# Patient Record
Sex: MALE | Race: WHITE | Employment: PART TIME | URBAN - METROPOLITAN AREA
[De-identification: names, ages, dates, MRNs, and addresses within clinical notes are randomized per-mention and may not be internally consistent; named-entity substitution may affect disease eponyms.]

---

## 2020-07-01 ENCOUNTER — HOSPITAL ENCOUNTER (OUTPATIENT)
Dept: GENERAL RADIOLOGY | Age: 65
Discharge: HOME | End: 2020-07-01
Payer: COMMERCIAL

## 2020-07-01 ENCOUNTER — HOSPITAL ENCOUNTER (OUTPATIENT)
Age: 65
Discharge: HOME | End: 2020-07-01
Payer: COMMERCIAL

## 2020-07-01 PROCEDURE — 72040 X-RAY EXAM NECK SPINE 2-3 VW: CPT

## 2020-11-11 ENCOUNTER — HOSPITAL ENCOUNTER (OUTPATIENT)
Dept: NON INVASIVE DIAGNOSTICS | Age: 65
Discharge: HOME | DRG: 191 | End: 2020-11-11
Payer: MEDICAID

## 2020-11-11 ENCOUNTER — OFFICE VISIT (OUTPATIENT)
Dept: CARDIOLOGY CLINIC | Age: 65
End: 2020-11-11
Payer: MEDICAID

## 2020-11-11 VITALS
WEIGHT: 252 LBS | HEIGHT: 68 IN | HEART RATE: 80 BPM | SYSTOLIC BLOOD PRESSURE: 138 MMHG | BODY MASS INDEX: 38.19 KG/M2 | DIASTOLIC BLOOD PRESSURE: 78 MMHG

## 2020-11-11 PROBLEM — I20.0 UNSTABLE ANGINA (HCC): Status: ACTIVE | Noted: 2020-11-11

## 2020-11-11 PROBLEM — R07.9 EXERTIONAL CHEST PAIN: Status: ACTIVE | Noted: 2020-11-11

## 2020-11-11 PROBLEM — R94.39 ABNORMAL STRESS TEST: Status: ACTIVE | Noted: 2020-11-11

## 2020-11-11 LAB
LV EF: 48 %
LVEF MODALITY: NORMAL

## 2020-11-11 PROCEDURE — 93017 CV STRESS TEST TRACING ONLY: CPT | Performed by: INTERNAL MEDICINE

## 2020-11-11 PROCEDURE — 99205 OFFICE O/P NEW HI 60 MIN: CPT | Performed by: INTERNAL MEDICINE

## 2020-11-11 PROCEDURE — 6360000002 HC RX W HCPCS: Performed by: INTERNAL MEDICINE

## 2020-11-11 PROCEDURE — 78452 HT MUSCLE IMAGE SPECT MULT: CPT | Performed by: INTERNAL MEDICINE

## 2020-11-11 PROCEDURE — A9502 TC99M TETROFOSMIN: HCPCS | Performed by: INTERNAL MEDICINE

## 2020-11-11 PROCEDURE — 3430000000 HC RX DIAGNOSTIC RADIOPHARMACEUTICAL: Performed by: INTERNAL MEDICINE

## 2020-11-11 RX ORDER — ASPIRIN 81 MG/1
81 TABLET, CHEWABLE ORAL DAILY
Status: ON HOLD | COMMUNITY
End: 2020-11-20 | Stop reason: HOSPADM

## 2020-11-11 RX ORDER — ACETAMINOPHEN AND CODEINE PHOSPHATE 300; 60 MG/1; MG/1
1 TABLET ORAL EVERY 8 HOURS PRN
Status: ON HOLD | COMMUNITY
End: 2020-11-22 | Stop reason: HOSPADM

## 2020-11-11 RX ORDER — TAMSULOSIN HYDROCHLORIDE 0.4 MG/1
0.4 CAPSULE ORAL DAILY
COMMUNITY

## 2020-11-11 RX ADMIN — REGADENOSON 0.4 MG: 0.08 INJECTION, SOLUTION INTRAVENOUS at 11:06

## 2020-11-11 RX ADMIN — TETROFOSMIN 10 MILLICURIE: 1.38 INJECTION, POWDER, LYOPHILIZED, FOR SOLUTION INTRAVENOUS at 10:14

## 2020-11-11 RX ADMIN — TETROFOSMIN 30 MILLICURIE: 1.38 INJECTION, POWDER, LYOPHILIZED, FOR SOLUTION INTRAVENOUS at 12:14

## 2020-11-11 NOTE — PROGRESS NOTES
daily  Patient taking differently: Take 50 mg by mouth daily  Yes Lorenzo Montoya MD       [x] Medications and dosages reviewed. ROS:  [x]Full ROS obtained and negative except as mentioned in HPI      Physical Examination:    Vitals:    11/11/20 1320   BP: 138/78   Site: Left Upper Arm   Position: Sitting   Cuff Size: Medium Adult   Pulse: 80   Weight: 252 lb (114.3 kg)   Height: 5' 8\" (1.727 m)        · GENERAL: Well developed, well nourished, No acute distress  · NEUROLOGICAL: Alert and oriented  · PSYCH: Calm affect  · SKIN: Warm and dry, No visible rash,   · EYES: Pupils equal and round, Sclera non-icteric,   · HENT:  External ears and nose unremarkable, mucus membranes moist  · MUSCULOSKELETAL: Normal cephalic, neck supple  · CAROTID: Normal upstroke, no bruits  · CARDIAC: JVP normal, Normal PMI, regular rate and rhythm, normal S1S2, no murmur, rub, or gallop  · RESPIRATORY: Normal respiratory effort, clear to auscultation bilaterally  · EXTREMITIES: No edema  · GASTROINTESTINAL: normal bowel sounds, soft, non-tender, No hepatomegaly     MYOVIEW  Summary     -There is a moderate sized, severe intensity, significantly reversible     inferior-lateral defect c/w ischemia and some scar.     -There is mild inferior-lateral hypokinesis with EF=48%.  -Moderate risk study           Assessment:     Abnormal Stress: Moderate area of ischemia. Needs cath possible PCI. Discussed risk/benefits. Pt agrees to proceed. Plan for tomorrow. Offered admission today but pt declines as he has to take care of his cat.     Chest pain/Unstable angina:  As above  Cath possible in am  On atenolol and ASA  Told to call 911 if any significant pain  Limit activity    HTN:  /78 (Site: Left Upper Arm, Position: Sitting, Cuff Size: Medium Adult)   Pulse 80   Ht 5' 8\" (1.727 m)   Wt 252 lb (114.3 kg)   BMI 38.32 kg/m²   Controlled      Plan:  Cath possible in am  Call 911 with any issues tonight  Discussed with pt and Dr. Mayela Damon via phone    Thank you for allowing me to participate in the care of this individual.      Esteban Payan M.D., [facility] [place]

## 2020-11-12 ENCOUNTER — APPOINTMENT (OUTPATIENT)
Dept: GENERAL RADIOLOGY | Age: 65
DRG: 191 | End: 2020-11-12
Attending: INTERNAL MEDICINE
Payer: MEDICAID

## 2020-11-12 ENCOUNTER — HOSPITAL ENCOUNTER (INPATIENT)
Dept: CARDIAC CATH/INVASIVE PROCEDURES | Age: 65
LOS: 1 days | Discharge: HOME | DRG: 191 | End: 2020-11-13
Attending: INTERNAL MEDICINE | Admitting: INTERNAL MEDICINE
Payer: MEDICAID

## 2020-11-12 PROBLEM — I25.83 CORONARY ARTERY DISEASE DUE TO LIPID RICH PLAQUE: Status: ACTIVE | Noted: 2020-11-12

## 2020-11-12 PROBLEM — I25.10 CORONARY ARTERY DISEASE DUE TO LIPID RICH PLAQUE: Status: ACTIVE | Noted: 2020-11-12

## 2020-11-12 LAB
ANION GAP SERPL CALCULATED.3IONS-SCNC: 11 MMOL/L (ref 3–16)
BASOPHILS ABSOLUTE: 0.1 K/UL (ref 0–0.2)
BASOPHILS RELATIVE PERCENT: 0.8 %
BILIRUBIN URINE: NEGATIVE
BLOOD, URINE: NEGATIVE
BUN BLDV-MCNC: 28 MG/DL (ref 7–20)
CALCIUM SERPL-MCNC: 9.7 MG/DL (ref 8.3–10.6)
CHLORIDE BLD-SCNC: 100 MMOL/L (ref 99–110)
CLARITY: CLEAR
CO2: 28 MMOL/L (ref 21–32)
COLOR: YELLOW
CREAT SERPL-MCNC: 1.1 MG/DL (ref 0.8–1.3)
EKG ATRIAL RATE: 77 BPM
EKG DIAGNOSIS: NORMAL
EKG P AXIS: 28 DEGREES
EKG P-R INTERVAL: 172 MS
EKG Q-T INTERVAL: 404 MS
EKG QRS DURATION: 106 MS
EKG QTC CALCULATION (BAZETT): 457 MS
EKG R AXIS: 28 DEGREES
EKG T AXIS: 4 DEGREES
EKG VENTRICULAR RATE: 77 BPM
EOSINOPHILS ABSOLUTE: 0.1 K/UL (ref 0–0.6)
EOSINOPHILS RELATIVE PERCENT: 1.9 %
GFR AFRICAN AMERICAN: >60
GFR NON-AFRICAN AMERICAN: >60
GLUCOSE BLD-MCNC: 154 MG/DL (ref 70–99)
GLUCOSE URINE: NEGATIVE MG/DL
HCT VFR BLD CALC: 43.7 % (ref 40.5–52.5)
HEMOGLOBIN: 15.1 G/DL (ref 13.5–17.5)
KETONES, URINE: NEGATIVE MG/DL
LEFT VENTRICULAR EJECTION FRACTION MODE: NORMAL
LEUKOCYTE ESTERASE, URINE: NEGATIVE
LV EF: 45 %
LV EF: 55 %
LVEF MODALITY: NORMAL
LYMPHOCYTES ABSOLUTE: 1.8 K/UL (ref 1–5.1)
LYMPHOCYTES RELATIVE PERCENT: 22.8 %
MCH RBC QN AUTO: 32.5 PG (ref 26–34)
MCHC RBC AUTO-ENTMCNC: 34.6 G/DL (ref 31–36)
MCV RBC AUTO: 93.8 FL (ref 80–100)
MICROSCOPIC EXAMINATION: NORMAL
MONOCYTES ABSOLUTE: 0.7 K/UL (ref 0–1.3)
MONOCYTES RELATIVE PERCENT: 9.5 %
NEUTROPHILS ABSOLUTE: 5 K/UL (ref 1.7–7.7)
NEUTROPHILS RELATIVE PERCENT: 65 %
NITRITE, URINE: NEGATIVE
PDW BLD-RTO: 12.5 % (ref 12.4–15.4)
PH UA: 5.5 (ref 5–8)
PLATELET # BLD: 232 K/UL (ref 135–450)
PMV BLD AUTO: 8.3 FL (ref 5–10.5)
POTASSIUM SERPL-SCNC: 3.2 MMOL/L (ref 3.5–5.1)
PROTEIN UA: NEGATIVE MG/DL
RBC # BLD: 4.66 M/UL (ref 4.2–5.9)
SODIUM BLD-SCNC: 139 MMOL/L (ref 136–145)
SPECIFIC GRAVITY UA: >1.03 (ref 1–1.03)
URINE TYPE: NORMAL
UROBILINOGEN, URINE: 0.2 E.U./DL
WBC # BLD: 7.7 K/UL (ref 4–11)

## 2020-11-12 PROCEDURE — 36415 COLL VENOUS BLD VENIPUNCTURE: CPT

## 2020-11-12 PROCEDURE — B2151ZZ FLUOROSCOPY OF LEFT HEART USING LOW OSMOLAR CONTRAST: ICD-10-PCS | Performed by: INTERNAL MEDICINE

## 2020-11-12 PROCEDURE — B2111ZZ FLUOROSCOPY OF MULTIPLE CORONARY ARTERIES USING LOW OSMOLAR CONTRAST: ICD-10-PCS | Performed by: INTERNAL MEDICINE

## 2020-11-12 PROCEDURE — 93306 TTE W/DOPPLER COMPLETE: CPT

## 2020-11-12 PROCEDURE — 6370000000 HC RX 637 (ALT 250 FOR IP): Performed by: INTERNAL MEDICINE

## 2020-11-12 PROCEDURE — U0003 INFECTIOUS AGENT DETECTION BY NUCLEIC ACID (DNA OR RNA); SEVERE ACUTE RESPIRATORY SYNDROME CORONAVIRUS 2 (SARS-COV-2) (CORONAVIRUS DISEASE [COVID-19]), AMPLIFIED PROBE TECHNIQUE, MAKING USE OF HIGH THROUGHPUT TECHNOLOGIES AS DESCRIBED BY CMS-2020-01-R: HCPCS

## 2020-11-12 PROCEDURE — 93458 L HRT ARTERY/VENTRICLE ANGIO: CPT | Performed by: INTERNAL MEDICINE

## 2020-11-12 PROCEDURE — 71045 X-RAY EXAM CHEST 1 VIEW: CPT

## 2020-11-12 PROCEDURE — 80048 BASIC METABOLIC PNL TOTAL CA: CPT

## 2020-11-12 PROCEDURE — 2140000000 HC CCU INTERMEDIATE R&B

## 2020-11-12 PROCEDURE — C1769 GUIDE WIRE: HCPCS

## 2020-11-12 PROCEDURE — 2500000003 HC RX 250 WO HCPCS

## 2020-11-12 PROCEDURE — 93880 EXTRACRANIAL BILAT STUDY: CPT

## 2020-11-12 PROCEDURE — 99152 MOD SED SAME PHYS/QHP 5/>YRS: CPT

## 2020-11-12 PROCEDURE — 81003 URINALYSIS AUTO W/O SCOPE: CPT

## 2020-11-12 PROCEDURE — 99153 MOD SED SAME PHYS/QHP EA: CPT

## 2020-11-12 PROCEDURE — 93005 ELECTROCARDIOGRAM TRACING: CPT | Performed by: INTERNAL MEDICINE

## 2020-11-12 PROCEDURE — 85025 COMPLETE CBC W/AUTO DIFF WBC: CPT

## 2020-11-12 PROCEDURE — 93458 L HRT ARTERY/VENTRICLE ANGIO: CPT

## 2020-11-12 PROCEDURE — 6360000004 HC RX CONTRAST MEDICATION: Performed by: INTERNAL MEDICINE

## 2020-11-12 PROCEDURE — 93010 ELECTROCARDIOGRAM REPORT: CPT | Performed by: INTERNAL MEDICINE

## 2020-11-12 PROCEDURE — APPSS60 APP SPLIT SHARED TIME 46-60 MINUTES: Performed by: NURSE PRACTITIONER

## 2020-11-12 PROCEDURE — 6360000002 HC RX W HCPCS

## 2020-11-12 PROCEDURE — 93971 EXTREMITY STUDY: CPT

## 2020-11-12 PROCEDURE — 2709999900 HC NON-CHARGEABLE SUPPLY

## 2020-11-12 PROCEDURE — 2580000003 HC RX 258: Performed by: INTERNAL MEDICINE

## 2020-11-12 PROCEDURE — U0002 COVID-19 LAB TEST NON-CDC: HCPCS

## 2020-11-12 PROCEDURE — 4A023N7 MEASUREMENT OF CARDIAC SAMPLING AND PRESSURE, LEFT HEART, PERCUTANEOUS APPROACH: ICD-10-PCS | Performed by: INTERNAL MEDICINE

## 2020-11-12 PROCEDURE — 94010 BREATHING CAPACITY TEST: CPT

## 2020-11-12 PROCEDURE — C1894 INTRO/SHEATH, NON-LASER: HCPCS

## 2020-11-12 RX ORDER — ONDANSETRON 2 MG/ML
4 INJECTION INTRAMUSCULAR; INTRAVENOUS EVERY 6 HOURS PRN
Status: DISCONTINUED | OUTPATIENT
Start: 2020-11-12 | End: 2020-11-13 | Stop reason: HOSPADM

## 2020-11-12 RX ORDER — SODIUM CHLORIDE 0.9 % (FLUSH) 0.9 %
10 SYRINGE (ML) INJECTION EVERY 12 HOURS SCHEDULED
Status: DISCONTINUED | OUTPATIENT
Start: 2020-11-12 | End: 2020-11-13 | Stop reason: HOSPADM

## 2020-11-12 RX ORDER — TAMSULOSIN HYDROCHLORIDE 0.4 MG/1
0.4 CAPSULE ORAL DAILY
Status: DISCONTINUED | OUTPATIENT
Start: 2020-11-12 | End: 2020-11-13 | Stop reason: HOSPADM

## 2020-11-12 RX ORDER — ACETAMINOPHEN 325 MG/1
650 TABLET ORAL EVERY 4 HOURS PRN
Status: DISCONTINUED | OUTPATIENT
Start: 2020-11-12 | End: 2020-11-13 | Stop reason: HOSPADM

## 2020-11-12 RX ORDER — ASPIRIN 81 MG/1
81 TABLET, CHEWABLE ORAL DAILY
Status: DISCONTINUED | OUTPATIENT
Start: 2020-11-13 | End: 2020-11-13 | Stop reason: HOSPADM

## 2020-11-12 RX ORDER — METOPROLOL TARTRATE 50 MG/1
50 TABLET, FILM COATED ORAL 2 TIMES DAILY
Status: DISCONTINUED | OUTPATIENT
Start: 2020-11-12 | End: 2020-11-13 | Stop reason: HOSPADM

## 2020-11-12 RX ORDER — SODIUM CHLORIDE 0.9 % (FLUSH) 0.9 %
10 SYRINGE (ML) INJECTION PRN
Status: DISCONTINUED | OUTPATIENT
Start: 2020-11-12 | End: 2020-11-13 | Stop reason: HOSPADM

## 2020-11-12 RX ORDER — RANOLAZINE 500 MG/1
500 TABLET, EXTENDED RELEASE ORAL 2 TIMES DAILY
Status: DISCONTINUED | OUTPATIENT
Start: 2020-11-12 | End: 2020-11-13 | Stop reason: HOSPADM

## 2020-11-12 RX ORDER — ATORVASTATIN CALCIUM 80 MG/1
80 TABLET, FILM COATED ORAL NIGHTLY
Status: DISCONTINUED | OUTPATIENT
Start: 2020-11-12 | End: 2020-11-13 | Stop reason: HOSPADM

## 2020-11-12 RX ADMIN — IOPAMIDOL 79 ML: 755 INJECTION, SOLUTION INTRAVENOUS at 09:36

## 2020-11-12 RX ADMIN — RANOLAZINE 500 MG: 500 TABLET, EXTENDED RELEASE ORAL at 20:02

## 2020-11-12 RX ADMIN — RANOLAZINE 500 MG: 500 TABLET, EXTENDED RELEASE ORAL at 14:00

## 2020-11-12 RX ADMIN — ATORVASTATIN CALCIUM 80 MG: 80 TABLET, FILM COATED ORAL at 20:02

## 2020-11-12 RX ADMIN — NITROGLYCERIN 0.5 INCH: 20 OINTMENT TOPICAL at 13:00

## 2020-11-12 RX ADMIN — NITROGLYCERIN 0.5 INCH: 20 OINTMENT TOPICAL at 17:35

## 2020-11-12 RX ADMIN — Medication 10 ML: at 20:03

## 2020-11-12 ASSESSMENT — PAIN SCALES - GENERAL
PAINLEVEL_OUTOF10: 0

## 2020-11-12 NOTE — BRIEF OP NOTE
Cardiac Cath 11/12/2020:  Access: RRA  Ultrasound: Ultrasound guidance used to determine after mentioned artery patency, size (> 2 mm), anatomic variations and ideal puncture location. Real-time ultrasound utilized concurrent with vascular needle entry into the artery. Images permanently recorded and reported in the patient chart. Hemostasis: TR band  Conscious sedation start time: 0905  Conscious sedation stop time: 0931  Versed: 4 mg  Fentanyl: 150 mcg  Bleeding risk: Low  LVEDP: 22 mmHg  AO: 101/59 mmHg  Estimated blood loss: Less than 25 mL  Contrast: 79 mL  Fluoroscopy time: 2.5 min. Anatomy:   LM-30% distal  LAD-70% mid, 100% distal with left to left collaterals  D1-80% proximal  Cx-100% proximal with left to left collaterals  OM1-100% proximal with left to left collaterals  RCA-100% proximal with right to right and left-to-right collaterals  LVEF-55%    Impression:  1. Severe three-vessel CAD with extensive collateralization. 2.  Preserved LV systolic function with LVEF of 55%. Plan:  1. Recommend CV surgical evaluation for CABG.

## 2020-11-12 NOTE — CONSULTS
Department of Cardiovascular & Thoracic Surgery   Consultation          PCP: Camilla Vaz MD    Referring Physician: Ofe Peña MD    DIAGNOSIS:  CAD    CHIEF COMPLAINT:  Chest pain     History Obtained From:  Patient and electronic medical record     HISTORY OF PRESENT ILLNESS:      The patient is a 59 y.o. male with significant past medical history of hypertension, former smoker, and family history of heart disease. Patient has been having chest pain for two years. He has been seen by dr. Padmaja Lambert. On 11/11 patient had a stress test with moderate inferior lateral ischemia. He underwent a cardiac angiogram today showed multi vessel CAD. We were consulted to evaluate for CABG. Patient is in stable condition. Past Medical History:        Diagnosis Date    Anxiety     Cervical disc herniation     HTN (hypertension) 5/9/2013    Neck sprain     Post traumatic stress disorder     Rotator cuff (capsule) sprain and strain     Sprain of shoulder, left     Sprain of shoulder, right        Past Surgical History:        Procedure Laterality Date    ANKLE SURGERY Right     around 2000     Home medications:  Prior to Admission medications    Medication Sig Start Date End Date Taking? Authorizing Provider   tamsulosin (FLOMAX) 0.4 MG capsule Take 0.4 mg by mouth daily   Yes Historical Provider, MD   acetaminophen-codeine (TYLENOL/CODEINE #4) 300-60 MG per tablet Take 1 tablet by mouth every 8 hours as needed for Pain. Yes Historical Provider, MD   aspirin 81 MG chewable tablet Take 81 mg by mouth daily   Yes Historical Provider, MD   atenolol (TENORMIN) 100 MG tablet Take 1 tablet by mouth daily  Patient taking differently: Take 50 mg by mouth daily  5/18/15  Yes Camilla Vaz MD       Medications:   No current facility-administered medications for this encounter. Allergies:  Patient has no known allergies.     Social History:    TOBACCO: former smoker   ETOH: reports no history of alcohol abuse   DRUGS: reports no history of drug abuse   LIFESTYLE: active   MARITAL STATUS:   OCCUPATION:  [address]      Family History:        Problem Relation Age of Onset    Heart Disease Father        REVIEW OF SYSTEMS:    CONSTITUTIONAL: alert and oriented x 3, pleasant 59years old male   DERMATOLOGICAL: negative  NEUROLOGICAL:  negative  EYES:  negative  HEENT:  negative  RESPIRATORY:  negative  CARDIOVASCULAR:  negative  GASTROINTESTINAL:  negative  GENITO-URINARY: negative  ENDOCRINE: negative  MUSCULOSKELETAL:  negative  HEMATOLOGICAL AND LYMPHATIC: negative  IMMUNOLOGICAL: negative  PSYCHOLOGICAL: negative    PHYSICAL EXAM:    VITALS:  BP (!) 152/90   Ht 5' 8\" (1.727 m)   Wt 252 lb (114.3 kg)   BMI 38.32 kg/m²     Eyes:  lids and lashes normal, pupils equal and round, extra ocular muscles intact, sclera clear, conjunctiva normal    Head/ENT:  Normocephalic, atraumatic, no residual dentition, normal gums, & palate, oropharynx without erythema or exudates    Neck:  supple, symmetrical, trachea midline, no lymphadenopathy, no jugular venous distension, no carotid bruits and MASSES:  no masses    Lungs:  no increased work of breathing, good air exchange, no retractions and clear to auscultation, no crackles or wheezing, no palpable / percussible abnormalities    Cardiovascular:  regular rate and rhythm, S1, S2 normal, no murmur, click, rub or gallop, normal apical impulse and prominent apical impulse    Pulses:  Right dorsalis pedis 2, Left dorsalis pedis 2, Right posterior tibial 1, Left Posterior tibial 1, Right Femoral 2, Left Femoral 2, Right radial 2, and Left radial 2    Abdomen:  Soft, normal bowel sounds, non-tender, no hepatosplenomegaly, aorta likely normal and bruits absent    Musculoskeletal:  Back is straight and non-tender,  No CVAT, full ROM of upper and lower extremities. Extremities:   No clubbing, or cyanosis, or edema .   He does have a right ankle surgical incision from orthopedic surgery and he mentions that he has hardware (rods) in place. Skin: warm and normal turgor, no ulcers, infections, or rashes, no rashes    Neurological: awake, alert and oriented x 3, motor 5/5 bilateral upper and lower extremities, sensation grossly intact    Psychiatric: Mood and affect appear appropriate    DATA:    EKG: sinus rhythm  CBC:   Lab Results   Component Value Date    WBC 7.7 11/12/2020    RBC 4.66 11/12/2020    HGB 15.1 11/12/2020    HCT 43.7 11/12/2020    MCV 93.8 11/12/2020    MCH 32.5 11/12/2020    MCHC 34.6 11/12/2020    RDW 12.5 11/12/2020     11/12/2020    MPV 8.3 11/12/2020     BMP:    Lab Results   Component Value Date     11/12/2020    K 3.2 11/12/2020     11/12/2020    CO2 28 11/12/2020    BUN 28 11/12/2020    LABALBU 4.4 05/09/2013    CREATININE 1.1 11/12/2020    CALCIUM 9.7 11/12/2020    GFRAA >60 11/12/2020    GFRAA >60 05/09/2013    LABGLOM >60 11/12/2020    GLUCOSE 154 11/12/2020         Cardiac Angiogram: 11/12/2020  Summary:  LM-30% distal  LAD-70% mid, 100% distal with left to left collaterals  D1-80% proximal  Cx-100% proximal with left to left collaterals  OM1-100% proximal with left to left collaterals  RCA-100% proximal with right to right and left-to-right collaterals  LVEF-55%     Impression:  1. Severe three-vessel CAD with extensive collateralization. 2.  Preserved LV systolic function with LVEF of 55%.     [id number] Score for Atrial Fibrillation Stroke Risk   Risk   Factors  Component Value   C CHF No 0   H HTN Yes 1   A2 Age >= 76 No,  (62 y.o.) 0   D DM No 0   S2 Prior Stroke/TIA No 0   V Vascular Disease No 0   A Age 74-69 No,  (62 y.o.) 0   Sc Sex male 0    [id number]  Score  1   Score last updated 11/12/20 7:91 AM EST    Click here for a link to the UpToDate guideline \"Atrial Fibrillation: Anticoagulation therapy to prevent embolization    Disclaimer: Risk Score calculation is dependent on accuracy of patient problem list and past encounter diagnosis. STS Risk score:  Risk of Mortality: 0.519%   Renal Failure: 0.930%   Permanent Stroke: 0.493%   Prolonged Ventilation: 3.165%   DSW Infection: 0.300%   Reoperation: 1.086%   Morbidity or Mortality: 5.044%   Short Length of Stay: 65.204%   Long Length of Stay: 1.597%      ASSESSMENT AND PLAN:  54-year-old male who presents with worsening exertional shortness of breath and chest pain for which he underwent an abnormal stress test recently. That led to a cardiac cath earlier today which showed severe three-vessel coronary artery disease as outlined above. We are asked to evaluate the patient for consideration for surgical revascularization with CABG. Treatment options including not doing anything and the consequences of that versus medical therapy versus PCI versus CABG were discussed in extent with the patient. Best option is CABG. We will need to complete standard preoperative work-up and risk stratification. The patient wishes to be discharged home to arrange some personal matters before returning next week for surgery, possibly Monday or Tuesday. He lives alone so he he will need appoint one of his family members as medical POA and I instructed him to discuss with other family members the possibility of someone spending a few nights with him at home after his discharge from the hospital in the early postoperative phase.   He understands that with urgent CABG surgery he has risks including but not limited to bleeding that might require transfusions and/or reexploration, infection, irregular heartbeat, heart block that might require a permanent pacemaker, wound dehiscence, sternal dehiscence with possible need for reconstructive surgery and multiple surgical scars, chronic chest wall pain syndrome and/or chest wall numbness/tingling, brachial plexus and/or peripheral neuropathy, stroke with possible permanent deficit, respiratory failure with possible need for prolonged mechanical ventilation and/or tracheostomy, acute kidney injury with possible need for dialysis, intestinal organ malperfusion and possible limb ischemia and limb loss, postoperative low cardiac output syndrome that might require mechanical circulatory support, heart attack, bypass graft failure that might require another percutaneous intervention and/or repeat bypass surgery, and even death with an operation. Pertinent questions were asked and answered. The patient was counseled at length about the risks of rosemary Covid-19 during their perioperative period and any recovery window from their procedure. The patient was made aware that rosemary Covid-19  may worsen their prognosis for recovering from their procedure  and lend to a higher morbidity and/or mortality risk. All material risks, benefits, and reasonable alternatives including postponing the procedure were discussed. The patient does wish to proceed with the procedure at this time. Dr. Laney Runner, thank you very much for allowing us to participate in the care of this patient.

## 2020-11-12 NOTE — H&P
[facility]  Cardiac Consult     Referring Provider:  Boris Ziegler MD     Chief complaint:  Chest pain. History of Present Illness:  59 y.o. male seen in consultation at the request of Dr. Abrahan Dao for chest pain and abnormal stress. He says he has been having chest pain for 2 years. Worsening over the past 2 months and much worse over the past 2 months. Pain in the center f his chest. Exacerbated by exertion and improved by rest. PCP recently added atenolol and that has also help. He rubs Vicks on his chest at night. He had a stress myvoiew today with moderate inferior lateral ischemia/scar. Past Medical History:   has a past medical history of Anxiety, Cervical disc herniation, HTN (hypertension), Neck sprain, Post traumatic stress disorder, Rotator cuff (capsule) sprain and strain, Sprain of shoulder, left, and Sprain of shoulder, right. Surgical History:   has a past surgical history that includes Ankle surgery (Right). Social History:  Social History     Tobacco Use    Smoking status: Former Smoker     Packs/day: 0.50     Types: Cigarettes    Smokeless tobacco: Never Used   Substance Use Topics    Alcohol use: Yes     Alcohol/week: 0.8 standard drinks     Types: 1 Standard drinks or equivalent per week        Family History:  family history includes Heart Disease in his father. Allergies:  Patient has no known allergies. Home Medications:  Prior to Visit Medications    Medication Sig Taking? Authorizing Provider   tamsulosin (FLOMAX) 0.4 MG capsule Take 0.4 mg by mouth daily Yes Historical Provider, MD   acetaminophen-codeine (TYLENOL/CODEINE #4) 300-60 MG per tablet Take 1 tablet by mouth every 8 hours as needed for Pain.  Yes Historical Provider, MD   aspirin 81 MG chewable tablet Take 81 mg by mouth daily Yes Historical Provider, MD   atenolol (TENORMIN) 100 MG tablet Take 1 tablet by mouth daily  Patient taking differently: Take 50 mg by mouth daily  Yes Boris Ziegler MD       [x] Medications and dosages reviewed. ROS:  [x]Full ROS obtained and negative except as mentioned in HPI      Physical Examination:    Vitals:    11/12/20 0826   BP: (!) 152/90   Weight: 252 lb (114.3 kg)   Height: 5' 8\" (1.727 m)        · GENERAL: Well developed, well nourished, No acute distress  · NEUROLOGICAL: Alert and oriented  · PSYCH: Calm affect  · SKIN: Warm and dry, No visible rash,   · EYES: Pupils equal and round, Sclera non-icteric,   · HENT:  External ears and nose unremarkable, mucus membranes moist  · MUSCULOSKELETAL: Normal cephalic, neck supple  · CAROTID: Normal upstroke, no bruits  · CARDIAC: JVP normal, Normal PMI, regular rate and rhythm, normal S1S2, no murmur, rub, or gallop  · RESPIRATORY: Normal respiratory effort, clear to auscultation bilaterally  · EXTREMITIES: No edema  · GASTROINTESTINAL: normal bowel sounds, soft, non-tender, No hepatomegaly     MYOVIEW  Summary     -There is a moderate sized, severe intensity, significantly reversible     inferior-lateral defect c/w ischemia and some scar.     -There is mild inferior-lateral hypokinesis with EF=48%.  -Moderate risk study           Assessment:     Abnormal Stress: Moderate area of ischemia. Needs cath possible PCI. Discussed risk/benefits. Pt agrees to proceed. Plan for tomorrow. Offered admission today but pt declines as he has to take care of his cat. Chest pain/Unstable angina:  As above  Cath possible in am  On atenolol and ASA  Told to call 911 if any significant pain  Limit activity    HTN:  /78 (Site: Left Upper Arm, Position: Sitting, Cuff Size: Medium Adult)   Pulse 80   Ht 5' 8\" (1.727 m)   Wt 252 lb (114.3 kg)   BMI 38.32 kg/m²   Controlled      Plan:  Cath possible in am  Call 911 with any issues tonight  Discussed with pt and Dr. Roberto Kaye via phone    Thank you for allowing me to participate in the care of this individual.      Trevon Sotomayor M.D., [facility] [place]    Patient seen and examined.   Chart reviewed. No changes to H&P. Cardiac catheterization today. Dianne Hugger Karla Cockayne, MD

## 2020-11-12 NOTE — PRE SEDATION
Brief Pre-Op Note/Sedation Assessment      Macie Faust  1955  Cath/NONE      [id number]  9:06 AM    Planned Procedure: Cardiac Catheterization Procedure    Post Procedure Plan: Return to same level of care    Consent: I have discussed with the patient and/or the patient representative the indication, alternatives, and the possible risks and/or complications of the planned procedure and the anesthesia methods. The patient and/or patient representative appear to understand and agree to proceed. Chief Complaint: Chest Pain/Pressure  Anginal Equivalent      Indications for Cath Procedure:  New Onset Angina <= 2 months  Anginal Classification within 2 weeks:  CCS III - Symptoms with everyday living activities, i.e., moderate limitation  NYHA Heart Failure Class within 2 weeks: No symptoms  Is Cath Lab Visit Valve-related?: No  Surgical Risk: Low  Functional Type: >= 4 METS with symptoms    Anti- Anginal Meds within 2 weeks:   Yes: Beta Blockers and Aspirin    Stress or Imaging Studies Performed:  Stress Test with SPECT Result: Positive:  inferolateral Risk/Extent of Ischemia:  Intermediate     Vital Signs:  BP (!) 152/90   Ht 5' 8\" (1.727 m)   Wt 252 lb (114.3 kg)   BMI 38.32 kg/m²     Allergies:  No Known Allergies    Past Medical History:  Past Medical History:   Diagnosis Date    Anxiety     Cervical disc herniation     HTN (hypertension) 5/9/2013    Neck sprain     Post traumatic stress disorder     Rotator cuff (capsule) sprain and strain     Sprain of shoulder, left     Sprain of shoulder, right          Surgical History:  Past Surgical History:   Procedure Laterality Date    ANKLE SURGERY Right     around 2000         Medications:  No current facility-administered medications for this encounter. Pre-Sedation:    Pre-Sedation Documentation and Exam:  I have assessed the patient and agree with the H&P present on the chart.     Prior History of Anesthesia Complications: none    Modified Mallampati:  III (soft palate, base of uvula visible)    ASA Classification:  Class 2 - A normal healthy patient with mild systemic disease      Shelby Scale: Activity:  2 - Able to move 4 extremities voluntarily on command  Respiration:  2 - Able to breathe deeply and cough freely  Circulation:  2 - BP+/- 20mmHg of normal  Consciousness:  2 - Fully awake  Oxygen Saturation (color):  2 - Able to maintain oxygen saturation >92% on room air    Sedation/Anesthesia Plan:  Guard the patient's safety and welfare. Minimize physical discomfort and pain. Minimize negative psychological responses to treatment by providing sedation and analgesia and maximize the potential amnesia. Patient to meet pre-procedure discharge plan.     Medication Planned:  midazolam intravenously and fentanyl intravenously    Patient is an appropriate candidate for plan of sedation: yes      Electronically signed by Edith Mo MD on 11/12/2020 at 9:06 AM

## 2020-11-13 ENCOUNTER — APPOINTMENT (OUTPATIENT)
Dept: CT IMAGING | Age: 65
DRG: 191 | End: 2020-11-13
Attending: INTERNAL MEDICINE
Payer: MEDICAID

## 2020-11-13 ENCOUNTER — ANESTHESIA EVENT (OUTPATIENT)
Dept: OPERATING ROOM | Age: 65
DRG: 166 | End: 2020-11-13
Payer: MEDICAID

## 2020-11-13 VITALS
DIASTOLIC BLOOD PRESSURE: 91 MMHG | BODY MASS INDEX: 37.96 KG/M2 | RESPIRATION RATE: 16 BRPM | HEART RATE: 86 BPM | OXYGEN SATURATION: 94 % | TEMPERATURE: 97.1 F | HEIGHT: 68 IN | SYSTOLIC BLOOD PRESSURE: 142 MMHG | WEIGHT: 250.44 LBS

## 2020-11-13 LAB
A/G RATIO: 1.3 (ref 1.1–2.2)
ALBUMIN SERPL-MCNC: 3.6 G/DL (ref 3.4–5)
ALP BLD-CCNC: 71 U/L (ref 40–129)
ALT SERPL-CCNC: 17 U/L (ref 10–40)
ANION GAP SERPL CALCULATED.3IONS-SCNC: 10 MMOL/L (ref 3–16)
ANION GAP SERPL CALCULATED.3IONS-SCNC: 13 MMOL/L (ref 3–16)
AST SERPL-CCNC: 26 U/L (ref 15–37)
BILIRUB SERPL-MCNC: 0.9 MG/DL (ref 0–1)
BUN BLDV-MCNC: 24 MG/DL (ref 7–20)
BUN BLDV-MCNC: 25 MG/DL (ref 7–20)
CALCIUM SERPL-MCNC: 9 MG/DL (ref 8.3–10.6)
CALCIUM SERPL-MCNC: 9.4 MG/DL (ref 8.3–10.6)
CHLORIDE BLD-SCNC: 104 MMOL/L (ref 99–110)
CHLORIDE BLD-SCNC: 105 MMOL/L (ref 99–110)
CO2: 23 MMOL/L (ref 21–32)
CO2: 27 MMOL/L (ref 21–32)
CREAT SERPL-MCNC: 1.1 MG/DL (ref 0.8–1.3)
CREAT SERPL-MCNC: 1.2 MG/DL (ref 0.8–1.3)
GFR AFRICAN AMERICAN: >60
GFR AFRICAN AMERICAN: >60
GFR NON-AFRICAN AMERICAN: >60
GFR NON-AFRICAN AMERICAN: >60
GLOBULIN: 2.8 G/DL
GLUCOSE BLD-MCNC: 130 MG/DL (ref 70–99)
GLUCOSE BLD-MCNC: 134 MG/DL (ref 70–99)
HCT VFR BLD CALC: 42 % (ref 40.5–52.5)
HEMOGLOBIN: 14.3 G/DL (ref 13.5–17.5)
MCH RBC QN AUTO: 32.1 PG (ref 26–34)
MCHC RBC AUTO-ENTMCNC: 34.2 G/DL (ref 31–36)
MCV RBC AUTO: 93.9 FL (ref 80–100)
PDW BLD-RTO: 12.4 % (ref 12.4–15.4)
PLATELET # BLD: 225 K/UL (ref 135–450)
PMV BLD AUTO: 8.3 FL (ref 5–10.5)
POTASSIUM SERPL-SCNC: 3.3 MMOL/L (ref 3.5–5.1)
POTASSIUM SERPL-SCNC: 3.4 MMOL/L (ref 3.5–5.1)
RBC # BLD: 4.47 M/UL (ref 4.2–5.9)
SARS-COV-2, PCR: NOT DETECTED
SODIUM BLD-SCNC: 140 MMOL/L (ref 136–145)
SODIUM BLD-SCNC: 142 MMOL/L (ref 136–145)
TOTAL PROTEIN: 6.4 G/DL (ref 6.4–8.2)
WBC # BLD: 8.2 K/UL (ref 4–11)

## 2020-11-13 PROCEDURE — 80053 COMPREHEN METABOLIC PANEL: CPT

## 2020-11-13 PROCEDURE — 85027 COMPLETE CBC AUTOMATED: CPT

## 2020-11-13 PROCEDURE — 6370000000 HC RX 637 (ALT 250 FOR IP): Performed by: INTERNAL MEDICINE

## 2020-11-13 PROCEDURE — 71250 CT THORAX DX C-: CPT

## 2020-11-13 PROCEDURE — 83036 HEMOGLOBIN GLYCOSYLATED A1C: CPT

## 2020-11-13 PROCEDURE — APPSS30 APP SPLIT SHARED TIME 16-30 MINUTES: Performed by: NURSE PRACTITIONER

## 2020-11-13 PROCEDURE — 2580000003 HC RX 258: Performed by: INTERNAL MEDICINE

## 2020-11-13 PROCEDURE — 80061 LIPID PANEL: CPT

## 2020-11-13 PROCEDURE — 36415 COLL VENOUS BLD VENIPUNCTURE: CPT

## 2020-11-13 PROCEDURE — 99239 HOSP IP/OBS DSCHRG MGMT >30: CPT | Performed by: INTERNAL MEDICINE

## 2020-11-13 RX ORDER — ATORVASTATIN CALCIUM 80 MG/1
80 TABLET, FILM COATED ORAL NIGHTLY
Qty: 30 TABLET | Refills: 3 | Status: SHIPPED | OUTPATIENT
Start: 2020-11-13 | End: 2020-12-08 | Stop reason: SDUPTHER

## 2020-11-13 RX ORDER — ISOSORBIDE MONONITRATE 30 MG/1
30 TABLET, EXTENDED RELEASE ORAL DAILY
Qty: 30 TABLET | Refills: 3 | Status: ON HOLD
Start: 2020-11-13 | End: 2020-11-20 | Stop reason: HOSPADM

## 2020-11-13 RX ORDER — METOPROLOL TARTRATE 50 MG/1
50 TABLET, FILM COATED ORAL 2 TIMES DAILY
Qty: 60 TABLET | Refills: 3 | Status: ON HOLD
Start: 2020-11-13 | End: 2020-11-20 | Stop reason: HOSPADM

## 2020-11-13 RX ORDER — NITROGLYCERIN 0.4 MG/1
0.4 TABLET SUBLINGUAL EVERY 5 MIN PRN
Qty: 25 TABLET | Refills: 1 | Status: ON HOLD
Start: 2020-11-13 | End: 2020-11-20 | Stop reason: HOSPADM

## 2020-11-13 RX ADMIN — Medication 10 ML: at 08:44

## 2020-11-13 RX ADMIN — RANOLAZINE 500 MG: 500 TABLET, EXTENDED RELEASE ORAL at 08:44

## 2020-11-13 RX ADMIN — METOPROLOL TARTRATE 50 MG: 50 TABLET, FILM COATED ORAL at 08:44

## 2020-11-13 RX ADMIN — ASPIRIN 81 MG: 81 TABLET, CHEWABLE ORAL at 08:44

## 2020-11-13 RX ADMIN — TAMSULOSIN HYDROCHLORIDE 0.4 MG: 0.4 CAPSULE ORAL at 08:44

## 2020-11-13 ASSESSMENT — PAIN SCALES - GENERAL: PAINLEVEL_OUTOF10: 0

## 2020-11-13 NOTE — DISCHARGE SUMMARY
[address]    DISCHARGE SUMMARY      Patient ID:  Farhan Emerson  [id number] 59 y.o. 1955    Admit date: 11/12/2020    Discharge date: 11/13/2020     Admitting Physician: Alma Villaseñor MD     Discharge Physician: Alma Villaseñor     Admission Diagnoses: Abnormal stress test [R94.39]  Exertional chest pain [R07.9]  Unstable angina ([address] [I20.0]  Unstable angina ([address] [I20.0]    Discharge Diagnoses:   Patient Active Problem List   Diagnosis    BWC Rotator cuff (capsule) sprain and strain    BWC Neck sprain and strain    BWC Sprain and strain of unspecified site of shoulder and upper arm    Post traumatic stress disorder    Neck sprain    Sprain of shoulder, left    Sprain of shoulder, right    Rotator cuff (capsule) sprain and strain    HTN (hypertension)    Cervical disc herniation    Anxiety    Abnormal stress test    Exertional chest pain    Unstable angina ([address]    Coronary artery disease due to lipid rich plaque        Discharged Condition: fair    Hospital Course: Farhan Emerson was admitted 11/12/2020 for cardiac catheterization as part of evaluation of unstable angina. Cardiac catheterization 11/12/2020 reveals severe multivessel CAD with normal LV systolic function. He was admitted for further evaluation and treatment. He was seen by CV surgery with plans for CABG. Patient had 2D echo Doppler as well as carotid vein map and CT of the abdomen pelvis. He was discharged home today, 11/13/2020, due to the need to take care of several personal business items that he states that he had to do. He will return on Monday, 11/16/2020, for CABG.     Consults:  IP CONSULT TO CARDIOTHORACIC SURGERY  IP CONSULT TO CARDIOTHORACIC SURGERY    Significant Diagnostic Studies:   Echocardiography: abnormal and reviewed by myself  Stress test: Abnormal, suggestive of severe CAD not done    Labs:   Lab Results   Component Value Date    CREATININE 1.2 11/13/2020    CREATININE 1.1 11/13/2020    BUN 25 (H) 11/13/2020    BUN 24 (H) 11/13/2020     11/13/2020     11/13/2020    K 3.4 (L) 11/13/2020    K 3.3 (L) 11/13/2020     11/13/2020     11/13/2020    CO2 23 11/13/2020    CO2 27 11/13/2020      Lab Results   Component Value Date    WBC 8.2 11/13/2020    HGB 14.3 11/13/2020    HCT 42.0 11/13/2020    MCV 93.9 11/13/2020     11/13/2020    No results found for: INR, PROTIME No results found for: BNP    Disposition: home    Patient Instructions:   Discharge Medication List as of 11/13/2020  2:46 PM      START taking these medications    Details   atorvastatin (LIPITOR) 80 MG tablet Take 1 tablet by mouth nightly, Disp-30 tablet,R-3Normal      metoprolol tartrate (LOPRESSOR) 50 MG tablet Take 1 tablet by mouth 2 times daily, Disp-60 tablet,R-3Normal      nitroGLYCERIN (NITROSTAT) 0.4 MG SL tablet Place 1 tablet under the tongue every 5 minutes as needed for Chest pain, Disp-25 tablet,R-1Normal      isosorbide mononitrate (IMDUR) 30 MG extended release tablet Take 1 tablet by mouth daily, Disp-30 tablet,R-3Normal         CONTINUE these medications which have NOT CHANGED    Details   tamsulosin (FLOMAX) 0.4 MG capsule Take 0.4 mg by mouth dailyHistorical Med      acetaminophen-codeine (TYLENOL/CODEINE #4) 300-60 MG per tablet Take 1 tablet by mouth every 8 hours as needed for Pain. Historical Med      aspirin 81 MG chewable tablet Take 81 mg by mouth dailyHistorical Med         STOP taking these medications       atenolol (TENORMIN) 100 MG tablet Comments:   Reason for Stopping:               is on a beta-blocker  is not on an ace-i/ARB  is on a statin  is on antiplatelet therapy (aspirin)  is not on anticoagulant    Follow-up with CV surgery in 2 days.     Signed:  Aislinn Campbell, 11/13/2020, 5:04 PM

## 2020-11-13 NOTE — PROGRESS NOTES
CC: CAD, hypertension      Patient denies any acute events overnight. CV: NSR 60-70's, SBP: 100-120's- on Lopressor 50 BID PO  Pulm: Clear sat 97-99% on RA  Renal: K+ 3.3, Creat.  1.1  Heme: WBC: 8.2, H/H: 14.3, 42, PLT: 225      Plan:  CABG schedule next week per Dr. Yani Motley
CLINICAL PHARMACY NOTE: MEDS TO [address] Select Patient?: No  Total # of Prescriptions Filled: 4   The following medications were delivered to the patient:  · Nitroglycerin 0.4 subl  · Atorvastatin 40mg  · Metoprolol tartrate 50mg  · Isosorbide mononitrate ER 30mg  Total # of Interventions Completed: 0  Time Spent (min): 30    Additional Documentation:  Medications delivered- patient signed  Bandar Taylor
Discharge instructions reviewed with patient and family member. Patient and family verbalized understanding. All home medications have been reviewed, questions answered and patient voiced understanding. All medication side effects reviewed and patient and family verbalized understanding. pt is aware in case of chest pain to call 911. Pt aware to take beta blockers Monday morning prior to arriving for surgery. 2 bottles of Hibiclens given to pt. Patient given prescriptions, discharge instructions, and appointment times. Patient discharged to home with family via private car. Taken to lobby via wheelchair.
Patient brought over to CVU from cath lab and attached to CVU monitoring. Report reviewed with cath lab RN. R radial site  Occlusive dressing dry and intact. Pedal pulses easily palpated.   Primary RN sandip
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 He does live alone and would need to make arrangements. He will stay overnight for further evaluation from CV surgery as well as additional work-up including 2D echo with Doppler and hopefully carotid vein mapping. Advised him that the safest place, in regards to his cardiac status, is in the hospital.  He however has not had rest discomfort yet. I have discussed that he is at increased risk for coronary event going home. We will wait further decision-making and the patient tomorrow. We will add Ranexa and nitroglycerin paste. Change atenolol to metoprolol.
Pt discharged per wheelchair with mask on and pt [id number].
Resting in bed, assessment completed. Denies needs will monitor.
estimated   at 45%. -Inferoseptal, inferior, and inferolateral hypokinesis noted. -There is trivial tricuspid regurgitation with a RVSP estimation of 13 mmHg. -Grade I diastolic dysfunction with normal LV filling pressures. Avg.   E/e'=8.4    Cardiac Cath 11/12/2020:  Access: RRA  Contrast: 79 mL  Fluoroscopy time: 2.5 min.     Anatomy:   LM-30% distal  LAD-70% mid, 100% distal with left to left collaterals  D1-80% proximal  Cx-100% proximal with left to left collaterals  OM1-100% proximal with left to left collaterals  RCA-100% proximal with right to right and left-to-right collaterals  LVEF-55%     Impression:  1. Severe three-vessel CAD with extensive collateralization. 2.  Preserved LV systolic function with LVEF of 55%.     Plan:  1. Recommend CV surgical evaluation for CABG. Assessment/Plan:  Principal Problem:    Unstable angina (HCC)  Plan: Secondary to severe multivessel CAD. CABG planned for Monday. Active Problems:    Abnormal stress test  Plan: Secondary to severe multivessel CAD. Coronary artery disease due to lipid rich plaque  Plan: Severe multivessel CAD. Revascularization planned by CABG      HTN (hypertension)  Plan: Stable. Patient lives alone and has limited support. He states that he will need to be discharged to be able to take care of some important business. Current CABG is planned for Monday. CV surgery has ordered a CT of the abdomen and pelvis which will be done today and then hopeful discharge afterwards.         Anny Whittington MD 11/13/2020 1:43 PM

## 2020-11-14 LAB
CHOLESTEROL, TOTAL: 200 MG/DL (ref 0–199)
ESTIMATED AVERAGE GLUCOSE: 145.6 MG/DL
HBA1C MFR BLD: 6.7 %
HDLC SERPL-MCNC: 35 MG/DL (ref 40–60)
LDL CHOLESTEROL CALCULATED: 120 MG/DL
TRIGL SERPL-MCNC: 225 MG/DL (ref 0–150)
VLDLC SERPL CALC-MCNC: 45 MG/DL

## 2020-11-14 NOTE — ANESTHESIA PRE PROCEDURE
Department of Anesthesiology  Preprocedure Note       Name:  Gigi Garnica   Age:  59 y.o.  :  1955                                          MRN:  [id number]         Date:  2020      Surgeon: Donna Roldan):  Spike Viera MD    Procedure: Procedure(s):  CABG CORONARY ARTERY BYPASS GRAFTING WITH LEFT ATRIAL APPENDAGE CLIP    Medications prior to admission:   Prior to Admission medications    Medication Sig Start Date End Date Taking? Authorizing Provider   atorvastatin (LIPITOR) 80 MG tablet Take 1 tablet by mouth nightly 20   Kirk Tierney MD   metoprolol tartrate (LOPRESSOR) 50 MG tablet Take 1 tablet by mouth 2 times daily 20   Kirk Tierney MD   nitroGLYCERIN (NITROSTAT) 0.4 MG SL tablet Place 1 tablet under the tongue every 5 minutes as needed for Chest pain 20   Kirk Tierney MD   isosorbide mononitrate (IMDUR) 30 MG extended release tablet Take 1 tablet by mouth daily 20   Kirk Tierney MD   tamsulosin (FLOMAX) 0.4 MG capsule Take 0.4 mg by mouth daily    Historical Provider, MD   acetaminophen-codeine (TYLENOL/CODEINE #4) 300-60 MG per tablet Take 1 tablet by mouth every 8 hours as needed for Pain. Historical Provider, MD   aspirin 81 MG chewable tablet Take 81 mg by mouth daily    Historical Provider, MD       Current medications:    No current facility-administered medications for this encounter.       Current Outpatient Medications   Medication Sig Dispense Refill    atorvastatin (LIPITOR) 80 MG tablet Take 1 tablet by mouth nightly 30 tablet 3    metoprolol tartrate (LOPRESSOR) 50 MG tablet Take 1 tablet by mouth 2 times daily 60 tablet 3    nitroGLYCERIN (NITROSTAT) 0.4 MG SL tablet Place 1 tablet under the tongue every 5 minutes as needed for Chest pain 25 tablet 1    isosorbide mononitrate (IMDUR) 30 MG extended release tablet Take 1 tablet by mouth daily 30 tablet 3    tamsulosin (FLOMAX) 0.4 MG capsule Take 0.4 mg by mouth daily      acetaminophen-codeine (TYLENOL/CODEINE #4) 300-60 MG per tablet Take 1 tablet by mouth every 8 hours as needed for Pain.  aspirin 81 MG chewable tablet Take 81 mg by mouth daily         Allergies:  No Known Allergies    Problem List:    Patient Active Problem List   Diagnosis Code    [facility] Rotator cuff (capsule) sprain and strain S43.429A    [facility] Neck sprain and strain S13. 9XXA    [facility] Sprain and strain of unspecified site of shoulder and upper arm [id number]    Post traumatic stress disorder F43.10    Neck sprain S13. 9XXA    Sprain of shoulder, left S42.46A    Sprain of shoulder, right S43.401A    Rotator cuff (capsule) sprain and strain [id number]    HTN (hypertension) I10    Cervical disc herniation M50.20    Anxiety F41.9    Abnormal stress test R94.39    Exertional chest pain R07.9    Unstable angina (HCC) I20.0    Coronary artery disease due to lipid rich plaque I25.10, I25.83       Past Medical History:        Diagnosis Date    Anxiety     Cervical disc herniation     Coronary artery disease due to lipid rich plaque 11/12/2020    HTN (hypertension) 5/9/2013    Neck sprain     Post traumatic stress disorder     Rotator cuff (capsule) sprain and strain     Sprain of shoulder, left     Sprain of shoulder, right        Past Surgical History:        Procedure Laterality Date    ANKLE SURGERY Right     around 2000       Social History:    Social History     Tobacco Use    Smoking status: Former Smoker     Packs/day: 0.50     Types: Cigarettes    Smokeless tobacco: Never Used   Substance Use Topics    Alcohol use: Yes     Alcohol/week: 0.8 standard drinks     Types: 1 Standard drinks or equivalent per week                                Counseling given: Not Answered      Vital Signs (Current): There were no vitals filed for this visit.                                            BP Readings from Last 3 Encounters:   11/13/20 (!) 142/91   11/11/20 138/78   05/18/15 120/90       NPO Status: BMI:   Wt Readings from Last 3 Encounters:   11/13/20 250 lb 7.1 oz (113.6 kg)   11/11/20 252 lb (114.3 kg)   05/18/15 248 lb (112.5 kg)     There is no height or weight on file to calculate BMI.    CBC:   Lab Results   Component Value Date    WBC 8.2 11/13/2020    RBC 4.47 11/13/2020    HGB 14.3 11/13/2020    HCT 42.0 11/13/2020    MCV 93.9 11/13/2020    RDW 12.4 11/13/2020     11/13/2020       CMP:   Lab Results   Component Value Date     11/13/2020     11/13/2020    K 3.4 11/13/2020    K 3.3 11/13/2020     11/13/2020     11/13/2020    CO2 23 11/13/2020    CO2 27 11/13/2020    BUN 25 11/13/2020    BUN 24 11/13/2020    CREATININE 1.2 11/13/2020    CREATININE 1.1 11/13/2020    GFRAA >60 11/13/2020    GFRAA >60 11/13/2020    GFRAA >60 05/09/2013    AGRATIO 1.3 11/13/2020    LABGLOM >60 11/13/2020    LABGLOM >60 11/13/2020    GLUCOSE 130 11/13/2020    GLUCOSE 134 11/13/2020    PROT 6.4 11/13/2020    PROT 7.0 01/10/2013    CALCIUM 9.4 11/13/2020    CALCIUM 9.0 11/13/2020    BILITOT 0.9 11/13/2020    ALKPHOS 71 11/13/2020    AST 26 11/13/2020    ALT 17 11/13/2020       POC Tests: No results for input(s): POCGLU, POCNA, POCK, POCCL, POCBUN, POCHEMO, POCHCT in the last 72 hours.     Coags: No results found for: PROTIME, INR, APTT    HCG (If Applicable): No results found for: PREGTESTUR, PREGSERUM, HCG, HCGQUANT     ABGs: No results found for: PHART, PO2ART, IDO9XOO, HBD6HWE, BEART, D2KTKHEO     Type & Screen (If Applicable):  No results found for: LABABO, LABRH    Drug/Infectious Status (If Applicable):  No results found for: HIV, HEPCAB    COVID-19 Screening (If Applicable):   Lab Results   Component Value Date    COVID19 Not Detected 11/12/2020         Anesthesia Evaluation  Patient summary reviewed and Nursing notes reviewed  Airway: Mallampati: II  TM distance: >3 FB   Neck ROM: full  Mouth opening: > = 3 FB Dental:          Pulmonary:                              Cardiovascular:  Exercise tolerance: poor (<4 METS),   (+) hypertension: moderate, angina: with exertion, CAD: obstructive,                ROS comment: Echo 2020  Mildly reduced global systolic function with an ejection fraction estimated   at 45%. -Inferoseptal, inferior, and inferolateral hypokinesis noted. -There is trivial tricuspid regurgitation with a RVSP estimation of 13 mmHg. -Grade I diastolic dysfunction with normal LV filling pressures. Avg.   E/e'=8.4    Cath 2020  LM-30% distal  LAD-70% mid, 100% distal with left to left collaterals  D1-80% proximal  Cx-100% proximal with left to left collaterals  OM1-100% proximal with left to left collaterals  RCA-100% proximal with right to right and left-to-right collaterals  LVEF-55%     Neuro/Psych:   (+) psychiatric history: stable with treatment            GI/Hepatic/Renal:             Endo/Other:                     Abdominal:           Vascular:                                        Anesthesia Plan      general     ASA 4 - emergent       Induction: intravenous and rapid sequence. MALATHI, PA catheter, central line and arterial line  MIPS: Postoperative opioids intended, Prophylactic antiemetics administered and Postoperative ventilation.                       Kati Gao MD   11/13/2020

## 2020-11-14 NOTE — PROCEDURES
[facility]                     [address] [facility], [address]                               PULMONARY FUNCTION    PATIENT NAME: Glenna Mead                  :        1955  MED REC NO:   [id number]                          ROOM:       2910  ACCOUNT NO:   [de-identified]                           ADMIT DATE: 2020  PROVIDER:     Claire Hutchinson MD    DATE OF PROCEDURE:  2020    Spirometry reveals normal FVC and FEV1.  FEV1/FVC ratio is normal.   Expiratory flow rates are normal.    IMPRESSION:  Normal spirometry; however, there was significant  variability between efforts. Interpreted with caution.         Stuart Solis MD    D: 2020 16:54:25       T: 2020 23:10:27     [id number]  Job#: [id number]     Doc#: [id number]    CC:

## 2020-11-16 ENCOUNTER — APPOINTMENT (OUTPATIENT)
Dept: GENERAL RADIOLOGY | Age: 65
DRG: 166 | End: 2020-11-16
Attending: THORACIC SURGERY (CARDIOTHORACIC VASCULAR SURGERY)
Payer: MEDICAID

## 2020-11-16 ENCOUNTER — ANESTHESIA (OUTPATIENT)
Dept: OPERATING ROOM | Age: 65
DRG: 166 | End: 2020-11-16
Payer: MEDICAID

## 2020-11-16 ENCOUNTER — HOSPITAL ENCOUNTER (INPATIENT)
Age: 65
LOS: 6 days | Discharge: HOME HEALTH | DRG: 166 | End: 2020-11-22
Attending: THORACIC SURGERY (CARDIOTHORACIC VASCULAR SURGERY) | Admitting: THORACIC SURGERY (CARDIOTHORACIC VASCULAR SURGERY)
Payer: MEDICAID

## 2020-11-16 VITALS — TEMPERATURE: 98.4 F | RESPIRATION RATE: 16 BRPM | OXYGEN SATURATION: 99 %

## 2020-11-16 PROBLEM — I25.10 CAD IN NATIVE ARTERY: Status: ACTIVE | Noted: 2020-11-16

## 2020-11-16 LAB
ABO/RH: NORMAL
ACTIVATED CLOTTING TIME: 100 SEC (ref 99–130)
ACTIVATED CLOTTING TIME: 106 SEC (ref 99–130)
ACTIVATED CLOTTING TIME: 436 SEC (ref 99–130)
ACTIVATED CLOTTING TIME: 450 SEC (ref 99–130)
ACTIVATED CLOTTING TIME: 459 SEC (ref 99–130)
ACTIVATED CLOTTING TIME: 493 SEC (ref 99–130)
ACTIVATED CLOTTING TIME: 572 SEC (ref 99–130)
ANION GAP SERPL CALCULATED.3IONS-SCNC: 10 MMOL/L (ref 3–16)
ANTIBODY SCREEN: NORMAL
APTT: 31.2 SEC (ref 24.2–36.2)
BASE EXCESS ARTERIAL: -1 (ref -3–3)
BASE EXCESS ARTERIAL: -2 (ref -3–3)
BASE EXCESS ARTERIAL: 0 (ref -3–3)
BASE EXCESS ARTERIAL: 0.9 MMOL/L (ref -3–3)
BASE EXCESS ARTERIAL: 1 (ref -3–3)
BASE EXCESS ARTERIAL: 2 (ref -3–3)
BASE EXCESS MIXED: -1
BASE EXCESS VENOUS: -1 (ref -3–3)
BUN BLDV-MCNC: 15 MG/DL (ref 7–20)
CALCIUM IONIZED: 1.11 MMOL/L (ref 1.12–1.32)
CALCIUM IONIZED: 1.13 MMOL/L (ref 1.12–1.32)
CALCIUM IONIZED: 1.14 MMOL/L (ref 1.12–1.32)
CALCIUM IONIZED: 1.14 MMOL/L (ref 1.12–1.32)
CALCIUM IONIZED: 1.19 MMOL/L (ref 1.12–1.32)
CALCIUM IONIZED: 1.2 MMOL/L (ref 1.12–1.32)
CALCIUM IONIZED: 1.23 MMOL/L (ref 1.12–1.32)
CALCIUM SERPL-MCNC: 8.1 MG/DL (ref 8.3–10.6)
CARBOXYHEMOGLOBIN ARTERIAL: 1.1 % (ref 0–1.5)
CHLORIDE BLD-SCNC: 112 MMOL/L (ref 99–110)
CO2: 23 MMOL/L (ref 21–32)
CREAT SERPL-MCNC: 1 MG/DL (ref 0.8–1.3)
GFR AFRICAN AMERICAN: >60
GFR NON-AFRICAN AMERICAN: >60
GLUCOSE BLD-MCNC: 106 MG/DL (ref 70–99)
GLUCOSE BLD-MCNC: 111 MG/DL (ref 70–99)
GLUCOSE BLD-MCNC: 123 MG/DL (ref 70–99)
GLUCOSE BLD-MCNC: 124 MG/DL (ref 70–99)
GLUCOSE BLD-MCNC: 124 MG/DL (ref 70–99)
GLUCOSE BLD-MCNC: 127 MG/DL (ref 70–99)
GLUCOSE BLD-MCNC: 130 MG/DL (ref 70–99)
GLUCOSE BLD-MCNC: 132 MG/DL (ref 70–99)
GLUCOSE BLD-MCNC: 134 MG/DL (ref 70–99)
GLUCOSE BLD-MCNC: 135 MG/DL (ref 70–99)
GLUCOSE BLD-MCNC: 136 MG/DL (ref 70–99)
GLUCOSE BLD-MCNC: 137 MG/DL (ref 70–99)
GLUCOSE BLD-MCNC: 138 MG/DL (ref 70–99)
GLUCOSE BLD-MCNC: 143 MG/DL (ref 70–99)
GLUCOSE BLD-MCNC: 147 MG/DL (ref 70–99)
GLUCOSE BLD-MCNC: 148 MG/DL (ref 70–99)
GLUCOSE BLD-MCNC: 152 MG/DL (ref 70–99)
GLUCOSE BLD-MCNC: 208 MG/DL (ref 70–99)
HCO3 ARTERIAL: 23.5 MMOL/L (ref 21–29)
HCO3 ARTERIAL: 23.7 MMOL/L (ref 21–29)
HCO3 ARTERIAL: 24.5 MMOL/L (ref 21–29)
HCO3 ARTERIAL: 24.9 MMOL/L (ref 21–29)
HCO3 ARTERIAL: 26 MMOL/L (ref 21–29)
HCO3 ARTERIAL: 26.1 MMOL/L (ref 21–29)
HCO3 ARTERIAL: 26.4 MMOL/L (ref 21–29)
HCO3 ARTERIAL: 26.5 MMOL/L (ref 21–29)
HCO3 ARTERIAL: 26.6 MMOL/L (ref 21–29)
HCO3 ARTERIAL: 27 MMOL/L (ref 21–29)
HCO3 VENOUS: 25.5 MMOL/L (ref 23–29)
HCO3, MIXED: 24.3 MMOL/L
HCT VFR BLD CALC: 33.5 % (ref 40.5–52.5)
HEMOGLOBIN, ART, EXTENDED: 14.2 G/DL (ref 13.5–17.5)
HEMOGLOBIN: 10.5 GM/DL (ref 13.5–17.5)
HEMOGLOBIN: 10.5 GM/DL (ref 13.5–17.5)
HEMOGLOBIN: 10.6 GM/DL (ref 13.5–17.5)
HEMOGLOBIN: 11.3 GM/DL (ref 13.5–17.5)
HEMOGLOBIN: 11.6 G/DL (ref 13.5–17.5)
HEMOGLOBIN: 11.7 GM/DL (ref 13.5–17.5)
HEMOGLOBIN: 12.4 GM/DL (ref 13.5–17.5)
HEMOGLOBIN: 8.6 GM/DL (ref 13.5–17.5)
HEMOGLOBIN: 9.4 GM/DL (ref 13.5–17.5)
HEMOGLOBIN: 9.5 GM/DL (ref 13.5–17.5)
HEMOGLOBIN: 9.6 GM/DL (ref 13.5–17.5)
HEMOGLOBIN: 9.7 GM/DL (ref 13.5–17.5)
INR BLD: 0.94 (ref 0.86–1.14)
LACTATE: 0.77 MMOL/L (ref 0.4–2)
LACTATE: 1.22 MMOL/L (ref 0.4–2)
LACTATE: 2.01 MMOL/L (ref 0.4–2)
LACTATE: 2.01 MMOL/L (ref 0.4–2)
LACTATE: 2.2 MMOL/L (ref 0.4–2)
LACTATE: 2.33 MMOL/L (ref 0.4–2)
LACTATE: 2.39 MMOL/L (ref 0.4–2)
LACTATE: 2.41 MMOL/L (ref 0.4–2)
MAGNESIUM: 2.7 MG/DL (ref 1.8–2.4)
MCH RBC QN AUTO: 32.8 PG (ref 26–34)
MCHC RBC AUTO-ENTMCNC: 34.7 G/DL (ref 31–36)
MCV RBC AUTO: 94.3 FL (ref 80–100)
METHEMOGLOBIN ARTERIAL: 0.1 %
O2 CONTENT ARTERIAL: 19 ML/DL
O2 SAT, ARTERIAL: 100 % (ref 93–100)
O2 SAT, ARTERIAL: 93 % (ref 93–100)
O2 SAT, ARTERIAL: 93 % (ref 93–100)
O2 SAT, ARTERIAL: 94 % (ref 93–100)
O2 SAT, ARTERIAL: 95.9 %
O2 SAT, ARTERIAL: 96 % (ref 93–100)
O2 SAT, ARTERIAL: 97 % (ref 93–100)
O2 SAT, MIXED: 60 %
O2 SAT, VEN: 77 %
O2 THERAPY: ABNORMAL
PCO2 ARTERIAL: 37.8 MM HG (ref 35–45)
PCO2 ARTERIAL: 38.5 MM HG (ref 35–45)
PCO2 ARTERIAL: 42.4 MMHG (ref 35–45)
PCO2 ARTERIAL: 43.2 MM HG (ref 35–45)
PCO2 ARTERIAL: 44.8 MM HG (ref 35–45)
PCO2 ARTERIAL: 44.8 MM HG (ref 35–45)
PCO2 ARTERIAL: 44.9 MM HG (ref 35–45)
PCO2 ARTERIAL: 46.2 MM HG (ref 35–45)
PCO2 ARTERIAL: 47.6 MM HG (ref 35–45)
PCO2 ARTERIAL: 47.7 MM HG (ref 35–45)
PCO2 ARTERIAL: 48.3 MM HG (ref 35–45)
PCO2 ARTERIAL: 50.2 MM HG (ref 35–45)
PCO2 MIXED: 43.7 MM HG
PCO2, VEN: 48.9 MM HG (ref 40–50)
PDW BLD-RTO: 12.1 % (ref 12.4–15.4)
PERFORMED ON: ABNORMAL
PH ARTERIAL: 7.3 (ref 7.35–7.45)
PH ARTERIAL: 7.32 (ref 7.35–7.45)
PH ARTERIAL: 7.34 (ref 7.35–7.45)
PH ARTERIAL: 7.35 (ref 7.35–7.45)
PH ARTERIAL: 7.36 (ref 7.35–7.45)
PH ARTERIAL: 7.37 (ref 7.35–7.45)
PH ARTERIAL: 7.38 (ref 7.35–7.45)
PH ARTERIAL: 7.39 (ref 7.35–7.45)
PH ARTERIAL: 7.39 (ref 7.35–7.45)
PH ARTERIAL: 7.4 (ref 7.35–7.45)
PH VENOUS: 7.33 (ref 7.35–7.45)
PH, MIXED: 7.35 (ref 7.35–7.45)
PLATELET # BLD: 163 K/UL (ref 135–450)
PMV BLD AUTO: 8.1 FL (ref 5–10.5)
PO2 ARTERIAL: 186.7 MM HG (ref 75–108)
PO2 ARTERIAL: 200.4 MM HG (ref 75–108)
PO2 ARTERIAL: 354.2 MM HG (ref 75–108)
PO2 ARTERIAL: 396.9 MM HG (ref 75–108)
PO2 ARTERIAL: 397.6 MM HG (ref 75–108)
PO2 ARTERIAL: 427.4 MM HG (ref 75–108)
PO2 ARTERIAL: 65.4 MM HG (ref 75–108)
PO2 ARTERIAL: 70 MM HG (ref 75–108)
PO2 ARTERIAL: 73.2 MM HG (ref 75–108)
PO2 ARTERIAL: 73.9 MMHG (ref 75–108)
PO2 ARTERIAL: 87.8 MM HG (ref 75–108)
PO2 ARTERIAL: 90.2 MM HG (ref 75–108)
PO2 MIXED: 33 MM HG
PO2, VEN: 45 MM HG
POC HEMATOCRIT: 25 % (ref 40.5–52.5)
POC HEMATOCRIT: 28 % (ref 40.5–52.5)
POC HEMATOCRIT: 29 % (ref 40.5–52.5)
POC HEMATOCRIT: 31 % (ref 40.5–52.5)
POC HEMATOCRIT: 33 % (ref 40.5–52.5)
POC HEMATOCRIT: 34 % (ref 40.5–52.5)
POC HEMATOCRIT: 36 % (ref 40.5–52.5)
POC PATIENT TEMP: 97
POC PATIENT TEMP: 99
POC PATIENT TEMP: 99.4
POC POTASSIUM: 3 MMOL/L (ref 3.5–5.1)
POC POTASSIUM: 3.1 MMOL/L (ref 3.5–5.1)
POC POTASSIUM: 3.2 MMOL/L (ref 3.5–5.1)
POC POTASSIUM: 3.3 MMOL/L (ref 3.5–5.1)
POC POTASSIUM: 3.4 MMOL/L (ref 3.5–5.1)
POC POTASSIUM: 3.5 MMOL/L (ref 3.5–5.1)
POC POTASSIUM: 3.5 MMOL/L (ref 3.5–5.1)
POC POTASSIUM: 3.7 MMOL/L (ref 3.5–5.1)
POC POTASSIUM: 4 MMOL/L (ref 3.5–5.1)
POC SAMPLE TYPE: ABNORMAL
POC SODIUM: 140 MMOL/L (ref 136–145)
POC SODIUM: 140 MMOL/L (ref 136–145)
POC SODIUM: 141 MMOL/L (ref 136–145)
POC SODIUM: 144 MMOL/L (ref 136–145)
POC SODIUM: 144 MMOL/L (ref 136–145)
POC SODIUM: 145 MMOL/L (ref 136–145)
POC SODIUM: 149 MMOL/L (ref 136–145)
POTASSIUM SERPL-SCNC: 3.3 MMOL/L (ref 3.5–5.1)
PROTHROMBIN TIME: 10.9 SEC (ref 10–13.2)
RBC # BLD: 3.55 M/UL (ref 4.2–5.9)
SODIUM BLD-SCNC: 145 MMOL/L (ref 136–145)
TCO2 ARTERIAL: 25 MMOL/L
TCO2 ARTERIAL: 25 MMOL/L
TCO2 ARTERIAL: 26 MMOL/L
TCO2 ARTERIAL: 27 MMOL/L
TCO2 ARTERIAL: 28 MMOL/L
TCO2 ARTERIAL: 61.2 MMOL/L
TCO2 CALC MIXED: 26 MMOL/L
TCO2 CALC VENOUS: 27 MMOL/L
WBC # BLD: 17.7 K/UL (ref 4–11)

## 2020-11-16 PROCEDURE — 33533 CABG ARTERIAL SINGLE: CPT | Performed by: PHYSICIAN ASSISTANT

## 2020-11-16 PROCEDURE — 02L70CK OCCLUSION OF LEFT ATRIAL APPENDAGE WITH EXTRALUMINAL DEVICE, OPEN APPROACH: ICD-10-PCS | Performed by: THORACIC SURGERY (CARDIOTHORACIC VASCULAR SURGERY)

## 2020-11-16 PROCEDURE — 3700000001 HC ADD 15 MINUTES (ANESTHESIA): Performed by: THORACIC SURGERY (CARDIOTHORACIC VASCULAR SURGERY)

## 2020-11-16 PROCEDURE — 6360000002 HC RX W HCPCS: Performed by: THORACIC SURGERY (CARDIOTHORACIC VASCULAR SURGERY)

## 2020-11-16 PROCEDURE — 85610 PROTHROMBIN TIME: CPT

## 2020-11-16 PROCEDURE — 33518 CABG ARTERY-VEIN TWO: CPT | Performed by: PHYSICIAN ASSISTANT

## 2020-11-16 PROCEDURE — P9045 ALBUMIN (HUMAN), 5%, 250 ML: HCPCS | Performed by: THORACIC SURGERY (CARDIOTHORACIC VASCULAR SURGERY)

## 2020-11-16 PROCEDURE — 84295 ASSAY OF SERUM SODIUM: CPT

## 2020-11-16 PROCEDURE — 2500000003 HC RX 250 WO HCPCS: Performed by: ANESTHESIOLOGY

## 2020-11-16 PROCEDURE — 02HV33Z INSERTION OF INFUSION DEVICE INTO SUPERIOR VENA CAVA, PERCUTANEOUS APPROACH: ICD-10-PCS | Performed by: ANESTHESIOLOGY

## 2020-11-16 PROCEDURE — 2500000003 HC RX 250 WO HCPCS: Performed by: THORACIC SURGERY (CARDIOTHORACIC VASCULAR SURGERY)

## 2020-11-16 PROCEDURE — 85014 HEMATOCRIT: CPT

## 2020-11-16 PROCEDURE — 2709999900 HC NON-CHARGEABLE SUPPLY: Performed by: THORACIC SURGERY (CARDIOTHORACIC VASCULAR SURGERY)

## 2020-11-16 PROCEDURE — 2580000003 HC RX 258: Performed by: THORACIC SURGERY (CARDIOTHORACIC VASCULAR SURGERY)

## 2020-11-16 PROCEDURE — C9290 INJ, BUPIVACAINE LIPOSOME: HCPCS | Performed by: THORACIC SURGERY (CARDIOTHORACIC VASCULAR SURGERY)

## 2020-11-16 PROCEDURE — 85730 THROMBOPLASTIN TIME PARTIAL: CPT

## 2020-11-16 PROCEDURE — 94750 HC PULMONARY COMPLIANCE STUDY: CPT

## 2020-11-16 PROCEDURE — 06BQ4ZZ EXCISION OF LEFT SAPHENOUS VEIN, PERCUTANEOUS ENDOSCOPIC APPROACH: ICD-10-PCS | Performed by: THORACIC SURGERY (CARDIOTHORACIC VASCULAR SURGERY)

## 2020-11-16 PROCEDURE — B24BZZ4 ULTRASONOGRAPHY OF HEART WITH AORTA, TRANSESOPHAGEAL: ICD-10-PCS | Performed by: ANESTHESIOLOGY

## 2020-11-16 PROCEDURE — C9113 INJ PANTOPRAZOLE SODIUM, VIA: HCPCS | Performed by: THORACIC SURGERY (CARDIOTHORACIC VASCULAR SURGERY)

## 2020-11-16 PROCEDURE — 82803 BLOOD GASES ANY COMBINATION: CPT

## 2020-11-16 PROCEDURE — C1729 CATH, DRAINAGE: HCPCS | Performed by: THORACIC SURGERY (CARDIOTHORACIC VASCULAR SURGERY)

## 2020-11-16 PROCEDURE — 2720000010 HC SURG SUPPLY STERILE: Performed by: THORACIC SURGERY (CARDIOTHORACIC VASCULAR SURGERY)

## 2020-11-16 PROCEDURE — 6370000000 HC RX 637 (ALT 250 FOR IP): Performed by: THORACIC SURGERY (CARDIOTHORACIC VASCULAR SURGERY)

## 2020-11-16 PROCEDURE — 2780000010 HC IMPLANT OTHER: Performed by: THORACIC SURGERY (CARDIOTHORACIC VASCULAR SURGERY)

## 2020-11-16 PROCEDURE — 94640 AIRWAY INHALATION TREATMENT: CPT

## 2020-11-16 PROCEDURE — 85347 COAGULATION TIME ACTIVATED: CPT

## 2020-11-16 PROCEDURE — 36415 COLL VENOUS BLD VENIPUNCTURE: CPT

## 2020-11-16 PROCEDURE — 021109W BYPASS CORONARY ARTERY, TWO ARTERIES FROM AORTA WITH AUTOLOGOUS VENOUS TISSUE, OPEN APPROACH: ICD-10-PCS | Performed by: THORACIC SURGERY (CARDIOTHORACIC VASCULAR SURGERY)

## 2020-11-16 PROCEDURE — 7100000011 HC PHASE II RECOVERY - ADDTL 15 MIN

## 2020-11-16 PROCEDURE — 7100000010 HC PHASE II RECOVERY - FIRST 15 MIN

## 2020-11-16 PROCEDURE — 02100Z9 BYPASS CORONARY ARTERY, ONE ARTERY FROM LEFT INTERNAL MAMMARY, OPEN APPROACH: ICD-10-PCS | Performed by: THORACIC SURGERY (CARDIOTHORACIC VASCULAR SURGERY)

## 2020-11-16 PROCEDURE — 6360000002 HC RX W HCPCS: Performed by: ANESTHESIOLOGY

## 2020-11-16 PROCEDURE — 2700000000 HC OXYGEN THERAPY PER DAY

## 2020-11-16 PROCEDURE — 85027 COMPLETE CBC AUTOMATED: CPT

## 2020-11-16 PROCEDURE — 86900 BLOOD TYPING SEROLOGIC ABO: CPT

## 2020-11-16 PROCEDURE — 2100000000 HC CCU R&B

## 2020-11-16 PROCEDURE — 94002 VENT MGMT INPAT INIT DAY: CPT

## 2020-11-16 PROCEDURE — 82947 ASSAY GLUCOSE BLOOD QUANT: CPT

## 2020-11-16 PROCEDURE — 33508 ENDOSCOPIC VEIN HARVEST: CPT | Performed by: THORACIC SURGERY (CARDIOTHORACIC VASCULAR SURGERY)

## 2020-11-16 PROCEDURE — C1751 CATH, INF, PER/CENT/MIDLINE: HCPCS | Performed by: THORACIC SURGERY (CARDIOTHORACIC VASCULAR SURGERY)

## 2020-11-16 PROCEDURE — C9248 INJ, CLEVIDIPINE BUTYRATE: HCPCS | Performed by: ANESTHESIOLOGY

## 2020-11-16 PROCEDURE — 83605 ASSAY OF LACTIC ACID: CPT

## 2020-11-16 PROCEDURE — 3700000000 HC ANESTHESIA ATTENDED CARE: Performed by: THORACIC SURGERY (CARDIOTHORACIC VASCULAR SURGERY)

## 2020-11-16 PROCEDURE — 3600000018 HC SURGERY OHS ADDTL 15MIN: Performed by: THORACIC SURGERY (CARDIOTHORACIC VASCULAR SURGERY)

## 2020-11-16 PROCEDURE — 83735 ASSAY OF MAGNESIUM: CPT

## 2020-11-16 PROCEDURE — 5A1221Z PERFORMANCE OF CARDIAC OUTPUT, CONTINUOUS: ICD-10-PCS | Performed by: THORACIC SURGERY (CARDIOTHORACIC VASCULAR SURGERY)

## 2020-11-16 PROCEDURE — 33533 CABG ARTERIAL SINGLE: CPT | Performed by: THORACIC SURGERY (CARDIOTHORACIC VASCULAR SURGERY)

## 2020-11-16 PROCEDURE — 86850 RBC ANTIBODY SCREEN: CPT

## 2020-11-16 PROCEDURE — 86923 COMPATIBILITY TEST ELECTRIC: CPT

## 2020-11-16 PROCEDURE — 94761 N-INVAS EAR/PLS OXIMETRY MLT: CPT

## 2020-11-16 PROCEDURE — 71045 X-RAY EXAM CHEST 1 VIEW: CPT

## 2020-11-16 PROCEDURE — 82330 ASSAY OF CALCIUM: CPT

## 2020-11-16 PROCEDURE — 86901 BLOOD TYPING SEROLOGIC RH(D): CPT

## 2020-11-16 PROCEDURE — 3600000008 HC SURGERY OHS BASE: Performed by: THORACIC SURGERY (CARDIOTHORACIC VASCULAR SURGERY)

## 2020-11-16 PROCEDURE — 84132 ASSAY OF SERUM POTASSIUM: CPT

## 2020-11-16 PROCEDURE — 33518 CABG ARTERY-VEIN TWO: CPT | Performed by: THORACIC SURGERY (CARDIOTHORACIC VASCULAR SURGERY)

## 2020-11-16 PROCEDURE — 2580000003 HC RX 258: Performed by: ANESTHESIOLOGY

## 2020-11-16 PROCEDURE — 80048 BASIC METABOLIC PNL TOTAL CA: CPT

## 2020-11-16 PROCEDURE — 5A1223Z PERFORMANCE OF CARDIAC PACING, CONTINUOUS: ICD-10-PCS | Performed by: THORACIC SURGERY (CARDIOTHORACIC VASCULAR SURGERY)

## 2020-11-16 RX ORDER — ACETAMINOPHEN 650 MG/1
650 SUPPOSITORY RECTAL EVERY 4 HOURS PRN
Status: DISCONTINUED | OUTPATIENT
Start: 2020-11-16 | End: 2020-11-17 | Stop reason: DRUGHIGH

## 2020-11-16 RX ORDER — ALBUTEROL SULFATE 90 UG/1
2 AEROSOL, METERED RESPIRATORY (INHALATION) 4 TIMES DAILY
Status: DISCONTINUED | OUTPATIENT
Start: 2020-11-16 | End: 2020-11-16

## 2020-11-16 RX ORDER — MILRINONE LACTATE 0.2 MG/ML
INJECTION, SOLUTION INTRAVENOUS CONTINUOUS PRN
Status: DISCONTINUED | OUTPATIENT
Start: 2020-11-16 | End: 2020-11-16 | Stop reason: SDUPTHER

## 2020-11-16 RX ORDER — MAGNESIUM SULFATE IN WATER 40 MG/ML
2 INJECTION, SOLUTION INTRAVENOUS PRN
Status: DISCONTINUED | OUTPATIENT
Start: 2020-11-16 | End: 2020-11-19

## 2020-11-16 RX ORDER — DEXTROSE MONOHYDRATE 25 G/50ML
12.5 INJECTION, SOLUTION INTRAVENOUS PRN
Status: DISCONTINUED | OUTPATIENT
Start: 2020-11-16 | End: 2020-11-19

## 2020-11-16 RX ORDER — FENTANYL CITRATE 50 UG/ML
INJECTION, SOLUTION INTRAMUSCULAR; INTRAVENOUS PRN
Status: DISCONTINUED | OUTPATIENT
Start: 2020-11-16 | End: 2020-11-16 | Stop reason: SDUPTHER

## 2020-11-16 RX ORDER — ASPIRIN 300 MG/1
150 SUPPOSITORY RECTAL DAILY
Status: DISCONTINUED | OUTPATIENT
Start: 2020-11-16 | End: 2020-11-17

## 2020-11-16 RX ORDER — MEPERIDINE HYDROCHLORIDE 50 MG/ML
25 INJECTION INTRAMUSCULAR; INTRAVENOUS; SUBCUTANEOUS
Status: DISPENSED | OUTPATIENT
Start: 2020-11-16 | End: 2020-11-16

## 2020-11-16 RX ORDER — MAGNESIUM HYDROXIDE 1200 MG/15ML
LIQUID ORAL CONTINUOUS PRN
Status: COMPLETED | OUTPATIENT
Start: 2020-11-16 | End: 2020-11-16

## 2020-11-16 RX ORDER — SODIUM CHLORIDE 9 MG/ML
INJECTION, SOLUTION INTRAVENOUS CONTINUOUS
Status: DISCONTINUED | OUTPATIENT
Start: 2020-11-16 | End: 2020-11-19

## 2020-11-16 RX ORDER — OXYCODONE HYDROCHLORIDE 5 MG/1
5 TABLET ORAL EVERY 4 HOURS PRN
Status: DISCONTINUED | OUTPATIENT
Start: 2020-11-16 | End: 2020-11-19

## 2020-11-16 RX ORDER — OXYCODONE HYDROCHLORIDE 5 MG/1
10 TABLET ORAL EVERY 4 HOURS PRN
Status: DISCONTINUED | OUTPATIENT
Start: 2020-11-16 | End: 2020-11-19

## 2020-11-16 RX ORDER — VECURONIUM BROMIDE 1 MG/ML
INJECTION, POWDER, LYOPHILIZED, FOR SOLUTION INTRAVENOUS PRN
Status: DISCONTINUED | OUTPATIENT
Start: 2020-11-16 | End: 2020-11-16 | Stop reason: SDUPTHER

## 2020-11-16 RX ORDER — CALCIUM CHLORIDE 100 MG/ML
INJECTION INTRAVENOUS; INTRAVENTRICULAR PRN
Status: DISCONTINUED | OUTPATIENT
Start: 2020-11-16 | End: 2020-11-16 | Stop reason: SDUPTHER

## 2020-11-16 RX ORDER — FUROSEMIDE 10 MG/ML
20 INJECTION INTRAMUSCULAR; INTRAVENOUS 2 TIMES DAILY
Status: DISCONTINUED | OUTPATIENT
Start: 2020-11-17 | End: 2020-11-19

## 2020-11-16 RX ORDER — ATORVASTATIN CALCIUM 40 MG/1
40 TABLET, FILM COATED ORAL NIGHTLY
Status: DISCONTINUED | OUTPATIENT
Start: 2020-11-17 | End: 2020-11-22 | Stop reason: HOSPADM

## 2020-11-16 RX ORDER — ALBUTEROL SULFATE 2.5 MG/3ML
2.5 SOLUTION RESPIRATORY (INHALATION) 4 TIMES DAILY
Status: DISCONTINUED | OUTPATIENT
Start: 2020-11-17 | End: 2020-11-22 | Stop reason: HOSPADM

## 2020-11-16 RX ORDER — 0.9 % SODIUM CHLORIDE 0.9 %
500 INTRAVENOUS SOLUTION INTRAVENOUS CONTINUOUS PRN
Status: DISCONTINUED | OUTPATIENT
Start: 2020-11-16 | End: 2020-11-19

## 2020-11-16 RX ORDER — MILRINONE LACTATE 0.2 MG/ML
0.38 INJECTION, SOLUTION INTRAVENOUS CONTINUOUS PRN
Status: DISCONTINUED | OUTPATIENT
Start: 2020-11-16 | End: 2020-11-19

## 2020-11-16 RX ORDER — SODIUM CHLORIDE 0.9 % (FLUSH) 0.9 %
10 SYRINGE (ML) INJECTION PRN
Status: DISCONTINUED | OUTPATIENT
Start: 2020-11-16 | End: 2020-11-22 | Stop reason: HOSPADM

## 2020-11-16 RX ORDER — HYDRALAZINE HYDROCHLORIDE 20 MG/ML
5 INJECTION INTRAMUSCULAR; INTRAVENOUS EVERY 5 MIN PRN
Status: DISCONTINUED | OUTPATIENT
Start: 2020-11-16 | End: 2020-11-17 | Stop reason: RX

## 2020-11-16 RX ORDER — HEPARIN SODIUM 10000 [USP'U]/ML
INJECTION, SOLUTION INTRAVENOUS; SUBCUTANEOUS PRN
Status: DISCONTINUED | OUTPATIENT
Start: 2020-11-16 | End: 2020-11-16 | Stop reason: SDUPTHER

## 2020-11-16 RX ORDER — DIPHENHYDRAMINE HCL 25 MG
25 TABLET ORAL NIGHTLY PRN
Status: DISCONTINUED | OUTPATIENT
Start: 2020-11-16 | End: 2020-11-19

## 2020-11-16 RX ORDER — MIDAZOLAM HYDROCHLORIDE 5 MG/ML
INJECTION INTRAMUSCULAR; INTRAVENOUS PRN
Status: DISCONTINUED | OUTPATIENT
Start: 2020-11-16 | End: 2020-11-16 | Stop reason: SDUPTHER

## 2020-11-16 RX ORDER — FUROSEMIDE 10 MG/ML
INJECTION INTRAMUSCULAR; INTRAVENOUS PRN
Status: DISCONTINUED | OUTPATIENT
Start: 2020-11-16 | End: 2020-11-16 | Stop reason: SDUPTHER

## 2020-11-16 RX ORDER — MIDAZOLAM HYDROCHLORIDE 1 MG/ML
1 INJECTION INTRAMUSCULAR; INTRAVENOUS
Status: DISCONTINUED | OUTPATIENT
Start: 2020-11-16 | End: 2020-11-17

## 2020-11-16 RX ORDER — ONDANSETRON 2 MG/ML
4 INJECTION INTRAMUSCULAR; INTRAVENOUS EVERY 8 HOURS PRN
Status: DISCONTINUED | OUTPATIENT
Start: 2020-11-16 | End: 2020-11-22 | Stop reason: HOSPADM

## 2020-11-16 RX ORDER — SODIUM CHLORIDE 0.9 % (FLUSH) 0.9 %
10 SYRINGE (ML) INJECTION EVERY 12 HOURS SCHEDULED
Status: DISCONTINUED | OUTPATIENT
Start: 2020-11-16 | End: 2020-11-22 | Stop reason: HOSPADM

## 2020-11-16 RX ORDER — SENNA AND DOCUSATE SODIUM 50; 8.6 MG/1; MG/1
1 TABLET, FILM COATED ORAL 2 TIMES DAILY
Status: DISCONTINUED | OUTPATIENT
Start: 2020-11-16 | End: 2020-11-22

## 2020-11-16 RX ORDER — INSULIN LISPRO 100 [IU]/ML
0-12 INJECTION, SOLUTION INTRAVENOUS; SUBCUTANEOUS
Status: DISCONTINUED | OUTPATIENT
Start: 2020-11-16 | End: 2020-11-22 | Stop reason: HOSPADM

## 2020-11-16 RX ORDER — PROTAMINE SULFATE 10 MG/ML
INJECTION, SOLUTION INTRAVENOUS PRN
Status: DISCONTINUED | OUTPATIENT
Start: 2020-11-16 | End: 2020-11-16 | Stop reason: SDUPTHER

## 2020-11-16 RX ORDER — FONDAPARINUX SODIUM 2.5 MG/.5ML
2.5 INJECTION SUBCUTANEOUS DAILY
Status: DISCONTINUED | OUTPATIENT
Start: 2020-11-17 | End: 2020-11-22 | Stop reason: HOSPADM

## 2020-11-16 RX ORDER — PANTOPRAZOLE SODIUM 40 MG/10ML
40 INJECTION, POWDER, LYOPHILIZED, FOR SOLUTION INTRAVENOUS ONCE
Status: COMPLETED | OUTPATIENT
Start: 2020-11-16 | End: 2020-11-16

## 2020-11-16 RX ORDER — NICOTINE POLACRILEX 4 MG
15 LOZENGE BUCCAL PRN
Status: DISCONTINUED | OUTPATIENT
Start: 2020-11-16 | End: 2020-11-22 | Stop reason: HOSPADM

## 2020-11-16 RX ORDER — PROTAMINE SULFATE 10 MG/ML
50 INJECTION, SOLUTION INTRAVENOUS
Status: ACTIVE | OUTPATIENT
Start: 2020-11-16 | End: 2020-11-16

## 2020-11-16 RX ORDER — LANOLIN ALCOHOL/MO/W.PET/CERES
400 CREAM (GRAM) TOPICAL 2 TIMES DAILY
Status: DISCONTINUED | OUTPATIENT
Start: 2020-11-17 | End: 2020-11-22 | Stop reason: HOSPADM

## 2020-11-16 RX ORDER — GABAPENTIN 300 MG/1
600 CAPSULE ORAL ONCE
Status: COMPLETED | OUTPATIENT
Start: 2020-11-16 | End: 2020-11-16

## 2020-11-16 RX ORDER — LISINOPRIL 5 MG/1
2.5 TABLET ORAL
Status: DISCONTINUED | OUTPATIENT
Start: 2020-11-20 | End: 2020-11-20

## 2020-11-16 RX ORDER — AMINOCAPROIC ACID 250 MG/ML
INJECTION, SOLUTION INTRAVENOUS PRN
Status: DISCONTINUED | OUTPATIENT
Start: 2020-11-16 | End: 2020-11-16 | Stop reason: SDUPTHER

## 2020-11-16 RX ORDER — SUCCINYLCHOLINE CHLORIDE 20 MG/ML
INJECTION INTRAMUSCULAR; INTRAVENOUS PRN
Status: DISCONTINUED | OUTPATIENT
Start: 2020-11-16 | End: 2020-11-16 | Stop reason: SDUPTHER

## 2020-11-16 RX ORDER — ACETAMINOPHEN 500 MG
1000 TABLET ORAL EVERY 6 HOURS
Status: DISCONTINUED | OUTPATIENT
Start: 2020-11-16 | End: 2020-11-22 | Stop reason: HOSPADM

## 2020-11-16 RX ORDER — POTASSIUM CHLORIDE 750 MG/1
10 TABLET, FILM COATED, EXTENDED RELEASE ORAL
Status: DISCONTINUED | OUTPATIENT
Start: 2020-11-17 | End: 2020-11-22 | Stop reason: HOSPADM

## 2020-11-16 RX ORDER — POTASSIUM CHLORIDE 29.8 MG/ML
20 INJECTION INTRAVENOUS PRN
Status: DISCONTINUED | OUTPATIENT
Start: 2020-11-16 | End: 2020-11-19

## 2020-11-16 RX ORDER — BUPIVACAINE HYDROCHLORIDE 2.5 MG/ML
INJECTION, SOLUTION EPIDURAL; INFILTRATION; INTRACAUDAL
Status: COMPLETED | OUTPATIENT
Start: 2020-11-16 | End: 2020-11-16

## 2020-11-16 RX ORDER — SODIUM CHLORIDE, SODIUM LACTATE, POTASSIUM CHLORIDE, CALCIUM CHLORIDE 600; 310; 30; 20 MG/100ML; MG/100ML; MG/100ML; MG/100ML
INJECTION, SOLUTION INTRAVENOUS ONCE
Status: COMPLETED | OUTPATIENT
Start: 2020-11-16 | End: 2020-11-16

## 2020-11-16 RX ORDER — PROPOFOL 10 MG/ML
INJECTION, EMULSION INTRAVENOUS CONTINUOUS PRN
Status: DISCONTINUED | OUTPATIENT
Start: 2020-11-16 | End: 2020-11-16 | Stop reason: SDUPTHER

## 2020-11-16 RX ORDER — ALBUMIN, HUMAN INJ 5% 5 %
25 SOLUTION INTRAVENOUS PRN
Status: DISCONTINUED | OUTPATIENT
Start: 2020-11-16 | End: 2020-11-19

## 2020-11-16 RX ORDER — FENTANYL CITRATE 50 UG/ML
25 INJECTION, SOLUTION INTRAMUSCULAR; INTRAVENOUS
Status: DISCONTINUED | OUTPATIENT
Start: 2020-11-16 | End: 2020-11-19

## 2020-11-16 RX ORDER — METOPROLOL TARTRATE 5 MG/5ML
2.5 INJECTION INTRAVENOUS EVERY 10 MIN PRN
Status: DISCONTINUED | OUTPATIENT
Start: 2020-11-16 | End: 2020-11-18

## 2020-11-16 RX ORDER — ALBUTEROL SULFATE 90 UG/1
2 AEROSOL, METERED RESPIRATORY (INHALATION) EVERY 6 HOURS PRN
Status: DISCONTINUED | OUTPATIENT
Start: 2020-11-16 | End: 2020-11-22 | Stop reason: HOSPADM

## 2020-11-16 RX ORDER — PROPOFOL 10 MG/ML
INJECTION, EMULSION INTRAVENOUS PRN
Status: DISCONTINUED | OUTPATIENT
Start: 2020-11-16 | End: 2020-11-16 | Stop reason: SDUPTHER

## 2020-11-16 RX ORDER — ESMOLOL HYDROCHLORIDE 10 MG/ML
5 INJECTION INTRAVENOUS ONCE
Status: COMPLETED | OUTPATIENT
Start: 2020-11-16 | End: 2020-11-16

## 2020-11-16 RX ORDER — ALBUTEROL SULFATE 2.5 MG/3ML
2.5 SOLUTION RESPIRATORY (INHALATION)
Status: DISCONTINUED | OUTPATIENT
Start: 2020-11-16 | End: 2020-11-16

## 2020-11-16 RX ORDER — DEXTROSE MONOHYDRATE 50 MG/ML
100 INJECTION, SOLUTION INTRAVENOUS PRN
Status: DISCONTINUED | OUTPATIENT
Start: 2020-11-16 | End: 2020-11-19

## 2020-11-16 RX ORDER — CALCIUM CHLORIDE 100 MG/ML
INJECTION INTRAVENOUS; INTRAVENTRICULAR
Status: COMPLETED | OUTPATIENT
Start: 2020-11-16 | End: 2020-11-16

## 2020-11-16 RX ADMIN — Medication 2 PUFF: at 19:52

## 2020-11-16 RX ADMIN — FENTANYL CITRATE 50 MCG: 50 INJECTION, SOLUTION INTRAMUSCULAR; INTRAVENOUS at 08:45

## 2020-11-16 RX ADMIN — PROPOFOL 150 MG: 10 INJECTION, EMULSION INTRAVENOUS at 09:28

## 2020-11-16 RX ADMIN — POTASSIUM CHLORIDE 20 MEQ: 29.8 INJECTION, SOLUTION INTRAVENOUS at 15:45

## 2020-11-16 RX ADMIN — VANCOMYCIN HYDROCHLORIDE 1500 MG: 10 INJECTION, POWDER, LYOPHILIZED, FOR SOLUTION INTRAVENOUS at 09:12

## 2020-11-16 RX ADMIN — MUPIROCIN: 20 OINTMENT TOPICAL at 20:03

## 2020-11-16 RX ADMIN — ALBUMIN (HUMAN) 12.5 G: 12.5 INJECTION, SOLUTION INTRAVENOUS at 15:36

## 2020-11-16 RX ADMIN — FUROSEMIDE 20 MG: 10 INJECTION, SOLUTION INTRAMUSCULAR; INTRAVENOUS at 09:43

## 2020-11-16 RX ADMIN — ALBUMIN (HUMAN) 12.5 G: 12.5 INJECTION, SOLUTION INTRAVENOUS at 15:40

## 2020-11-16 RX ADMIN — MIDAZOLAM 5 MG: 5 INJECTION INTRAMUSCULAR; INTRAVENOUS at 13:16

## 2020-11-16 RX ADMIN — MILRINONE LACTATE 0.38 MCG/KG/MIN: 200 INJECTION, SOLUTION INTRAVENOUS at 09:45

## 2020-11-16 RX ADMIN — VANCOMYCIN HYDROCHLORIDE 1500 MG: 10 INJECTION, POWDER, LYOPHILIZED, FOR SOLUTION INTRAVENOUS at 20:47

## 2020-11-16 RX ADMIN — FENTANYL CITRATE 150 MCG: 50 INJECTION, SOLUTION INTRAMUSCULAR; INTRAVENOUS at 09:28

## 2020-11-16 RX ADMIN — ASPIRIN 325 MG: 325 TABLET, COATED ORAL at 22:45

## 2020-11-16 RX ADMIN — MIDAZOLAM 5 MG: 5 INJECTION INTRAMUSCULAR; INTRAVENOUS at 11:43

## 2020-11-16 RX ADMIN — MIDAZOLAM 3 MG: 5 INJECTION INTRAMUSCULAR; INTRAVENOUS at 09:37

## 2020-11-16 RX ADMIN — HEPARIN SODIUM 40000 UNITS: 10000 INJECTION, SOLUTION INTRAVENOUS; SUBCUTANEOUS at 11:06

## 2020-11-16 RX ADMIN — CLEVIPIDINE 0.5 MG/HR: 0.5 EMULSION INTRAVENOUS at 10:20

## 2020-11-16 RX ADMIN — HEPARIN SODIUM 3000 UNITS: 10000 INJECTION, SOLUTION INTRAVENOUS; SUBCUTANEOUS at 10:14

## 2020-11-16 RX ADMIN — VECURONIUM BROMIDE 10 MG: 1 INJECTION, POWDER, LYOPHILIZED, FOR SOLUTION INTRAVENOUS at 09:43

## 2020-11-16 RX ADMIN — MUPIROCIN: 20 OINTMENT TOPICAL at 08:06

## 2020-11-16 RX ADMIN — FUROSEMIDE 20 MG: 10 INJECTION, SOLUTION INTRAMUSCULAR; INTRAVENOUS at 10:00

## 2020-11-16 RX ADMIN — SODIUM CHLORIDE, POTASSIUM CHLORIDE, SODIUM LACTATE AND CALCIUM CHLORIDE: 600; 310; 30; 20 INJECTION, SOLUTION INTRAVENOUS at 08:48

## 2020-11-16 RX ADMIN — NOREPINEPHRINE BITARTRATE 0.5 MCG/MIN: 1 INJECTION, SOLUTION, CONCENTRATE INTRAVENOUS at 09:34

## 2020-11-16 RX ADMIN — SODIUM CHLORIDE 2 UNITS/HR: 9 INJECTION, SOLUTION INTRAVENOUS at 09:22

## 2020-11-16 RX ADMIN — ESMOLOL HYDROCHLORIDE 5 MG: 10 INJECTION, SOLUTION INTRAVENOUS at 08:41

## 2020-11-16 RX ADMIN — FENTANYL CITRATE 25 MCG: 50 INJECTION, SOLUTION INTRAMUSCULAR; INTRAVENOUS at 21:41

## 2020-11-16 RX ADMIN — SUCCINYLCHOLINE CHLORIDE 120 MG: 20 INJECTION, SOLUTION INTRAMUSCULAR; INTRAVENOUS at 09:28

## 2020-11-16 RX ADMIN — PANTOPRAZOLE SODIUM 40 MG: 40 INJECTION, POWDER, FOR SOLUTION INTRAVENOUS at 08:05

## 2020-11-16 RX ADMIN — DESMOPRESSIN ACETATE 16 MCG: 4 SOLUTION INTRAVENOUS at 14:26

## 2020-11-16 RX ADMIN — SODIUM BICARBONATE 25 MEQ: 84 INJECTION, SOLUTION INTRAVENOUS at 11:43

## 2020-11-16 RX ADMIN — GABAPENTIN 600 MG: 300 CAPSULE ORAL at 08:04

## 2020-11-16 RX ADMIN — CEFAZOLIN SODIUM 2 G: 10 INJECTION, POWDER, FOR SOLUTION INTRAVENOUS at 09:20

## 2020-11-16 RX ADMIN — PROPOFOL 100 MCG/KG/MIN: 10 INJECTION, EMULSION INTRAVENOUS at 09:37

## 2020-11-16 RX ADMIN — FENTANYL CITRATE 250 MCG: 50 INJECTION, SOLUTION INTRAMUSCULAR; INTRAVENOUS at 11:43

## 2020-11-16 RX ADMIN — POTASSIUM CHLORIDE 20 MEQ: 29.8 INJECTION, SOLUTION INTRAVENOUS at 18:07

## 2020-11-16 RX ADMIN — PROPOFOL 50 MG: 10 INJECTION, EMULSION INTRAVENOUS at 09:36

## 2020-11-16 RX ADMIN — FENTANYL CITRATE 50 MCG: 50 INJECTION, SOLUTION INTRAMUSCULAR; INTRAVENOUS at 10:41

## 2020-11-16 RX ADMIN — CEFAZOLIN SODIUM 2 G: 10 INJECTION, POWDER, FOR SOLUTION INTRAVENOUS at 13:05

## 2020-11-16 RX ADMIN — SODIUM CHLORIDE 10.46 UNITS/HR: 9 INJECTION, SOLUTION INTRAVENOUS at 17:52

## 2020-11-16 RX ADMIN — AMINOCAPROIC ACID 1 G/HR: 250 INJECTION, SOLUTION INTRAVENOUS at 09:20

## 2020-11-16 RX ADMIN — ACETAMINOPHEN 1000 MG: 500 TABLET, FILM COATED ORAL at 18:41

## 2020-11-16 RX ADMIN — HEPARIN SODIUM 10000 UNITS: 10000 INJECTION, SOLUTION INTRAVENOUS; SUBCUTANEOUS at 11:25

## 2020-11-16 RX ADMIN — SODIUM BICARBONATE 25 MEQ: 84 INJECTION, SOLUTION INTRAVENOUS at 12:11

## 2020-11-16 RX ADMIN — CALCIUM CHLORIDE 0.5 G: 100 INJECTION INTRAVENOUS; INTRAVENTRICULAR at 13:59

## 2020-11-16 RX ADMIN — POTASSIUM CHLORIDE 20 MEQ: 29.8 INJECTION, SOLUTION INTRAVENOUS at 16:49

## 2020-11-16 RX ADMIN — CEFAZOLIN SODIUM 2 G: 10 INJECTION, POWDER, FOR SOLUTION INTRAVENOUS at 20:03

## 2020-11-16 RX ADMIN — FENTANYL CITRATE 100 MCG: 50 INJECTION, SOLUTION INTRAMUSCULAR; INTRAVENOUS at 13:16

## 2020-11-16 RX ADMIN — SODIUM BICARBONATE 50 MEQ: 84 INJECTION, SOLUTION INTRAVENOUS at 13:08

## 2020-11-16 RX ADMIN — MIDAZOLAM 1 MG: 5 INJECTION INTRAMUSCULAR; INTRAVENOUS at 08:49

## 2020-11-16 RX ADMIN — PROTAMINE SULFATE 500 MG: 10 INJECTION, SOLUTION INTRAVENOUS at 14:03

## 2020-11-16 RX ADMIN — FENTANYL CITRATE 25 MCG: 50 INJECTION, SOLUTION INTRAMUSCULAR; INTRAVENOUS at 17:18

## 2020-11-16 RX ADMIN — FENTANYL CITRATE 50 MCG: 50 INJECTION, SOLUTION INTRAMUSCULAR; INTRAVENOUS at 09:54

## 2020-11-16 RX ADMIN — MIDAZOLAM 5 MG: 5 INJECTION INTRAMUSCULAR; INTRAVENOUS at 09:28

## 2020-11-16 RX ADMIN — ALBUMIN (HUMAN) 12.5 G: 12.5 INJECTION, SOLUTION INTRAVENOUS at 18:00

## 2020-11-16 RX ADMIN — OXYCODONE 10 MG: 5 TABLET ORAL at 22:45

## 2020-11-16 RX ADMIN — POTASSIUM CHLORIDE 40 MEQ: 29.8 INJECTION, SOLUTION INTRAVENOUS at 20:15

## 2020-11-16 RX ADMIN — ALBUMIN (HUMAN) 12.5 G: 12.5 INJECTION, SOLUTION INTRAVENOUS at 17:38

## 2020-11-16 RX ADMIN — ACETAMINOPHEN 1000 MG: 500 TABLET, FILM COATED ORAL at 22:44

## 2020-11-16 RX ADMIN — MIDAZOLAM 2 MG: 5 INJECTION INTRAMUSCULAR; INTRAVENOUS at 09:21

## 2020-11-16 RX ADMIN — FENTANYL CITRATE 50 MCG: 50 INJECTION, SOLUTION INTRAMUSCULAR; INTRAVENOUS at 09:21

## 2020-11-16 RX ADMIN — VECURONIUM BROMIDE 10 MG: 1 INJECTION, POWDER, LYOPHILIZED, FOR SOLUTION INTRAVENOUS at 11:43

## 2020-11-16 RX ADMIN — FENTANYL CITRATE 50 MCG: 50 INJECTION, SOLUTION INTRAMUSCULAR; INTRAVENOUS at 08:50

## 2020-11-16 RX ADMIN — CALCIUM CHLORIDE 0.5 G: 100 INJECTION INTRAVENOUS; INTRAVENTRICULAR at 14:04

## 2020-11-16 RX ADMIN — ONDANSETRON 4 MG: 2 INJECTION INTRAMUSCULAR; INTRAVENOUS at 21:57

## 2020-11-16 RX ADMIN — MIDAZOLAM 1 MG: 5 INJECTION INTRAMUSCULAR; INTRAVENOUS at 08:39

## 2020-11-16 RX ADMIN — ALBUMIN (HUMAN) 12.5 G: 12.5 INJECTION, SOLUTION INTRAVENOUS at 17:31

## 2020-11-16 RX ADMIN — AMINOCAPROIC ACID 5000 MG: 250 INJECTION, SOLUTION INTRAVENOUS at 09:20

## 2020-11-16 RX ADMIN — FENTANYL CITRATE 25 MCG: 50 INJECTION, SOLUTION INTRAMUSCULAR; INTRAVENOUS at 20:04

## 2020-11-16 ASSESSMENT — PULMONARY FUNCTION TESTS
PIF_VALUE: 19
PIF_VALUE: 28
PIF_VALUE: 19
PIF_VALUE: 28
PIF_VALUE: 21
PIF_VALUE: 1
PIF_VALUE: 1
PIF_VALUE: 0
PIF_VALUE: 22
PIF_VALUE: 23
PIF_VALUE: 21
PIF_VALUE: 19
PIF_VALUE: 22
PIF_VALUE: 23
PIF_VALUE: 24
PIF_VALUE: 1
PIF_VALUE: 1
PIF_VALUE: 25
PIF_VALUE: 19
PIF_VALUE: 23
PIF_VALUE: 0
PIF_VALUE: 0
PIF_VALUE: 23
PIF_VALUE: 25
PIF_VALUE: 0
PIF_VALUE: 0
PIF_VALUE: 24
PIF_VALUE: 21
PIF_VALUE: 1
PIF_VALUE: 0
PIF_VALUE: 21
PIF_VALUE: 0
PIF_VALUE: 19
PIF_VALUE: 22
PIF_VALUE: 0
PIF_VALUE: 21
PIF_VALUE: 26
PIF_VALUE: 26
PIF_VALUE: 21
PIF_VALUE: 1
PIF_VALUE: 18
PIF_VALUE: 0
PIF_VALUE: 23
PIF_VALUE: 19
PIF_VALUE: 21
PIF_VALUE: 1
PIF_VALUE: 26
PIF_VALUE: 27
PIF_VALUE: 21
PIF_VALUE: 17
PIF_VALUE: 0
PIF_VALUE: 23
PIF_VALUE: 0
PIF_VALUE: 25
PIF_VALUE: 1
PIF_VALUE: 17
PIF_VALUE: 20
PIF_VALUE: 26
PIF_VALUE: 23
PIF_VALUE: 0
PIF_VALUE: 26
PIF_VALUE: 0
PIF_VALUE: 0
PIF_VALUE: 19
PIF_VALUE: 26
PIF_VALUE: 0
PIF_VALUE: 25
PIF_VALUE: 22
PIF_VALUE: 1
PIF_VALUE: 17
PIF_VALUE: 24
PIF_VALUE: 24
PIF_VALUE: 0
PIF_VALUE: 1
PIF_VALUE: 1
PIF_VALUE: 0
PIF_VALUE: 4
PIF_VALUE: 21
PIF_VALUE: 22
PIF_VALUE: 20
PIF_VALUE: 0
PIF_VALUE: 23
PIF_VALUE: 23
PIF_VALUE: 0
PIF_VALUE: 27
PIF_VALUE: 19
PIF_VALUE: 21
PIF_VALUE: 0
PIF_VALUE: 17
PIF_VALUE: 20
PIF_VALUE: 23
PIF_VALUE: 22
PIF_VALUE: 1
PIF_VALUE: 22
PIF_VALUE: 26
PIF_VALUE: 0
PIF_VALUE: 20
PIF_VALUE: 22
PIF_VALUE: 19
PIF_VALUE: 0
PIF_VALUE: 25
PIF_VALUE: 0
PIF_VALUE: 0
PIF_VALUE: 25
PIF_VALUE: 0
PIF_VALUE: 25
PIF_VALUE: 22
PIF_VALUE: 19
PIF_VALUE: 0
PIF_VALUE: 2
PIF_VALUE: 0
PIF_VALUE: 23
PIF_VALUE: 21
PIF_VALUE: 21
PIF_VALUE: 0
PIF_VALUE: 21
PIF_VALUE: 19
PIF_VALUE: 21
PIF_VALUE: 0
PIF_VALUE: 18
PIF_VALUE: 2
PIF_VALUE: 22
PIF_VALUE: 21
PIF_VALUE: 31
PIF_VALUE: 0
PIF_VALUE: 22
PIF_VALUE: 0
PIF_VALUE: 16
PIF_VALUE: 23
PIF_VALUE: 21
PIF_VALUE: 25
PIF_VALUE: 0
PIF_VALUE: 24
PIF_VALUE: 0
PIF_VALUE: 22
PIF_VALUE: 17
PIF_VALUE: 1
PIF_VALUE: 0
PIF_VALUE: 21
PIF_VALUE: 1
PIF_VALUE: 14
PIF_VALUE: 0
PIF_VALUE: 22
PIF_VALUE: 0
PIF_VALUE: 19
PIF_VALUE: 0
PIF_VALUE: 1
PIF_VALUE: 23
PIF_VALUE: 26
PIF_VALUE: 19
PIF_VALUE: 23
PIF_VALUE: 23
PIF_VALUE: 0
PIF_VALUE: 24
PIF_VALUE: 23
PIF_VALUE: 1
PIF_VALUE: 23
PIF_VALUE: 1
PIF_VALUE: 0
PIF_VALUE: 19
PIF_VALUE: 18
PIF_VALUE: 0
PIF_VALUE: 0
PIF_VALUE: 1
PIF_VALUE: 0
PIF_VALUE: 22
PIF_VALUE: 1
PIF_VALUE: 19
PIF_VALUE: 1
PIF_VALUE: 21
PIF_VALUE: 23
PIF_VALUE: 1
PIF_VALUE: 25
PIF_VALUE: 16
PIF_VALUE: 24
PIF_VALUE: 0
PIF_VALUE: 26
PIF_VALUE: 1
PIF_VALUE: 0
PIF_VALUE: 21
PIF_VALUE: 22
PIF_VALUE: 0
PIF_VALUE: 19
PIF_VALUE: 23
PIF_VALUE: 25
PIF_VALUE: 21
PIF_VALUE: 0
PIF_VALUE: 24
PIF_VALUE: 1
PIF_VALUE: 1
PIF_VALUE: 19
PIF_VALUE: 23
PIF_VALUE: 25
PIF_VALUE: 25
PIF_VALUE: 1
PIF_VALUE: 0
PIF_VALUE: 1
PIF_VALUE: 24
PIF_VALUE: 19
PIF_VALUE: 21
PIF_VALUE: 20
PIF_VALUE: 26
PIF_VALUE: 26
PIF_VALUE: 23
PIF_VALUE: 0
PIF_VALUE: 24
PIF_VALUE: 21
PIF_VALUE: 1
PIF_VALUE: 21
PIF_VALUE: 27
PIF_VALUE: 21
PIF_VALUE: 24
PIF_VALUE: 2
PIF_VALUE: 23
PIF_VALUE: 26
PIF_VALUE: 24
PIF_VALUE: 3
PIF_VALUE: 0
PIF_VALUE: 0
PIF_VALUE: 17
PIF_VALUE: 3
PIF_VALUE: 17
PIF_VALUE: 0
PIF_VALUE: 21
PIF_VALUE: 23
PIF_VALUE: 1
PIF_VALUE: 1
PIF_VALUE: 25
PIF_VALUE: 0
PIF_VALUE: 1
PIF_VALUE: 23
PIF_VALUE: 24
PIF_VALUE: 24
PIF_VALUE: 26
PIF_VALUE: 19
PIF_VALUE: 0
PIF_VALUE: 1
PIF_VALUE: 26
PIF_VALUE: 19
PIF_VALUE: 23
PIF_VALUE: 21
PIF_VALUE: 0
PIF_VALUE: 1
PIF_VALUE: 0
PIF_VALUE: 23
PIF_VALUE: 0
PIF_VALUE: 24
PIF_VALUE: 26
PIF_VALUE: 20
PIF_VALUE: 0
PIF_VALUE: 23
PIF_VALUE: 25
PIF_VALUE: 35
PIF_VALUE: 26
PIF_VALUE: 18
PIF_VALUE: 19
PIF_VALUE: 6
PIF_VALUE: 0
PIF_VALUE: 4
PIF_VALUE: 25
PIF_VALUE: 23
PIF_VALUE: 22
PIF_VALUE: 0
PIF_VALUE: 23
PIF_VALUE: 0
PIF_VALUE: 21
PIF_VALUE: 1
PIF_VALUE: 22
PIF_VALUE: 1
PIF_VALUE: 23
PIF_VALUE: 20
PIF_VALUE: 0
PIF_VALUE: 26
PIF_VALUE: 23
PIF_VALUE: 0
PIF_VALUE: 25
PIF_VALUE: 1
PIF_VALUE: 0
PIF_VALUE: 1
PIF_VALUE: 0
PIF_VALUE: 20
PIF_VALUE: 19
PIF_VALUE: 22
PIF_VALUE: 0
PIF_VALUE: 28
PIF_VALUE: 23
PIF_VALUE: 23
PIF_VALUE: 21
PIF_VALUE: 25
PIF_VALUE: 24
PIF_VALUE: 22
PIF_VALUE: 24
PIF_VALUE: 3
PIF_VALUE: 21
PIF_VALUE: 1
PIF_VALUE: 22
PIF_VALUE: 0
PIF_VALUE: 21
PIF_VALUE: 1
PIF_VALUE: 22
PIF_VALUE: 21
PIF_VALUE: 0
PIF_VALUE: 0
PIF_VALUE: 22
PIF_VALUE: 27
PIF_VALUE: 21
PIF_VALUE: 0
PIF_VALUE: 21
PIF_VALUE: 25
PIF_VALUE: 1
PIF_VALUE: 25
PIF_VALUE: 1
PIF_VALUE: 18
PIF_VALUE: 0
PIF_VALUE: 23
PIF_VALUE: 26
PIF_VALUE: 0
PIF_VALUE: 26
PIF_VALUE: 18
PIF_VALUE: 0
PIF_VALUE: 23
PIF_VALUE: 1
PIF_VALUE: 23
PIF_VALUE: 25
PIF_VALUE: 0
PIF_VALUE: 0
PIF_VALUE: 20
PIF_VALUE: 0
PIF_VALUE: 22
PIF_VALUE: 0
PIF_VALUE: 0
PIF_VALUE: 25
PIF_VALUE: 0
PIF_VALUE: 23
PIF_VALUE: 1
PIF_VALUE: 0
PIF_VALUE: 22
PIF_VALUE: 21
PIF_VALUE: 25
PIF_VALUE: 0
PIF_VALUE: 1
PIF_VALUE: 21
PIF_VALUE: 23
PIF_VALUE: 19
PIF_VALUE: 25
PIF_VALUE: 19

## 2020-11-16 ASSESSMENT — PAIN SCALES - GENERAL
PAINLEVEL_OUTOF10: 0
PAINLEVEL_OUTOF10: 10
PAINLEVEL_OUTOF10: 9
PAINLEVEL_OUTOF10: 5
PAINLEVEL_OUTOF10: 10
PAINLEVEL_OUTOF10: 8
PAINLEVEL_OUTOF10: 3
PAINLEVEL_OUTOF10: 3
PAINLEVEL_OUTOF10: 0

## 2020-11-16 ASSESSMENT — PAIN DESCRIPTION - LOCATION
LOCATION: CHEST
LOCATION: STERNUM
LOCATION: STERNUM
LOCATION: CHEST
LOCATION: CHEST

## 2020-11-16 ASSESSMENT — PAIN DESCRIPTION - PAIN TYPE
TYPE: SURGICAL PAIN

## 2020-11-16 NOTE — PROGRESS NOTES
Pt verified information regarding surgery, name, birth date, surgeon, procedure and allergies: NKDA. Patient transferred to [address] [place] [address] for surgery. Appropriate antibiotics ordered: Ancef 2 grams and Vancomycin 1.5 grams. Beta blocker: IV Esmolol 5 mg given at 0841 per Dr. Macy Eisenmenger verbal order at bedside. Lab work within normal limits, 2 units of RBC's on call to OR, vital signs stable, Mepilex sacral border applied and 2% chlorhexidine gluconate skin prep given. Patient and family educated about surgery and pain management. Arterial line placed in Left Brachial and TLC introducer in Right IJ with PA line by Dr. Samantha Knapp. To surgery per bed at 3114.

## 2020-11-16 NOTE — PROGRESS NOTES
Patient admitted to CVU from [place] [address] and attached to ventilator and monitors. Report received from anesthesiologist.  Chest x-ray ordered. Labs drawn and sent. Assessment complete. Hemodymanics stable and will continue to monitor. Family let back to see patient. Visiting hours reviewed and all questions answered. Family aware of discharge class. Primary RN Gini Stuart.     [address] [facility]

## 2020-11-16 NOTE — ANESTHESIA PROCEDURE NOTES
Procedure Performed: MALATHI       Start Time:  11/16/2020 12:30 PM       End Time:   11/16/2020 1:45 PM    Preanesthesia Checklist:  Patient identified, IV assessed, risks and benefits discussed, monitors and equipment assessed, procedure being performed at surgeon's request and anesthesia consent obtained. General Procedure Information  Diagnostic Indications for Echo:  assessment of surgical repair, hemodynamic monitoring and assessment of valve function  Physician Requesting Echo: Spike Viera MD  Location performed:  OR  Intubated  Bite block placed  Heart visualized  Probe Insertion:  Easy  Probe Type:  3D and mulitplane  Modalities:  3D, color flow mapping, pulse wave Doppler and continuous wave Doppler    Echocardiographic and Doppler Measurements    Ventricles    Right Ventricle:  Cavity size dilated. Hypertrophy not present. Thrombus not present. Global function normal.  Ejection Fraction 45%. Left Ventricle:  Cavity size normal.  Hypertrophy not present. Thrombus not present. Global Function normal.  Ejection Fraction 60%. Ventricular Regional Function:  1- Basal Anteroseptal:  normal  2- Basal Anterior:  normal  3- Basal Anterolateral:  normal  4- Basal Inferolateral:  normal  5- Basal Inferior:  normal  6- Basal Inferoseptal:  normal  7- Mid Anteroseptal:  normal  8- Mid Anterior:  normal  9- Mid Anterolateral:  normal  10- Mid Inferolateral:  normal  11- Mid Inferior:  normal  12- Mid Inferoseptal:  normal  13- Apical Anterior:  normal  14- Apical Lateral:  normal  15- Apical Inferior:  normal  16- Apical Septal:  normal  17- [place]:  normal      Valves    Aortic Valve: Annulus normal.  Stenosis not present. Regurgitation none. Leaflets normal.  Leaflet motions normal.      Mitral Valve: Annulus normal.  Stenosis not present. Regurgitation none. Leaflets normal.  Leaflet motions normal.      Tricuspid Valve: Annulus normal.  Stenosis not present. Regurgitation none.   Leaflets normal.  Leaflet motions normal.    Pulmonic Valve: Annulus normal.  Stenosis not present. Regurgitation none. Aorta    Ascending Aorta:  Size normal.  Dissection not present. Aortic Arch:  Size normal.  Dissection not present. Descending Aorta:  Size normal.  Dissection not present. Other Aortic Findings:       Left atrial appendage occluded    Atria    Right Atrium:  Size normal.  Spontaneous echo contrast not present. Thrombus not present. Tumor not present. Device not present. Left Atrium:  Size normal.  Spontaneous echo contrast not present. Thrombus not present. Tumor not present. Device not present.     Left atrial appendage normal.      Septa    Atrial Septum:  Intra-atrial septal morphology normal.      Ventricular Septum:  Intra-ventricular septum morphology normal.          Other Findings  Pericardium:  normal  Pleural Effusion:  none  Pulmonary Arteries:  normal  Pulmonary Venous Flow:  normal    Anesthesia Information  Performed Personally

## 2020-11-16 NOTE — ANESTHESIA PRE PROCEDURE
Department of Anesthesiology  Preprocedure Note       Name:  Rios Kirkpatrick   Age:  59 y.o.  :  1955                                          MRN:  [id number]         Date:  2020      Surgeon: Neo Dos Santosor):  Spike Viera MD    Procedure: Procedure(s):  CABG CORONARY ARTERY BYPASS GRAFTING WITH LEFT ATRIAL APPENDAGE CLIP    Medications prior to admission:   Prior to Admission medications    Medication Sig Start Date End Date Taking? Authorizing Provider   atorvastatin (LIPITOR) 80 MG tablet Take 1 tablet by mouth nightly 20   Raulito Cardenas MD   metoprolol tartrate (LOPRESSOR) 50 MG tablet Take 1 tablet by mouth 2 times daily 20   Raulito Cardenas MD   nitroGLYCERIN (NITROSTAT) 0.4 MG SL tablet Place 1 tablet under the tongue every 5 minutes as needed for Chest pain 20   Raulito Cardenas MD   isosorbide mononitrate (IMDUR) 30 MG extended release tablet Take 1 tablet by mouth daily 20   Raulito Cardenas MD   tamsulosin (FLOMAX) 0.4 MG capsule Take 0.4 mg by mouth daily    Historical Provider, MD   acetaminophen-codeine (TYLENOL/CODEINE #4) 300-60 MG per tablet Take 1 tablet by mouth every 8 hours as needed for Pain. Historical Provider, MD   aspirin 81 MG chewable tablet Take 81 mg by mouth daily    Historical Provider, MD       Current medications:    No current facility-administered medications for this visit. No current outpatient medications on file.      Facility-Administered Medications Ordered in Other Visits   Medication Dose Route Frequency Provider Last Rate Last Dose    aminocaproic acid (AMICAR) 5,000 mg in sodium chloride 0.9 % 250 mL infusion bolus  5 g Intravenous Once Spike Viera MD        insulin regular (HUMULIN R;NOVOLIN R) 100 Units in sodium chloride 0.9 % 100 mL infusion  0.1 Units/kg/hr (Order-Specific) Intravenous Once Spike Viera MD        norepinephrine (LEVOPHED) 16 mg in dextrose 5 % 250 mL infusion  2 mcg/min Intravenous Once Spike Viera MD        phenylephrine (STEPHANI-SYNEPHRINE) 50 mg in dextrose 5 % 250 mL infusion  100 mcg/min Intravenous Once Spike Viera MD        bupivacaine liposome (EXPAREL) 1.3 % injection 66.5 mg  5 mL Subcutaneous Once Spike Viera MD        lactated ringers infusion   Intravenous Once Spike Viera MD        mupirocin (BACTROBAN) 2 % ointment   Nasal Once Spike Viera MD        pantoprazole (PROTONIX) injection 40 mg  40 mg Intravenous Once Spike Viera MD        ceFAZolin (ANCEF) 2 g in dextrose 5 % 100 mL IVPB  2 g Intravenous Once Spike Viera MD        vancomycin (VANCOCIN) 1,500 mg in dextrose 5 % 250 mL IVPB  1,500 mg Intravenous Once Spike Viera MD        gabapentin (NEURONTIN) capsule 600 mg  600 mg Oral Once Spike Viera MD           Allergies:  No Known Allergies    Problem List:    Patient Active Problem List   Diagnosis Code    [facility] Rotator cuff (capsule) sprain and strain S43.429A    [facility] Neck sprain and strain S13. 9XXA    [facility] Sprain and strain of unspecified site of shoulder and upper arm [id number]    Post traumatic stress disorder F43.10    Neck sprain S13. 9XXA    Sprain of shoulder, left S42.46A    Sprain of shoulder, right S42.200A    Rotator cuff (capsule) sprain and strain [id number]    HTN (hypertension) I10    Cervical disc herniation M50.20    Anxiety F41.9    Abnormal stress test R94.39    Exertional chest pain R07.9    Unstable angina (HCC) I20.0    Coronary artery disease due to lipid rich plaque I25.10, I25.83       Past Medical History:        Diagnosis Date    Anxiety     Cervical disc herniation     Coronary artery disease due to lipid rich plaque 11/12/2020    HTN (hypertension) 5/9/2013    Neck sprain     Post traumatic stress disorder     Rotator cuff (capsule) sprain and strain     Sprain of shoulder, left     Sprain of shoulder, right        Past Surgical History: Procedure Laterality Date    ANKLE SURGERY Right     around 2000       Social History:    Social History     Tobacco Use    Smoking status: Former Smoker     Packs/day: 0.50     Types: Cigarettes    Smokeless tobacco: Never Used   Substance Use Topics    Alcohol use: Yes     Alcohol/week: 0.8 standard drinks     Types: 1 Standard drinks or equivalent per week                                Counseling given: Not Answered      Vital Signs (Current): There were no vitals filed for this visit.                                            BP Readings from Last 3 Encounters:   11/13/20 (!) 142/91   11/11/20 138/78   05/18/15 120/90       NPO Status:                                                                                 BMI:   Wt Readings from Last 3 Encounters:   11/13/20 250 lb 7.1 oz (113.6 kg)   11/11/20 252 lb (114.3 kg)   05/18/15 248 lb (112.5 kg)     There is no height or weight on file to calculate BMI.    CBC:   Lab Results   Component Value Date    WBC 8.2 11/13/2020    RBC 4.47 11/13/2020    HGB 14.3 11/13/2020    HCT 42.0 11/13/2020    MCV 93.9 11/13/2020    RDW 12.4 11/13/2020     11/13/2020       CMP:   Lab Results   Component Value Date     11/13/2020     11/13/2020    K 3.4 11/13/2020    K 3.3 11/13/2020     11/13/2020     11/13/2020    CO2 23 11/13/2020    CO2 27 11/13/2020    BUN 25 11/13/2020    BUN 24 11/13/2020    CREATININE 1.2 11/13/2020    CREATININE 1.1 11/13/2020    GFRAA >60 11/13/2020    GFRAA >60 11/13/2020    GFRAA >60 05/09/2013    AGRATIO 1.3 11/13/2020    LABGLOM >60 11/13/2020    LABGLOM >60 11/13/2020    GLUCOSE 130 11/13/2020    GLUCOSE 134 11/13/2020    PROT 6.4 11/13/2020    PROT 7.0 01/10/2013    CALCIUM 9.4 11/13/2020    CALCIUM 9.0 11/13/2020    BILITOT 0.9 11/13/2020    ALKPHOS 71 11/13/2020    AST 26 11/13/2020    ALT 17 11/13/2020       POC Tests: No results for input(s): POCGLU, POCNA, POCK, POCCL, POCBUN, POCHEMO, POCHCT in the last 72 hours. Coags: No results found for: PROTIME, INR, APTT    HCG (If Applicable): No results found for: PREGTESTUR, PREGSERUM, HCG, HCGQUANT     ABGs: No results found for: PHART, PO2ART, OOV5GFP, IET4RMA, BEART, M8ALELMU     Type & Screen (If Applicable):  No results found for: LABABO, LABRH    Drug/Infectious Status (If Applicable):  No results found for: HIV, HEPCAB    COVID-19 Screening (If Applicable):   Lab Results   Component Value Date    COVID19 Not Detected 11/12/2020         Anesthesia Evaluation  Patient summary reviewed and Nursing notes reviewed  Airway: Mallampati: II  TM distance: >3 FB   Neck ROM: full  Mouth opening: > = 3 FB Dental:          Pulmonary:                              Cardiovascular:  Exercise tolerance: poor (<4 METS),   (+) hypertension: moderate, angina: with exertion, CAD: obstructive,                ROS comment: Echo 2020  Mildly reduced global systolic function with an ejection fraction estimated   at 45%. -Inferoseptal, inferior, and inferolateral hypokinesis noted. -There is trivial tricuspid regurgitation with a RVSP estimation of 13 mmHg. -Grade I diastolic dysfunction with normal LV filling pressures. Avg.   E/e'=8.4    Cath 2020  LM-30% distal  LAD-70% mid, 100% distal with left to left collaterals  D1-80% proximal  Cx-100% proximal with left to left collaterals  OM1-100% proximal with left to left collaterals  RCA-100% proximal with right to right and left-to-right collaterals  LVEF-55%     Neuro/Psych:   (+) psychiatric history: stable with treatment            GI/Hepatic/Renal:             Endo/Other:                     Abdominal:           Vascular:                                          Anesthesia Plan      general     ASA 4 - emergent       Induction: intravenous and rapid sequence.   MALATHI, PA catheter, central line and arterial line  MIPS: Postoperative opioids intended, Prophylactic antiemetics administered and Postoperative

## 2020-11-16 NOTE — BRIEF OP NOTE
Brief Postoperative Note      Patient: Hamzah Davies  YOB: 1955  MRN: [id number]    Date of Procedure: 11/16/2020    Pre-Op Diagnosis: CORONARY ARTERY DISEASE    Post-Op Diagnosis: Same       Procedure(s):  MALATHI. TCPB. RT EVH. CABG x 3, LIMA TO DISTAL LAD; SVG TO OM1; SVG TO RPDA. SCOTT CLIP WITH 45mm  ATRICLIP. PLACEMENT OF TEMPORARY VENTRICULAR PACING WIRE. DOPPLER FLOW VERIFICATION OF GRAFTS. BILATERAL INTERCOSTAL NERVE BLOCK WITH EXPAREL & MARCAINE. CBP Time:   139 min         Aortic Cross Clamp Time: 115   min    Surgeon(s):  Spike Viera MD    Assistant:  First Assistant: Keven Gunn RN  Physician Assistant: ROSY Amaya due to the complexity of the case she scrubbed in and assisted with cannulation, decannulation and proximal and distal anastomoses. Wily Mendietaows did the [facility] and [facility]. Anesthesia: General    Estimated Blood Loss (mL): 055    Complications: None    Specimens:   * No specimens in log *    Implants:  * No implants in log *      Drains:   Chest Tube 2 Anterior Mediastinal;Pleural 22 Kittitian (Active)       Urethral Catheter Temperature probe 16 fr (Active)       Findings: There was no pericardial adhesions or effusion, the ascending aorta was free of calcifications. The heart was rotated counterclockwise wise which made the exposure of the ascedning aorta challenging. There was some visible myocardial scar along the diaphragmatic aspect of the RV to suggest prior MI. The OM1 was a 2.5mm target vessel of moderate quality and mostly intramyocardial. The D2 was <1mm target vessel of moderate quality and was left alone. The RPDA was a 1.25mm target vessle of moderate quality. The distal LAD was a 1mm target vessel of moderate quality, the distal run off was 1mm.   The EF was 40% pre with some inferior lateral wall hypokineis and 60% post procedure without any lateral and inferior wall hypokineis The LIMA was of adequate size and quality and had excellent flow and was good to be used for by pass. The vein was of adequate quality and caliber and good to be used for a conduit. There was no flow into the SCOTT at the end of the procedure. There was excellent doppler flow on all three grafts at the end of the procedure.       Electronically signed by Avelina Carvalho MD on 11/16/2020 at 3:00 PM

## 2020-11-16 NOTE — ANESTHESIA PROCEDURE NOTES
Procedure Performed: MALATHI       Start Time:  11/16/2020 9:30 AM       End Time:   11/16/2020 10:57 AM    Preanesthesia Checklist:  Patient identified, IV assessed, risks and benefits discussed, monitors and equipment assessed, procedure being performed at surgeon's request and anesthesia consent obtained. General Procedure Information  Diagnostic Indications for Echo:  assessment of surgical repair, hemodynamic monitoring and assessment of valve function  Physician Requesting Echo: Spike Viera MD  Location performed:  OR  Intubated  Bite block placed  Heart visualized  Probe Insertion:  Easy  Probe Type:  3D and mulitplane  Modalities:  3D, color flow mapping, pulse wave Doppler and continuous wave Doppler    Echocardiographic and Doppler Measurements    Ventricles    Right Ventricle:  Cavity size dilated. Hypertrophy not present. Thrombus not present. Global function normal.  Ejection Fraction 35%. Left Ventricle:  Cavity size normal.  Hypertrophy not present. Thrombus not present. Global Function normal.  Ejection Fraction 40%. Ventricular Regional Function:  1- Basal Anteroseptal:  normal  2- Basal Anterior:  normal  3- Basal Anterolateral:  normal  4- Basal Inferolateral:  hypokinetic  5- Basal Inferior:  hypokinetic  6- Basal Inferoseptal:  normal  7- Mid Anteroseptal:  normal  8- Mid Anterior:  normal  9- Mid Anterolateral:  normal  10- Mid Inferolateral:  hypokinetic  11- Mid Inferior:  hypokinetic  12- Mid Inferoseptal:  normal  13- Apical Anterior:  normal  14- Apical Lateral:  hypokinetic  15- Apical Inferior:  hypokinetic  16- Apical Septal:  normal  17- [place]:  normal      Valves    Aortic Valve: Annulus normal.  Stenosis not present. Regurgitation none. Leaflets normal.  Leaflet motions normal.      Mitral Valve: Annulus normal.  Stenosis not present. Regurgitation none. Leaflets normal.  Leaflet motions normal.      Tricuspid Valve: Annulus normal.  Stenosis not present. Regurgitation none. Leaflets normal.  Leaflet motions normal.    Pulmonic Valve: Annulus normal.  Stenosis not present. Regurgitation none. Aorta    Ascending Aorta:  Size normal.  Dissection not present. Aortic Arch:  Size normal.  Dissection not present. Descending Aorta:  Size normal.  Dissection not present. Atria    Right Atrium:  Size normal.  Spontaneous echo contrast not present. Thrombus not present. Tumor not present. Device not present. Left Atrium:  Size normal.  Spontaneous echo contrast not present. Thrombus not present. Tumor not present. Device not present.     Left atrial appendage normal.      Septa    Atrial Septum:  Intra-atrial septal morphology normal.      Ventricular Septum:  Intra-ventricular septum morphology normal.          Other Findings  Pericardium:  normal  Pleural Effusion:  none  Pulmonary Arteries:  normal  Pulmonary Venous Flow:  normal    Anesthesia Information  Performed Personally

## 2020-11-16 NOTE — ANESTHESIA POSTPROCEDURE EVALUATION
Department of Anesthesiology  Postprocedure Note    Patient: Mariana Morel  MRN: [id number]  YOB: 1955  Date of evaluation: 2020  Time:  3:44 PM     Procedure Summary     Date:  20 Room / Location:  [address]    Anesthesia Start:  4594 Anesthesia Stop:  9443    Procedure:  MALATHI. TCPB. RT EVH. CABG x 3, LIMA TO DISTAL LAD; SVG TO OM1; SVG TO RPDA. SCOTT CLIP WITH 45mm  ATRICLIP. PLACEMENT OF TEMPORARY VENTRICULAR PACING WIRE. DOPPLER FLOW VERIFICATION OF GRAFTS. BILATERAL INTERCOSTAL NERVE BLOCK WITH EXPAREL & MARCAINE. (N/A ) Diagnosis:  (CORONARY ARTERY DISEASE)    Surgeon:  Jasper Anguiano MD Responsible Provider:  Coleta Apgar, MD    Anesthesia Type:  general ASA Status:  4 - Emergent          Anesthesia Type: general    [place]by Phase I:      [place]by Phase II:      Last vitals: Reviewed and per EMR flowsheets.        Anesthesia Post Evaluation    Patient location during evaluation: ICU  Patient participation: complete - patient cannot participate  Level of consciousness: sedated and ventilated  Pain score: 2  Airway patency: patent  Nausea & Vomiting: no vomiting  Complications: no  Cardiovascular status: blood pressure returned to baseline  Respiratory status: intubated, acceptable and ventilator  Hydration status: euvolemic

## 2020-11-16 NOTE — ANESTHESIA PROCEDURE NOTES
Central Venous Line:    A central venous line was placed using surface landmarks, in the pre-op for the following indication(s): central venous access and CVP monitoring. 11/16/2020 8:14 AM11/16/2020 8:15 AM    Sterility preparation included the following: hand hygiene performed prior to procedure, maximum sterile barriers used and sterile technique used to drape from head to toe. The patient was placed in Trendelenburg position. The right internal jugular vein was prepped. The site was prepped with Chloraprep. A 9 Fr (size), introducer triple lumen was placed. During the procedure, the following specific steps were taken: target vein identified and needle advanced into vein and blood aspirated. Intravenous verification was obtained by ultrasound and venous blood return. Post insertion care included: all ports aspirated, all ports flushed easily, guidewire removed intact, Biopatch applied, line sutured in place and dressing applied. During the procedure the patient experienced: patient tolerated procedure well with no complications and EBL < 5mL. Anesthesia type: generalA(n) oximetric, 7.5 (size) Pulmonary Artery Catheter (PAC) was placed through the Introducer CVL in the right internal jugular vein. The PAC placement was confirmed by pressure tracing changes. The patient experienced the following events during the procedure: patient tolerated procedure well with no complications. PA Cath placed?: Yes    Additional notes:  U/S 64645.  (1) US was used to identify the  vessel. (2) It was assessed and patent. (3) US was used to visualize vascular needle entry into the  vessel.  (4) The selected vessel appeared anatomically normal and (5) there were no apparent abnormal findings. (6) A permanent ultrasound image was saved in the patient's record.       Staffing  Anesthesiologist: Rainer Stevens MD  Performed: Anesthesiologist   Preanesthetic Checklist  Completed: patient identified, site marked, surgical consent, pre-op evaluation, timeout performed, IV checked, risks and benefits discussed, monitors and equipment checked, anesthesia consent given, oxygen available and patient being monitored

## 2020-11-16 NOTE — DISCHARGE SUMMARY
Cardiac, Vascular & Thoracic Surgery  Discharge Summary    Patient:  Wendy Mejia 1955 [id number]   Admission Date:  11/16/2020  6:50 AM  Discharge Date:  11/23/2020    Principle Diagnosis: CAD    Secondary Diagnosis:  Active Problems:    CAD in native artery  Resolved Problems:     resolved hospital problems      Cardiac Cath: Anatomy:   LM-30% distal  LAD-70% mid, 100% distal with left to left collaterals  D1-80% proximal  Cx-100% proximal with left to left collaterals  OM1-100% proximal with left to left collaterals  RCA-100% proximal with right to right and left-to-right collaterals  LVEF-55%    Procedure: 11/16/2020 CABG x 3, LIMA TO DISTAL LAD; SVG TO OM1; SVG TO RPDA. SCOTT CLIP WITH 45mm  ATRICLIP. History:  The patient is a 59 y.o. male was admitted 11/12/2020 for cardiac catheterization as part of evaluation of unstable angina.  Cardiac catheterization 11/12/2020 reveals severe multivessel CAD with normal LV systolic function. Tamera Childers was admitted for further evaluation and treatment. Tamera Childers was seen by CV surgery with plans for CABG.  Patient had 2D echo Doppler as well as carotid vein map and CT of the abdomen pelvis.  He was discharged home 11/13/2020, due to the need to take care of several personal business items that he states that he had to do. Tamera Childers returns today 11/16/2020 for CABG/SCOTT closure with atri cure clip    Hospital Course: The patient underwent CABG/ SCOTT on 11/16/2020 . The patient had post op AFIB/ converted into sinus rhythm on amiodarone. He had an uneventful post operative recovery otherwise. The patient was discharged home on amiodarone and coumadin on 11/23/2020.         Disposition:  home    Condition:  good    Medications:  Aspirin: 325 mg daily   Betablocker: Lopressor 37.5 mg BID   Statin: Lipitor 80 mg daily   Coumadin 2.5 mg daily     Discharge Medications:   Sammi Sprague, [address] Medication Instructions VQF:[id number]    Printed on:11/23/20 0804   Medication Information                      acetaminophen (TYLENOL) 500 MG tablet  Take 2 tablets by mouth daily             Alcohol Swabs PADS  1 Units by Does not apply route 2 times daily             amiodarone (CORDARONE) 200 MG tablet  Take 1 tablet by mouth 2 times daily Take 2tb BID fro three days  Take 1tb BID for one week  Take 1tb daily for one week             amiodarone (CORDARONE) 200 MG tablet  Take 2 tablets by mouth 2 times daily for 3 days, THEN 1 tablet 2 times daily for 7 days, THEN 1 tablet daily for 7 days. aspirin 325 MG EC tablet  Take 1 tablet by mouth daily             atorvastatin (LIPITOR) 80 MG tablet  Take 1 tablet by mouth nightly             blood glucose monitor strips  Test 2 times a day & as needed for symptoms of irregular blood glucose. Dispense sufficient amount for indicated testing frequency plus additional to accommodate PRN testing needs. furosemide (LASIX) 20 MG tablet  Take 1 tablet by mouth daily             Lancets MISC  1 each by Does not apply route 2 times daily             metFORMIN (GLUCOPHAGE) 500 MG tablet  Take 1 tablet by mouth 2 times daily (with meals)             metoprolol tartrate 37.5 MG TABS  Take 37.5 mg by mouth 2 times daily             potassium chloride (KLOR-CON) 10 MEQ extended release tablet  Take 1 tablet by mouth daily             tamsulosin (FLOMAX) 0.4 MG capsule  Take 0.4 mg by mouth daily             traMADol (ULTRAM) 50 MG tablet  Take 1 tablet by mouth every 6 hours as needed (pain) for up to 7 days. warfarin (COUMADIN) 5 MG tablet  Take 2.5 mg daily , dosage depends on INR levels                 Labs:  Renal: K+ 3.4, Creat.  1.2  Heme: WBC: 8.2, H/H: 14.3/ 42, PLT: 225  HgA1C: 6.7    Admission WT: 113.6 kg, ( 250 lbs )  Pre-Op WT:  113.6 kg, ( 250 lbs)  Discharge WT: 114 kg, ( 251 lbs)       Patient Instructions:    Patient was instructed to adhere strictly to social distancing measures and avoid  close contact with individuals who are at risk for being asymptomatic carries and/or have had known contact with a diagnosed COVID-19 patient over the next few weeks. Patient Instructions: Activity: DO NOT LIFT, PUSH, OR PULL ANYTHING OVER 5 POUNDS FOR 8 WEEK from the day of surgery  Diet:  cardiac diet  Wound Care:  [address] YOUR INCISIONS DAILY WITH A CLEAN WASHCLOTH AND ANTIBACTERIAL SOAP.  Do not wash your incisions after you have cleansed other parts of your body      Follow up with Cardiothoracic Surgeon, Dr. Antonio Campbell 12/2/2020  Follow up with CardiologistDeena 12/8/2020  Follow up with PCP: Dr. Irving Jj  12/15/2020

## 2020-11-16 NOTE — ANESTHESIA PROCEDURE NOTES
Arterial Line:    An arterial line was placed using surface landmarks, in the pre-op for the following indication(s): continuous blood pressure monitoring and blood sampling needed. A 20 gauge (size), 1 and 3/8 inch (length), Angiocath (type) catheter was placed, Seldinger technique not used, into the left brachial artery, secured by tape and Tegaderm. Anesthesia type: Local  Local infiltration: Injection    Events:  patient tolerated procedure well with no complications and EBL < 5mL.   11/16/2020 8:11 AM11/16/2020 8:13 AM  Anesthesiologist: Stefany Shaffer MD  Performed: Anesthesiologist   Preanesthetic Checklist  Completed: patient identified, site marked, surgical consent, pre-op evaluation, timeout performed, IV checked, risks and benefits discussed, monitors and equipment checked, anesthesia consent given, oxygen available and patient being monitored

## 2020-11-16 NOTE — PROGRESS NOTES
Blood pressure labile d/t recovery time from open heart surgery.  Watching blood pressure closely and titrating gtts as needed for patient stability

## 2020-11-17 LAB
ANION GAP SERPL CALCULATED.3IONS-SCNC: 9 MMOL/L (ref 3–16)
BUN BLDV-MCNC: 16 MG/DL (ref 7–20)
CALCIUM SERPL-MCNC: 7.9 MG/DL (ref 8.3–10.6)
CHLORIDE BLD-SCNC: 112 MMOL/L (ref 99–110)
CO2: 25 MMOL/L (ref 21–32)
CREAT SERPL-MCNC: 1.2 MG/DL (ref 0.8–1.3)
GFR AFRICAN AMERICAN: >60
GFR NON-AFRICAN AMERICAN: >60
GLUCOSE BLD-MCNC: 102 MG/DL (ref 70–99)
GLUCOSE BLD-MCNC: 102 MG/DL (ref 70–99)
GLUCOSE BLD-MCNC: 104 MG/DL (ref 70–99)
GLUCOSE BLD-MCNC: 110 MG/DL (ref 70–99)
GLUCOSE BLD-MCNC: 112 MG/DL (ref 70–99)
GLUCOSE BLD-MCNC: 112 MG/DL (ref 70–99)
GLUCOSE BLD-MCNC: 114 MG/DL (ref 70–99)
GLUCOSE BLD-MCNC: 116 MG/DL (ref 70–99)
GLUCOSE BLD-MCNC: 121 MG/DL (ref 70–99)
GLUCOSE BLD-MCNC: 130 MG/DL (ref 70–99)
GLUCOSE BLD-MCNC: 132 MG/DL (ref 70–99)
GLUCOSE BLD-MCNC: 146 MG/DL (ref 70–99)
GLUCOSE BLD-MCNC: 177 MG/DL (ref 70–99)
GLUCOSE BLD-MCNC: 199 MG/DL (ref 70–99)
GLUCOSE BLD-MCNC: 200 MG/DL (ref 70–99)
GLUCOSE BLD-MCNC: 219 MG/DL (ref 70–99)
GLUCOSE BLD-MCNC: 305 MG/DL (ref 70–99)
GLUCOSE BLD-MCNC: 489 MG/DL (ref 70–99)
GLUCOSE BLD-MCNC: 78 MG/DL (ref 70–99)
GLUCOSE BLD-MCNC: 87 MG/DL (ref 70–99)
GLUCOSE BLD-MCNC: 92 MG/DL (ref 70–99)
GLUCOSE BLD-MCNC: 92 MG/DL (ref 70–99)
GLUCOSE BLD-MCNC: 93 MG/DL (ref 70–99)
HCT VFR BLD CALC: 29.8 % (ref 40.5–52.5)
HEMOGLOBIN: 10.3 G/DL (ref 13.5–17.5)
MAGNESIUM: 2.2 MG/DL (ref 1.8–2.4)
MCH RBC QN AUTO: 32.5 PG (ref 26–34)
MCHC RBC AUTO-ENTMCNC: 34.7 G/DL (ref 31–36)
MCV RBC AUTO: 93.6 FL (ref 80–100)
PDW BLD-RTO: 12 % (ref 12.4–15.4)
PERFORMED ON: ABNORMAL
PERFORMED ON: NORMAL
PLATELET # BLD: 155 K/UL (ref 135–450)
PMV BLD AUTO: 8.3 FL (ref 5–10.5)
POTASSIUM SERPL-SCNC: 3.6 MMOL/L (ref 3.5–5.1)
RBC # BLD: 3.18 M/UL (ref 4.2–5.9)
SODIUM BLD-SCNC: 146 MMOL/L (ref 136–145)
WBC # BLD: 10.8 K/UL (ref 4–11)

## 2020-11-17 PROCEDURE — 83735 ASSAY OF MAGNESIUM: CPT

## 2020-11-17 PROCEDURE — 94640 AIRWAY INHALATION TREATMENT: CPT

## 2020-11-17 PROCEDURE — 85027 COMPLETE CBC AUTOMATED: CPT

## 2020-11-17 PROCEDURE — 94150 VITAL CAPACITY TEST: CPT

## 2020-11-17 PROCEDURE — 94761 N-INVAS EAR/PLS OXIMETRY MLT: CPT

## 2020-11-17 PROCEDURE — APPSS30 APP SPLIT SHARED TIME 16-30 MINUTES: Performed by: NURSE PRACTITIONER

## 2020-11-17 PROCEDURE — 2100000000 HC CCU R&B

## 2020-11-17 PROCEDURE — 6360000002 HC RX W HCPCS: Performed by: NURSE PRACTITIONER

## 2020-11-17 PROCEDURE — 2580000003 HC RX 258: Performed by: THORACIC SURGERY (CARDIOTHORACIC VASCULAR SURGERY)

## 2020-11-17 PROCEDURE — P9047 ALBUMIN (HUMAN), 25%, 50ML: HCPCS | Performed by: NURSE PRACTITIONER

## 2020-11-17 PROCEDURE — 6360000002 HC RX W HCPCS: Performed by: THORACIC SURGERY (CARDIOTHORACIC VASCULAR SURGERY)

## 2020-11-17 PROCEDURE — 2700000000 HC OXYGEN THERAPY PER DAY

## 2020-11-17 PROCEDURE — 2580000003 HC RX 258

## 2020-11-17 PROCEDURE — 80048 BASIC METABOLIC PNL TOTAL CA: CPT

## 2020-11-17 PROCEDURE — 94669 MECHANICAL CHEST WALL OSCILL: CPT

## 2020-11-17 PROCEDURE — 6370000000 HC RX 637 (ALT 250 FOR IP): Performed by: THORACIC SURGERY (CARDIOTHORACIC VASCULAR SURGERY)

## 2020-11-17 RX ORDER — ALBUMIN (HUMAN) 12.5 G/50ML
25 SOLUTION INTRAVENOUS ONCE
Status: COMPLETED | OUTPATIENT
Start: 2020-11-17 | End: 2020-11-17

## 2020-11-17 RX ORDER — SODIUM CHLORIDE 9 MG/ML
INJECTION, SOLUTION INTRAVENOUS
Status: COMPLETED
Start: 2020-11-17 | End: 2020-11-17

## 2020-11-17 RX ORDER — AMIODARONE HYDROCHLORIDE 200 MG/1
400 TABLET ORAL 2 TIMES DAILY
Status: DISCONTINUED | OUTPATIENT
Start: 2020-11-17 | End: 2020-11-22 | Stop reason: HOSPADM

## 2020-11-17 RX ADMIN — Medication 10 ML: at 20:45

## 2020-11-17 RX ADMIN — STANDARDIZED SENNA CONCENTRATE AND DOCUSATE SODIUM 1 TABLET: 8.6; 5 TABLET ORAL at 08:31

## 2020-11-17 RX ADMIN — OXYCODONE 10 MG: 5 TABLET ORAL at 16:43

## 2020-11-17 RX ADMIN — ACETAMINOPHEN 1000 MG: 500 TABLET, FILM COATED ORAL at 16:42

## 2020-11-17 RX ADMIN — POTASSIUM CHLORIDE 10 MEQ: 750 TABLET, FILM COATED, EXTENDED RELEASE ORAL at 18:04

## 2020-11-17 RX ADMIN — METOPROLOL TARTRATE 12.5 MG: 25 TABLET, FILM COATED ORAL at 08:31

## 2020-11-17 RX ADMIN — OXYCODONE 5 MG: 5 TABLET ORAL at 08:31

## 2020-11-17 RX ADMIN — POTASSIUM CHLORIDE 10 MEQ: 750 TABLET, FILM COATED, EXTENDED RELEASE ORAL at 12:13

## 2020-11-17 RX ADMIN — AMIODARONE HYDROCHLORIDE 0.5 MG/MIN: 50 INJECTION, SOLUTION INTRAVENOUS at 20:50

## 2020-11-17 RX ADMIN — Medication 400 MG: at 20:43

## 2020-11-17 RX ADMIN — OXYCODONE 10 MG: 5 TABLET ORAL at 12:13

## 2020-11-17 RX ADMIN — OXYCODONE 10 MG: 5 TABLET ORAL at 20:44

## 2020-11-17 RX ADMIN — SODIUM CHLORIDE 7.9 UNITS/HR: 9 INJECTION, SOLUTION INTRAVENOUS at 20:50

## 2020-11-17 RX ADMIN — AMIODARONE HYDROCHLORIDE 400 MG: 200 TABLET ORAL at 20:43

## 2020-11-17 RX ADMIN — ATORVASTATIN CALCIUM 40 MG: 40 TABLET, FILM COATED ORAL at 20:44

## 2020-11-17 RX ADMIN — POTASSIUM CHLORIDE 40 MEQ: 29.8 INJECTION, SOLUTION INTRAVENOUS at 05:11

## 2020-11-17 RX ADMIN — ALBUMIN (HUMAN) 25 G: 0.25 INJECTION, SOLUTION INTRAVENOUS at 08:30

## 2020-11-17 RX ADMIN — FONDAPARINUX SODIUM 2.5 MG: 2.5 INJECTION, SOLUTION SUBCUTANEOUS at 08:31

## 2020-11-17 RX ADMIN — AMIODARONE HYDROCHLORIDE 1 MG/MIN: 50 INJECTION, SOLUTION INTRAVENOUS at 12:18

## 2020-11-17 RX ADMIN — ALBUTEROL SULFATE 2.5 MG: 2.5 SOLUTION RESPIRATORY (INHALATION) at 11:38

## 2020-11-17 RX ADMIN — Medication 400 MG: at 08:31

## 2020-11-17 RX ADMIN — SODIUM CHLORIDE: 9 INJECTION, SOLUTION INTRAVENOUS at 04:15

## 2020-11-17 RX ADMIN — ASPIRIN 325 MG: 325 TABLET, COATED ORAL at 08:31

## 2020-11-17 RX ADMIN — ACETAMINOPHEN 1000 MG: 500 TABLET, FILM COATED ORAL at 04:16

## 2020-11-17 RX ADMIN — DEXTROSE MONOHYDRATE 150 MG: 50 INJECTION, SOLUTION INTRAVENOUS at 14:35

## 2020-11-17 RX ADMIN — MUPIROCIN: 20 OINTMENT TOPICAL at 08:32

## 2020-11-17 RX ADMIN — VANCOMYCIN HYDROCHLORIDE 1500 MG: 10 INJECTION, POWDER, LYOPHILIZED, FOR SOLUTION INTRAVENOUS at 20:48

## 2020-11-17 RX ADMIN — PHENYLEPHRINE HYDROCHLORIDE 50 MCG/MIN: 10 INJECTION INTRAVENOUS at 12:18

## 2020-11-17 RX ADMIN — STANDARDIZED SENNA CONCENTRATE AND DOCUSATE SODIUM 1 TABLET: 8.6; 5 TABLET ORAL at 20:43

## 2020-11-17 RX ADMIN — POTASSIUM CHLORIDE 10 MEQ: 750 TABLET, FILM COATED, EXTENDED RELEASE ORAL at 08:31

## 2020-11-17 RX ADMIN — DEXTROSE MONOHYDRATE 150 MG: 50 INJECTION, SOLUTION INTRAVENOUS at 12:18

## 2020-11-17 RX ADMIN — AMIODARONE HYDROCHLORIDE 0.5 MG/MIN: 50 INJECTION, SOLUTION INTRAVENOUS at 17:51

## 2020-11-17 RX ADMIN — CEFAZOLIN SODIUM 2 G: 10 INJECTION, POWDER, FOR SOLUTION INTRAVENOUS at 04:43

## 2020-11-17 RX ADMIN — INSULIN GLARGINE 17 UNITS: 100 INJECTION, SOLUTION SUBCUTANEOUS at 21:20

## 2020-11-17 RX ADMIN — MUPIROCIN: 20 OINTMENT TOPICAL at 20:45

## 2020-11-17 RX ADMIN — AMIODARONE HYDROCHLORIDE 400 MG: 200 TABLET ORAL at 13:55

## 2020-11-17 RX ADMIN — ALBUTEROL SULFATE 2.5 MG: 2.5 SOLUTION RESPIRATORY (INHALATION) at 07:14

## 2020-11-17 RX ADMIN — SODIUM CHLORIDE 5.2 UNITS/HR: 9 INJECTION, SOLUTION INTRAVENOUS at 03:15

## 2020-11-17 RX ADMIN — CEFAZOLIN SODIUM 2 G: 10 INJECTION, POWDER, FOR SOLUTION INTRAVENOUS at 12:13

## 2020-11-17 RX ADMIN — ACETAMINOPHEN 1000 MG: 500 TABLET, FILM COATED ORAL at 10:42

## 2020-11-17 RX ADMIN — CEFAZOLIN SODIUM 2 G: 10 INJECTION, POWDER, FOR SOLUTION INTRAVENOUS at 19:47

## 2020-11-17 RX ADMIN — ACETAMINOPHEN 1000 MG: 500 TABLET, FILM COATED ORAL at 20:43

## 2020-11-17 RX ADMIN — OXYCODONE 10 MG: 5 TABLET ORAL at 03:14

## 2020-11-17 RX ADMIN — VANCOMYCIN HYDROCHLORIDE 1500 MG: 10 INJECTION, POWDER, LYOPHILIZED, FOR SOLUTION INTRAVENOUS at 11:57

## 2020-11-17 ASSESSMENT — PAIN DESCRIPTION - PAIN TYPE
TYPE: SURGICAL PAIN

## 2020-11-17 ASSESSMENT — PAIN DESCRIPTION - LOCATION
LOCATION: CHEST

## 2020-11-17 ASSESSMENT — PAIN SCALES - GENERAL
PAINLEVEL_OUTOF10: 5
PAINLEVEL_OUTOF10: 8
PAINLEVEL_OUTOF10: 0
PAINLEVEL_OUTOF10: 5
PAINLEVEL_OUTOF10: 6
PAINLEVEL_OUTOF10: 5
PAINLEVEL_OUTOF10: 0
PAINLEVEL_OUTOF10: 5
PAINLEVEL_OUTOF10: 3
PAINLEVEL_OUTOF10: 0
PAINLEVEL_OUTOF10: 5
PAINLEVEL_OUTOF10: 7
PAINLEVEL_OUTOF10: 0
PAINLEVEL_OUTOF10: 5
PAINLEVEL_OUTOF10: 1
PAINLEVEL_OUTOF10: 5
PAINLEVEL_OUTOF10: 5
PAINLEVEL_OUTOF10: 3
PAINLEVEL_OUTOF10: 5
PAINLEVEL_OUTOF10: 3
PAINLEVEL_OUTOF10: 5

## 2020-11-17 NOTE — PROGRESS NOTES
CC:s/p CABG x 3 on 11/16,  ( LIMA TO DISTAL LAD; SVG TO OM1; SVG TO RPDA ),  SCOTT clip      Patient is doing well, Sitting up in the chair, talking and making jokes    CV: NSR 80-90's, SBP: 108-120's- on Levophed 2 mcg/ min   Pulm: clear sat 95-97% on 5L NC  Renal: K+ 3.6, Creat.  1.2  Heme: WBC: 10.8, H/H: 10.3/ 29.8, PLT: 155   111.5 kg, pre op 113.6 kg  Glucose: 112, Hgb A1C: 6.7  CT: 320/240/-560  UOP: 1300/3225/931- 3847    Plan: add Albumin 25 g, wean Levophed, likely transition later today, encourage I&S and activity

## 2020-11-17 NOTE — CONSULTS
Outpatient Cardiac Rehab explained to pt. Given brochure and reviewed. Wife states she will look at info later.

## 2020-11-17 NOTE — CARE COORDINATION
[facility] Discharge Planning Assessment     RN/SW discharge planner met with patient (and family member) to discuss reason for admission, current living situation, and potential needs at the time of discharge    Insurance/Demographics Verified: Medicaid [place]    Current Living Arrangements   [] Apartment [] Condo [] Hotel [] Homeless [x] House [] Shelter   []  SNF           []  LTC     Confirmed w/:   Living w/: [x] Alone [] Children [] Parent [] Spouse        Primary Care Provider / Last visit to PCP   Quin Hayes, [address] [place]   Specialty Providers   [] [address] [place]           Level of Functioning / DME   [x] no DME [] Independent w/ ADLs [] Dependent w/ ADLs   [] BiPAP/CPAP [] BSC [] Cane [] Rollator   [] Hospital Bed [] Home O2   w/   [] Scooter [] Shower Chair [] [place] [] Wheelchair        Current [employer]   [x] n/a  []  CoA: w/: [] HD [] PD   [] [address]                                           [] [place]   [] RN   [] PT   [] OT   [] SW         Medication compliance issues:  none identified    Financial issues that could impact healthcare:  none identified    Discussed and provided facilities of choice if transition to a skilled nursing facility is required at the time of discharge. Tentative Discharge Planning   [] Acute Rehab [] Home Alone [x] Home w/ Family   [x] HHC        [] [place]   []  RN   [] PT   [] OT   [] SW   [] Hospice [] LTAC/Select   [] SNF /  [] LTC   [facility] agency not assigned. Discussed with patient and/or family that on the day of discharge home tentative time of discharge will be between 10 AM and noon. Transportation at the time of discharge: sister    No need for a readmission assessment. This procedure/admission was scheduled.     DRAKE Georges, RN   RN Case Manager  [facility]  [phone]

## 2020-11-17 NOTE — PROGRESS NOTES
11/16/20 1956   [facility] Patient Data   Static Compliance 22 mL/cmH2O   Dynamic Compliance 21 mL/cmH2O

## 2020-11-17 NOTE — OP NOTE
Operative Note  Patient: Kenn Monroy  YOB: 1955  MRN: [id number]     Date of Procedure: 11/16/2020     Pre-Op Diagnosis:   #1 Stable angina  #2 severe three-vessel coronary artery disease  #3 hypertension   #4 hyperlipidemia  #5 posttraumatic stress disorder  #6 obesity     Post-Op Diagnosis: Same   #1 Stable angina  #2 severe three-vessel coronary artery disease  #3 hypertension   #4 hyperlipidemia  #5 posttraumatic stress disorder  #6 obesity    Procedure(s):  MALATHI.   TCPB. Left EVH. CABG x 3 ( LIMA TO DISTAL LAD; SVG TO OM1; SVG TO RPDA)  SCOTT EXCLUSION WITH 45mm  ATRICLIP. PLACEMENT OF TEMPORARY VENTRICULAR PACING WIRE. DOPPLER FLOW VERIFICATION OF GRAFTS. BILATERAL INTERCOSTAL NERVE BLOCK WITH EXPAREL & MARCAINE.     CBP Time:   139 min         Aortic Cross Clamp Time: 115   min     Surgeon(s):  Spike Viera MD     Assistant:  First Assistant: Gloriajean Gosselin, RN  Physician Assistant: ROSY Scott due to the complexity of the case she scrubbed in and assisted with cannulation, decannulation and proximal and distal anastomoses. Reina Gottron did the [facility] and relief.     Anesthesia: General     Estimated Blood Loss (mL): 621     Complications: None      Specimens:   * No specimens in log *    Implants:  * No implants in log *      Drains:   Chest Tube 2 Anterior Mediastinal;Pleural 22 Hungarian (Active)   Suction -20 cm H2O 11/16/20 1615   Chest Tube Airleak Yes 11/16/20 1615   Drainage Description Sanguinous 11/16/20 1615   Dressing Status Clean;Dry; Intact 11/16/20 1615   Dressing Type Dry dressing 11/16/20 1615   Site Assessment Not assessed 11/16/20 1615   Patency Intervention Tip/Tilt 11/16/20 1615   Output (ml) 40 ml 11/16/20 1900       NG/OG/NJ/NE Tube Orogastric 16 fr Center mouth (Active)   Surrounding Skin Dry; Intact 11/16/20 1535   Securement device No 11/16/20 1535   Status Clamped 11/16/20 1535   Placement Verified by X-Ray (Initial) 11/16/20 1535   NG/OG/NJ/NE procedure without any lateral and inferior wall hypokineis The LIMA was of adequate size and quality and had excellent flow and was good to be used for by pass. The vein was of adequate quality and caliber and good to be used for a conduit. There was no flow into the SCOTT at the end of the procedure. There was excellent doppler flow on all three grafts at the end of the procedure. Detailed Description of Procedure:   After informed consent was obtained and placement of monitoring lines, the patient was brought to the operating room and was placed on the OR table in supine position. General  endotracheal anesthesia was induced, and a Rodriguez catheter and MALATHI probe were inserted. Preoperative antibiotics were administered. The patient was prepped and draped in the usual sterile fashion from chin to toes. A proper time out was performed. A standard median sternotomy was carried out at the same time with endoscopic vein harvest from the left leg. At the end of the vein harvest, the vein was found to be of adequate caliber and quality to be used for bypass. The leg incisions were closed in layers in standard fashion. The LIMA retractor was inserted, and the LIMA was harvested as a pedicle. At the end of the LIMA harvest, full heparin dose was administered. The LIMA was transected distally and was found to have excellent flow. It was spatulated, wrapped in papaverine soaked sponge, clamped and set aside. Its distal stump was doubly ligated. The LIMA retractor was removed and a standard sternal retractor was inserted. The pericardium was opened in an inverted-T fashion and it was suspended with stay sutures. The aorta was inspected and palpated and was found normal without any calcifications. Standard aortic and right atrial cannulation was carried out in preparation for cardiopulmonary bypass. An antegrade DLP needle was placed on the ascending aorta. With adequate ACT, we commenced cardiopulmonary bypass.   We had excellent forward flow and drainage. The systemic temperature was allowed to drift down to 34 degrees centigrade. The coronary artery targets were marked on the epicardium. Aortic cross-clamp was applied, and prompt diastolic arrest was achieved with administration of 1.5 liters of antegrade cardioplegia. Cardiac standstill was maintained throughout the case with topical ice cooling and intermittent doses of  antegrade cardioplegia through the DLP needle and down through the vein grafts in 15-20 minutes intervals. Our attention was then turned to the RPDA which was our first target. This was found to be a  1.25 mm  target vessel of moderate quality, it had a run off 1-1.25mm. A 7 mm arteriotomy was performed and a spatulated vein graft was anastomosed to  the RPDA with a running 7-0 Prolene suture. Before tying the sutures, all three anastomotic lumens were probed to ensure there was no compromise of the anastomosis. The heart was allowed to fill up with volume and the vein graft was measured and cut to length in order to reach the ascending aorta. A proximal anastomotic site was created on the ascending aorta with an 11 Blade followed by a 4mm punch. The first proximal aorto saphenous anastomosis to the RPDA was then carried out with a running 6-0 Prolene suture. Of note the graft to the RPDA was brought anterior to the right ventricle. Our attention was then turned to the lateral wall. The OM1 was found to be a  2.5 mm target vessel of moderate quality, most of it was following and straight intramyocardial course. A 7 mm arteriotomy was performed and the vein graft was anastomosed to the OM2 with a running 7-0 Prolene suture. Before tying the sutures, all three anastomotic lumens were probed to ensure there was no compromise of the anastomosis. The left atrial appendage was then measured and a 45mm AtriCure clip was applied at the base of the left atrial appendage.     The second diagonal was The pericardium was then re approximated with several interrupted sutures. The sternal edges were treated with platelet rich gel and bilateral intercostal parasternal nerve block was carried out with a mixture of Exparel and Marcaine after thorough hemostasis was ensured. A 24 [place] drain was placed in the left pleural space and a  24 [place]f drain was placed in the anterior mediastinum with its tip into the right pleural space which was widely opened and suctioned out. The sternum was then reapproximated with stainless steel wires. The sternotomy incision was then irrigated and closed in between layers in the standard fashion. All needles, instruments and sponge counts were found to be correct.   The patient was brought into the intensive care unit in critical, but stable condition      Electronically signed by Jasper Anguiano MD on 11/16/2020 at 7:31 PM

## 2020-11-17 NOTE — PROGRESS NOTES
EPOC labs done  ABG'S MD emil b/s  Ok to drop to 40% spont to prepare for extubation  K 3.7 will replace per MD order  [facility]

## 2020-11-17 NOTE — PLAN OF CARE
Nutrition Problem #1: Increased nutrient needs  Intervention: Food and/or Nutrient Delivery: Continue Current Diet  Nutritional Goals: PO to remain greater than 75% at meals

## 2020-11-17 NOTE — PROGRESS NOTES
Comprehensive Nutrition Assessment    Type and Reason for Visit:  Initial, Consult(S/P CABG)    Nutrition Recommendations/Plan:   1. Continue current diet  2. Encourage po    Nutrition Assessment:  Pt is at risk for nutritional compromise d/t increased nutrient needs for wound healing. Pt is s/p OHS. Pt denies any n/v. Pt wt stable. PO great pta and currently po great now with intake 100%. Pt denies any needs at this time. Malnutrition Assessment:  Malnutrition Status:  No malnutrition      Estimated Daily Nutrient Needs:  Energy (kcal):  3528-8817 cals (15-18 cals/112kg); Weight Used for Energy Requirements:  Current     Protein (g):   gms (1.2-2.0 gms/IBW 70kg); Weight Used for Protein Requirements:  Ideal        Fluid (ml/day):   ; Method Used for Fluid Requirements:  1 ml/kcal      Nutrition Related Findings:  I/O's: -2.3L. No edema.  Na 146H      Wounds:  Surgical Incision       Current Nutrition Therapies:    DIET CARDIAC; Daily Fluid Restriction: 1800 ml; No Caffeine  DIET CARDIAC; Daily Fluid Restriction: 1800 ml; No Caffeine    Anthropometric Measures:  · Height: 5' 8\" (172.7 cm)  · Current Body Weight: 245 lb (111.1 kg)   · Ideal Body Weight: 154 lbs; % Ideal Body Weight     · BMI: 37.3  · Adjusted Body Weight:  ; No Adjustment   · BMI Categories: Obese Class 2 (BMI 35.0 -39.9)       Nutrition Diagnosis:   · Increased nutrient needs related to increase demand for energy/nutrients as evidenced by wounds(s/p OHS)    Nutrition Interventions:   Food and/or Nutrient Delivery:  Continue Current Diet  Nutrition Education/Counseling:  Education needed(CVU d/c class)   Coordination of Nutrition Care:  Continue to monitor while inpatient    Goals:  PO to remain greater than 75% at meals       Nutrition Monitoring and Evaluation:   Behavioral-Environmental Outcomes:  None Identified   Food/Nutrient Intake Outcomes:  Food and Nutrient Intake  Physical Signs/Symptoms Outcomes:  Weight, Skin     Discharge Planning:    No discharge needs at this time     Electronically signed by Darrell Cummings RD, CNSC, LD on 11/17/20 at 11:22 AM EST    Contact: [phone]

## 2020-11-18 ENCOUNTER — APPOINTMENT (OUTPATIENT)
Dept: GENERAL RADIOLOGY | Age: 65
DRG: 166 | End: 2020-11-18
Attending: THORACIC SURGERY (CARDIOTHORACIC VASCULAR SURGERY)
Payer: MEDICAID

## 2020-11-18 LAB
ANION GAP SERPL CALCULATED.3IONS-SCNC: 10 MMOL/L (ref 3–16)
BUN BLDV-MCNC: 20 MG/DL (ref 7–20)
CALCIUM SERPL-MCNC: 7.7 MG/DL (ref 8.3–10.6)
CHLORIDE BLD-SCNC: 105 MMOL/L (ref 99–110)
CO2: 24 MMOL/L (ref 21–32)
CREAT SERPL-MCNC: 1.1 MG/DL (ref 0.8–1.3)
GFR AFRICAN AMERICAN: >60
GFR NON-AFRICAN AMERICAN: >60
GLUCOSE BLD-MCNC: 105 MG/DL (ref 70–99)
GLUCOSE BLD-MCNC: 114 MG/DL (ref 70–99)
GLUCOSE BLD-MCNC: 114 MG/DL (ref 70–99)
GLUCOSE BLD-MCNC: 118 MG/DL (ref 70–99)
GLUCOSE BLD-MCNC: 122 MG/DL (ref 70–99)
GLUCOSE BLD-MCNC: 123 MG/DL (ref 70–99)
GLUCOSE BLD-MCNC: 124 MG/DL (ref 70–99)
GLUCOSE BLD-MCNC: 134 MG/DL (ref 70–99)
GLUCOSE BLD-MCNC: 181 MG/DL (ref 70–99)
GLUCOSE BLD-MCNC: 191 MG/DL (ref 70–99)
GLUCOSE BLD-MCNC: 224 MG/DL (ref 70–99)
GLUCOSE BLD-MCNC: 242 MG/DL (ref 70–99)
HCT VFR BLD CALC: 28.1 % (ref 40.5–52.5)
HEMOGLOBIN: 9.4 G/DL (ref 13.5–17.5)
MCH RBC QN AUTO: 31.8 PG (ref 26–34)
MCHC RBC AUTO-ENTMCNC: 33.4 G/DL (ref 31–36)
MCV RBC AUTO: 95.3 FL (ref 80–100)
PDW BLD-RTO: 12.4 % (ref 12.4–15.4)
PERFORMED ON: ABNORMAL
PLATELET # BLD: 126 K/UL (ref 135–450)
PMV BLD AUTO: 8.9 FL (ref 5–10.5)
POTASSIUM SERPL-SCNC: 3.6 MMOL/L (ref 3.5–5.1)
POTASSIUM SERPL-SCNC: 3.9 MMOL/L (ref 3.5–5.1)
POTASSIUM SERPL-SCNC: 4.1 MMOL/L (ref 3.5–5.1)
RBC # BLD: 2.95 M/UL (ref 4.2–5.9)
SODIUM BLD-SCNC: 139 MMOL/L (ref 136–145)
WBC # BLD: 11.6 K/UL (ref 4–11)

## 2020-11-18 PROCEDURE — 99024 POSTOP FOLLOW-UP VISIT: CPT | Performed by: THORACIC SURGERY (CARDIOTHORACIC VASCULAR SURGERY)

## 2020-11-18 PROCEDURE — 2000000000 HC ICU R&B

## 2020-11-18 PROCEDURE — 84132 ASSAY OF SERUM POTASSIUM: CPT

## 2020-11-18 PROCEDURE — 97530 THERAPEUTIC ACTIVITIES: CPT

## 2020-11-18 PROCEDURE — 2700000000 HC OXYGEN THERAPY PER DAY

## 2020-11-18 PROCEDURE — 71045 X-RAY EXAM CHEST 1 VIEW: CPT

## 2020-11-18 PROCEDURE — 94761 N-INVAS EAR/PLS OXIMETRY MLT: CPT

## 2020-11-18 PROCEDURE — 80048 BASIC METABOLIC PNL TOTAL CA: CPT

## 2020-11-18 PROCEDURE — 2580000003 HC RX 258: Performed by: THORACIC SURGERY (CARDIOTHORACIC VASCULAR SURGERY)

## 2020-11-18 PROCEDURE — 97116 GAIT TRAINING THERAPY: CPT

## 2020-11-18 PROCEDURE — 6370000000 HC RX 637 (ALT 250 FOR IP): Performed by: PHYSICIAN ASSISTANT

## 2020-11-18 PROCEDURE — 94640 AIRWAY INHALATION TREATMENT: CPT

## 2020-11-18 PROCEDURE — 97165 OT EVAL LOW COMPLEX 30 MIN: CPT

## 2020-11-18 PROCEDURE — 6360000002 HC RX W HCPCS: Performed by: THORACIC SURGERY (CARDIOTHORACIC VASCULAR SURGERY)

## 2020-11-18 PROCEDURE — 6370000000 HC RX 637 (ALT 250 FOR IP): Performed by: THORACIC SURGERY (CARDIOTHORACIC VASCULAR SURGERY)

## 2020-11-18 PROCEDURE — 94669 MECHANICAL CHEST WALL OSCILL: CPT

## 2020-11-18 PROCEDURE — 97161 PT EVAL LOW COMPLEX 20 MIN: CPT

## 2020-11-18 PROCEDURE — 85027 COMPLETE CBC AUTOMATED: CPT

## 2020-11-18 RX ORDER — TAMSULOSIN HYDROCHLORIDE 0.4 MG/1
0.4 CAPSULE ORAL DAILY
Status: DISCONTINUED | OUTPATIENT
Start: 2020-11-18 | End: 2020-11-22 | Stop reason: HOSPADM

## 2020-11-18 RX ADMIN — POTASSIUM CHLORIDE 20 MEQ: 29.8 INJECTION, SOLUTION INTRAVENOUS at 10:30

## 2020-11-18 RX ADMIN — ALBUTEROL SULFATE 2.5 MG: 2.5 SOLUTION RESPIRATORY (INHALATION) at 07:54

## 2020-11-18 RX ADMIN — POTASSIUM CHLORIDE 10 MEQ: 750 TABLET, FILM COATED, EXTENDED RELEASE ORAL at 17:05

## 2020-11-18 RX ADMIN — ACETAMINOPHEN 1000 MG: 500 TABLET, FILM COATED ORAL at 21:07

## 2020-11-18 RX ADMIN — INSULIN LISPRO 2 UNITS: 100 INJECTION, SOLUTION INTRAVENOUS; SUBCUTANEOUS at 21:11

## 2020-11-18 RX ADMIN — METOPROLOL TARTRATE 25 MG: 25 TABLET, FILM COATED ORAL at 09:15

## 2020-11-18 RX ADMIN — DEXTROSE MONOHYDRATE 75 MG: 50 INJECTION, SOLUTION INTRAVENOUS at 17:30

## 2020-11-18 RX ADMIN — OXYCODONE 10 MG: 5 TABLET ORAL at 12:40

## 2020-11-18 RX ADMIN — POTASSIUM CHLORIDE 10 MEQ: 750 TABLET, FILM COATED, EXTENDED RELEASE ORAL at 08:04

## 2020-11-18 RX ADMIN — OXYCODONE 5 MG: 5 TABLET ORAL at 21:07

## 2020-11-18 RX ADMIN — MUPIROCIN: 20 OINTMENT TOPICAL at 21:10

## 2020-11-18 RX ADMIN — OXYCODONE 10 MG: 5 TABLET ORAL at 17:05

## 2020-11-18 RX ADMIN — POTASSIUM CHLORIDE 20 MEQ: 29.8 INJECTION, SOLUTION INTRAVENOUS at 09:15

## 2020-11-18 RX ADMIN — Medication 10 ML: at 09:00

## 2020-11-18 RX ADMIN — AMIODARONE HYDROCHLORIDE 400 MG: 200 TABLET ORAL at 18:20

## 2020-11-18 RX ADMIN — TAMSULOSIN HYDROCHLORIDE 0.4 MG: 0.4 CAPSULE ORAL at 16:18

## 2020-11-18 RX ADMIN — ALBUTEROL SULFATE 2.5 MG: 2.5 SOLUTION RESPIRATORY (INHALATION) at 12:12

## 2020-11-18 RX ADMIN — FUROSEMIDE 20 MG: 10 INJECTION, SOLUTION INTRAMUSCULAR; INTRAVENOUS at 08:07

## 2020-11-18 RX ADMIN — POTASSIUM CHLORIDE 10 MEQ: 750 TABLET, FILM COATED, EXTENDED RELEASE ORAL at 18:45

## 2020-11-18 RX ADMIN — POTASSIUM CHLORIDE 20 MEQ: 29.8 INJECTION, SOLUTION INTRAVENOUS at 17:16

## 2020-11-18 RX ADMIN — Medication 400 MG: at 21:08

## 2020-11-18 RX ADMIN — POTASSIUM CHLORIDE 20 MEQ: 29.8 INJECTION, SOLUTION INTRAVENOUS at 18:25

## 2020-11-18 RX ADMIN — Medication 10 ML: at 21:08

## 2020-11-18 RX ADMIN — METOPROLOL TARTRATE 25 MG: 25 TABLET, FILM COATED ORAL at 21:08

## 2020-11-18 RX ADMIN — ACETAMINOPHEN 1000 MG: 500 TABLET, FILM COATED ORAL at 04:09

## 2020-11-18 RX ADMIN — DEXTROSE MONOHYDRATE 75 MG: 50 INJECTION, SOLUTION INTRAVENOUS at 23:47

## 2020-11-18 RX ADMIN — ALBUTEROL SULFATE 2.5 MG: 2.5 SOLUTION RESPIRATORY (INHALATION) at 16:24

## 2020-11-18 RX ADMIN — MUPIROCIN: 20 OINTMENT TOPICAL at 09:20

## 2020-11-18 RX ADMIN — FUROSEMIDE 20 MG: 10 INJECTION, SOLUTION INTRAMUSCULAR; INTRAVENOUS at 16:18

## 2020-11-18 RX ADMIN — ASPIRIN 325 MG: 325 TABLET, COATED ORAL at 08:03

## 2020-11-18 RX ADMIN — STANDARDIZED SENNA CONCENTRATE AND DOCUSATE SODIUM 1 TABLET: 8.6; 5 TABLET ORAL at 21:08

## 2020-11-18 RX ADMIN — ATORVASTATIN CALCIUM 40 MG: 40 TABLET, FILM COATED ORAL at 21:08

## 2020-11-18 RX ADMIN — OXYCODONE 10 MG: 5 TABLET ORAL at 02:08

## 2020-11-18 RX ADMIN — OXYCODONE 10 MG: 5 TABLET ORAL at 08:02

## 2020-11-18 RX ADMIN — Medication 400 MG: at 08:04

## 2020-11-18 RX ADMIN — CEFAZOLIN SODIUM 2 G: 10 INJECTION, POWDER, FOR SOLUTION INTRAVENOUS at 04:10

## 2020-11-18 RX ADMIN — AMIODARONE HYDROCHLORIDE 400 MG: 200 TABLET ORAL at 08:03

## 2020-11-18 RX ADMIN — INSULIN LISPRO 4 UNITS: 100 INJECTION, SOLUTION INTRAVENOUS; SUBCUTANEOUS at 17:03

## 2020-11-18 RX ADMIN — ALBUTEROL SULFATE 2.5 MG: 2.5 SOLUTION RESPIRATORY (INHALATION) at 19:22

## 2020-11-18 RX ADMIN — ACETAMINOPHEN 1000 MG: 500 TABLET, FILM COATED ORAL at 16:18

## 2020-11-18 RX ADMIN — STANDARDIZED SENNA CONCENTRATE AND DOCUSATE SODIUM 1 TABLET: 8.6; 5 TABLET ORAL at 08:04

## 2020-11-18 RX ADMIN — FONDAPARINUX SODIUM 2.5 MG: 2.5 INJECTION, SOLUTION SUBCUTANEOUS at 09:30

## 2020-11-18 ASSESSMENT — PAIN DESCRIPTION - PAIN TYPE
TYPE: SURGICAL PAIN

## 2020-11-18 ASSESSMENT — PAIN SCALES - GENERAL
PAINLEVEL_OUTOF10: 5
PAINLEVEL_OUTOF10: 0
PAINLEVEL_OUTOF10: 0
PAINLEVEL_OUTOF10: 8
PAINLEVEL_OUTOF10: 5
PAINLEVEL_OUTOF10: 7
PAINLEVEL_OUTOF10: 0
PAINLEVEL_OUTOF10: 0
PAINLEVEL_OUTOF10: 8
PAINLEVEL_OUTOF10: 5
PAINLEVEL_OUTOF10: 3
PAINLEVEL_OUTOF10: 4
PAINLEVEL_OUTOF10: 7
PAINLEVEL_OUTOF10: 4
PAINLEVEL_OUTOF10: 5

## 2020-11-18 ASSESSMENT — PAIN DESCRIPTION - ORIENTATION: ORIENTATION: MID

## 2020-11-18 ASSESSMENT — PAIN DESCRIPTION - LOCATION
LOCATION: CHEST

## 2020-11-18 NOTE — PROGRESS NOTES
Physical Therapy    Facility/Department: [facility] CVU  Initial Assessment    NAME: Madyson Garay  : 1955  MRN: [id number]    Date of Service: 2020    Discharge Recommendations: Madyson Garay scored a  on the AM-PAC short mobility form. At this time, no further PT is recommended upon discharge due to patient having safe functional mobility with use of rollator. Recommend patient returns to prior setting with prior services. PT Equipment Recommendations  Equipment Needed: Yes  Mobility Devices: Pearletha Mosque: Rollator (4 Wheeled)    Assessment   Body structures, Functions, Activity limitations: Decreased functional mobility ; Decreased strength;Decreased endurance;Decreased balance  Assessment: pt. presents below his baseline function. pt demonstrates the above functional limitation, and would benefit from skill PT at this time to promote safe return to PLOF. Prognosis: Good  Decision Making: Low Complexity  History: HTN, CAD, CABG 20  PT Education: Plan of Care;PT Role;Goals; Energy Conservation; Adaptive Device Training  Barriers to Learning: None  REQUIRES PT FOLLOW UP: Yes  Activity Tolerance  Activity Tolerance: Patient limited by endurance; Patient Tolerated treatment well       Patient Diagnosis(es): There were no encounter diagnoses. has a past medical history of Anxiety, Cervical disc herniation, Coronary artery disease due to lipid rich plaque, HTN (hypertension), Neck sprain, Post traumatic stress disorder, Rotator cuff (capsule) sprain and strain, Sprain of shoulder, left, and Sprain of shoulder, right.   has a past surgical history that includes Ankle surgery (Right); hernia repair (Right); and Coronary artery bypass graft (N/A, 2020).     Restrictions  Restrictions/Precautions  Restrictions/Precautions: Fall Risk(moderate fall risk )  Required Braces or Orthoses?: No  Position Activity Restriction  Sternal Precautions: No Pushing, No Pulling, 5# Lifting Restrictions  Other position/activity restrictions: s/p CABG x3  Vision/Hearing  Vision: Impaired  Vision Exceptions: Wears glasses at all times  Hearing: Exceptions to [facility]  Hearing Exceptions: Hard of hearing/hearing concerns     Subjective  General  Chart Reviewed: Yes  Response To Previous Treatment: Not applicable  Family / Caregiver Present: No  Diagnosis: CABG 11/16/20  Follows Commands: Within Functional Limits  General Comment  Comments: pt. seated in bed side chair upon arrival  Subjective  Subjective: pt. agreeable to PT/OT eval.  Pain Screening  Patient Currently in Pain: Yes  Pain Assessment  Pain Level: 5(had recent pain meds)  Vital Signs  Patient Currently in Pain: Yes     Orientation  Orientation  Overall Orientation Status: Within Normal Limits  Social/Functional History  Social/Functional History  Lives With: Alone(Sister lives close by)  Type of Home: House  Home Layout: One level  Home Access: Stairs to enter without rails  Entrance Stairs - Number of Steps: 1 CHANTELLE  Bathroom Shower/Tub: Tub/Shower unit  Bathroom Equipment: Shower chair  Bathroom Accessibility: Accessible  Home Equipment: Cane, Lift chair  ADL Assistance: Independent  Homemaking Assistance: Independent(No washer and dryer, goes somewhere 1x/wk)  [employer] Responsibilities: Yes  Ambulation Assistance: Independent  Transfer Assistance: Independent(uses lift chair)  Active : Yes  Occupation: Part time employment  Leisure & Hobbies: exercises 3-5x/wk  Additional Comments: denies recent falls  Cognition   Cognition  Attention Span: Attends with cues to redirect    Objective     Observation/Palpation  Posture: Good    AROM RLE (degrees)  RLE AROM: WFL  AROM LLE (degrees)  LLE AROM : WFL  Strength RLE  Strength RLE: WFL  Comment: grossly 4/5  Strength LLE  Strength LLE: WFL  Comment: grossly 4/5  Tone RLE  RLE Tone: Normotonic  Tone LLE  LLE Tone: Normotonic  Motor Control  Gross Motor?: WFL  Sensation  Overall Sensation Status: WFL  Bed mobility  Supine to Sit: Unable to assess  Sit to Supine: Unable to assess  Scooting: Unable to assess  Comment: pt. already seated upon arrival  Transfers  Sit to Stand: Minimal Assistance  Stand to sit: Minimal Assistance  Ambulation  Ambulation?: Yes  More Ambulation?: No  Ambulation 1  Surface: level tile  Device: Rollator  Other Apparatus: O2(2L)  Assistance: Contact guard assistance  Quality of Gait: pt. ambulates with wide MISTY, increased medial/lateral sway, decreased ernie, and decreased step length. pt. required VC two times to keep both hands on rollator. pt had one standing rest break for 1 min and was SOB. No LOB. Distance: ~270 ft  Stairs/Curb  Stairs?: No     Balance  Posture: Good  Sitting - Static: Good  Sitting - Dynamic: Good  Standing - Static: Fair;+(Used rollator)  Standing - Dynamic: Fair;+(Used rollator)        Plan   Plan  Times per week: 3-5x  Times per day: Daily  Current Treatment Recommendations: Strengthening, Balance Training, Functional Mobility Training, Transfer Training, Endurance Training, Gait Training  Safety Devices  Type of devices: All fall risk precautions in place, Call light within reach, Left in chair, Nurse notified  Restraints  Initially in place: No    AM-PAC Score  AM-PAC Inpatient Mobility Raw Score : 18 (11/18/20 1447)  AM-PAC Inpatient T-Scale Score : 43.63 (11/18/20 1447)  Mobility Inpatient CMS 0-100% Score: 46.58 (11/18/20 1447)  Mobility Inpatient CMS G-Code Modifier : CK (11/18/20 1447)        Goals  Short term goals  Time Frame for Short term goals: to be met prior to discharge  Short term goal 1: pt. will complete bed mobility with mod I. Short term goal 2: pt. will complete transfers with mod I. Short term goal 3: pt. will complete sit to/from stand with mod I. Short term goal 4: pt will ambulate 400 ft. with LRAD and supervision.        Therapy Time   Individual Concurrent Group Co-treatment   Time In 1366         Time Out 1433         Minutes 38         Timed Code Treatment Minutes: [address] SPT  Therapist was present, directed the patient's care, made skilled judgement, and was responsible for assessment and treatment of the patient.         NASH [id number]

## 2020-11-18 NOTE — PLAN OF CARE
Problem: Pain:  Goal: Pain level will decrease  Description: Pain level will decrease  Outcome: Ongoing   Patient has taken oxycodone twice today with management of pain to level 3-4 after taking. Patient is able to perform required breathing exercises today with pain managed.

## 2020-11-18 NOTE — CARE COORDINATION
CM spoke w/ patient again re:  [facility] AT [facility] at d/c.  Karolina Rincon, from [place] [id number], has accepted client for RN only. Case management will continue to follow progress and update discharge plan as needed.     Jorge Alberto Woodard, BSN, .[phone]

## 2020-11-18 NOTE — FLOWSHEET NOTE
Patient sweating profusely. Blood sugar is 242 and just ate soup and lemon ice,mandarin oranges and fruit cup. Heart rate is 94 and /68.

## 2020-11-18 NOTE — PROGRESS NOTES
Occupational Therapy   Occupational Therapy Initial Assessment  Date: 2020   Patient Name: Esther Alvarez  MRN: [id number]     : 1955    Date of Service: 2020    Discharge Recommendations:  Esther Alvarez scored a  on the AM-PAC ADL Inpatient form. Current research shows that an AM-PAC score of 18 or greater is typically associated with a discharge to the patient's home setting. Based on the patient's AM-PAC score, and their current ADL deficits, it is recommended that the patient have 2-3 sessions per week of Occupational Therapy at d/c to increase the patient's independence. At this time, this patient demonstrates the endurance and safety to discharge home with home services and a follow up treatment frequency of 2-3x/wk. Please see assessment section for further patient specific details. If patient discharges prior to next session this note will serve as a discharge summary. Please see below for the latest assessment towards goals. HOME HEALTH CARE: LEVEL 3 SAFETY     - Initial home health evaluation to occur within 24-48 hours, in patient home   - Therapy evaluations in home within 24-48 hours of discharge; including DME and home safety   - Frontload therapy 5 days, then 3x a week   - Therapy to evaluate if patient has [address] needs for personal care   -  evaluation within 24-48 hours, includes evaluation of resources and insurance to determine [place], [place], LTC, and Medicaid options          OT Equipment Recommendations  Equipment Needed: Yes  Mobility Devices: ADL Assistive Devices  ADL Assistive Devices: Reacher;Sock-Aid Hard    Assessment   Performance deficits / Impairments: Decreased functional mobility ; Decreased ADL status; Decreased endurance;Decreased high-level IADLs  Assessment: Pt below baseline level of occuaptional function.  Pt would benefit from acute OT services to address above deficits  Treatment Diagnosis: Decreased functional mobility, ADL/IADL status, and endurance associated w/CABG x3  Prognosis: Good  Decision Making: Low Complexity  History: Pt 58 y/o M; lives alone; IND ADLs and IADLs; no recent falls. PMH: HTN, CAD, PTSD  Exam: ROM, MMT, 6-clicks, 4 functional performance deficits, stable status  Assistance / Modification: CGA for mobility  OT Education: OT Role;Plan of Care;Precautions; ADL Adaptive Strategies;Transfer Training;Energy Conservation;Equipment  Patient Education: discharge; pt independently verbalized understanding  Barriers to Learning: none  REQUIRES OT FOLLOW UP: Yes  Activity Tolerance  Activity Tolerance: Patient Tolerated treatment well  Safety Devices  Safety Devices in place: Yes  Type of devices: All fall risk precautions in place;Call light within reach; Chair alarm in place;Gait belt;Patient at risk for falls; Left in chair;Nurse notified  Restraints  Initially in place: No           Patient Diagnosis(es): There were no encounter diagnoses. has a past medical history of Anxiety, Cervical disc herniation, Coronary artery disease due to lipid rich plaque, HTN (hypertension), Neck sprain, Post traumatic stress disorder, Rotator cuff (capsule) sprain and strain, Sprain of shoulder, left, and Sprain of shoulder, right.   has a past surgical history that includes Ankle surgery (Right); hernia repair (Right); and Coronary artery bypass graft (N/A, 11/16/2020).     Treatment Diagnosis: Decreased functional mobility, ADL/IADL status, and endurance associated w/CABG x3      Restrictions  Restrictions/Precautions  Restrictions/Precautions: Fall Risk(moderate fall risk )  Required Braces or Orthoses?: No  Position Activity Restriction  Sternal Precautions: No Pushing, No Pulling, 5# Lifting Restrictions  Other position/activity restrictions: s/p CABG x3    Subjective   General  Chart Reviewed: Yes  Family / Caregiver Present: No  Diagnosis: s/p CABG x3  Subjective  Subjective: Pt seated in recliner upon arrival; pleasant and agreeable to OT eval; pt states pain 5/10, RN aware  Patient Currently in Pain: Yes  Pain Assessment  Pain Assessment: 0-10  Pain Level: 5  Pre Treatment Pain Screening  Intervention List: Patient able to continue with treatment;Nurse/Physician notified  Vital Signs  Patient Currently in Pain: Yes  Social/Functional History  Social/Functional History  Lives With: Alone(Sister lives close by)  Type of Home: House  Home Layout: One level  Home Access: Stairs to enter without rails  Entrance Stairs - Number of Steps: 1 CHANTELLE  Bathroom Shower/Tub: Tub/Shower unit  Bathroom Equipment: Shower chair  Bathroom Accessibility: Accessible  Home Equipment: Cane, Lift chair  ADL Assistance: Independent  Homemaking Assistance: Independent(No washer and dryer, goes somewhere 1x/wk)  [employer] Responsibilities: Yes  Ambulation Assistance: Independent  Transfer Assistance: Independent(uses lift chair)  Active : Yes  Occupation: Part time employment  Leisure & Hobbies: exercises 3-5x/wk  Additional Comments: denies recent falls       Objective   Vision: Impaired  Vision Exceptions: Wears glasses at all times  Hearing: Exceptions to [facility]  Hearing Exceptions: Hard of hearing/hearing concerns    Orientation  Overall Orientation Status: Within Normal Limits  Observation/Palpation  Posture: Good  Balance  Sitting Balance: Supervision(seated in recliner)  Standing Balance: Contact guard assistance  Standing Balance  Time: ~5 min  Activity: functional mobility in hallway ~270 ft w/4WW  Comment: 1 standing rest break for ~1 min  Functional Mobility  Functional - Mobility Device: 4-Wheeled Walker  Activity: Other  Assist Level: Contact guard assistance  Functional Mobility Comments: pt required 1 standing rest break for ~1 min  ADL  LE Dressing: Maximum assistance(socks)  Additional Comments: declined further ADLs; pt would benefit from reacher and sock aid for LB dressing  Tone RUE  RUE Tone: Normotonic  Tone LUE  LUE Tone: Normotonic  Coordination  Movements Are Fluid And Coordinated: Yes     Bed mobility  Comment: pt seated in recliner at beginning and end of session  Transfers  Stand Step Transfers: Contact guard assistance  Sit to stand: Minimal assistance  Stand to sit: Minimal assistance     Cognition  Attention Span: Attends with cues to redirect  Perception  Overall Perceptual Status: WFL     Sensation  Overall Sensation Status: WFL        LUE AROM (degrees)  LUE AROM : WFL  LUE General AROM: shoulder flexion not assessed d/t sternal precautions  Left Hand AROM (degrees)  Left Hand AROM: WFL  RUE AROM (degrees)  RUE AROM : WFL  RUE General AROM: shoulder flexion not assessed d/t sternal precautions  Right Hand AROM (degrees)  Right Hand AROM: WFL  LUE Strength  Gross LUE Strength: WFL  L Hand General: 5/5  LUE Strength Comment: shoulder not assessed d/t sternal precautions  RUE Strength  Gross RUE Strength: WFL  R Hand General: 5/5  RUE Strength Comment: shoulder not assessed d/t sternal precautions                   Plan   Plan  Times per week: 3-5x/wk  Times per day: Daily  Current Treatment Recommendations: Balance Training, Functional Mobility Training, Endurance Training, Safety Education & Training, Self-Care / ADL, Equipment Evaluation, Education, & procurement, Patient/Caregiver Education & Training  Plan Comment: pt would benefit from reacher and sock aid during next treatment session for LB dressing    AM-PAC Score        AM-[facility] Inpatient Daily Activity Raw Score: 19 (11/18/20 1449)  AM-PAC Inpatient ADL T-Scale Score : 40.22 (11/18/20 1449)  ADL Inpatient CMS 0-100% Score: 42.8 (11/18/20 1449)  ADL Inpatient CMS G-Code Modifier : CK (11/18/20 1449)    Goals  Short term goals  Time Frame for Short term goals: discharge  Short term goal 1: Mod I for functional mobility for ADL tasks w/4WW  Short term goal 2: Mod I for functional transfers to ADL surfaces w/4WW  Short term goal 3: Mod I for UB bathing/dressing  Short term goal 4: Mod I for LB bathing/dressing w/AE  Short term goal 5: Mod I for toileting       Therapy Time   Individual Concurrent Group Co-treatment   Time In 1355         Time Out 1433         Minutes 38              Timed Code Treatment Minutes:  23 Minutes    Total Treatment Minutes:  38 min    C/ Sonya 66, OT     Jenni Everett, S/OT  I have directly observed this treatment and have read and approve this note.    Duran Grant., OTR/L, [id number]

## 2020-11-18 NOTE — PROGRESS NOTES
11/18/20 0552   Patient Observation   Observations pt. asleep at this time-IS and vpep not performed

## 2020-11-18 NOTE — PLAN OF CARE
Problem: Falls - Risk of:  Goal: Will remain free from falls  Description: Will remain free from falls  Outcome: Ongoing     Problem: Pain:  Goal: Control of acute pain  Description: Control of acute pain  Outcome: Ongoing     Problem: Nutrition  Goal: Optimal nutrition therapy  Outcome: Ongoing

## 2020-11-18 NOTE — PROGRESS NOTES
Progress Note    Vital Signs:                                                 /80   Pulse 78   Temp 98.4 °F (36.9 °C) (Temporal)   Resp 18   Ht 5' 8\" (1.727 m)   Wt 263 lb 0.1 oz (119.3 kg)   SpO2 92%   BMI 39.99 kg/m²  O2 Flow Rate (L/min): 3 L/min     CVP (Mean): 257 mmHg  PAP: 41/21  PAP (Mean): 271 mmHg  SVR (Using ABP Mean): 696.77 dyne*sec/cm5  CCI: 2.8 L/min  SVO2 (%): 42 %  Admission Weight: 249 lb 1.9 oz (113 kg)(Per BMI on Epic from 11/13/20)        Drips:  amio bolus/gtt 11/17 d/t a-fib RVR -- should finish ~ 1300h    I/O:      Intake/Output Summary (Last 24 hours) at 11/18/2020 0733  Last data filed at 11/18/2020 0604  Gross per 24 hour   Intake 1734.41 ml   Output 2300 ml   Net -565.59 ml     Chest Tube: 90cc overnight, 270cc/24hrs    CV: reg, wound c/d/i - compression stockings on  Pulm: decreased  Abd: soft  Ext: warm, 1+ edema    Data Review:  CBC:   Recent Labs     11/16/20  1545  11/16/20  2245 11/17/20  0405 11/18/20  0411   WBC 17.7*  --   --  10.8 11.6*   HGB 11.6*   < > 10.5* 10.3* 9.4*   HCT 33.5*  --   --  29.8* 28.1*   MCV 94.3  --   --  93.6 95.3     --   --  155 126*    < > = values in this interval not displayed. BMP:   Recent Labs     11/16/20  1545 11/17/20  0405 11/18/20  0411    146* 139   K 3.3* 3.6 3.6   * 112* 105   CO2 23 25 24   BUN 15 16 20   CREATININE 1.0 1.2 1.1   CALCIUM 8.1* 7.9* 7.7*   MG 2.70* 2.20  --      Cardiac Enzymes: No results for input(s): CKTOTAL, CKMB, CKMBINDEX, TROPONINI in the last 72 hours.   PT/INR:   Recent Labs     11/16/20  0735   PROTIME 10.9   INR 0.94     APTT:   Recent Labs     11/16/20  0735   APTT 31.2       Assessment/Plan:  - only 90cc out of CT overnight - likely dc CT today  - remove a-line, remove mccord  - encourage ambulation and IS    CAD; HTN:  - POD2 - s/p CABG x 3; LIMA-LAD, SVG-OM1, SVG-RPDA; SCOTT clip on 11/16 per Dr Ariel Lundberg  - continue ASA, statin, low dose BB, pain control - hold parameters placed on BB  - continue lasix, monitor for hypotensive may decrease to 10mg IV BID  A-fib RVR:  - overnight episode - treated with amio bolus/gtt - resolved, back in NSR this AM - monitoring  - continue amio gtt per protocol - continue amio 400 PO BID       ROSY Alcantar  11/18/2020  7:33 AM     Increase beta-blocker. Finish amiodarone protocol. D/C pacing wires then D/C mccord and likely D/C chest tubes later today.     Electronically signed by Avelina Junior MD on 11/18/2020 at 9:01 AM

## 2020-11-19 PROBLEM — E66.9 OBESITY (BMI 30-39.9): Status: ACTIVE | Noted: 2020-11-19

## 2020-11-19 PROBLEM — E11.9 DM2 (DIABETES MELLITUS, TYPE 2) (HCC): Status: ACTIVE | Noted: 2020-11-19

## 2020-11-19 LAB
GLUCOSE BLD-MCNC: 118 MG/DL (ref 70–99)
GLUCOSE BLD-MCNC: 160 MG/DL (ref 70–99)
GLUCOSE BLD-MCNC: 163 MG/DL (ref 70–99)
GLUCOSE BLD-MCNC: 167 MG/DL (ref 70–99)
INR BLD: 1.16 (ref 0.86–1.14)
MAGNESIUM: 2.2 MG/DL (ref 1.8–2.4)
PERFORMED ON: ABNORMAL
POTASSIUM SERPL-SCNC: 4.2 MMOL/L (ref 3.5–5.1)
PROTHROMBIN TIME: 13.5 SEC (ref 10–13.2)

## 2020-11-19 PROCEDURE — 94640 AIRWAY INHALATION TREATMENT: CPT

## 2020-11-19 PROCEDURE — 6360000002 HC RX W HCPCS: Performed by: THORACIC SURGERY (CARDIOTHORACIC VASCULAR SURGERY)

## 2020-11-19 PROCEDURE — 2580000003 HC RX 258: Performed by: NURSE PRACTITIONER

## 2020-11-19 PROCEDURE — APPSS30 APP SPLIT SHARED TIME 16-30 MINUTES: Performed by: NURSE PRACTITIONER

## 2020-11-19 PROCEDURE — 6370000000 HC RX 637 (ALT 250 FOR IP): Performed by: THORACIC SURGERY (CARDIOTHORACIC VASCULAR SURGERY)

## 2020-11-19 PROCEDURE — 84132 ASSAY OF SERUM POTASSIUM: CPT

## 2020-11-19 PROCEDURE — 6370000000 HC RX 637 (ALT 250 FOR IP): Performed by: INTERNAL MEDICINE

## 2020-11-19 PROCEDURE — 94761 N-INVAS EAR/PLS OXIMETRY MLT: CPT

## 2020-11-19 PROCEDURE — 85610 PROTHROMBIN TIME: CPT

## 2020-11-19 PROCEDURE — 2000000000 HC ICU R&B

## 2020-11-19 PROCEDURE — 6360000002 HC RX W HCPCS: Performed by: NURSE PRACTITIONER

## 2020-11-19 PROCEDURE — 83735 ASSAY OF MAGNESIUM: CPT

## 2020-11-19 PROCEDURE — 6370000000 HC RX 637 (ALT 250 FOR IP): Performed by: PHYSICIAN ASSISTANT

## 2020-11-19 PROCEDURE — 2580000003 HC RX 258: Performed by: THORACIC SURGERY (CARDIOTHORACIC VASCULAR SURGERY)

## 2020-11-19 PROCEDURE — 94669 MECHANICAL CHEST WALL OSCILL: CPT

## 2020-11-19 PROCEDURE — 6370000000 HC RX 637 (ALT 250 FOR IP): Performed by: NURSE PRACTITIONER

## 2020-11-19 RX ORDER — FUROSEMIDE 10 MG/ML
20 INJECTION INTRAMUSCULAR; INTRAVENOUS 3 TIMES DAILY
Status: DISCONTINUED | OUTPATIENT
Start: 2020-11-19 | End: 2020-11-20

## 2020-11-19 RX ORDER — TRAMADOL HYDROCHLORIDE 50 MG/1
50 TABLET ORAL EVERY 4 HOURS PRN
Status: DISCONTINUED | OUTPATIENT
Start: 2020-11-19 | End: 2020-11-22 | Stop reason: HOSPADM

## 2020-11-19 RX ORDER — TRAMADOL HYDROCHLORIDE 50 MG/1
50 TABLET ORAL EVERY 4 HOURS PRN
Status: DISCONTINUED | OUTPATIENT
Start: 2020-11-19 | End: 2020-11-19

## 2020-11-19 RX ORDER — TRAMADOL HYDROCHLORIDE 50 MG/1
100 TABLET ORAL EVERY 4 HOURS PRN
Status: DISCONTINUED | OUTPATIENT
Start: 2020-11-19 | End: 2020-11-19

## 2020-11-19 RX ORDER — WARFARIN SODIUM 2.5 MG/1
2.5 TABLET ORAL DAILY
Status: DISCONTINUED | OUTPATIENT
Start: 2020-11-19 | End: 2020-11-22 | Stop reason: HOSPADM

## 2020-11-19 RX ORDER — TRAMADOL HYDROCHLORIDE 50 MG/1
100 TABLET ORAL EVERY 4 HOURS PRN
Status: DISCONTINUED | OUTPATIENT
Start: 2020-11-19 | End: 2020-11-22 | Stop reason: HOSPADM

## 2020-11-19 RX ADMIN — Medication 400 MG: at 19:34

## 2020-11-19 RX ADMIN — AMIODARONE HYDROCHLORIDE 150 MG: 50 INJECTION, SOLUTION INTRAVENOUS at 13:40

## 2020-11-19 RX ADMIN — INSULIN GLARGINE 10 UNITS: 100 INJECTION, SOLUTION SUBCUTANEOUS at 19:41

## 2020-11-19 RX ADMIN — MUPIROCIN: 20 OINTMENT TOPICAL at 19:37

## 2020-11-19 RX ADMIN — AMIODARONE HYDROCHLORIDE 400 MG: 200 TABLET ORAL at 07:57

## 2020-11-19 RX ADMIN — ACETAMINOPHEN 1000 MG: 500 TABLET, FILM COATED ORAL at 09:11

## 2020-11-19 RX ADMIN — POTASSIUM CHLORIDE 10 MEQ: 750 TABLET, FILM COATED, EXTENDED RELEASE ORAL at 17:05

## 2020-11-19 RX ADMIN — FUROSEMIDE 20 MG: 10 INJECTION, SOLUTION INTRAMUSCULAR; INTRAVENOUS at 07:58

## 2020-11-19 RX ADMIN — INSULIN LISPRO 2 UNITS: 100 INJECTION, SOLUTION INTRAVENOUS; SUBCUTANEOUS at 12:10

## 2020-11-19 RX ADMIN — ACETAMINOPHEN 1000 MG: 500 TABLET, FILM COATED ORAL at 17:05

## 2020-11-19 RX ADMIN — FONDAPARINUX SODIUM 2.5 MG: 2.5 INJECTION, SOLUTION SUBCUTANEOUS at 07:58

## 2020-11-19 RX ADMIN — INSULIN LISPRO 2 UNITS: 100 INJECTION, SOLUTION INTRAVENOUS; SUBCUTANEOUS at 19:41

## 2020-11-19 RX ADMIN — FUROSEMIDE 20 MG: 10 INJECTION, SOLUTION INTRAMUSCULAR; INTRAVENOUS at 17:19

## 2020-11-19 RX ADMIN — TAMSULOSIN HYDROCHLORIDE 0.4 MG: 0.4 CAPSULE ORAL at 07:57

## 2020-11-19 RX ADMIN — METOPROLOL TARTRATE 25 MG: 25 TABLET, FILM COATED ORAL at 07:57

## 2020-11-19 RX ADMIN — ACETAMINOPHEN 1000 MG: 500 TABLET, FILM COATED ORAL at 22:56

## 2020-11-19 RX ADMIN — POTASSIUM CHLORIDE 10 MEQ: 750 TABLET, FILM COATED, EXTENDED RELEASE ORAL at 12:09

## 2020-11-19 RX ADMIN — INSULIN LISPRO 2 UNITS: 100 INJECTION, SOLUTION INTRAVENOUS; SUBCUTANEOUS at 08:08

## 2020-11-19 RX ADMIN — ASPIRIN 325 MG: 325 TABLET, COATED ORAL at 07:57

## 2020-11-19 RX ADMIN — ACETAMINOPHEN 1000 MG: 500 TABLET, FILM COATED ORAL at 04:24

## 2020-11-19 RX ADMIN — Medication 400 MG: at 07:58

## 2020-11-19 RX ADMIN — ALBUTEROL SULFATE 2.5 MG: 2.5 SOLUTION RESPIRATORY (INHALATION) at 20:39

## 2020-11-19 RX ADMIN — ATORVASTATIN CALCIUM 40 MG: 40 TABLET, FILM COATED ORAL at 19:34

## 2020-11-19 RX ADMIN — Medication 10 ML: at 19:35

## 2020-11-19 RX ADMIN — TRAMADOL HYDROCHLORIDE 100 MG: 50 TABLET, FILM COATED ORAL at 09:11

## 2020-11-19 RX ADMIN — Medication 10 ML: at 08:16

## 2020-11-19 RX ADMIN — ALBUTEROL SULFATE 2.5 MG: 2.5 SOLUTION RESPIRATORY (INHALATION) at 15:58

## 2020-11-19 RX ADMIN — ALBUTEROL SULFATE 2.5 MG: 2.5 SOLUTION RESPIRATORY (INHALATION) at 08:43

## 2020-11-19 RX ADMIN — TRAMADOL HYDROCHLORIDE 100 MG: 50 TABLET, FILM COATED ORAL at 17:07

## 2020-11-19 RX ADMIN — METFORMIN HYDROCHLORIDE 500 MG: 500 TABLET ORAL at 17:12

## 2020-11-19 RX ADMIN — POTASSIUM CHLORIDE 10 MEQ: 750 TABLET, FILM COATED, EXTENDED RELEASE ORAL at 07:58

## 2020-11-19 RX ADMIN — WARFARIN SODIUM 2.5 MG: 2.5 TABLET ORAL at 17:12

## 2020-11-19 RX ADMIN — MUPIROCIN: 20 OINTMENT TOPICAL at 08:14

## 2020-11-19 RX ADMIN — METOPROLOL TARTRATE 25 MG: 25 TABLET, FILM COATED ORAL at 19:34

## 2020-11-19 RX ADMIN — AMIODARONE HYDROCHLORIDE 400 MG: 200 TABLET ORAL at 19:34

## 2020-11-19 RX ADMIN — FUROSEMIDE 20 MG: 10 INJECTION, SOLUTION INTRAMUSCULAR; INTRAVENOUS at 12:29

## 2020-11-19 ASSESSMENT — PAIN SCALES - GENERAL
PAINLEVEL_OUTOF10: 6
PAINLEVEL_OUTOF10: 2
PAINLEVEL_OUTOF10: 3
PAINLEVEL_OUTOF10: 2
PAINLEVEL_OUTOF10: 2
PAINLEVEL_OUTOF10: 6
PAINLEVEL_OUTOF10: 3
PAINLEVEL_OUTOF10: 6
PAINLEVEL_OUTOF10: 0
PAINLEVEL_OUTOF10: 4

## 2020-11-19 ASSESSMENT — PAIN DESCRIPTION - LOCATION
LOCATION: CHEST

## 2020-11-19 ASSESSMENT — PAIN DESCRIPTION - PAIN TYPE
TYPE: SURGICAL PAIN

## 2020-11-19 ASSESSMENT — PAIN DESCRIPTION - ORIENTATION
ORIENTATION: MID

## 2020-11-19 NOTE — PROGRESS NOTES
[facility] Education Nurse  Consult Note       NAME:  Jessenia Hernandez  RECORD NUMBER:  [id number]  AGE: 59 y.o. GENDER: male  : 1955  TODAY'S DATE:  2020    Subjective:     Reason for Educator consult ---  Evaluation and Assessment:    Usama Munoz is a 59 y.o. male referred by:   [x] Physician  [] Nursing  [] Other:      Pt newly diagnoses with DM. Pt states he's gained 20 lbs over the last few months. Pt states he used to go to [facility] every night for a couple of hours but has not been able to do that as much. The patient does not have a glucometer at home and therefore will need one upon discharge. Provided information on S/S of hyper- and hypoglycemia and the proper treatment of hyper- and hypoglycemia. Explained A1C values and goals. Informed pt of current A1C 6.7. Gave recommendations and handout information on diet with diabetes and counting carbohydrates  Pt states he generally eats a healthy diet with lots of vegetables and lean meat. Reviewed use and side effects of metformin. Instructed pt to notify PCP if he experiences side effects. Patient given booklet of written material regarding diabetes titled \"Where do I begin? \". PAST MEDICAL HISTORY      Diagnosis Date    Anxiety     Cervical disc herniation     Coronary artery disease due to lipid rich plaque 2020    HTN (hypertension) 2013    Neck sprain     Obesity (BMI 30-39.9) 2020    Post traumatic stress disorder     Rotator cuff (capsule) sprain and strain     Sprain of shoulder, left     Sprain of shoulder, right        PAST SURGICAL HISTORY  Past Surgical History:   Procedure Laterality Date    ANKLE SURGERY Right     around 30 [facility] GRAFT N/A 2020    MALATHI. TCPB. RT EVH. CABG x 3, LIMA TO DISTAL LAD; SVG TO OM1; SVG TO RPDA. SCOTT CLIP WITH 45mm  ATRICLIP. PLACEMENT OF TEMPORARY VENTRICULAR PACING WIRE. DOPPLER FLOW VERIFICATION OF GRAFTS. BILATERAL INTERCOSTAL NERVE BLOCK WITH EXPAREL & MARCAINE. performed by Abilio Guevara MD at [address] Right     1980's       FAMILY HISTORY  Family History   Problem Relation Age of Onset    Heart Disease Father        SOCIAL HISTORY  Social History     Tobacco Use    Smoking status: Former Smoker     Packs/day: 0.50     Types: Cigarettes    Smokeless tobacco: Never Used   Substance Use Topics    Alcohol use: Yes     Alcohol/week: 0.8 standard drinks     Types: 1 Standard drinks or equivalent per week    Drug use: No       ALLERGIES  No Known Allergies    MEDICATIONS  No current facility-administered medications on file prior to encounter. Current Outpatient Medications on File Prior to Encounter   Medication Sig Dispense Refill    atorvastatin (LIPITOR) 80 MG tablet Take 1 tablet by mouth nightly 30 tablet 3    metoprolol tartrate (LOPRESSOR) 50 MG tablet Take 1 tablet by mouth 2 times daily 60 tablet 3    isosorbide mononitrate (IMDUR) 30 MG extended release tablet Take 1 tablet by mouth daily 30 tablet 3    tamsulosin (FLOMAX) 0.4 MG capsule Take 0.4 mg by mouth daily      acetaminophen-codeine (TYLENOL/CODEINE #4) 300-60 MG per tablet Take 1 tablet by mouth every 8 hours as needed for Pain.       aspirin 81 MG chewable tablet Take 81 mg by mouth daily      nitroGLYCERIN (NITROSTAT) 0.4 MG SL tablet Place 1 tablet under the tongue every 5 minutes as needed for Chest pain 25 tablet 1       Objective:     LABS    CBC:   Lab Results   Component Value Date    WBC 11.6 11/18/2020    RBC 2.95 11/18/2020    HGB 9.4 11/18/2020    HCT 28.1 11/18/2020    MCV 95.3 11/18/2020    MCH 31.8 11/18/2020    MCHC 33.4 11/18/2020    RDW 12.4 11/18/2020     11/18/2020    MPV 8.9 11/18/2020     CMP:    Lab Results   Component Value Date     11/18/2020    K 4.2 11/19/2020     11/18/2020    CO2 24 11/18/2020    BUN 20 11/18/2020    CREATININE 1.1 11/18/2020    GFRAA >60 11/18/2020    GFRAA >60 05/09/2013    AGRATIO 1.3 11/13/2020    LABGLOM >60 11/18/2020    GLUCOSE 114 11/18/2020    PROT 6.4 11/13/2020    PROT 7.0 01/10/2013    LABALBU 3.6 11/13/2020    CALCIUM 7.7 11/18/2020    BILITOT 0.9 11/13/2020    ALKPHOS 71 11/13/2020    AST 26 11/13/2020    ALT 17 11/13/2020       HgBA1c:    Lab Results   Component Value Date    LABA1C 6.7 11/13/2020       This patient's last creatinine was   Recent Labs     11/18/20  0411   CREATININE 1.1        Recent Blood sugars have been   Lab Results   Component Value Date    POCGLU 118 11/19/2020    POCGLU 163 11/19/2020    POCGLU 160 11/19/2020    POCGLU 191 11/18/2020    POCGLU 242 11/18/2020    POCGLU 224 11/18/2020    POCGLU 134 11/18/2020    POCGLU 122 11/18/2020     Lab Results   Component Value Date    GLUCOSE 114 11/18/2020    GLUCOSE 87 11/17/2020    GLUCOSE 127 11/16/2020    GLUCOSE 130 11/13/2020    GLUCOSE 134 11/13/2020    GLUCOSE 154 11/12/2020            Assessment:     Patient Active Problem List   Diagnosis    BWC Rotator cuff (capsule) sprain and strain    BWC Neck sprain and strain    BWC Sprain and strain of unspecified site of shoulder and upper arm    Post traumatic stress disorder    Neck sprain    Sprain of shoulder, left    Sprain of shoulder, right    Rotator cuff (capsule) sprain and strain    HTN (hypertension)    Cervical disc herniation    Anxiety    Abnormal stress test    Exertional chest pain    Unstable angina (HCC)    Coronary artery disease due to lipid rich plaque    CAD in native artery    DM2 (diabetes mellitus, type 2) (HCC)    Obesity (BMI 30-39. 9)       Plan:     Plan of Care:   Continue to monitor blood sugars to determine adequacy of current dosages    Discharge Plan:  Patient to f/u with PCP. Instructed to log blood sugars and take blood sugar log to his f/u appointment.

## 2020-11-19 NOTE — CONSULTS
[facility]    11/19/2020 2:33 PM    Patient Information: Magdy Trotter   Date of Admit:  11/16/2020  Primary Care Physician:  Camilla Vaz MD  Requesting Physician:  Link Staton MD    Reason for consult:   Medical evaluation and recommendations for DM 2    Chief complaint:  \"I got a bypass surgery\"    History of Present Illness:  Magdy Trotter is a 59 y.o. male on [address] [facility] [employer] who was admitted on 11/16/2020 for CABG for CAD. Patient with history of obesity. Has post operative Afib and treated with Amio. Denies prior history of DM2. Has lost weight in past, gained 25 lbs bc of COVID. Has expected post op CP. No fevers, chills, NS, diarrhea or abdominal pain. Willing to consider insulin if needed. Otherwise complete ROS is negative unless listed above. REVIEW OF SYSTEMS:  Pertinent positives as noted in HPI. All other systems were reviewed and are negative. Past Medical History:   has a past medical history of Anxiety, Cervical disc herniation, Coronary artery disease due to lipid rich plaque, HTN (hypertension), Neck sprain, Obesity (BMI 30-39.9), Post traumatic stress disorder, Rotator cuff (capsule) sprain and strain, Sprain of shoulder, left, and Sprain of shoulder, right. Past Surgical History:   has a past surgical history that includes Ankle surgery (Right); hernia repair (Right); and Coronary artery bypass graft (N/A, 11/16/2020).      Medications:   furosemide  20 mg Intravenous TID    warfarin  2.5 mg Oral Daily    amiodarone bolus  150 mg Intravenous Once    metoprolol tartrate  25 mg Oral BID    tamsulosin  0.4 mg Oral Daily    amiodarone  400 mg Oral BID    sodium chloride flush  10 mL Intravenous 2 times per day    acetaminophen  1,000 mg Oral Q6H    [START ON 11/20/2020] lisinopril  2.5 mg Oral Lunch    magnesium oxide  400 mg Oral BID  mupirocin   Nasal BID    potassium chloride  10 mEq Oral TID WC    atorvastatin  40 mg Oral Nightly    fondaparinux  2.5 mg Subcutaneous Daily    aspirin  325 mg Oral Daily    insulin lispro  0-12 Units Subcutaneous 4x Daily AC & HS    sennosides-docusate sodium  1 tablet Oral BID    albuterol  2.5 mg Nebulization 4x daily       Allergies:  No Known Allergies     Social History:   reports that he has quit smoking. His smoking use included cigarettes. He smoked 0.50 packs per day. He has never used smokeless tobacco. He reports current alcohol use of about 0.8 standard drinks of alcohol per week. He reports that he does not use drugs. Family History:  family history includes Heart Disease in his father. Physical Exam:  BP (!) 127/91   Pulse 133   Temp 98.7 °F (37.1 °C) (Temporal)   Resp 20   Ht 5' 8\" (1.727 m)   Wt 262 lb 2 oz (118.9 kg)   SpO2 96%   BMI 39.86 kg/m²     General appearance: Appears comfortable. Well nourished, obese  Eyes: Sclera clear, pupils equal  ENT: Moist mucus membranes, no thrush  Neck: Trachea midline, symmetrical  Cardiovascular: Regular rhythm, normal S1, S2. No murmur, gallop, rub. No edema in  lower extremities  Respiratory: Clear to auscultation bilaterally. No wheeze. Good inspiratory effort  Gastrointestinal: Abdomen soft, obese, not tender, not distended, normal bowel sounds  Musculoskeletal: No cyanosis in digits, warm extremities  Neurologic: Grossly intact, no motor or speech deficits. Psychiatric: Normal affect. Alert and oriented to time, place and person.   Skin: Warm, dry, normal turgor, no rash    Labs:  CBC:   Lab Results   Component Value Date    WBC 11.6 11/18/2020    RBC 2.95 11/18/2020    HGB 9.4 11/18/2020    HCT 28.1 11/18/2020    MCV 95.3 11/18/2020    MCH 31.8 11/18/2020    MCHC 33.4 11/18/2020    RDW 12.4 11/18/2020     11/18/2020    MPV 8.9 11/18/2020     BMP:    Lab Results   Component Value Date     11/18/2020    K 4.2

## 2020-11-19 NOTE — ACP (ADVANCE CARE PLANNING)
Advanced Care Planning Note. Purpose of Encounter: Advanced care planning in light of CAD  Parties In Attendance: Patient  Decisional Capacity: Yes  Subjective: Patient with expected post op CP  Objective: INR 1.16  Goals of Care Determination: Patient wants full support (CPR, vent, surgery, HD, trach, PEG)  Plan:  Lantus, MTF. Amio, IV Lasix  Code Status: Full code   Time spent on Advanced care Plannin minutes  Advanced Care Planning Documents: Completed advanced directives on chart, sister is the POA.     Ilya Kern MD  2020 2:39 PM

## 2020-11-19 NOTE — PROGRESS NOTES
Pt converted to A fib at 2300, 's-140's, /70 (82). Pt asymptomatic. Dr Doris Calvillo notified, new order received for 75mg bolus of amio over 1 hour.  Will continue to monitor

## 2020-11-20 LAB
ANION GAP SERPL CALCULATED.3IONS-SCNC: 8 MMOL/L (ref 3–16)
BLOOD BANK DISPENSE STATUS: NORMAL
BLOOD BANK DISPENSE STATUS: NORMAL
BLOOD BANK PRODUCT CODE: NORMAL
BLOOD BANK PRODUCT CODE: NORMAL
BPU ID: NORMAL
BPU ID: NORMAL
BUN BLDV-MCNC: 26 MG/DL (ref 7–20)
CALCIUM SERPL-MCNC: 8.6 MG/DL (ref 8.3–10.6)
CHLORIDE BLD-SCNC: 102 MMOL/L (ref 99–110)
CO2: 29 MMOL/L (ref 21–32)
CREAT SERPL-MCNC: 1 MG/DL (ref 0.8–1.3)
DESCRIPTION BLOOD BANK: NORMAL
DESCRIPTION BLOOD BANK: NORMAL
GFR AFRICAN AMERICAN: >60
GFR NON-AFRICAN AMERICAN: >60
GLUCOSE BLD-MCNC: 115 MG/DL (ref 70–99)
GLUCOSE BLD-MCNC: 116 MG/DL (ref 70–99)
GLUCOSE BLD-MCNC: 120 MG/DL (ref 70–99)
GLUCOSE BLD-MCNC: 135 MG/DL (ref 70–99)
GLUCOSE BLD-MCNC: 163 MG/DL (ref 70–99)
HCT VFR BLD CALC: 27.2 % (ref 40.5–52.5)
HEMOGLOBIN: 9.4 G/DL (ref 13.5–17.5)
MCH RBC QN AUTO: 33.5 PG (ref 26–34)
MCHC RBC AUTO-ENTMCNC: 34.8 G/DL (ref 31–36)
MCV RBC AUTO: 96.5 FL (ref 80–100)
PDW BLD-RTO: 12.9 % (ref 12.4–15.4)
PERFORMED ON: ABNORMAL
PLATELET # BLD: 169 K/UL (ref 135–450)
PMV BLD AUTO: 8.6 FL (ref 5–10.5)
POTASSIUM SERPL-SCNC: 3.8 MMOL/L (ref 3.5–5.1)
RBC # BLD: 2.81 M/UL (ref 4.2–5.9)
SODIUM BLD-SCNC: 139 MMOL/L (ref 136–145)
WBC # BLD: 7.5 K/UL (ref 4–11)

## 2020-11-20 PROCEDURE — 97535 SELF CARE MNGMENT TRAINING: CPT

## 2020-11-20 PROCEDURE — 6370000000 HC RX 637 (ALT 250 FOR IP): Performed by: NURSE PRACTITIONER

## 2020-11-20 PROCEDURE — 94761 N-INVAS EAR/PLS OXIMETRY MLT: CPT

## 2020-11-20 PROCEDURE — 6360000002 HC RX W HCPCS: Performed by: THORACIC SURGERY (CARDIOTHORACIC VASCULAR SURGERY)

## 2020-11-20 PROCEDURE — 6370000000 HC RX 637 (ALT 250 FOR IP): Performed by: THORACIC SURGERY (CARDIOTHORACIC VASCULAR SURGERY)

## 2020-11-20 PROCEDURE — 2580000003 HC RX 258: Performed by: THORACIC SURGERY (CARDIOTHORACIC VASCULAR SURGERY)

## 2020-11-20 PROCEDURE — 94669 MECHANICAL CHEST WALL OSCILL: CPT

## 2020-11-20 PROCEDURE — 94640 AIRWAY INHALATION TREATMENT: CPT

## 2020-11-20 PROCEDURE — 2000000000 HC ICU R&B

## 2020-11-20 PROCEDURE — 97116 GAIT TRAINING THERAPY: CPT

## 2020-11-20 PROCEDURE — 85027 COMPLETE CBC AUTOMATED: CPT

## 2020-11-20 PROCEDURE — 97530 THERAPEUTIC ACTIVITIES: CPT

## 2020-11-20 PROCEDURE — 6360000002 HC RX W HCPCS: Performed by: NURSE PRACTITIONER

## 2020-11-20 PROCEDURE — 6370000000 HC RX 637 (ALT 250 FOR IP): Performed by: PHYSICIAN ASSISTANT

## 2020-11-20 PROCEDURE — 80048 BASIC METABOLIC PNL TOTAL CA: CPT

## 2020-11-20 PROCEDURE — 6370000000 HC RX 637 (ALT 250 FOR IP): Performed by: INTERNAL MEDICINE

## 2020-11-20 RX ORDER — WARFARIN SODIUM 2.5 MG/1
2.5 TABLET ORAL DAILY
Status: DISCONTINUED | OUTPATIENT
Start: 2020-11-20 | End: 2020-11-20 | Stop reason: SDUPTHER

## 2020-11-20 RX ORDER — SENNA AND DOCUSATE SODIUM 50; 8.6 MG/1; MG/1
1 TABLET, FILM COATED ORAL 2 TIMES DAILY
Qty: 14 TABLET | Refills: 0 | Status: SHIPPED | OUTPATIENT
Start: 2020-11-20 | End: 2020-11-22 | Stop reason: HOSPADM

## 2020-11-20 RX ORDER — WARFARIN SODIUM 5 MG/1
TABLET ORAL
Qty: 30 TABLET | Refills: 1 | Status: SHIPPED | OUTPATIENT
Start: 2020-11-20 | End: 2021-04-09

## 2020-11-20 RX ORDER — ACETAMINOPHEN 500 MG
1000 TABLET ORAL DAILY
Qty: 30 TABLET | Refills: 0 | Status: SHIPPED | OUTPATIENT
Start: 2020-11-20 | End: 2022-07-27 | Stop reason: ALTCHOICE

## 2020-11-20 RX ORDER — FUROSEMIDE 20 MG/1
20 TABLET ORAL DAILY
Qty: 10 TABLET | Refills: 0 | Status: SHIPPED | OUTPATIENT
Start: 2020-11-20 | End: 2020-12-08 | Stop reason: ALTCHOICE

## 2020-11-20 RX ORDER — METOPROLOL TARTRATE 37.5 MG/1
37.5 TABLET, FILM COATED ORAL 2 TIMES DAILY
Qty: 60 TABLET | Refills: 3 | Status: SHIPPED | OUTPATIENT
Start: 2020-11-20 | End: 2020-12-10 | Stop reason: SDUPTHER

## 2020-11-20 RX ORDER — FUROSEMIDE 10 MG/ML
20 INJECTION INTRAMUSCULAR; INTRAVENOUS 2 TIMES DAILY
Status: DISCONTINUED | OUTPATIENT
Start: 2020-11-20 | End: 2020-11-21

## 2020-11-20 RX ORDER — POTASSIUM CHLORIDE 750 MG/1
10 TABLET, FILM COATED, EXTENDED RELEASE ORAL DAILY
Qty: 10 TABLET | Refills: 0 | Status: SHIPPED | OUTPATIENT
Start: 2020-11-20 | End: 2020-12-08 | Stop reason: ALTCHOICE

## 2020-11-20 RX ORDER — TRAMADOL HYDROCHLORIDE 50 MG/1
50 TABLET ORAL EVERY 6 HOURS PRN
Qty: 28 TABLET | Refills: 0 | Status: SHIPPED | OUTPATIENT
Start: 2020-11-20 | End: 2020-11-27

## 2020-11-20 RX ORDER — LISINOPRIL 5 MG/1
2.5 TABLET ORAL
Status: DISCONTINUED | OUTPATIENT
Start: 2020-11-21 | End: 2020-11-20 | Stop reason: ALTCHOICE

## 2020-11-20 RX ADMIN — Medication 30 ML: at 20:26

## 2020-11-20 RX ADMIN — POTASSIUM CHLORIDE 10 MEQ: 750 TABLET, FILM COATED, EXTENDED RELEASE ORAL at 16:44

## 2020-11-20 RX ADMIN — INSULIN LISPRO 2 UNITS: 100 INJECTION, SOLUTION INTRAVENOUS; SUBCUTANEOUS at 20:15

## 2020-11-20 RX ADMIN — POTASSIUM CHLORIDE 10 MEQ: 750 TABLET, FILM COATED, EXTENDED RELEASE ORAL at 12:42

## 2020-11-20 RX ADMIN — AMIODARONE HYDROCHLORIDE 200 MG: 200 TABLET ORAL at 09:14

## 2020-11-20 RX ADMIN — ALBUTEROL SULFATE 2.5 MG: 2.5 SOLUTION RESPIRATORY (INHALATION) at 11:59

## 2020-11-20 RX ADMIN — METOPROLOL TARTRATE 12.5 MG: 25 TABLET, FILM COATED ORAL at 11:24

## 2020-11-20 RX ADMIN — ACETAMINOPHEN 1000 MG: 500 TABLET, FILM COATED ORAL at 23:48

## 2020-11-20 RX ADMIN — METFORMIN HYDROCHLORIDE 500 MG: 500 TABLET ORAL at 09:14

## 2020-11-20 RX ADMIN — FUROSEMIDE 20 MG: 10 INJECTION, SOLUTION INTRAMUSCULAR; INTRAVENOUS at 09:15

## 2020-11-20 RX ADMIN — INSULIN GLARGINE 10 UNITS: 100 INJECTION, SOLUTION SUBCUTANEOUS at 20:16

## 2020-11-20 RX ADMIN — MUPIROCIN: 20 OINTMENT TOPICAL at 20:24

## 2020-11-20 RX ADMIN — POTASSIUM CHLORIDE 10 MEQ: 750 TABLET, FILM COATED, EXTENDED RELEASE ORAL at 09:14

## 2020-11-20 RX ADMIN — AMIODARONE HYDROCHLORIDE 200 MG: 200 TABLET ORAL at 09:24

## 2020-11-20 RX ADMIN — Medication 400 MG: at 20:32

## 2020-11-20 RX ADMIN — ACETAMINOPHEN 1000 MG: 500 TABLET, FILM COATED ORAL at 04:26

## 2020-11-20 RX ADMIN — METFORMIN HYDROCHLORIDE 500 MG: 500 TABLET ORAL at 16:44

## 2020-11-20 RX ADMIN — METOPROLOL TARTRATE 25 MG: 25 TABLET, FILM COATED ORAL at 09:15

## 2020-11-20 RX ADMIN — FUROSEMIDE 20 MG: 10 INJECTION, SOLUTION INTRAMUSCULAR; INTRAVENOUS at 16:43

## 2020-11-20 RX ADMIN — STANDARDIZED SENNA CONCENTRATE AND DOCUSATE SODIUM 1 TABLET: 8.6; 5 TABLET ORAL at 09:15

## 2020-11-20 RX ADMIN — WARFARIN SODIUM 2.5 MG: 2.5 TABLET ORAL at 16:44

## 2020-11-20 RX ADMIN — Medication 400 MG: at 09:14

## 2020-11-20 RX ADMIN — ACETAMINOPHEN 1000 MG: 500 TABLET, FILM COATED ORAL at 16:43

## 2020-11-20 RX ADMIN — ATORVASTATIN CALCIUM 40 MG: 40 TABLET, FILM COATED ORAL at 20:32

## 2020-11-20 RX ADMIN — FONDAPARINUX SODIUM 2.5 MG: 2.5 INJECTION, SOLUTION SUBCUTANEOUS at 09:15

## 2020-11-20 RX ADMIN — Medication 10 ML: at 09:16

## 2020-11-20 RX ADMIN — METOPROLOL TARTRATE 37.5 MG: 25 TABLET, FILM COATED ORAL at 20:12

## 2020-11-20 RX ADMIN — ALBUTEROL SULFATE 2.5 MG: 2.5 SOLUTION RESPIRATORY (INHALATION) at 15:51

## 2020-11-20 RX ADMIN — MUPIROCIN: 20 OINTMENT TOPICAL at 09:15

## 2020-11-20 RX ADMIN — Medication 10 ML: at 16:45

## 2020-11-20 RX ADMIN — AMIODARONE HYDROCHLORIDE 400 MG: 200 TABLET ORAL at 20:12

## 2020-11-20 RX ADMIN — TAMSULOSIN HYDROCHLORIDE 0.4 MG: 0.4 CAPSULE ORAL at 09:14

## 2020-11-20 RX ADMIN — ALBUTEROL SULFATE 2.5 MG: 2.5 SOLUTION RESPIRATORY (INHALATION) at 08:09

## 2020-11-20 RX ADMIN — ASPIRIN 325 MG: 325 TABLET, COATED ORAL at 09:15

## 2020-11-20 RX ADMIN — TRAMADOL HYDROCHLORIDE 100 MG: 50 TABLET, FILM COATED ORAL at 23:49

## 2020-11-20 RX ADMIN — ACETAMINOPHEN 1000 MG: 500 TABLET, FILM COATED ORAL at 10:24

## 2020-11-20 RX ADMIN — ALBUTEROL SULFATE 2.5 MG: 2.5 SOLUTION RESPIRATORY (INHALATION) at 19:28

## 2020-11-20 ASSESSMENT — PAIN SCALES - GENERAL
PAINLEVEL_OUTOF10: 0
PAINLEVEL_OUTOF10: 0
PAINLEVEL_OUTOF10: 7
PAINLEVEL_OUTOF10: 0
PAINLEVEL_OUTOF10: 0
PAINLEVEL_OUTOF10: 3
PAINLEVEL_OUTOF10: 3
PAINLEVEL_OUTOF10: 0
PAINLEVEL_OUTOF10: 1

## 2020-11-20 ASSESSMENT — PAIN DESCRIPTION - LOCATION
LOCATION: CHEST

## 2020-11-20 ASSESSMENT — PAIN DESCRIPTION - PROGRESSION
CLINICAL_PROGRESSION: GRADUALLY WORSENING
CLINICAL_PROGRESSION: GRADUALLY WORSENING

## 2020-11-20 ASSESSMENT — PAIN DESCRIPTION - ORIENTATION
ORIENTATION: MID

## 2020-11-20 ASSESSMENT — PAIN DESCRIPTION - FREQUENCY
FREQUENCY: INTERMITTENT

## 2020-11-20 ASSESSMENT — PAIN DESCRIPTION - ONSET
ONSET: AWAKENED FROM SLEEP
ONSET: GRADUAL
ONSET: GRADUAL

## 2020-11-20 ASSESSMENT — PAIN DESCRIPTION - PAIN TYPE
TYPE: SURGICAL PAIN

## 2020-11-20 NOTE — DISCHARGE INSTR - COC
 Sprain of shoulder, left S42.46A    Sprain of shoulder, right S43.401A    Rotator cuff (capsule) sprain and strain [id number]    HTN (hypertension) I10    Cervical disc herniation M50.20    Anxiety F41.9    Abnormal stress test R94.39    Exertional chest pain R07.9    Unstable angina (HCC) I20.0    Coronary artery disease due to lipid rich plaque I25.10, I25.83    CAD in native artery I25.10    DM2 (diabetes mellitus, type 2) (HCC) E11.9    Obesity (BMI 30-39. 9) E66.9       Isolation/Infection:   Isolation            No Isolation          Patient Infection Status       Infection Onset Added Last Indicated Last Indicated By Review Planned Expiration Resolved Resolved By    None active    Resolved    COVID-19 Rule Out 11/12/20 11/12/20 11/12/20 COVID-19 (Ordered)   11/13/20 Rule-Out Test Resulted            Nurse Assessment:  Last Vital Signs: /74   Pulse 89   Temp 98.5 °F (36.9 °C) (Temporal)   Resp 18   Ht 5' 8\" (1.727 m)   Wt 253 lb 12 oz (115.1 kg)   SpO2 93%   BMI 38.58 kg/m²     Last documented pain score (0-10 scale): Pain Level: 1  Last Weight:   Wt Readings from Last 1 Encounters:   11/20/20 253 lb 12 oz (115.1 kg)     Mental Status:  oriented and alert    IV Access:  - None    Nursing Mobility/ADLs:  Walking   Independent  Transfer  Independent  Bathing  Independent  Dressing  Independent  Toileting  Independent  Feeding  Independent  Med Admin  Independent  Med Delivery   none    Wound Care Documentation and Therapy:        Elimination:  Continence:   · Bowel: Yes  · Bladder: Yes  Urinary Catheter: None   Colostomy/Ileostomy/Ileal Conduit: No       Date of Last BM: 11/22/20    Intake/Output Summary (Last 24 hours) at 11/20/2020 0930  Last data filed at 11/20/2020 0500  Gross per 24 hour   Intake 960 ml   Output 1970 ml   Net -1010 ml     I/O last 3 completed shifts:   In: 1200 [P.O.:1200]  Out: 2470 [Urine:2470]    Safety Concerns:     None    Impairments/Disabilities: None    Nutrition Therapy:  Current Nutrition Therapy:   - Oral Diet:  Carb Control 5 carbs/meal (2000kcals/day) and Cardiac    Routes of Feeding: Oral  Liquids: Thin Liquids  Daily Fluid Restriction: no  Last Modified Barium Swallow with Video (Video Swallowing Test): not done    Treatments at the Time of Hospital Discharge:   Respiratory Treatments: ***  Oxygen Therapy:  is not on home oxygen therapy. Ventilator:    - No ventilator support    Rehab Therapies: Physical Therapy  Weight Bearing Status/Restrictions: follow post open heart instructions  Other Medical Equipment (for information only, NOT a DME order):  none  Other Treatments: PT/INR 11/23 Monday     Patient's personal belongings (please select all that are sent with patient):  Glasses, phone    RN SIGNATURE:  Electronically signed by Montana Mcnally RN on 11/22/20 at 2:49 PM EST    CASE MANAGEMENT/SOCIAL WORK SECTION    Inpatient Status Date: 11/16/2020    Readmission Risk Assessment Score:  Readmission Risk              Risk of Unplanned Readmission:        15           Discharging to Facility/ Agency         / signature: Electronically signed by Jud Meyers RN on 11/22/20 at 2:11 PM EST    PHYSICIAN SECTION    Prognosis: Good    Condition at Discharge: Stable    Rehab Potential (if transferring to Rehab): Good    Recommended Labs or Other Treatments After Discharge: draw INR on 42/17/5444    Physician Certification: I certify the above information and transfer of Luzmaria Brunson  is necessary for the continuing treatment of the diagnosis listed and that he requires [address] for less 30 days.      Update Admission H&P: No change in H&P    PHYSICIAN SIGNATURE:  Electronically signed by BAN Dietz CNP on 11/20/20 at 9:31 AM EST

## 2020-11-20 NOTE — PROGRESS NOTES
Per discussion with Dr. Bhanu Kumar, discontinue lisinopril and he will start it in the office. Increase Metoprolol to 37.5 mg BID, additional 12.5 to be given now. No need to re-check INR after only one dose of Coumadin.   Give 2.5 mg again tonight and check in the AM.

## 2020-11-20 NOTE — PROGRESS NOTES
Physical Therapy  Facility/Department: [facility]  Daily Treatment Note  NAME: Ruby Garcia  : 1955  MRN: [id number]    Date of Service: 2020    Discharge Recommendations:  Home with assist PRN   PT Equipment Recommendations  Equipment Needed: No  Ruby Garcia scored a 21/24 on the AM-PAC short mobility form. At this time, no further PT is recommended upon discharge due to pt with safe mobility without AD use. Recommend patient returns to prior setting with prior services. Assessment   Body structures, Functions, Activity limitations: Decreased functional mobility ; Decreased strength;Decreased endurance;Decreased balance  Assessment: Pt presents below his baseline level of mobility but is improving. Pt able to ambulate without AD this date, continues to be limited largely by just his SOB and deconditioned status. Will continue to follow pt to progress his mobility as he tolerates. Prognosis: Good  Decision Making: Low Complexity  History: HTN, CAD, CABG 20  PT Education: Plan of Care;PT Role;Goals; Energy Conservation; Adaptive Device Training  Patient Education: Pt verbalizes understanding. Barriers to Learning: None  REQUIRES PT FOLLOW UP: Yes  Activity Tolerance  Activity Tolerance: Patient limited by endurance; Patient Tolerated treatment well  Activity Tolerance: Limited by some SOB with ambulation, O2 sats at 99%     Patient Diagnosis(es): The encounter diagnosis was S/P CABG x 3.     has a past medical history of Anxiety, Cervical disc herniation, Coronary artery disease due to lipid rich plaque, HTN (hypertension), Neck sprain, Obesity (BMI 30-39.9), Post traumatic stress disorder, Rotator cuff (capsule) sprain and strain, Sprain of shoulder, left, and Sprain of shoulder, right.   has a past surgical history that includes Ankle surgery (Right); hernia repair (Right); and Coronary artery bypass graft (N/A, 11/16/2020). Restrictions  Restrictions/Precautions  Restrictions/Precautions: Fall Risk  Required Braces or Orthoses?: No  Position Activity Restriction  Sternal Precautions: No Pushing, No Pulling, 5# Lifting Restrictions  Other position/activity restrictions: s/p CABG x3  Subjective   General  Chart Reviewed: Yes  Response To Previous Treatment: Patient with no complaints from previous session. Family / Caregiver Present: No  Subjective  Subjective: Pt agreeable to PT tx. Asking to get back to bed when done  General Comment  Comments: Pt seated in chair upon arrival  Pain Screening  Patient Currently in Pain: Denies  Vital Signs  Patient Currently in Pain: Denies       Orientation  Orientation  Overall Orientation Status: Within Normal Limits  Cognition      Objective   Bed mobility  Sit to Supine: Supervision  Scooting: Supervision  Comment: Able to perform sit to supine with supv using momentum to get LEs onto bed and maintains sternal precautions  Transfers  Sit to Stand: Stand by assistance  Stand to sit: Stand by assistance  Ambulation  Ambulation?: Yes  More Ambulation?: No  Ambulation 1  Surface: level tile  Device: No Device  Assistance: Stand by assistance  Quality of Gait: Pt with wide MISTY, increased lateral sway, decreased gait speed but no LOB or significant issues with gait. Pt needing 1 standing rest break for SOB, O2 was 99% after gait on room air. Gait Deviations: Slow Diana; Increased MISTY  Distance: 270  Comments: no buckling or LOB noted. Declined trying steps  Stairs/Curb  Stairs?: No     Balance  Posture: Good  Sitting - Static: Good  Sitting - Dynamic: Good  Standing - Static: Good  Standing - Dynamic: Fair;+  Comments: Pt standing using urinal with SBA for safety. no LOB.                                                                                 AM-PAC Score  AM-PAC Inpatient Mobility Raw Score : 21 (11/20/20 1141)  AM-PAC Inpatient T-Scale Score : 50.25 (11/20/20 1141)  Mobility Inpatient CMS 0-100% Score: 28.97 (11/20/20 1141)  Mobility Inpatient CMS G-Code Modifier : CJ (11/20/20 1141)          Goals  Short term goals  Time Frame for Short term goals: to be met prior to discharge  Short term goal 1: pt. will complete bed mobility with mod I. -not met  Short term goal 2: pt. will complete transfers with mod I.-not met  Short term goal 3: pt. will complete sit to/from stand with mod I. -not met  Short term goal 4: pt will ambulate 400 ft. with LRAD and supervision.-not met    Plan    Plan  Times per week: 3-5x  Times per day: Daily  Current Treatment Recommendations: Strengthening, Balance Training, Functional Mobility Training, Transfer Training, Endurance Training, Gait Training  Safety Devices  Type of devices:  All fall risk precautions in place, Call light within reach, Left in chair, Nurse notified  Restraints  Initially in place: No     Therapy Time   Individual Concurrent Group Co-treatment   Time In 1030         Time Out 1045         Minutes 15         Timed Code Treatment Minutes: [address] [place], [address] [facility], [id number]

## 2020-11-20 NOTE — PROGRESS NOTES
[facility] PROGRESS NOTE    11/20/2020 2:28 PM        Name: Charmaine Chow . Admitted: 11/16/2020  Primary Care Provider: Jolene Sher MD (Tel: [phone])        CC: I had a bypass surgery    Subjective:  . Patient denies SOB, HA or fevers. No abdominal pain or diarrhea. Improving incisional CP and leg pain.     Reviewed interval ancillary notes    Current Medications  metoprolol tartrate (LOPRESSOR) tablet 37.5 mg, BID  furosemide (LASIX) injection 20 mg, BID  traMADol (ULTRAM) tablet 50 mg, Q4H PRN    Or  traMADol (ULTRAM) tablet 100 mg, Q4H PRN  warfarin (COUMADIN) tablet 2.5 mg, Daily  metFORMIN (GLUCOPHAGE) tablet 500 mg, BID WC  insulin glargine (LANTUS;BASAGLAR) injection pen 10 Units, Nightly  tamsulosin (FLOMAX) capsule 0.4 mg, Daily  lip balm petroleum free (PHYTOPLEX) stick, PRN  phenol-menthol (CEPASTAT) (sugar-free) lozenge 1 lozenge, PRN  amiodarone (CORDARONE) tablet 400 mg, BID  sodium chloride flush 0.9 % injection 10 mL, 2 times per day  sodium chloride flush 0.9 % injection 10 mL, PRN  acetaminophen (TYLENOL) tablet 1,000 mg, Q6H  ondansetron (ZOFRAN) injection 4 mg, Q8H PRN  magnesium oxide (MAG-OX) tablet 400 mg, BID  mupirocin (BACTROBAN) 2 % ointment, BID  potassium chloride (KLOR-CON) extended release tablet 10 mEq, TID WC  atorvastatin (LIPITOR) tablet 40 mg, Nightly  fondaparinux (ARIXTRA) injection 2.5 mg, Daily  aspirin EC tablet 325 mg, Daily  albuterol sulfate  (90 Base) MCG/ACT inhaler 2 puff, Q6H PRN  insulin lispro (1 Unit Dial) 0-12 Units, 4x Daily AC & HS  glucose (GLUTOSE) 40 % oral gel 15 g, PRN  glucagon (rDNA) injection 1 mg, PRN  sennosides-docusate sodium (SENOKOT-S) 8.6-50 MG tablet 1 tablet, BID  albuterol (PROVENTIL) nebulizer solution 2.5 mg, 4x daily        Objective:  /74   Pulse 89   Temp 98.5 °F (36.9 °C) (Temporal)   Resp 18   Ht 5' 8\" to ensure the distal side port is completely intragastric. Left basilar atelectasis. No pneumothorax. Problem List  Principal Problem:    CAD in native artery  Active Problems:    HTN (hypertension)    DM2 (diabetes mellitus, type 2) (HCC)    Obesity (BMI 30-39. 9)  Resolved Problems:    * No resolved hospital problems. *       Assessment & Plan:   1. Continue Lantus 10 U nightly here, cont SSI  2. Cont  mg PO bid  3. Diabetic ed consult appreciated  4. Amio per CTS for Afib  5. Cont ASA, Lipitor, Metoprolol  6. IV Lasix per CTS  7. Monitor BMP  8. Coumadin/Arixtra per CTS    IV Access:  R IJ TLC  Rodriguez: no   Diet: DIET CARDIAC; Daily Fluid Restriction: 1800 ml; No Caffeine  Code:Full Code  DVT PPX Arixtra/Coumadin  Disposition Home with home PT/OT    Discussed with patient, nursing and CM. Continue Lantus 10 U qhs and MTF. Will need ONLY MTF upon DC to home.       Kinsey Moya MD   11/20/2020 2:28 PM

## 2020-11-20 NOTE — PROGRESS NOTES
Occupational Therapy  Facility/Department: [facility]  Daily Treatment Note  NAME: Madyson Garay  : 1955  MRN: [id number]    Date of Service: 2020    Discharge Recommendations:  Madyson Garay scored a 19 on the AM-PAC ADL Inpatient form. Current research shows that an AM-PAC score of 18 or greater is typically associated with a discharge to the patient's home setting. Based on the patient's AM-PAC score, and their current ADL deficits, it is recommended that the patient have 2-3 sessions per week of Occupational Therapy at d/c to increase the patient's independence. At this time, this patient demonstrates the endurance and safety to discharge home with home services and a follow up treatment frequency of 2-3x/wk. Please see assessment section for further patient specific details. If patient discharges prior to next session this note will serve as a discharge summary. Please see below for the latest assessment towards goals. HOME HEALTH CARE: LEVEL 3 SAFETY     - Initial home health evaluation to occur within 24-48 hours, in patient home   - Therapy evaluations in home within 24-48 hours of discharge; including DME and home safety   - Frontload therapy 5 days, then 3x a week   - Therapy to evaluate if patient has [address] needs for personal care   -  evaluation within 24-48 hours, includes evaluation of resources and insurance to determine [place], [place], LTC, and Medicaid options        OT Equipment Recommendations  Equipment Needed: Yes  ADL Assistive Devices: Reacher;Sock-Aid Hard    Assessment   Performance deficits / Impairments: Decreased functional mobility ; Decreased ADL status; Decreased endurance;Decreased high-level IADLs  Assessment: Pt below baseline level of occuaptional function.  Pt would benefit from acute OT services to address above deficits  Treatment Diagnosis: Decreased functional mobility, ADL/IADL status, and endurance associated w/CABG w/AE -- not addressed 11/20  Short term goal 5: Mod I for toileting -- Supervision in stance for urinal 11/20       Therapy Time   Individual Concurrent Group Co-treatment   Time In 1435         Time Out 1503         Minutes 28              Timed Code Treatment Minutes:  28 min    Total Treatment Minutes:  28 min    ELVA Hassan 66, OT     Jenni Everett, S/OT  I have directly observed this treatment and have read and approve this note.    Elle Cee., OTR/L, [id number]

## 2020-11-21 LAB
ANION GAP SERPL CALCULATED.3IONS-SCNC: 10 MMOL/L (ref 3–16)
BUN BLDV-MCNC: 22 MG/DL (ref 7–20)
CALCIUM SERPL-MCNC: 9 MG/DL (ref 8.3–10.6)
CHLORIDE BLD-SCNC: 105 MMOL/L (ref 99–110)
CO2: 26 MMOL/L (ref 21–32)
CREAT SERPL-MCNC: 1 MG/DL (ref 0.8–1.3)
GFR AFRICAN AMERICAN: >60
GFR NON-AFRICAN AMERICAN: >60
GLUCOSE BLD-MCNC: 126 MG/DL (ref 70–99)
GLUCOSE BLD-MCNC: 128 MG/DL (ref 70–99)
GLUCOSE BLD-MCNC: 128 MG/DL (ref 70–99)
GLUCOSE BLD-MCNC: 131 MG/DL (ref 70–99)
GLUCOSE BLD-MCNC: 131 MG/DL (ref 70–99)
INR BLD: 1.17 (ref 0.86–1.14)
MAGNESIUM: 2.4 MG/DL (ref 1.8–2.4)
PERFORMED ON: ABNORMAL
POTASSIUM SERPL-SCNC: 4.3 MMOL/L (ref 3.5–5.1)
PROTHROMBIN TIME: 13.6 SEC (ref 10–13.2)
SODIUM BLD-SCNC: 141 MMOL/L (ref 136–145)

## 2020-11-21 PROCEDURE — 94640 AIRWAY INHALATION TREATMENT: CPT

## 2020-11-21 PROCEDURE — 6360000002 HC RX W HCPCS: Performed by: THORACIC SURGERY (CARDIOTHORACIC VASCULAR SURGERY)

## 2020-11-21 PROCEDURE — 6370000000 HC RX 637 (ALT 250 FOR IP): Performed by: INTERNAL MEDICINE

## 2020-11-21 PROCEDURE — 6370000000 HC RX 637 (ALT 250 FOR IP): Performed by: NURSE PRACTITIONER

## 2020-11-21 PROCEDURE — 36592 COLLECT BLOOD FROM PICC: CPT

## 2020-11-21 PROCEDURE — 2580000003 HC RX 258: Performed by: THORACIC SURGERY (CARDIOTHORACIC VASCULAR SURGERY)

## 2020-11-21 PROCEDURE — 80048 BASIC METABOLIC PNL TOTAL CA: CPT

## 2020-11-21 PROCEDURE — 2000000000 HC ICU R&B

## 2020-11-21 PROCEDURE — 6370000000 HC RX 637 (ALT 250 FOR IP): Performed by: THORACIC SURGERY (CARDIOTHORACIC VASCULAR SURGERY)

## 2020-11-21 PROCEDURE — 85610 PROTHROMBIN TIME: CPT

## 2020-11-21 PROCEDURE — 94669 MECHANICAL CHEST WALL OSCILL: CPT

## 2020-11-21 PROCEDURE — 94761 N-INVAS EAR/PLS OXIMETRY MLT: CPT

## 2020-11-21 PROCEDURE — 83735 ASSAY OF MAGNESIUM: CPT

## 2020-11-21 PROCEDURE — 36415 COLL VENOUS BLD VENIPUNCTURE: CPT

## 2020-11-21 RX ORDER — FUROSEMIDE 10 MG/ML
20 INJECTION INTRAMUSCULAR; INTRAVENOUS 3 TIMES DAILY
Status: DISCONTINUED | OUTPATIENT
Start: 2020-11-21 | End: 2020-11-22 | Stop reason: HOSPADM

## 2020-11-21 RX ADMIN — FUROSEMIDE 20 MG: 10 INJECTION, SOLUTION INTRAMUSCULAR; INTRAVENOUS at 14:15

## 2020-11-21 RX ADMIN — METOPROLOL TARTRATE 12.5 MG: 25 TABLET, FILM COATED ORAL at 12:53

## 2020-11-21 RX ADMIN — TRAMADOL HYDROCHLORIDE 50 MG: 50 TABLET, FILM COATED ORAL at 20:27

## 2020-11-21 RX ADMIN — TAMSULOSIN HYDROCHLORIDE 0.4 MG: 0.4 CAPSULE ORAL at 08:56

## 2020-11-21 RX ADMIN — TRAMADOL HYDROCHLORIDE 100 MG: 50 TABLET, FILM COATED ORAL at 08:09

## 2020-11-21 RX ADMIN — METOPROLOL TARTRATE 37.5 MG: 25 TABLET, FILM COATED ORAL at 22:12

## 2020-11-21 RX ADMIN — ACETAMINOPHEN 1000 MG: 500 TABLET, FILM COATED ORAL at 08:56

## 2020-11-21 RX ADMIN — FONDAPARINUX SODIUM 2.5 MG: 2.5 INJECTION, SOLUTION SUBCUTANEOUS at 08:56

## 2020-11-21 RX ADMIN — ALBUTEROL SULFATE 2.5 MG: 2.5 SOLUTION RESPIRATORY (INHALATION) at 19:24

## 2020-11-21 RX ADMIN — TRAMADOL HYDROCHLORIDE 100 MG: 50 TABLET, FILM COATED ORAL at 04:16

## 2020-11-21 RX ADMIN — FUROSEMIDE 20 MG: 10 INJECTION, SOLUTION INTRAMUSCULAR; INTRAVENOUS at 09:05

## 2020-11-21 RX ADMIN — POTASSIUM CHLORIDE 10 MEQ: 750 TABLET, FILM COATED, EXTENDED RELEASE ORAL at 12:53

## 2020-11-21 RX ADMIN — Medication 10 ML: at 20:26

## 2020-11-21 RX ADMIN — STANDARDIZED SENNA CONCENTRATE AND DOCUSATE SODIUM 1 TABLET: 8.6; 5 TABLET ORAL at 08:57

## 2020-11-21 RX ADMIN — POTASSIUM CHLORIDE 10 MEQ: 750 TABLET, FILM COATED, EXTENDED RELEASE ORAL at 08:56

## 2020-11-21 RX ADMIN — ALBUTEROL SULFATE 2.5 MG: 2.5 SOLUTION RESPIRATORY (INHALATION) at 08:23

## 2020-11-21 RX ADMIN — Medication 400 MG: at 08:56

## 2020-11-21 RX ADMIN — Medication 10 ML: at 08:58

## 2020-11-21 RX ADMIN — TRAMADOL HYDROCHLORIDE 100 MG: 50 TABLET, FILM COATED ORAL at 14:14

## 2020-11-21 RX ADMIN — POTASSIUM CHLORIDE 10 MEQ: 750 TABLET, FILM COATED, EXTENDED RELEASE ORAL at 18:05

## 2020-11-21 RX ADMIN — ACETAMINOPHEN 1000 MG: 500 TABLET, FILM COATED ORAL at 16:52

## 2020-11-21 RX ADMIN — METFORMIN HYDROCHLORIDE 500 MG: 500 TABLET ORAL at 08:56

## 2020-11-21 RX ADMIN — ALBUTEROL SULFATE 2.5 MG: 2.5 SOLUTION RESPIRATORY (INHALATION) at 16:18

## 2020-11-21 RX ADMIN — ASPIRIN 325 MG: 325 TABLET, COATED ORAL at 08:57

## 2020-11-21 RX ADMIN — ATORVASTATIN CALCIUM 40 MG: 40 TABLET, FILM COATED ORAL at 20:26

## 2020-11-21 RX ADMIN — INSULIN GLARGINE 10 UNITS: 100 INJECTION, SOLUTION SUBCUTANEOUS at 20:29

## 2020-11-21 RX ADMIN — AMIODARONE HYDROCHLORIDE 400 MG: 200 TABLET ORAL at 20:27

## 2020-11-21 RX ADMIN — ACETAMINOPHEN 1000 MG: 500 TABLET, FILM COATED ORAL at 22:13

## 2020-11-21 RX ADMIN — METOPROLOL TARTRATE 37.5 MG: 25 TABLET, FILM COATED ORAL at 08:57

## 2020-11-21 RX ADMIN — ALBUTEROL SULFATE 2.5 MG: 2.5 SOLUTION RESPIRATORY (INHALATION) at 12:11

## 2020-11-21 RX ADMIN — METFORMIN HYDROCHLORIDE 500 MG: 500 TABLET ORAL at 16:52

## 2020-11-21 RX ADMIN — AMIODARONE HYDROCHLORIDE 400 MG: 200 TABLET ORAL at 08:57

## 2020-11-21 RX ADMIN — FUROSEMIDE 20 MG: 10 INJECTION, SOLUTION INTRAMUSCULAR; INTRAVENOUS at 19:42

## 2020-11-21 RX ADMIN — MUPIROCIN: 20 OINTMENT TOPICAL at 09:04

## 2020-11-21 RX ADMIN — WARFARIN SODIUM 2.5 MG: 2.5 TABLET ORAL at 18:05

## 2020-11-21 RX ADMIN — Medication 400 MG: at 20:26

## 2020-11-21 ASSESSMENT — PAIN DESCRIPTION - PAIN TYPE
TYPE: SURGICAL PAIN

## 2020-11-21 ASSESSMENT — PAIN SCALES - GENERAL
PAINLEVEL_OUTOF10: 4
PAINLEVEL_OUTOF10: 4
PAINLEVEL_OUTOF10: 3
PAINLEVEL_OUTOF10: 4
PAINLEVEL_OUTOF10: 7
PAINLEVEL_OUTOF10: 4
PAINLEVEL_OUTOF10: 4
PAINLEVEL_OUTOF10: 2
PAINLEVEL_OUTOF10: 5
PAINLEVEL_OUTOF10: 1
PAINLEVEL_OUTOF10: 7
PAINLEVEL_OUTOF10: 1
PAINLEVEL_OUTOF10: 3
PAINLEVEL_OUTOF10: 7
PAINLEVEL_OUTOF10: 4
PAINLEVEL_OUTOF10: 1
PAINLEVEL_OUTOF10: 7
PAINLEVEL_OUTOF10: 7

## 2020-11-21 ASSESSMENT — PAIN DESCRIPTION - LOCATION
LOCATION: STERNUM
LOCATION: STERNUM
LOCATION: GENERALIZED
LOCATION: STERNUM
LOCATION: STERNUM
LOCATION: CHEST
LOCATION: STERNUM
LOCATION: CHEST

## 2020-11-21 ASSESSMENT — PAIN - FUNCTIONAL ASSESSMENT
PAIN_FUNCTIONAL_ASSESSMENT: PREVENTS OR INTERFERES SOME ACTIVE ACTIVITIES AND ADLS
PAIN_FUNCTIONAL_ASSESSMENT: PREVENTS OR INTERFERES WITH MANY ACTIVE NOT PASSIVE ACTIVITIES

## 2020-11-21 ASSESSMENT — PAIN DESCRIPTION - ONSET
ONSET: GRADUAL
ONSET: AWAKENED FROM SLEEP

## 2020-11-21 ASSESSMENT — PAIN DESCRIPTION - PROGRESSION
CLINICAL_PROGRESSION: GRADUALLY IMPROVING
CLINICAL_PROGRESSION: NOT CHANGED

## 2020-11-21 ASSESSMENT — PAIN DESCRIPTION - ORIENTATION
ORIENTATION: MID
ORIENTATION: MID

## 2020-11-21 ASSESSMENT — PULMONARY FUNCTION TESTS: PEFR_L/MIN: 16

## 2020-11-21 ASSESSMENT — PAIN DESCRIPTION - FREQUENCY
FREQUENCY: CONTINUOUS
FREQUENCY: INTERMITTENT
FREQUENCY: CONTINUOUS

## 2020-11-21 ASSESSMENT — PAIN DESCRIPTION - DESCRIPTORS
DESCRIPTORS: ACHING
DESCRIPTORS: ACHING

## 2020-11-21 NOTE — PROGRESS NOTES
11/20/20 4913   Treatment   Treatment Type Vibratory mucous clearing therapy or intervention Pt calling again, can leave mess.

## 2020-11-21 NOTE — PROGRESS NOTES
Patient educated about the risks and physician's preference for him not using a treadmill, but OK to ambulate as much as he wants. He kept insisting that he goes to the gym and will work out there. That is also where he wants to get his shower. Encouraged him not to use communal shower even though it will be one person at a time. He did not seem to understand the increased risk with being at a gym and showering there or the difference between a treadmill and ambulating. May need additional reinforcement.

## 2020-11-21 NOTE — PROGRESS NOTES
Patient went into afib with RVR in the 150's at 20:05 and converted back to NSR at 20:59. Gave patient his Amiodarone and Metoprolol early when he went into afib. Will continue to monitor.

## 2020-11-21 NOTE — PROGRESS NOTES
[facility] PROGRESS NOTE    11/21/2020 2:24 PM        Name: Aby Zaragoza . Admitted: 11/16/2020  Primary Care Provider: Liana Rwoe MD (Tel: [phone])        CC: I had a bypass surgery    Subjective:  . Patient denies SOB, HA or fevers. No abdominal pain or diarrhea. Tolerable incisional CP and leg pain.     Reviewed interval ancillary notes    Current Medications  furosemide (LASIX) injection 20 mg, TID  metoprolol tartrate (LOPRESSOR) tablet 37.5 mg, BID  traMADol (ULTRAM) tablet 50 mg, Q4H PRN    Or  traMADol (ULTRAM) tablet 100 mg, Q4H PRN  warfarin (COUMADIN) tablet 2.5 mg, Daily  metFORMIN (GLUCOPHAGE) tablet 500 mg, BID WC  insulin glargine (LANTUS;BASAGLAR) injection pen 10 Units, Nightly  tamsulosin (FLOMAX) capsule 0.4 mg, Daily  lip balm petroleum free (PHYTOPLEX) stick, PRN  phenol-menthol (CEPASTAT) (sugar-free) lozenge 1 lozenge, PRN  amiodarone (CORDARONE) tablet 400 mg, BID  sodium chloride flush 0.9 % injection 10 mL, 2 times per day  sodium chloride flush 0.9 % injection 10 mL, PRN  acetaminophen (TYLENOL) tablet 1,000 mg, Q6H  ondansetron (ZOFRAN) injection 4 mg, Q8H PRN  magnesium oxide (MAG-OX) tablet 400 mg, BID  potassium chloride (KLOR-CON) extended release tablet 10 mEq, TID WC  atorvastatin (LIPITOR) tablet 40 mg, Nightly  fondaparinux (ARIXTRA) injection 2.5 mg, Daily  aspirin EC tablet 325 mg, Daily  albuterol sulfate  (90 Base) MCG/ACT inhaler 2 puff, Q6H PRN  insulin lispro (1 Unit Dial) 0-12 Units, 4x Daily AC & HS  glucose (GLUTOSE) 40 % oral gel 15 g, PRN  glucagon (rDNA) injection 1 mg, PRN  sennosides-docusate sodium (SENOKOT-S) 8.6-50 MG tablet 1 tablet, BID  albuterol (PROVENTIL) nebulizer solution 2.5 mg, 4x daily        Objective:  /74   Pulse 78   Temp 97.4 °F (36.3 °C) (Temporal)   Resp 17   Ht 5' 8\" (1.727 m)   Wt 253 lb 15.5 oz (115.2 kg) SpO2 96%   BMI 38.62 kg/m²     Intake/Output Summary (Last 24 hours) at 11/21/2020 1424  Last data filed at 11/21/2020 1134  Gross per 24 hour   Intake 1020 ml   Output 1775 ml   Net -755 ml      Wt Readings from Last 3 Encounters:   11/21/20 253 lb 15.5 oz (115.2 kg)   11/13/20 250 lb 7.1 oz (113.6 kg)   11/11/20 252 lb (114.3 kg)       General appearance: Appears comfortable. Well nourished, obese, sitting in chair  Eyes: Sclera clear, pupils equal  ENT: Moist mucus membranes, no thrush  Neck: Trachea midline, symmetrical  Cardiovascular: Regular rhythm, normal S1, S2. No murmur, gallop, rub. Trace edema in  lower extremities  Respiratory: Decreased in bases. No wheezing or rales  Gastrointestinal: Abdomen soft, obese, not tender, not distended, normal bowel sounds  Musculoskeletal: No cyanosis in digits, warm extremities  Neurologic: Grossly intact, no motor or speech deficits. Psychiatric: Normal affect. Alert and oriented to time, place and person. Skin: Warm, dry, normal turgor, no rash       Labs and Tests:  CBC:   Recent Labs     11/20/20  0432   WBC 7.5   HGB 9.4*        BMP:    Recent Labs     11/19/20  0429 11/20/20  0432 11/21/20  0400   NA  --  139 141   K 4.2 3.8 4.3   CL  --  102 105   CO2  --  29 26   BUN  --  26* 22*   CREATININE  --  1.0 1.0   GLUCOSE  --  120* 126*     Hepatic: No results for input(s): AST, ALT, ALB, BILITOT, ALKPHOS in the last 72 hours. XR CHEST PORTABLE   Final Result      XR CHEST PORTABLE   Final Result   Endotracheal tube is slightly below the thoracic inlet. Advancement of 3.5   cm is suggested. Guymon-Grayson catheter is slightly distal.  Retraction of 3 cm is suggested. Nasogastric tube tip projects over the stomach. Advancement of 4 cm is   suggested, to ensure the distal side port is completely intragastric. Left basilar atelectasis. No pneumothorax.                Problem List  Principal Problem:    CAD in native artery  Active Problems: HTN (hypertension)    DM2 (diabetes mellitus, type 2) (HCC)    Obesity (BMI 30-39. 9)  Resolved Problems:    * No resolved hospital problems. *       Assessment & Plan:   1. Continue Lantus 10 U nightly while here, cont SSI  2. Cont  mg PO bid  3. Diabetic ed consult appreciated  4. Amio per CTS for Afib  5. Cont ASA, Lipitor, Metoprolol  6. IV Lasix per CTS  7. Monitor BMP  8. Coumadin/Arixtra per CTS    IV Access:  R IJ TLC  Rodriguez: No   Diet: DIET CARDIAC; Daily Fluid Restriction: 1800 ml; No Caffeine  Code:Full Code  DVT PPX Arixtra/Coumadin  Disposition Home with home PT/OT    Discussed with patient and nursing. Continue Lantus 10 U qhs and MTF. Will DC with only MTF upon DC to home.       Cee Courtney MD   11/21/2020 2:24 PM

## 2020-11-21 NOTE — PROGRESS NOTES
CC:s/p CABG x 3 on 11/16,  ( LIMA TO DISTAL LAD; SVG TO OM1; SVG TO RPDA ),  SCOTT clip      Patient denies any acute events overnight     CV: NSR 80-90's, SBP: 120's- on  Lopressor 25 mg BID   Post Afib- on amiodarone protocol - Amiodarone 400 mg BID PO, coumadin 2.5 mg daily   Pulm: clear sat 95-97% on   Renal: K+ 3.8, Creat. 1.0- Lasix 40 mg  BID IV  Heme: WBC: 7.5, H/H: 9.4/ 27.2, PLT: 169   115 kg, 118 kg  pre op 113.6 kg  Glucose: 115, HgB A1C- 6.7- on sliding scale insulin, Lantus , metformin 500 mg BID, followed per medical and consult diabetic educator       Plan: 1 more bout of afib last night at 9pm that lasted 1hr, continue diuresis still 2K above  Pre op weight, encourage I&S and activity, cont BB dose, cont amio,  Started on coumadin, cont 2.5mg dose, newly diagnosed DM, started on Metformin by ID, DM education.   Will check BMP and correct lytes as needed  D/c home likely tomorrow, would like him at least >24hr without any afib

## 2020-11-22 VITALS
BODY MASS INDEX: 38.09 KG/M2 | OXYGEN SATURATION: 95 % | SYSTOLIC BLOOD PRESSURE: 130 MMHG | DIASTOLIC BLOOD PRESSURE: 66 MMHG | TEMPERATURE: 98.2 F | HEART RATE: 78 BPM | RESPIRATION RATE: 18 BRPM | HEIGHT: 68 IN | WEIGHT: 251.32 LBS

## 2020-11-22 LAB
GLUCOSE BLD-MCNC: 115 MG/DL (ref 70–99)
GLUCOSE BLD-MCNC: 118 MG/DL (ref 70–99)
INR BLD: 1.18 (ref 0.86–1.14)
PERFORMED ON: ABNORMAL
PERFORMED ON: ABNORMAL
PROTHROMBIN TIME: 13.7 SEC (ref 10–13.2)

## 2020-11-22 PROCEDURE — 94669 MECHANICAL CHEST WALL OSCILL: CPT

## 2020-11-22 PROCEDURE — 6360000002 HC RX W HCPCS: Performed by: THORACIC SURGERY (CARDIOTHORACIC VASCULAR SURGERY)

## 2020-11-22 PROCEDURE — 94761 N-INVAS EAR/PLS OXIMETRY MLT: CPT

## 2020-11-22 PROCEDURE — 6370000000 HC RX 637 (ALT 250 FOR IP): Performed by: THORACIC SURGERY (CARDIOTHORACIC VASCULAR SURGERY)

## 2020-11-22 PROCEDURE — 94640 AIRWAY INHALATION TREATMENT: CPT

## 2020-11-22 PROCEDURE — 6370000000 HC RX 637 (ALT 250 FOR IP): Performed by: NURSE PRACTITIONER

## 2020-11-22 PROCEDURE — 6370000000 HC RX 637 (ALT 250 FOR IP): Performed by: INTERNAL MEDICINE

## 2020-11-22 PROCEDURE — 85610 PROTHROMBIN TIME: CPT

## 2020-11-22 PROCEDURE — 2580000003 HC RX 258: Performed by: THORACIC SURGERY (CARDIOTHORACIC VASCULAR SURGERY)

## 2020-11-22 RX ORDER — AMIODARONE HYDROCHLORIDE 200 MG/1
TABLET ORAL
Qty: 40 TABLET | Refills: 0 | Status: SHIPPED | OUTPATIENT
Start: 2020-11-22 | End: 2020-12-08 | Stop reason: ALTCHOICE

## 2020-11-22 RX ORDER — AMIODARONE HYDROCHLORIDE 400 MG/1
200 TABLET ORAL 2 TIMES DAILY
Qty: 40 TABLET | Refills: 0 | Status: SHIPPED | OUTPATIENT
Start: 2020-11-22 | End: 2020-11-22 | Stop reason: HOSPADM

## 2020-11-22 RX ORDER — GLUCOSAMINE HCL/CHONDROITIN SU 500-400 MG
CAPSULE ORAL
Qty: 100 STRIP | Refills: 0 | Status: SHIPPED | OUTPATIENT
Start: 2020-11-22

## 2020-11-22 RX ORDER — LANCETS 30 GAUGE
1 EACH MISCELLANEOUS 2 TIMES DAILY
Qty: 100 EACH | Refills: 0 | Status: SHIPPED | OUTPATIENT
Start: 2020-11-22

## 2020-11-22 RX ORDER — BLOOD PRESSURE TEST KIT
1 KIT MISCELLANEOUS 2 TIMES DAILY
Qty: 100 EACH | Refills: 0 | Status: SHIPPED | OUTPATIENT
Start: 2020-11-22

## 2020-11-22 RX ORDER — AMIODARONE HYDROCHLORIDE 200 MG/1
200 TABLET ORAL 2 TIMES DAILY
Qty: 40 TABLET | Refills: 0 | Status: SHIPPED | OUTPATIENT
Start: 2020-11-22 | End: 2020-12-08 | Stop reason: ALTCHOICE

## 2020-11-22 RX ADMIN — ACETAMINOPHEN 1000 MG: 500 TABLET, FILM COATED ORAL at 09:30

## 2020-11-22 RX ADMIN — AMIODARONE HYDROCHLORIDE 400 MG: 200 TABLET ORAL at 09:31

## 2020-11-22 RX ADMIN — ALBUTEROL SULFATE 2.5 MG: 2.5 SOLUTION RESPIRATORY (INHALATION) at 09:43

## 2020-11-22 RX ADMIN — Medication 10 ML: at 09:32

## 2020-11-22 RX ADMIN — FUROSEMIDE 20 MG: 10 INJECTION, SOLUTION INTRAMUSCULAR; INTRAVENOUS at 12:15

## 2020-11-22 RX ADMIN — Medication 400 MG: at 09:31

## 2020-11-22 RX ADMIN — FUROSEMIDE 20 MG: 10 INJECTION, SOLUTION INTRAMUSCULAR; INTRAVENOUS at 09:31

## 2020-11-22 RX ADMIN — METFORMIN HYDROCHLORIDE 500 MG: 500 TABLET ORAL at 09:31

## 2020-11-22 RX ADMIN — POTASSIUM CHLORIDE 10 MEQ: 750 TABLET, FILM COATED, EXTENDED RELEASE ORAL at 09:31

## 2020-11-22 RX ADMIN — POTASSIUM CHLORIDE 10 MEQ: 750 TABLET, FILM COATED, EXTENDED RELEASE ORAL at 12:15

## 2020-11-22 RX ADMIN — ASPIRIN 325 MG: 325 TABLET, COATED ORAL at 09:31

## 2020-11-22 RX ADMIN — TAMSULOSIN HYDROCHLORIDE 0.4 MG: 0.4 CAPSULE ORAL at 09:31

## 2020-11-22 RX ADMIN — METOPROLOL TARTRATE 37.5 MG: 25 TABLET, FILM COATED ORAL at 09:30

## 2020-11-22 RX ADMIN — FONDAPARINUX SODIUM 2.5 MG: 2.5 INJECTION, SOLUTION SUBCUTANEOUS at 09:32

## 2020-11-22 ASSESSMENT — PAIN SCALES - GENERAL
PAINLEVEL_OUTOF10: 2
PAINLEVEL_OUTOF10: 3
PAINLEVEL_OUTOF10: 4
PAINLEVEL_OUTOF10: 3

## 2020-11-22 ASSESSMENT — PAIN DESCRIPTION - LOCATION
LOCATION: CHEST
LOCATION: STERNUM

## 2020-11-22 ASSESSMENT — PAIN DESCRIPTION - PAIN TYPE
TYPE: SURGICAL PAIN
TYPE: SURGICAL PAIN

## 2020-11-22 NOTE — PROGRESS NOTES
Open heart discharge instructions reviewed thoroughly with patient and their family. All medications reviewed and paperwork signed. Prescriptions given to patient. All questions regarding discharge instructions and medications answered. Home health care set up with. Attempted to fax paperwork  to home health care agency but only 6 digit number on the [address] [place] [address] Reading. Open heart binder given to patient.

## 2020-11-22 NOTE — PROGRESS NOTES
Criteria met per open heart protocol to discontinue central line. Procedure explained to patient. Right IJ discontinued per protocol while in bed. Pressure held X 10 minutes and dressing intact. No hematoma or no oozing noted. Vaseline guaze applied. Patient tolerated well. Cont to monitor.

## 2020-11-22 NOTE — PROGRESS NOTES
CC:s/p CABG x 3 on 11/16,  ( LIMA TO DISTAL LAD; SVG TO OM1; SVG TO RPDA ),  SCOTT clip      Patient denies any acute events overnight     CV: NSR 80-90's, SBP: 120's- on  Lopressor 37.5 mg BID   Post Afib- on amiodarone protocol - Amiodarone 400 mg BID PO, coumadin 2.5 mg daily  Pulm: clear sat 95-97% on RA  Renal: K+ 4.3.(11/21), Creat. 1.0 (11/21)   Lasix 40 mg  BID IV  Heme: WBC: 7.5, H/H: 9.4/ 27.2, PLT: 169   115 kg, 118 kg  pre op 113.6 kg  Glucose: 115, HgB A1C- 6.7- on sliding scale insulin, Lantus , metformin 500 mg BID, followed per medical and consult diabetic educator       Plan: No more bout of afib, almost back to Pre op weight,   encourage I&S and activity,   cont BB dose, cont amio,    Started on coumadin, cont 2.5mg dose, newly diagnosed DM, started on Metformin by ID, DM education.   D/c home today

## 2020-11-22 NOTE — CARE COORDINATION
Discharge Note:    [facility] orders faxed to:    [facility]  Phone: [phone]  Fax: [phone]      No further discharge needs.     Maeve Rosado RN BSN  Case Management  [phone]

## 2020-11-22 NOTE — PROGRESS NOTES
[facility] PROGRESS NOTE    11/22/2020 2:32 PM        Name: Ki Gee . Admitted: 11/16/2020  Primary Care Provider: Alie Middleton MD (Tel: [phone])        CC: I had a bypass surgery    Subjective:  . Patient denies SOB, HA or fevers. Had yellow stool, but on Senna/colace bid. Denies incisional CP and leg pain.     Reviewed interval ancillary notes    Current Medications  furosemide (LASIX) injection 20 mg, TID  metoprolol tartrate (LOPRESSOR) tablet 37.5 mg, BID  traMADol (ULTRAM) tablet 50 mg, Q4H PRN    Or  traMADol (ULTRAM) tablet 100 mg, Q4H PRN  warfarin (COUMADIN) tablet 2.5 mg, Daily  metFORMIN (GLUCOPHAGE) tablet 500 mg, BID WC  insulin glargine (LANTUS;BASAGLAR) injection pen 10 Units, Nightly  tamsulosin (FLOMAX) capsule 0.4 mg, Daily  lip balm petroleum free (PHYTOPLEX) stick, PRN  phenol-menthol (CEPASTAT) (sugar-free) lozenge 1 lozenge, PRN  amiodarone (CORDARONE) tablet 400 mg, BID  sodium chloride flush 0.9 % injection 10 mL, 2 times per day  sodium chloride flush 0.9 % injection 10 mL, PRN  acetaminophen (TYLENOL) tablet 1,000 mg, Q6H  ondansetron (ZOFRAN) injection 4 mg, Q8H PRN  magnesium oxide (MAG-OX) tablet 400 mg, BID  potassium chloride (KLOR-CON) extended release tablet 10 mEq, TID WC  atorvastatin (LIPITOR) tablet 40 mg, Nightly  fondaparinux (ARIXTRA) injection 2.5 mg, Daily  aspirin EC tablet 325 mg, Daily  albuterol sulfate  (90 Base) MCG/ACT inhaler 2 puff, Q6H PRN  insulin lispro (1 Unit Dial) 0-12 Units, 4x Daily AC & HS  glucose (GLUTOSE) 40 % oral gel 15 g, PRN  glucagon (rDNA) injection 1 mg, PRN  albuterol (PROVENTIL) nebulizer solution 2.5 mg, 4x daily        Objective:  /66   Pulse 78   Temp 98.2 °F (36.8 °C) (Temporal)   Resp 18   Ht 5' 8\" (1.727 m)   Wt 251 lb 5.2 oz (114 kg)   SpO2 95%   BMI 38.21 kg/m²     Intake/Output Summary (Last 24 resolved hospital problems. *       Assessment & Plan:   1. I wrote for outpatient glucometer, lancets, swabs, strips and diabetic education  2. Cont  mg PO bid  3. Diabetic ed consult appreciated  4. Amio per CTS for Afib  5. Cont ASA, Lipitor, Metoprolol  6. PO Lasix per CTS  7. Stop Senna/Colace given diarrhea  8. Medically stable for DC to home    IV Access:  R IJ TLC  Rodriguez: No   Diet: DIET CARDIAC; Daily Fluid Restriction: 1800 ml; No Caffeine  Code:Full Code  DVT PPX Arixtra/Coumadin  Disposition Home with home PT/OT    Discussed with patient and nursing. Check C Diff if diarrhea continues at home off Senna/Colace. Diarrhea may be secondary to MTF if persists with negative C diff. F/U with PCP. [facility] are signing off at this time. Call if we can acutely help.       Rodney Love MD   11/22/2020 2:32 PM

## 2020-11-23 ENCOUNTER — ANTI-COAG VISIT (OUTPATIENT)
Dept: CARDIOTHORACIC SURGERY | Age: 65
End: 2020-11-23

## 2020-11-23 PROBLEM — Z95.1 S/P CABG (CORONARY ARTERY BYPASS GRAFT): Status: ACTIVE | Noted: 2020-11-23

## 2020-11-23 LAB — INR BLD: 1.2

## 2020-11-25 ENCOUNTER — ANTI-COAG VISIT (OUTPATIENT)
Dept: CARDIOTHORACIC SURGERY | Age: 65
End: 2020-11-25

## 2020-11-25 LAB — INR BLD: 2.9

## 2020-11-25 NOTE — PROGRESS NOTES
Spoke w/Steff, Physician choice [facility], ph: [phone]  Denies that pt has bleeding /bruising. Denies dizziness. Denies changes to diet; ~ 2 tramadol /day; amiodarone started 22Nov.  Dosing per dr marte; reviewed at length w/Steff; verb understanding. Reji Parada will ck INR Fri Nov 27 and call result to on call MD for dosing.

## 2020-11-30 ENCOUNTER — ANTI-COAG VISIT (OUTPATIENT)
Dept: CARDIOTHORACIC SURGERY | Age: 65
End: 2020-11-30

## 2020-11-30 LAB — INR BLD: 1.8

## 2020-12-02 ENCOUNTER — TELEPHONE (OUTPATIENT)
Dept: CARDIOTHORACIC SURGERY | Age: 65
End: 2020-12-02

## 2020-12-02 ENCOUNTER — OFFICE VISIT (OUTPATIENT)
Dept: CARDIOTHORACIC SURGERY | Age: 65
End: 2020-12-02

## 2020-12-02 ENCOUNTER — ANTI-COAG VISIT (OUTPATIENT)
Dept: CARDIOTHORACIC SURGERY | Age: 65
End: 2020-12-02

## 2020-12-02 VITALS
SYSTOLIC BLOOD PRESSURE: 118 MMHG | TEMPERATURE: 97.6 F | WEIGHT: 246.8 LBS | DIASTOLIC BLOOD PRESSURE: 80 MMHG | HEART RATE: 75 BPM | BODY MASS INDEX: 37.41 KG/M2 | HEIGHT: 68 IN | OXYGEN SATURATION: 97 %

## 2020-12-02 LAB — INR BLD: 2.6

## 2020-12-02 PROCEDURE — 99024 POSTOP FOLLOW-UP VISIT: CPT | Performed by: THORACIC SURGERY (CARDIOTHORACIC VASCULAR SURGERY)

## 2020-12-02 NOTE — PROGRESS NOTES
INR 2.6 today. Dr. Gudelia Jung would like patient to take 2.5 mg today and tomorrow and recheck on Friday. Home care nurse called and notified.

## 2020-12-02 NOTE — PROGRESS NOTES
Progress Note    CC: First postoperative follow-up    S/P  s/p CABG x 3 SCOTT clip on 11/16/20, discharged on 11/22     Subjective:   Patient states he is feeling better since he left the hospital after his recent surgery. He denies any syncope, presyncope, exertional shortness of breath, exertional chest pain, dizziness, lightheadedness, orthopnea, paroxysmal nocturnal dyspnea, palpitations, peripheral edema, fevers, chills, drainage from his incisions or any other constitutional symptoms. Vital Signs:                                                 /80 (Site: Right Upper Arm)   Pulse 75   Temp 97.6 °F (36.4 °C) (Infrared)   Ht 5' 8\" (1.727 m)   Wt 246 lb 12.8 oz (111.9 kg)   SpO2 97%   BMI 37.53 kg/m²        CV:   Regular rate and rhythm with no rubs or murmurs. Pulm: Clear lung fields with no rales or rhonchi. Incisions:    Sternotomy incision is clean, dry and intact. Sternum is stable. Abd:  Soft  Ext: Leg incision is clean, dry and intact. No peripheral edema. Assessment/Plan:  Overall doing very well post CABG x3 on 11/16/2020, was discharged home on 11/22/2020. We discussed secondary risk prevention for cardiovascular disease. I gave the patient a copy of our protocol for the secondary risk prevention of cardiovascular disease. He appears to be in normal sinus rhythm. He was instructed that he will be on amiodarone and on Coumadin for a few more weeks. I also instructed him to wear his compression stockings for a couple more weeks during daytime. Since he is back to his preop weight and there is no peripheral edema I explained to him and his sister that there is no need for continuing the Lasix and potassium. The patient may increase activities as he feels comfortable doing so. I stressed to him to follow religiously the sternal precautions for the next 6 weeks. Follow-up with Dr. Radha Alvarez and Dr. Tete Bee as prescribed. Follow-up:  With me in 1 month    Spike Viera, MD  12/2/2020  12:56 PM

## 2020-12-02 NOTE — TELEPHONE ENCOUNTER
After reviewing clinic today with , he wanted me to call and make sure that Mr. Mariana Blanco does return to the office for a 2nd post op check in one month. I called and left that message on Mr. Mariana Blanco phone. I also let him know that we did get his inr of 2.6  wants him to take 2.5 mg this martha and tomorrow with recheck on Friday. Cirilo Darling will call the home care nurse.

## 2020-12-04 ENCOUNTER — ANTI-COAG VISIT (OUTPATIENT)
Dept: CARDIOTHORACIC SURGERY | Age: 65
End: 2020-12-04

## 2020-12-04 LAB — INR BLD: 3.7

## 2020-12-07 ENCOUNTER — ANTI-COAG VISIT (OUTPATIENT)
Dept: CARDIOTHORACIC SURGERY | Age: 65
End: 2020-12-07

## 2020-12-07 LAB
INR BLD: 1.4
INR BLD: 1.4

## 2020-12-07 NOTE — PROGRESS NOTES
Rhonda Griffith with  called, inr today is 1.4 after holding coumadin for the weekend. Dr. Karson Skaggs updated, called and left  for Rhonda Griffith, instructed to have Mr. Ruben Alejandra take 5 mg this martha, 5 mg tomorrow and recheck inr Wed.

## 2020-12-08 ENCOUNTER — TELEPHONE (OUTPATIENT)
Dept: CARDIOLOGY CLINIC | Age: 65
End: 2020-12-08

## 2020-12-08 ENCOUNTER — HOSPITAL ENCOUNTER (OUTPATIENT)
Age: 65
Discharge: HOME | End: 2020-12-08
Payer: MEDICAID

## 2020-12-08 ENCOUNTER — OFFICE VISIT (OUTPATIENT)
Dept: CARDIOLOGY CLINIC | Age: 65
End: 2020-12-08
Payer: MEDICAID

## 2020-12-08 VITALS
WEIGHT: 247 LBS | HEART RATE: 67 BPM | SYSTOLIC BLOOD PRESSURE: 130 MMHG | HEIGHT: 68 IN | DIASTOLIC BLOOD PRESSURE: 72 MMHG | BODY MASS INDEX: 37.44 KG/M2 | OXYGEN SATURATION: 98 %

## 2020-12-08 LAB
ANION GAP SERPL CALCULATED.3IONS-SCNC: 11 MMOL/L (ref 3–16)
BUN BLDV-MCNC: 21 MG/DL (ref 7–20)
CALCIUM SERPL-MCNC: 9.3 MG/DL (ref 8.3–10.6)
CHLORIDE BLD-SCNC: 105 MMOL/L (ref 99–110)
CO2: 24 MMOL/L (ref 21–32)
CREAT SERPL-MCNC: 0.9 MG/DL (ref 0.8–1.3)
GFR AFRICAN AMERICAN: >60
GFR NON-AFRICAN AMERICAN: >60
GLUCOSE BLD-MCNC: 107 MG/DL (ref 70–99)
HCT VFR BLD CALC: 35.2 % (ref 40.5–52.5)
HEMOGLOBIN: 11.6 G/DL (ref 13.5–17.5)
MCH RBC QN AUTO: 31.6 PG (ref 26–34)
MCHC RBC AUTO-ENTMCNC: 33.1 G/DL (ref 31–36)
MCV RBC AUTO: 95.6 FL (ref 80–100)
PDW BLD-RTO: 12.9 % (ref 12.4–15.4)
PLATELET # BLD: 351 K/UL (ref 135–450)
PMV BLD AUTO: 8.8 FL (ref 5–10.5)
POTASSIUM SERPL-SCNC: 4.7 MMOL/L (ref 3.5–5.1)
RBC # BLD: 3.68 M/UL (ref 4.2–5.9)
SODIUM BLD-SCNC: 140 MMOL/L (ref 136–145)
WBC # BLD: 9.3 K/UL (ref 4–11)

## 2020-12-08 PROCEDURE — 36415 COLL VENOUS BLD VENIPUNCTURE: CPT

## 2020-12-08 PROCEDURE — 85027 COMPLETE CBC AUTOMATED: CPT

## 2020-12-08 PROCEDURE — 99214 OFFICE O/P EST MOD 30 MIN: CPT | Performed by: NURSE PRACTITIONER

## 2020-12-08 PROCEDURE — 93000 ELECTROCARDIOGRAM COMPLETE: CPT | Performed by: NURSE PRACTITIONER

## 2020-12-08 PROCEDURE — 80048 BASIC METABOLIC PNL TOTAL CA: CPT

## 2020-12-08 RX ORDER — ATORVASTATIN CALCIUM 80 MG/1
80 TABLET, FILM COATED ORAL NIGHTLY
Qty: 90 TABLET | Refills: 1 | Status: SHIPPED | OUTPATIENT
Start: 2020-12-08 | End: 2021-07-06 | Stop reason: SDUPTHER

## 2020-12-08 NOTE — PROGRESS NOTES
[facility]     Outpatient Follow Up Note    CHIEF COMPLAINT / HPI: Hospital Follow Up secondary to status post CABG    Hospital record has been reviewed  Hospital Course progressed as follows per discharge summary:     59 y.o. Karuna Peters admitted 11/12/2020 for cardiac catheterization as part of evaluation of unstable angina.  Cardiac catheterization 11/12/2020 reveals severe multivessel CAD with normal LV systolic function. [facility] was admitted for further evaluation and treatment. [facility] was seen by CV surgery with plans for CABG.  Patient had 2D echo Doppler as well as carotid vein map and CT of the abdomen pelvis.  He was discharged home 11/13/2020, due to the need to take care of several personal business items that he states that he had to do. [facility] returns today 11/16/2020 for CABG/SCOTT closure with atri cure clip  The patient underwent CABG/ SCOTT on 11/16/2020 . The patient had post op AFIB/ converted into sinus rhythm on amiodarone. He had an uneventful post operative recovery otherwise. The patient was discharged home on amiodarone and coumadin on 11/23/2020. Holden Antonio is 59 y.o. male who presents today for a routine follow up after a recent hospitalization related to the above mentioned issues. He recalls / guesses he had chest pains that came up gradually. He began not tolerating his 10 hr work days so had to decrease the amt of time. Subjective:   Since the time of discharge, the patient admits their symptoms have improved. He climbed steps yesterday and did ok. He has SOB when outside walking, ie moderate distances. He's only been using his IS once daily. He gets congested when laying on his side. He has not had any cough. He's sleeping b/w bed and a chair for comfort. His home wt was 240# making a 10# loss. He has kaci chest wall tenderness. His left chest is numb. The patient is not experiencing palpitations. These symptoms are improving over the last many days.  His visiting daily       No current facility-administered medications for this visit. REVIEW OF SYSTEMS:   CONSTITUTIONAL: + major weight loss, -fatigue, weakness, night sweats or fever. There's been no change in energy level, sleep pattern, or activity level. HEENT: No new vision difficulties or ringing in the ears. RESPIRATORY: No new SOB, PND, orthopnea or cough. CARDIOVASCULAR: See HPI  GI: No nausea, vomiting, + diarrhea > yogurt helps with forming, - constipation, abdominal pain or changes in bowel habits. : No urinary frequency, urgency, incontinence hematuria or dysuria. SKIN: No cyanosis or skin lesions. MUSCULOSKELETAL: No new muscle or joint pain. NEUROLOGICAL: No syncope or TIA-like symptoms. PSYCHIATRIC: No anxiety, pain, insomnia or depression    Objective:   PHYSICAL EXAM:       Vitals:    12/08/20 0932 12/08/20 1011   BP: 120/70 130/72   Site: Right Upper Arm Right Upper Arm   Position: Sitting    Cuff Size: Large Adult Large Adult   Pulse: 67    SpO2: 98%    Weight: 247 lb (112 kg)    Height: 5' 8\" (1.727 m)         VITALS:  /70 (Site: Right Upper Arm, Position: Sitting, Cuff Size: Large Adult)   Pulse 67   Ht 5' 8\" (1.727 m)   Wt 247 lb (112 kg)   SpO2 98%   BMI 37.56 kg/m²     CONSTITUTIONAL: Cooperative, no apparent distress, and appears well nourished / developed  NEUROLOGIC:  Awake and orientated to person, place and time. PSYCH: Calm affect. SKIN: Warm and dry: mediastinal inc with derma bond; LLE inc unremarkable. HEENT: Sclera non-icteric, normocephalic, neck supple, no elevation of JVP, normal carotid pulses with no bruits and thyroid normal size. LUNGS:  No increased work of breathing and clear to auscultation, no crackles or wheezing. CARDIOVASCULAR:  Regular rate 80 and rhythm with no murmurs, gallops, rubs, or abnormal heart sounds, normal PMI. The apical impulses not displaced.                                Heart tones are crisp and normal and inferolateral hypokinesis noted. -There is trivial tricuspid regurgitation with a RVSP estimation of 13 mmHg. -Grade I diastolic dysfunction with normal LV filling pressures. Avg.   E/e'=8.4    Carotid US: 11/12/20:  Summary          There is <50% stenosis of the bilateral internal carotid arteries.     Vertebral flow are antegrade bilaterally. Cardiac cath:   LM-30% distal  LAD-70% mid, 100% distal with left to left collaterals  D1-80% proximal  Cx-100% proximal with left to left collaterals  OM1-100% proximal with left to left collaterals  RCA-100% proximal with right to right and left-to-right collaterals  LVEF-55%     Impression:  1. Severe three-vessel CAD with extensive collateralization. 2.  Preserved LV systolic function with LVEF of 55%. Assessment:      Diagnosis Orders   1. Coronary artery disease due to lipid rich plaque   ~s/p CABG 11/16/20  ~SOB walking moderate distances ; last Hgb 9.4. neg for crackles / edema  ~wt down; no longer on lasix  ~inferior lateral wall HK, LVEF 45% on echo; EF 60% intra-op without lateral or inferior wall HK CBC    [place]   2. Paroxysmal atrial fibrillation (HCC)   ~episode post-op   ~s/p SCOTT Nov '20  ~amiodarone coarse completed. Remains on metoprolol   ~AP regular  ~warfarin managed by surgeon's RN : recommended for few additional weeks  EKG 12 lead   3. Essential hypertension   ~controlled    4. Mixed hyperlipidemia   ~new with LDL not at goal  ~atorvastatin 80 mg daily         Patient  is stable since hospital discharge. Plan: EKG: sinus rhythm 70   CBC / BMP (has not been fasting to include a lipid profile)   F/U 4 weeks    ~plan for CR II thereafter     I have addressed the patient's cardiac risk factors and adjusted pharmacologic treatment as needed. In addition, I have reinforced the need for patient directed risk factor modification.     Further evaluation will be based upon the patient's clinical course and testing results. All questions and concerns were addressed to the patient/sister. Alternatives to  treatment were discussed. The patient  currently  is not smoking. The risks related to smoking were reviewed with the patient. Recommend maintaining a smoke-free lifestyle. Antiplatelet therapy / anti-coagulation has been recommended / prescribed for this patient. Education conducted on adverse reactions including bleeding was discussed. The patient verbalizes understanding. Pt is on a BB  Pt is not on an ace-i/ARB : BP did not support post-op  Pt is on a statin      Saturated fat diet discussed : SMBG 107  Exercise program discussed : works part time at [employer] (walks / [employer])    Thank you for allowing to us to participate in the care of [employer].       [facility]  Documentation of today's visit sent to PCP

## 2020-12-09 ENCOUNTER — TELEPHONE (OUTPATIENT)
Dept: CARDIOLOGY CLINIC | Age: 65
End: 2020-12-09

## 2020-12-09 ENCOUNTER — ANTI-COAG VISIT (OUTPATIENT)
Dept: CARDIOTHORACIC SURGERY | Age: 65
End: 2020-12-09

## 2020-12-09 LAB — INR BLD: 1.4

## 2020-12-09 NOTE — TELEPHONE ENCOUNTER
Trudi Soto   12/9/2020  9:48 AM       Called lmor to aki Augustine, BAN - CNP   12/9/2020  8:41 AM       Bmp looks ok

## 2020-12-09 NOTE — PROGRESS NOTES
Spoke with Jie Velasco at [address]. Denies that pt has bleeding/bruising. No dizziness. Denies changes to diet or con comitant medications.   msg sent to [facility] anticoag clinic to initiate transfer of warfarin management to clinic via cardology

## 2020-12-10 ENCOUNTER — TELEPHONE (OUTPATIENT)
Dept: CARDIOLOGY CLINIC | Age: 65
End: 2020-12-10

## 2020-12-10 RX ORDER — WARFARIN SODIUM 5 MG/1
TABLET ORAL
Qty: 30 TABLET | Refills: 1 | Status: CANCELLED | OUTPATIENT
Start: 2020-12-10

## 2020-12-10 NOTE — TELEPHONE ENCOUNTER
Advised the pt of his lab results per NPTS . He voiced understanding . He would like to know who is filling his scripts for Warfarin and Metformin as Maxine Torrez was previously.

## 2020-12-10 NOTE — TELEPHONE ENCOUNTER
Medication Refill    Medication needing refilled:  Metoprolol , warfarin     Dosage of the medication:    How are you taking this medication (QD, BID, TID, QID, PRN):    30 or 90 day supply called in:    Which Pharmacy are we sending the medication to?: [facility]  At [facility] HIS INR CHECKED AT [address] [facility] . HE HAS APPT W PCP ON Tuesday AND WILL ASK IF HE WILL MANAGE COUMADIN .

## 2020-12-11 ENCOUNTER — TELEPHONE (OUTPATIENT)
Dept: CARDIOTHORACIC SURGERY | Age: 65
End: 2020-12-11

## 2020-12-11 ENCOUNTER — ANTI-COAG VISIT (OUTPATIENT)
Dept: CARDIOTHORACIC SURGERY | Age: 65
End: 2020-12-11

## 2020-12-11 LAB — INR BLD: 1.9

## 2020-12-11 RX ORDER — WARFARIN SODIUM 5 MG/1
5 TABLET ORAL SEE ADMIN INSTRUCTIONS
Qty: 60 TABLET | Refills: 1 | OUTPATIENT
Start: 2020-12-11 | End: 2021-04-09

## 2020-12-11 RX ORDER — METOPROLOL TARTRATE 37.5 MG/1
37.5 TABLET, FILM COATED ORAL 2 TIMES DAILY
Qty: 60 TABLET | Refills: 3 | Status: SHIPPED | OUTPATIENT
Start: 2020-12-11 | End: 2021-02-01 | Stop reason: DRUGHIGH

## 2020-12-11 RX ORDER — METOPROLOL TARTRATE 37.5 MG/1
37.5 TABLET, FILM COATED ORAL 2 TIMES DAILY
Qty: 60 TABLET | Refills: 1 | OUTPATIENT
Start: 2020-12-11 | End: 2021-01-08

## 2020-12-11 NOTE — PROGRESS NOTES
Spoke w/james, home care, ph: [phone]  Denies that pt has bleeding /bruising; no dizziness. No changes to diet. Per Home care, pt is completely out of metoprolol; no refills. Dc dosing instructions reviewed at length with pt and family. Dr marte aware. Will refill per dc instructions. /100  HR 89  spo2 96%  + BM, + IS  Pt/family verb understanding of all.

## 2020-12-11 NOTE — TELEPHONE ENCOUNTER
Per home care nurse, pt has no more metoprolol and no refills. DC orders:    Metoprolol Tartrate   Take 37.5 mg by mouth 2 times daily  + coumadin refill sent to pt preferred pharmacy. Refills ok per dr marte.

## 2020-12-14 ENCOUNTER — ANTI-COAG VISIT (OUTPATIENT)
Dept: CARDIOTHORACIC SURGERY | Age: 65
End: 2020-12-14

## 2020-12-14 LAB — INR BLD: 2.8

## 2020-12-14 NOTE — PROGRESS NOTES
Spoke with [place], Home care, ph: [phone]  Denies that pt has bleeding /bruising. No changes to diet or con comitant medications. No dizziness.   Dosing reviewed at length, verb understanding

## 2020-12-16 ENCOUNTER — ANTI-COAG VISIT (OUTPATIENT)
Dept: CARDIOTHORACIC SURGERY | Age: 65
End: 2020-12-16

## 2020-12-16 LAB — INR BLD: 2.5

## 2020-12-16 NOTE — PROGRESS NOTES
Spoke with Zo Page, Home care, ph: [phone]  Denies that pt has bleeding /bruising. No changes to diet or con comitant medications. No dizziness.   Dosing reviewed at length, verb understanding

## 2020-12-18 ENCOUNTER — ANTI-COAG VISIT (OUTPATIENT)
Dept: CARDIOTHORACIC SURGERY | Age: 65
End: 2020-12-18

## 2020-12-18 LAB — INR BLD: 2.7

## 2020-12-18 NOTE — PROGRESS NOTES
Spoke w/Steff, Home care. Denies that pt has bleeding /bruising. No changes to diet or con comitant medications. No dizziness. Dosing reviewed at length, verb understanding. Will again reach out to cardiology re: transfer to [facility] anticoag clinic for future INR /dosing management as home care will be ending.

## 2020-12-21 ENCOUNTER — ANTI-COAG VISIT (OUTPATIENT)
Dept: CARDIOTHORACIC SURGERY | Age: 65
End: 2020-12-21

## 2020-12-21 ENCOUNTER — TELEPHONE (OUTPATIENT)
Dept: CARDIOTHORACIC SURGERY | Age: 65
End: 2020-12-21

## 2020-12-21 LAB — INR BLD: 1.2

## 2020-12-21 NOTE — PROGRESS NOTES
Spoke w/Steff, Home care. Denies that pt has bleeding /bruising. No changes to diet or con comitant medications. No dizziness. Dosing reviewed at length, verb understanding. Will again reach out to cardiology re: transfer to [facility] anticoag clinic for future INR /dosing management as home care will be ending. Standing INR order placed in Epic. Pt aware to  Go to [facility] outpatient lab Weds AM dec 23 for INR  Home care ending.

## 2020-12-21 NOTE — TELEPHONE ENCOUNTER
Spoke w/Home care and patient at length. Pt gives hx of long term back pain; worse now after surgery 'feels stiff'. Used to drink gin + raisins to control pain. Has in past gone to gym to relieve back pain. Advised to take tylenol q 4-6 hours, and ambulate as much as tolerated. Advised to walk at home. Per dr marte, referral for in home PT assessment/tx faxed to home care. Copy in [employer] scan bin.     Home care relates BP today (after med)  178/113  HR 67  Denies CP  + IS, no SOB, no fever  T Strassell aware of elevated BP- requested cardiology follow up to manage BP

## 2020-12-23 ENCOUNTER — ANTI-COAG VISIT (OUTPATIENT)
Dept: CARDIOTHORACIC SURGERY | Age: 65
End: 2020-12-23

## 2020-12-23 ENCOUNTER — HOSPITAL ENCOUNTER (OUTPATIENT)
Age: 65
Discharge: HOME | End: 2020-12-23
Payer: MEDICAID

## 2020-12-23 LAB
INR BLD: 1.63 (ref 0.86–1.14)
PROTHROMBIN TIME: 19 SEC (ref 10–13.2)

## 2020-12-23 PROCEDURE — 85610 PROTHROMBIN TIME: CPT

## 2020-12-23 PROCEDURE — 36415 COLL VENOUS BLD VENIPUNCTURE: CPT

## 2020-12-23 NOTE — PROGRESS NOTES
Discharged from home care  Warfarin dosing per dr marte. INR at [facility] outpt lab until transfer to cardiology finalized. Spoke w/pt at length; reviewed warfarin dosing ; verb understanding. Denies bleeding/bruising. Denies dizziness, no changes to diet or medications. C/o continuing stiffness in back; long standing hx of this. Advised to try to ambulate /move around as much as possible. Advised to take tylenol every 4-6 hours around the clock to help with stiffness and aid ambulation. [facility] [place] [facility] has been taking tylenol am and pm; will try new regimen. [facility] [place] has ~ 12 messages on phone - likely 'one of them is from the PT home care folks; they can come next week'. Encouraged pt to take advantage of home care PT to assist with recovery. Verb understanding of all.

## 2020-12-28 ENCOUNTER — ANTI-COAG VISIT (OUTPATIENT)
Dept: CARDIOTHORACIC SURGERY | Age: 65
End: 2020-12-28

## 2020-12-28 ENCOUNTER — TELEPHONE (OUTPATIENT)
Dept: PHARMACY | Age: 65
End: 2020-12-28

## 2020-12-28 ENCOUNTER — HOSPITAL ENCOUNTER (OUTPATIENT)
Age: 65
Discharge: HOME | End: 2020-12-28
Payer: MEDICAID

## 2020-12-28 LAB
INR BLD: 2.8 (ref 0.86–1.14)
PROTHROMBIN TIME: 32.8 SEC (ref 10–13.2)

## 2020-12-28 PROCEDURE — 36415 COLL VENOUS BLD VENIPUNCTURE: CPT

## 2020-12-28 PROCEDURE — 85610 PROTHROMBIN TIME: CPT

## 2020-12-28 NOTE — PROGRESS NOTES
Spoke with Dr. Isabell West today is 2.8, left a vm instructing MrOfelia Shavonne Allen to hold coumadin, this martha and to take 2.5 mg tomorrow with recheck in office.

## 2020-12-28 NOTE — TELEPHONE ENCOUNTER
Patient came into F to have his INR checked. He was told to come in for an INR check if he didn't hear from anyone. Printed out lab order for registrar. Patient can get this drawn today. L/m for [facility] [place] vascular to call us if they want patient managed in the clinic.

## 2020-12-30 ENCOUNTER — ANTI-COAG VISIT (OUTPATIENT)
Dept: CARDIOTHORACIC SURGERY | Age: 65
End: 2020-12-30

## 2020-12-30 ENCOUNTER — OFFICE VISIT (OUTPATIENT)
Dept: CARDIOTHORACIC SURGERY | Age: 65
End: 2020-12-30

## 2020-12-30 VITALS
DIASTOLIC BLOOD PRESSURE: 82 MMHG | HEART RATE: 85 BPM | OXYGEN SATURATION: 97 % | HEIGHT: 68 IN | WEIGHT: 250 LBS | TEMPERATURE: 97 F | BODY MASS INDEX: 37.89 KG/M2 | SYSTOLIC BLOOD PRESSURE: 138 MMHG

## 2020-12-30 LAB — INR BLD: 2.5

## 2020-12-30 PROCEDURE — 99024 POSTOP FOLLOW-UP VISIT: CPT | Performed by: THORACIC SURGERY (CARDIOTHORACIC VASCULAR SURGERY)

## 2020-12-30 NOTE — PROGRESS NOTES
Progress Note    CC: First postoperative follow-up    S/P  s/p CABG x 3 SCOTT clip on 11/16/20, discharged on 11/22     Subjective:   Patient states he is feeling better since his last visit at my office few weeks ago. He denies any syncope, presyncope, exertional shortness of breath, exertional chest pain, dizziness, lightheadedness, orthopnea, paroxysmal nocturnal dyspnea, palpitations, peripheral edema, fevers, chills, drainage from his incisions or any other constitutional symptoms. He still admits that he gets some mild shortness of breath when he walks long distances on a cold weather. He also complains of some mild numbness and tingling in all 5 fingertips of the left hand. She feels this has been there since surgery and has been persistent without bothering him too much however. Vital Signs:                                                 /82 (Site: Right Upper Arm)   Pulse 85   Temp 97 °F (36.1 °C)   Ht 5' 8\" (1.727 m)   Wt 250 lb (113.4 kg)   SpO2 97%   BMI 38.01 kg/m²        CV:   Regular rate and rhythm with no rubs or murmurs. Pulm: Clear lung fields with no rales or rhonchi. Incisions:    Sternotomy incision is clean, dry and intact. Sternum is stable. Abd:  Soft  Ext: Leg incision is clean, dry and intact. No peripheral edema. Assessment/Plan:  Overall doing very well post CABG x3 on 11/16/2020, was discharged home on 11/22/2020. He appears to be in normal sinus rhythm. He was instructed that he will be on amiodarone and on Coumadin for a few more weeks. He is Coumadin dose was adjusted for the next 2 days at 2.5 mg/day until the following Monday. In regards with his left arm numbness I offered him to order a bilateral EMG however he wants to wait at least a couple more weeks to see if there is any improvement before we do that which I find very reasonable.   I told him that we will check with him on the phone in a couple weeks and if this is persistent and he is interested in having the test done we would be happy to facilitate that. He did mention that he has C3-C4 spinal issues  for which he is on chronic pain meds and I told him that it is very common that neuropathic symptoms in the upper extremities become more prominent on patients who have pre-existing conditions like him after general anesthesia, positioning on the operating table and median sternotomy. The patient may increase activities as he feels comfortable doing so. I stressed to him to follow religiously the sternal precautions for the next 2 weeks. He will also look into getting in the outpatient cardiac rehab program in the near future. Follow-up with Dr. Colby Ariza and Dr. Marine Baltazar as prescribed. Follow-up: Mr. Helen Allen is aware that he is more than happy to call my office and or schedule another visit anytime in the near future she feels the need to. Erickson Viera MD  12/30/2020  4:41 PM

## 2021-01-04 ENCOUNTER — HOSPITAL ENCOUNTER (OUTPATIENT)
Age: 66
Discharge: HOME | End: 2021-01-04
Payer: MEDICAID

## 2021-01-04 ENCOUNTER — ANTI-COAG VISIT (OUTPATIENT)
Dept: CARDIOTHORACIC SURGERY | Age: 66
End: 2021-01-04

## 2021-01-04 DIAGNOSIS — Z95.1 S/P CABG (CORONARY ARTERY BYPASS GRAFT): ICD-10-CM

## 2021-01-04 DIAGNOSIS — Z95.1 S/P CABG (CORONARY ARTERY BYPASS GRAFT): Primary | ICD-10-CM

## 2021-01-04 LAB
INR BLD: 1.18 (ref 0.86–1.14)
PROTHROMBIN TIME: 13.7 SEC (ref 10–13.2)

## 2021-01-04 PROCEDURE — 36415 COLL VENOUS BLD VENIPUNCTURE: CPT

## 2021-01-04 PROCEDURE — 85610 PROTHROMBIN TIME: CPT

## 2021-01-04 NOTE — PROGRESS NOTES
Spoke w/pt at length; states was not aware to have INR ck today at [facility] outpt lab. Will dose as directed 4Jan and recheck as directed. Denies changes to diet or medications; denies bleeding /bruising; denies dizziness. Verb understanding of all.

## 2021-01-05 ENCOUNTER — ANTI-COAG VISIT (OUTPATIENT)
Dept: CARDIOTHORACIC SURGERY | Age: 66
End: 2021-01-05

## 2021-01-05 DIAGNOSIS — Z95.1 S/P CABG (CORONARY ARTERY BYPASS GRAFT): Primary | ICD-10-CM

## 2021-01-05 NOTE — TELEPHONE ENCOUNTER
Received signed referral from Dr Bowen Meneses. Per notes pt is having lab draw for INR on 1/6 , will f/u after that to schedule initial appt w/ [facility].

## 2021-01-05 NOTE — PROGRESS NOTES
Spoke w/pt, dosing reviewed w/pt at length; aware of INR ck 6Jan21  Denies bleeding /bruising; no dizziness. No changes to diet or medications. Pt verb understanding of all.

## 2021-01-06 ENCOUNTER — ANTI-COAG VISIT (OUTPATIENT)
Dept: CARDIOTHORACIC SURGERY | Age: 66
End: 2021-01-06

## 2021-01-06 ENCOUNTER — HOSPITAL ENCOUNTER (OUTPATIENT)
Age: 66
Discharge: HOME | End: 2021-01-06
Payer: MEDICAID

## 2021-01-06 DIAGNOSIS — Z95.1 S/P CABG (CORONARY ARTERY BYPASS GRAFT): ICD-10-CM

## 2021-01-06 LAB
INR BLD: 1.2 (ref 0.86–1.14)
PROTHROMBIN TIME: 14 SEC (ref 10–13.2)

## 2021-01-06 PROCEDURE — 85610 PROTHROMBIN TIME: CPT

## 2021-01-06 PROCEDURE — 36415 COLL VENOUS BLD VENIPUNCTURE: CPT

## 2021-01-06 NOTE — PROGRESS NOTES
Next INR visit w/[facility] kaylee week of 11 Jan ([facility] will call pt to schedule 11Jan and will dose as appropriate). Spoke w/pt, aware of all; reviewed dosing at length. Denies bleeding/bruising; no changes to diet or con comitant medications. States incisional pain 'getting better', reviewed use of regular str tylenol for pain every 4-6 hours. States is walking at the mall; sees Maryann Powell, cardiology 7[facility]- requested pt review exercise w.cardiology. verb understanding. Aware [facility] will call pt for appt 11jan to ck INR and assume warfarin dosing. Verb understanding of all.

## 2021-01-07 NOTE — TELEPHONE ENCOUNTER
S/w pt hes going to find out if Dr Bonita Jackson office can manage warfarin, if not pt will contact CC to schedule initial appt.

## 2021-01-08 ENCOUNTER — OFFICE VISIT (OUTPATIENT)
Dept: CARDIOLOGY CLINIC | Age: 66
End: 2021-01-08
Payer: MEDICAID

## 2021-01-08 VITALS
BODY MASS INDEX: 37.74 KG/M2 | WEIGHT: 249 LBS | HEART RATE: 74 BPM | HEIGHT: 68 IN | OXYGEN SATURATION: 98 % | SYSTOLIC BLOOD PRESSURE: 142 MMHG | DIASTOLIC BLOOD PRESSURE: 86 MMHG

## 2021-01-08 DIAGNOSIS — E78.2 MIXED HYPERLIPIDEMIA: ICD-10-CM

## 2021-01-08 DIAGNOSIS — I25.83 CORONARY ARTERY DISEASE DUE TO LIPID RICH PLAQUE: Primary | ICD-10-CM

## 2021-01-08 DIAGNOSIS — I25.10 CORONARY ARTERY DISEASE DUE TO LIPID RICH PLAQUE: Primary | ICD-10-CM

## 2021-01-08 DIAGNOSIS — I10 ESSENTIAL HYPERTENSION: ICD-10-CM

## 2021-01-08 PROCEDURE — 99214 OFFICE O/P EST MOD 30 MIN: CPT | Performed by: NURSE PRACTITIONER

## 2021-01-08 NOTE — PROGRESS NOTES
[facility]     Outpatient Follow Up Note    Elkin Muro is 72 y.o. male who presents today with a history of CAD s/p CABG with SCOTT closure Nov '20 with post-op AF, HTN and hyperlipidemia . CHIEF COMPLAINT / HPI:  Follow Up secondary to coronary artery disease    Subjective:   he denies significant chest pain. His chest hurts all the time. Tylenol doesn't help much (he has tylenol #4 to use at nighttime but forgets to take any). There is SOB when walking. He goes out to the mall and walks 30-40 minutes everyday. He rests after 20 minutes at which time he becomes winded. The patient denies orthopnea/PND. The patient does not have swelling. The patients weight is up about 5# (2# by office scales) . The patient is not experiencing palpitations or dizziness. His hands get cold, Lt > Rt (is being followed by surgeon, suspected to be from cervical spine with chronic pain)    These symptoms are stable since the last OV. With regard to medication therapy the patient has been compliant with prescribed regimen. They have tolerated therapy to date.      Past Medical History:   Diagnosis Date    Anxiety     Cervical disc herniation     Coronary artery disease due to lipid rich plaque 11/12/2020    HTN (hypertension) 5/9/2013    Neck sprain     Obesity (BMI 30-39.9) 11/19/2020    Post traumatic stress disorder     Rotator cuff (capsule) sprain and strain     Sprain of shoulder, left     Sprain of shoulder, right      Social History:    Social History     Tobacco Use   Smoking Status Former Smoker    Packs/day: 0.50    Types: Cigarettes   Smokeless Tobacco Never Used     Current Medications:  Current Outpatient Medications   Medication Sig Dispense Refill    Metoprolol Tartrate 37.5 MG TABS Take 37.5 mg by mouth 2 times daily (Patient taking differently: Take 50 mg by mouth 2 times daily ) 60 tablet 3    warfarin (COUMADIN) 5 MG tablet Take 1 tablet by mouth See Admin Instructions As directed by physician 60 tablet 1    atorvastatin (LIPITOR) 80 MG tablet Take 1 tablet by mouth nightly 90 tablet 1    acetaminophen (TYLENOL) 500 MG tablet Take 2 tablets by mouth daily (Patient taking differently: Take 1,000 mg by mouth 2 tabs bid) 30 tablet 0    warfarin (COUMADIN) 5 MG tablet Take 2.5 mg daily , dosage depends on INR levels 30 tablet 1    metFORMIN (GLUCOPHAGE) 500 MG tablet Take 1 tablet by mouth 2 times daily (with meals) (Patient taking differently: Take 500 mg by mouth 0.5 tablet bid) 60 tablet 3    tamsulosin (FLOMAX) 0.4 MG capsule Take 0.4 mg by mouth daily         REVIEW OF SYSTEMS:    CONSTITUTIONAL: No major weight gain or loss, fatigue, weakness, night sweats or fever. HEENT: No new vision difficulties or ringing in the ears. RESPIRATORY: No new SOB, PND, orthopnea or cough. CARDIOVASCULAR: See HPI  GI: No nausea, vomiting, diarrhea, constipation, abdominal pain or changes in bowel habits. : No urinary frequency, urgency, incontinence hematuria or dysuria. SKIN: No cyanosis or skin lesions. MUSCULOSKELETAL: No new muscle or joint pain. NEUROLOGICAL: No syncope or TIA-like symptoms. PSYCHIATRIC: No anxiety, pain, insomnia or depression    Objective:   PHYSICAL EXAM:        Vitals:    01/08/21 1007 01/08/21 1025   BP: 130/70 (!) 142/86   Site: Left Upper Arm    Position: Sitting    Cuff Size: Large Adult    Pulse: 74    SpO2: 98%    Weight: 249 lb (112.9 kg)    Height: 5' 8\" (1.727 m)        VITALS:  /70 (Site: Left Upper Arm, Position: Sitting, Cuff Size: Large Adult)   Pulse 74   Ht 5' 8\" (1.727 m)   Wt 249 lb (112.9 kg)   SpO2 98%   BMI 37.86 kg/m²   CONSTITUTIONAL: Cooperative, no apparent distress, and appears well nourished / obese  NEUROLOGIC:  Awake and orientated to person, place and time. PSYCH: Calm affect. SKIN: Warm and dry.   HEENT: Sclera non-icteric, normocephalic, neck supple, no elevation of JVP, normal carotid pulses with no bruits and thyroid normal size. LUNGS:  No increased work of breathing and few crackles LLL  CARDIOVASCULAR:  Regular rate 84 and rhythm with no murmurs, gallops, rubs, or abnormal heart sounds, normal PMI. The apical impulses not displaced  JVP less than 8 cm H2O  Heart tones are crisp and normal  Cervical veins are not engorged  The carotid upstroke is normal in amplitude and contour without delay or bruit  JVP is not elevated  ABDOMEN:  Normal bowel sounds, non-distended and non-tender to palpation  EXT: No edema, no calf tenderness. Pulses are present bilaterally. DATA:    Lab Results   Component Value Date    ALT 17 11/13/2020    AST 26 11/13/2020    ALKPHOS 71 11/13/2020    BILITOT 0.9 11/13/2020     Lab Results   Component Value Date    CREATININE 0.9 12/08/2020    BUN 21 (H) 12/08/2020     12/08/2020    K 4.7 12/08/2020     12/08/2020    CO2 24 12/08/2020     No results found for: TSH, R6JAJGT, E0LBKTI, THYROIDAB  Lab Results   Component Value Date    WBC 9.3 12/08/2020    HGB 11.6 (L) 12/08/2020    HCT 35.2 (L) 12/08/2020    MCV 95.6 12/08/2020     12/08/2020     No components found for: CHLPL  Lab Results   Component Value Date    TRIG 225 (H) 11/13/2020    TRIG 293 (H) 05/09/2013     Lab Results   Component Value Date    HDL 35 (L) 11/13/2020    HDL 46 05/09/2013     Lab Results   Component Value Date    LDLCALC 120 (H) 11/13/2020    LDLCALC 132 (H) 05/09/2013     Lab Results   Component Value Date    LABVLDL 45 11/13/2020    LABVLDL 59 05/09/2013     Lab Results   Component Value Date    INR 1.20 (H) 01/06/2021    INR 1.18 (H) 01/04/2021    INR 2.50 12/30/2020    PROTIME 14.0 (H) 01/06/2021    PROTIME 13.7 (H) 01/04/2021    PROTIME 32.8 (H) 12/28/2020       Radiology Review:  Pertinent images / reports were reviewed as a part of this visit and reveals the following:    OR: 11/16/20:  MALATHI. TCPB. RT EVH. CABG x 3, LIMA TO DISTAL LAD; SVG TO OM1; SVG TO RPDA.  SCOTT CLIP WITH 45mm  ATRICLIP     Last Echo: 11/12/20  Summary   -Mildly reduced global systolic function with an ejection fraction estimated at 45%.  -Inferoseptal, inferior, and inferolateral hypokinesis noted.   -There is trivial tricuspid regurgitation with a RVSP estimation of 13 mmHg.   -Grade I diastolic dysfunction with normal LV filling pressures. Avg.   E/e'=8.4     Cardiac cath:   LM-30% distal  LAD-70% mid, 100% distal with left to left collaterals  D1-80% proximal  Cx-100% proximal with left to left collaterals  OM1-100% proximal with left to left collaterals  RCA-100% proximal with right to right and left-to-right collaterals  LVEF-55%     Impression:  1.  Severe three-vessel CAD with extensive collateralization. 2.  Preserved LV systolic function with LVEF of 55%. Assessment:      Diagnosis Orders   1. Coronary artery disease due to lipid rich plaque   ~continues to improve ; had started to walk around [facility]   ~s/p CABG Nov '20  ~statin / BB  ~post-op AF : [facility] transitioned to PCP    2. Essential hypertension   ~controlled on arrival ; suboptimal with recheck  ~will monitor  ~has been taking metoprolol 50 mg bid whereas 37.5 mg bid prescribed at discharge     3. Mixed hyperlipidemia   ~unchanged : no updated profile after starting atorvastatin  Lipid, Fasting     I had the opportunity to review the clinical symptoms and presentation of Chelsie Isidro. Plan:     1. Fasting cholesterol levels as routine  2. Resume using inspirometer : crackles LLL  3. F/U in 8 weeks     Overall the patient is stable from CV standpoint    I have addresed the patient's cardiac risk factors and adjusted pharmacologic treatment as needed. In addition, I have reinforced the need for patient directed risk factor modification. Further evaluation will be based upon the patient's clinical course and testing results. All questions and concerns were addressed to the patient. Alternatives to my treatment were discussed.       The patient is not currently smoking. The risks related to smoking were reviewed with the patient. Recommend maintaining a smoke-free lifestyle. Patient is on a beta-blocker  Patient is not on an ace-i/ARB : BP had not supported   Patient is on a statin     Antiplatelet therapy / anti-coagulation has not been recommended / prescribed for this patient. Education conducted on adverse reactions including bleeding was discussed. ~warfarin will be managed by PCP    The patient verbalizes understanding not to stop medications without discussing with us. Discussed exercise: 30-60 minutes 7 days/week  Discussed Low saturated fat/TAYLOR diet. SMBG 110-120    Thank you for allowing to us to participate in the care of Mary Jo Saravia.     BAN Gr    Documentation of today's visit sent to PCP

## 2021-01-11 NOTE — TELEPHONE ENCOUNTER
Confirmed Dr Crispin Jones does manage warfarin, pt will contact Md's office to schedule appt for INR check . Hold referral for 30 days.

## 2021-01-21 ENCOUNTER — HOSPITAL ENCOUNTER (OUTPATIENT)
Dept: CARDIAC REHAB | Age: 66
Setting detail: THERAPIES SERIES
Discharge: HOME | End: 2021-01-21
Payer: MEDICAID

## 2021-01-21 VITALS
OXYGEN SATURATION: 97 % | BODY MASS INDEX: 38.77 KG/M2 | HEART RATE: 68 BPM | RESPIRATION RATE: 16 BRPM | HEIGHT: 68 IN | WEIGHT: 255.8 LBS | SYSTOLIC BLOOD PRESSURE: 144 MMHG | DIASTOLIC BLOOD PRESSURE: 96 MMHG

## 2021-01-21 PROCEDURE — 93798 PHYS/QHP OP CAR RHAB W/ECG: CPT

## 2021-01-21 NOTE — PROGRESS NOTES
[facility] Cardiac Rehabilitation Initial Evaluation    Mauro Kidd       1955     [id number]    Share medical information:  Yes - Danny oRwley (sister):  [phone]    Cardiac History    CABG  EF:  45% (2020)  Arrhythmia:  Post op a. fib. Physical Assessment     General Appearance   Height:  5' 8\" (172.7 cm)  Weight:  255 lb 12.8 oz (116 kg)(255.8 lb)   BMI:  39  Skin color: Within Defined Limits      Cardiovascular Assessment  BP Sitting:  (!) 144/96(right arm sitting)  Sittin/96(left arm sitting)  Standing:  160/98(left arm)  Heart rate:   68 Monitor   Heart sounds:   Regular S1, S2, No adventitious heart sounds    Respiratory Assessment  Resp rate: 16     Regular  Normal  L Breath Sounds: Clear   R Breath Sounds: Clear  SpO2:  97 %  Quality/Effort:   Unlabored  Sleep Apnea:  No  CPAP  No  Oxygen  No     Sleeping Habits:  Pt. States that he sleeps to much. Edema:  No      Orthopedic/Exercise Limitations:  Yes - Pt. States that he has cervical disc issues. States that he was told not lift over his head. Pain:   Do you have pain?:  yes - pain to his incisional area/chest.  Rates a 5. States that he has had this pain since surgery. No change int he pain. Fall Risk Assessment     History of falling with or without injury: No  Use of ambulatory aid: No  Difficulty walking/impaired gait: No  Numbness in feet: No  Vision changes: No  Dizziness: No  Shortness of breath: Yes(states sob with exertion)  Current medications include but not limited to:  Anticoagulant, ACE, ARB, Beta  Blocker  Other fall risk : No  Outpatient fall risk intervention strategies: Fall risk education provided    Abuse / Neglect  Physical/behavioral signs of abuse/neglect   No    Do you feel safe at home   Yes    Advanced Directives  Patient has Advanced Directives:  No  Patient given Advanced Directive pack:  Not interested    Vaccinations  Influenza (annual): No  Pneumonia:  No     Pt. Arrived to Cardiopulmonary rehab for his initial interview and evaluation to begin Cardiac rehab. Pt's insurance was reviewed and pt. Was advised to call his insurance provider if he has any questions or concerns. PMHX and PSHX as well as home medications were reviewed and verified by pt. Plan of care was reviewed and goal was set by pt. And agreed upon. Six minute walk test completed at this session. Pt. To attend the 10:00 AM class time beginning on Monday January 25,2021.     NATHALIA Camilo RN  1/21/2021

## 2021-01-25 ENCOUNTER — TELEPHONE (OUTPATIENT)
Dept: CARDIOLOGY CLINIC | Age: 66
End: 2021-01-25

## 2021-01-25 ENCOUNTER — HOSPITAL ENCOUNTER (OUTPATIENT)
Dept: CARDIAC REHAB | Age: 66
Setting detail: THERAPIES SERIES
Discharge: HOME | End: 2021-01-25
Payer: MEDICAID

## 2021-01-25 PROCEDURE — 93798 PHYS/QHP OP CAR RHAB W/ECG: CPT

## 2021-01-25 NOTE — TELEPHONE ENCOUNTER
Cecilia Pritchard called and states she forgot to note that pt has had a weight gain of 17.2 since sx on 11/16/21 today's weight is 257.2

## 2021-01-26 RX ORDER — AMLODIPINE BESYLATE 5 MG/1
5 TABLET ORAL DAILY
COMMUNITY
End: 2021-01-26 | Stop reason: SDUPTHER

## 2021-01-26 RX ORDER — AMLODIPINE BESYLATE 5 MG/1
5 TABLET ORAL DAILY
Qty: 30 TABLET | Refills: 5 | Status: SHIPPED | OUTPATIENT
Start: 2021-01-26 | End: 2021-03-05 | Stop reason: ALTCHOICE

## 2021-01-26 NOTE — TELEPHONE ENCOUNTER
Called patient he will start norvasc 5 mg daily e-scribe to nithya . Please call patient for ov with Shaylee

## 2021-01-27 ENCOUNTER — HOSPITAL ENCOUNTER (OUTPATIENT)
Dept: CARDIAC REHAB | Age: 66
Setting detail: THERAPIES SERIES
Discharge: HOME | End: 2021-01-27
Payer: MEDICAID

## 2021-01-27 PROCEDURE — 93798 PHYS/QHP OP CAR RHAB W/ECG: CPT

## 2021-01-29 ENCOUNTER — HOSPITAL ENCOUNTER (OUTPATIENT)
Dept: CARDIAC REHAB | Age: 66
Setting detail: THERAPIES SERIES
Discharge: HOME | End: 2021-01-29
Payer: MEDICAID

## 2021-01-29 PROCEDURE — 93798 PHYS/QHP OP CAR RHAB W/ECG: CPT

## 2021-02-01 ENCOUNTER — HOSPITAL ENCOUNTER (OUTPATIENT)
Dept: CARDIAC REHAB | Age: 66
Setting detail: THERAPIES SERIES
Discharge: HOME | End: 2021-02-01
Payer: MEDICAID

## 2021-02-01 ENCOUNTER — TELEPHONE (OUTPATIENT)
Dept: CARDIOLOGY CLINIC | Age: 66
End: 2021-02-01

## 2021-02-01 ENCOUNTER — HOSPITAL ENCOUNTER (OUTPATIENT)
Dept: GENERAL RADIOLOGY | Age: 66
Discharge: HOME | End: 2021-02-01
Payer: MEDICAID

## 2021-02-01 ENCOUNTER — OFFICE VISIT (OUTPATIENT)
Dept: CARDIOLOGY CLINIC | Age: 66
End: 2021-02-01
Payer: MEDICAID

## 2021-02-01 ENCOUNTER — HOSPITAL ENCOUNTER (OUTPATIENT)
Age: 66
Discharge: HOME | End: 2021-02-01
Payer: MEDICAID

## 2021-02-01 VITALS
HEIGHT: 68 IN | WEIGHT: 254.7 LBS | SYSTOLIC BLOOD PRESSURE: 136 MMHG | BODY MASS INDEX: 38.6 KG/M2 | HEART RATE: 89 BPM | OXYGEN SATURATION: 96 % | DIASTOLIC BLOOD PRESSURE: 82 MMHG

## 2021-02-01 DIAGNOSIS — R06.02 SOB (SHORTNESS OF BREATH): ICD-10-CM

## 2021-02-01 DIAGNOSIS — I25.83 CORONARY ARTERY DISEASE DUE TO LIPID RICH PLAQUE: ICD-10-CM

## 2021-02-01 DIAGNOSIS — I10 ESSENTIAL HYPERTENSION: Primary | ICD-10-CM

## 2021-02-01 DIAGNOSIS — E78.2 MIXED HYPERLIPIDEMIA: ICD-10-CM

## 2021-02-01 DIAGNOSIS — I25.10 CORONARY ARTERY DISEASE DUE TO LIPID RICH PLAQUE: ICD-10-CM

## 2021-02-01 PROCEDURE — 99214 OFFICE O/P EST MOD 30 MIN: CPT | Performed by: NURSE PRACTITIONER

## 2021-02-01 PROCEDURE — 71046 X-RAY EXAM CHEST 2 VIEWS: CPT

## 2021-02-01 PROCEDURE — 93798 PHYS/QHP OP CAR RHAB W/ECG: CPT

## 2021-02-01 RX ORDER — METOPROLOL TARTRATE 50 MG/1
50 TABLET, FILM COATED ORAL 2 TIMES DAILY
COMMUNITY
End: 2021-04-12 | Stop reason: SDUPTHER

## 2021-02-01 NOTE — PROGRESS NOTES
[address]     Outpatient Follow Up Note    Kiana Wu is 72 y.o. male who presents today with a history of CAD s/p CABG with SCOTT closure  with post-op AF, HTN and hyperlipidemia . CHIEF COMPLAINT / HPI:  Follow Up secondary to HTN starting amlodipine. His BPs have been 160-170/ improved to 120/80 - 146/93. It was 114/70 this morning at [facility]     Subjective:     He's been able to lay on his Rt side and back. He has been using BenGay on his chest incision and Left upper chest. He uses Tyl #4 but for his neck which helps with his surgical discomfort. He was wheezing and SOB walking in from [facility] today. He hadn't gone back to walk in the mall this past week; d/t wheezing and harder to breath. The patient denies orthopnea/PND. The patient does not have swelling. The patients weight is up 5# . The patient is not experiencing palpitations or dizziness. His hands no longer get cold as reported at his last appt:  Lt > Rt (is being followed by surgeon, suspected to be from cervical spine with chronic pain)    These symptoms are stable since the last OV. With regard to medication therapy the patient has been compliant with prescribed regimen. They have tolerated therapy to date.      Past Medical History:   Diagnosis Date    Anxiety     Cervical disc herniation     Coronary artery disease due to lipid rich plaque 2020    HTN (hypertension) 2013    Neck sprain     Obesity (BMI 30-39.9) 2020    Post traumatic stress disorder     Rotator cuff (capsule) sprain and strain     Sprain of shoulder, left     Sprain of shoulder, right      Social History:    Social History     Tobacco Use   Smoking Status Former Smoker    Packs/day: 0.50    Types: Cigarettes    Quit date: 2020    Years since quittin.6   Smokeless Tobacco Never Used       Current Outpatient Medications   Medication Sig Dispense Refill    metoprolol tartrate (LOPRESSOR) 50 MG tablet Take 50 mg by mouth 2 times daily      amLODIPine (NORVASC) 5 MG tablet Take 1 tablet by mouth daily 30 tablet 5    warfarin (COUMADIN) 5 MG tablet Take 1 tablet by mouth See Admin Instructions As directed by physician (Patient taking differently: Take 7.5 mg by mouth See Admin Instructions As directed by physician    Taking 7.5 mg on Julutz-Huqcslp-Amohplwla-Thursday and Friday.) 60 tablet 1    atorvastatin (LIPITOR) 80 MG tablet Take 1 tablet by mouth nightly 90 tablet 1    acetaminophen (TYLENOL) 500 MG tablet Take 2 tablets by mouth daily (Patient taking differently: Take 1,000 mg by mouth 2 tabs bid) 30 tablet 0    metFORMIN (GLUCOPHAGE) 500 MG tablet Take 1 tablet by mouth 2 times daily (with meals) (Patient taking differently: Take 500 mg by mouth ) 60 tablet 3    tamsulosin (FLOMAX) 0.4 MG capsule Take 0.4 mg by mouth daily                           warfarin (COUMADIN) 5 MG tablet Take 2.5 mg daily , dosage depends on INR levels (Patient not taking: Reported on 2/1/2021) 30 tablet 1         REVIEW OF SYSTEMS:    CONSTITUTIONAL: No major weight gain or loss, fatigue, weakness, night sweats or fever. HEENT: No new vision difficulties or ringing in the ears; wakes with his nose crusty. Has been using left over mupirocin . RESPIRATORY: No new SOB, PND, orthopnea or cough; c/o wheezing. CARDIOVASCULAR: See HPI  GI: No nausea, vomiting, diarrhea, constipation, abdominal pain or changes in bowel habits. : No urinary frequency, urgency, incontinence hematuria or dysuria; inquiring about ED and meds. SKIN: No cyanosis or skin lesions. MUSCULOSKELETAL: No new muscle or joint pain. NEUROLOGICAL: No syncope or TIA-like symptoms.   PSYCHIATRIC: No anxiety, pain, insomnia or depression    Objective:   PHYSICAL EXAM:        Vitals:    02/01/21 1142 02/01/21 1224   BP: 130/88 136/82   Site: Left Upper Arm Right Upper Arm   Position: Sitting    Cuff Size: Large Adult Large Adult   Pulse: 89    SpO2: 96% Weight: 254 lb 11.2 oz (115.5 kg)    Height: 5' 8\" (1.727 m)        VITALS:  /82 (Site: Right Upper Arm, Cuff Size: Large Adult)   Pulse 89   Ht 5' 8\" (1.727 m)   Wt 254 lb 11.2 oz (115.5 kg)   SpO2 96%   BMI 38.73 kg/m²   CONSTITUTIONAL: Cooperative, no apparent distress, and appears well nourished / obese  NEUROLOGIC:  Awake and orientated to person, place and time. PSYCH: Calm affect. SKIN: Warm and dry. HEENT: Sclera non-icteric, normocephalic, neck supple, no elevation of JVP, normal carotid pulses with no bruits and thyroid normal size. LUNGS:  No increased work of breathing and essentially clear to slightly diminished bibasilar; neg wheezing with cough  CARDIOVASCULAR:  Regular rate 84 and rhythm with no murmurs, gallops, rubs, or abnormal heart sounds, normal PMI. The apical impulses not displaced  JVP less than 8 cm H2O  Heart tones are crisp and normal  Cervical veins are not engorged  The carotid upstroke is normal in amplitude and contour without delay or bruit  JVP is not elevated  ABDOMEN:  Normal bowel sounds, non-distended and non-tender to palpation  EXT: trace LLE edema, no calf tenderness. Pulses are present bilaterally.     DATA:    Lab Results   Component Value Date    ALT 17 11/13/2020    AST 26 11/13/2020    ALKPHOS 71 11/13/2020    BILITOT 0.9 11/13/2020     Lab Results   Component Value Date    CREATININE 0.9 12/08/2020    BUN 21 (H) 12/08/2020     12/08/2020    K 4.7 12/08/2020     12/08/2020    CO2 24 12/08/2020     No results found for: TSH, B6OKHMM, P5LSEVA, THYROIDAB  Lab Results   Component Value Date    WBC 9.3 12/08/2020    HGB 11.6 (L) 12/08/2020    HCT 35.2 (L) 12/08/2020    MCV 95.6 12/08/2020     12/08/2020     No components found for: CHLPL  Lab Results   Component Value Date    TRIG 225 (H) 11/13/2020    TRIG 293 (H) 05/09/2013     Lab Results   Component Value Date    HDL 35 (L) 11/13/2020    HDL 46 05/09/2013     Lab Results   Component Value Date    LDLCALC 120 (H) 11/13/2020    LDLCALC 132 (H) 05/09/2013     Lab Results   Component Value Date    LABVLDL 45 11/13/2020    LABVLDL 59 05/09/2013       Radiology Review:  Pertinent images / reports were reviewed as a part of this visit and reveals the following:    OR: 11/16/20:  MALATHI. TCPB. RT EVH. CABG x 3, LIMA TO DISTAL LAD; SVG TO OM1; SVG TO RPDA. SCOTT CLIP WITH 45mm  ATRICLIP     Last Echo: 11/12/20  Summary   -Mildly reduced global systolic function with an ejection fraction estimated at 45%.  -Inferoseptal, inferior, and inferolateral hypokinesis noted.   -There is trivial tricuspid regurgitation with a RVSP estimation of 13 mmHg.   -Grade I diastolic dysfunction with normal LV filling pressures. Avg.   E/e'=8.4     Cardiac cath:   LM-30% distal  LAD-70% mid, 100% distal with left to left collaterals  D1-80% proximal  Cx-100% proximal with left to left collaterals  OM1-100% proximal with left to left collaterals  RCA-100% proximal with right to right and left-to-right collaterals  LVEF-55%     Impression:  1.  Severe three-vessel CAD with extensive collateralization. 2.  Preserved LV systolic function with LVEF of 55%. Assessment:        Diagnosis Orders     1. Essential hypertension   ~controlled in office and with cardiac rehab's check earlier today ; tolerating the addition of amlodipine at 5 mg daily  ~has been elevated by home readings      2. Coronary artery disease due to lipid rich plaque   ~stable : CABG Nov '20  ~tolerating cardiac rehab  ~statin / BB  ~post-op AF : AC transitioned to PCP         3. Mixed hyperlipidemia   ~unchanged : no updated profile after starting atorvastatin ; had not gotten labs done as recommended      I had the opportunity to review the clinical symptoms and presentation of Jaylene Mccallum. Plan:     1. CXR today  2. Consider valsartan if BP remains elevated    ~he will take his BP cuff in for comparison when he sees his PCP later today  3.  F/U as scheduled with Dr. Simone Dennis    Overall the patient is stable from CV standpoint    I have addresed the patient's cardiac risk factors and adjusted pharmacologic treatment as needed. In addition, I have reinforced the need for patient directed risk factor modification. Further evaluation will be based upon the patient's clinical course and testing results. All questions and concerns were addressed to the patient. Alternatives to my treatment were discussed. The patient is not currently smoking. The risks related to smoking were reviewed with the patient. Recommend maintaining a smoke-free lifestyle. Patient is on a beta-blocker  Patient is not on an ace-i/ARB : BP had not supported   Patient is on a statin     Antiplatelet therapy / anti-coagulation has not been recommended / prescribed for this patient. Education conducted on adverse reactions including bleeding was discussed. ~warfarin managed by PCP    The patient verbalizes understanding not to stop medications without discussing with us. Discussed exercise: 30-60 minutes 7 days/week  Discussed Low saturated fat/TAYLOR diet. Thank you for allowing to us to participate in the care of Chelsie Isidro.     BAN Littlejohn    Documentation of today's visit sent to PCP

## 2021-02-01 NOTE — PATIENT INSTRUCTIONS
Do not exceed the amount of Tylenol recommended in 24 hrs    Chest x-ray today     Take your BP cuff in with you when you get your lab done today     Keep appt as scheduled with Dr Evan Mcgee

## 2021-02-03 ENCOUNTER — HOSPITAL ENCOUNTER (OUTPATIENT)
Dept: CARDIAC REHAB | Age: 66
Setting detail: THERAPIES SERIES
Discharge: HOME | End: 2021-02-03
Payer: MEDICAID

## 2021-02-03 PROCEDURE — 93798 PHYS/QHP OP CAR RHAB W/ECG: CPT

## 2021-02-05 ENCOUNTER — HOSPITAL ENCOUNTER (OUTPATIENT)
Dept: CARDIAC REHAB | Age: 66
Setting detail: THERAPIES SERIES
Discharge: HOME | End: 2021-02-05
Payer: MEDICAID

## 2021-02-05 ENCOUNTER — TELEPHONE (OUTPATIENT)
Dept: CARDIOLOGY CLINIC | Age: 66
End: 2021-02-05

## 2021-02-05 PROCEDURE — 93798 PHYS/QHP OP CAR RHAB W/ECG: CPT

## 2021-02-05 NOTE — TELEPHONE ENCOUNTER
Patient calling was confused about which medication gets adjusted with his PT/INR. He reports thinking the Metoprolol needed adjusted as well when the coumadin is adjusted. He is calling to see if NPTS is wanting to make any adjustments to the metoprolol. Please advise.

## 2021-02-08 ENCOUNTER — HOSPITAL ENCOUNTER (OUTPATIENT)
Dept: CARDIAC REHAB | Age: 66
Setting detail: THERAPIES SERIES
Discharge: HOME | End: 2021-02-08
Payer: MEDICAID

## 2021-02-08 PROCEDURE — 93798 PHYS/QHP OP CAR RHAB W/ECG: CPT

## 2021-02-10 ENCOUNTER — ANTI-COAG VISIT (OUTPATIENT)
Dept: PHARMACY | Age: 66
End: 2021-02-10

## 2021-02-10 ENCOUNTER — TELEPHONE (OUTPATIENT)
Dept: PHARMACY | Age: 66
End: 2021-02-10

## 2021-02-10 ENCOUNTER — HOSPITAL ENCOUNTER (OUTPATIENT)
Dept: CARDIAC REHAB | Age: 66
Setting detail: THERAPIES SERIES
Discharge: HOME | End: 2021-02-10
Payer: MEDICAID

## 2021-02-10 PROCEDURE — 93798 PHYS/QHP OP CAR RHAB W/ECG: CPT

## 2021-02-12 ENCOUNTER — HOSPITAL ENCOUNTER (OUTPATIENT)
Dept: CARDIAC REHAB | Age: 66
Setting detail: THERAPIES SERIES
Discharge: HOME | End: 2021-02-12
Payer: MEDICAID

## 2021-02-12 PROCEDURE — 93798 PHYS/QHP OP CAR RHAB W/ECG: CPT

## 2021-02-17 ENCOUNTER — HOSPITAL ENCOUNTER (OUTPATIENT)
Dept: CARDIAC REHAB | Age: 66
Setting detail: THERAPIES SERIES
Discharge: HOME | End: 2021-02-17
Payer: MEDICAID

## 2021-02-17 PROCEDURE — 93798 PHYS/QHP OP CAR RHAB W/ECG: CPT

## 2021-02-19 ENCOUNTER — HOSPITAL ENCOUNTER (OUTPATIENT)
Dept: CARDIAC REHAB | Age: 66
Setting detail: THERAPIES SERIES
Discharge: HOME | End: 2021-02-19
Payer: MEDICAID

## 2021-02-19 PROCEDURE — 93798 PHYS/QHP OP CAR RHAB W/ECG: CPT

## 2021-02-22 ENCOUNTER — HOSPITAL ENCOUNTER (OUTPATIENT)
Dept: CARDIAC REHAB | Age: 66
Setting detail: THERAPIES SERIES
Discharge: HOME | End: 2021-02-22
Payer: MEDICAID

## 2021-02-22 PROCEDURE — 93798 PHYS/QHP OP CAR RHAB W/ECG: CPT

## 2021-02-24 ENCOUNTER — HOSPITAL ENCOUNTER (OUTPATIENT)
Dept: CARDIAC REHAB | Age: 66
Setting detail: THERAPIES SERIES
Discharge: HOME | End: 2021-02-24
Payer: MEDICAID

## 2021-02-24 PROCEDURE — 93798 PHYS/QHP OP CAR RHAB W/ECG: CPT

## 2021-02-26 ENCOUNTER — HOSPITAL ENCOUNTER (OUTPATIENT)
Dept: CARDIAC REHAB | Age: 66
Setting detail: THERAPIES SERIES
Discharge: HOME | End: 2021-02-26
Payer: MEDICAID

## 2021-02-26 PROCEDURE — 93798 PHYS/QHP OP CAR RHAB W/ECG: CPT

## 2021-03-01 ENCOUNTER — HOSPITAL ENCOUNTER (OUTPATIENT)
Dept: CARDIAC REHAB | Age: 66
Setting detail: THERAPIES SERIES
Discharge: HOME | End: 2021-03-01
Payer: MEDICAID

## 2021-03-01 PROCEDURE — 93798 PHYS/QHP OP CAR RHAB W/ECG: CPT

## 2021-03-03 ENCOUNTER — HOSPITAL ENCOUNTER (OUTPATIENT)
Dept: CARDIAC REHAB | Age: 66
Setting detail: THERAPIES SERIES
Discharge: HOME | End: 2021-03-03
Payer: MEDICAID

## 2021-03-03 PROCEDURE — 93798 PHYS/QHP OP CAR RHAB W/ECG: CPT

## 2021-03-05 ENCOUNTER — HOSPITAL ENCOUNTER (OUTPATIENT)
Dept: CARDIAC REHAB | Age: 66
Setting detail: THERAPIES SERIES
Discharge: HOME | End: 2021-03-05
Payer: MEDICAID

## 2021-03-05 ENCOUNTER — OFFICE VISIT (OUTPATIENT)
Dept: CARDIOLOGY CLINIC | Age: 66
End: 2021-03-05
Payer: MEDICAID

## 2021-03-05 VITALS
BODY MASS INDEX: 38.68 KG/M2 | RESPIRATION RATE: 21 BRPM | OXYGEN SATURATION: 98 % | HEART RATE: 73 BPM | DIASTOLIC BLOOD PRESSURE: 78 MMHG | SYSTOLIC BLOOD PRESSURE: 118 MMHG | WEIGHT: 255.2 LBS | HEIGHT: 68 IN

## 2021-03-05 DIAGNOSIS — I97.89 POSTOPERATIVE ATRIAL FIBRILLATION (HCC): ICD-10-CM

## 2021-03-05 DIAGNOSIS — I25.10 CORONARY ARTERY DISEASE DUE TO LIPID RICH PLAQUE: Primary | ICD-10-CM

## 2021-03-05 DIAGNOSIS — I48.91 POSTOPERATIVE ATRIAL FIBRILLATION (HCC): ICD-10-CM

## 2021-03-05 DIAGNOSIS — I10 ESSENTIAL HYPERTENSION: ICD-10-CM

## 2021-03-05 DIAGNOSIS — E78.2 MIXED HYPERLIPIDEMIA: ICD-10-CM

## 2021-03-05 DIAGNOSIS — I10 ESSENTIAL HYPERTENSION: Primary | ICD-10-CM

## 2021-03-05 DIAGNOSIS — Z95.1 HISTORY OF CORONARY ARTERY BYPASS GRAFT X 3: ICD-10-CM

## 2021-03-05 DIAGNOSIS — I25.83 CORONARY ARTERY DISEASE DUE TO LIPID RICH PLAQUE: Primary | ICD-10-CM

## 2021-03-05 PROBLEM — E78.5 HLD (HYPERLIPIDEMIA): Status: ACTIVE | Noted: 2021-03-05

## 2021-03-05 PROCEDURE — 99214 OFFICE O/P EST MOD 30 MIN: CPT | Performed by: INTERNAL MEDICINE

## 2021-03-05 RX ORDER — ASPIRIN 325 MG
325 TABLET ORAL DAILY
COMMUNITY

## 2021-03-05 RX ORDER — LISINOPRIL 5 MG/1
5 TABLET ORAL DAILY
Qty: 90 TABLET | Refills: 4 | Status: SHIPPED | OUTPATIENT
Start: 2021-03-05 | End: 2021-04-09 | Stop reason: DRUGHIGH

## 2021-03-05 SDOH — HEALTH STABILITY: MENTAL HEALTH: HOW OFTEN DO YOU HAVE A DRINK CONTAINING ALCOHOL?: NOT ASKED

## 2021-03-05 SDOH — HEALTH STABILITY: MENTAL HEALTH: HOW MANY STANDARD DRINKS CONTAINING ALCOHOL DO YOU HAVE ON A TYPICAL DAY?: NOT ASKED

## 2021-03-05 NOTE — PROGRESS NOTES
[address]  Cardiac Consult     Referring Provider:  Jimena Kaur MD     Chief Complaint   Patient presents with    Coronary Artery Disease     Patient here today for his 2 month follow up he does c/o of sob /c bi lateral hand coldness     Hypertension        History of Present Illness:  72 y.o. male seen in f/u for CAD s/p CABG. He underwent CABG 2020 for multivessel CAD. LV function normal on cath, mildly decreased with inferior HK on ECHO. He had post-op afib treated with short term amiodarone and was placed on coumadin. No obvious or symptomatic recurrence. He did undergo SCOTT clipping with surgery. He is in cardiac rehab with no afib documented as well. He is more active with improving exercise tolerance. He works in an [employer]. He says he lifts boxes all day that can be up to 50 lbs. He also has to move carts that weight up to 1000 lbs. Past Medical History:   has a past medical history of Anxiety, Cervical disc herniation, Coronary artery disease due to lipid rich plaque, HLD (hyperlipidemia), HTN (hypertension), Neck sprain, Obesity (BMI 30-39.9), Post traumatic stress disorder, Rotator cuff (capsule) sprain and strain, Sprain of shoulder, left, and Sprain of shoulder, right. Surgical History:   has a past surgical history that includes Ankle surgery (Right); hernia repair (Right); joint replacement; and Coronary artery bypass graft (N/A, 2020). Social History:  Social History     Tobacco Use    Smoking status: Former Smoker     Packs/day: 0.50     Types: Cigarettes     Quit date: 2020     Years since quittin.7    Smokeless tobacco: Never Used   Substance Use Topics    Alcohol use: Not Currently     Alcohol/week: 0.8 standard drinks     Types: 1 Standard drinks or equivalent per week        Family History:  family history includes Heart Disease in his father. Allergies:  Patient has no known allergies.      Home Medications:  Prior to Visit Medications    Medication Sig Taking? Authorizing Provider   aspirin 325 MG tablet Take 325 mg by mouth daily Yes Historical Provider, MD   metoprolol tartrate (LOPRESSOR) 50 MG tablet Take 50 mg by mouth 2 times daily Yes Historical Provider, MD   amLODIPine (NORVASC) 5 MG tablet Take 1 tablet by mouth daily Yes BAN Diaz CNP   warfarin (COUMADIN) 5 MG tablet Take 1 tablet by mouth See Admin Instructions As directed by physician  Patient taking differently: Take 7.5 mg by mouth See Admin Instructions As directed by physician    Taking 7.5 mg on [place]-Cclylhw-Olbdojdty-Thursday and Friday. Yes Spike Viera MD   atorvastatin (LIPITOR) 80 MG tablet Take 1 tablet by mouth nightly Yes BAN Diaz CNP   Lancets MISC 1 each by Does not apply route 2 times daily Yes Ronnell Moreno MD   blood glucose monitor strips Test 2 times a day & as needed for symptoms of irregular blood glucose. Dispense sufficient amount for indicated testing frequency plus additional to accommodate PRN testing needs. Yes Ronnell Moreno MD   Alcohol Swabs PADS 1 Units by Does not apply route 2 times daily Yes Ronnell Moreno MD   acetaminophen (TYLENOL) 500 MG tablet Take 2 tablets by mouth daily  Patient taking differently: Take 1,000 mg by mouth 2 tabs bid Yes BAN Bolaños CNP   warfarin (COUMADIN) 5 MG tablet Take 2.5 mg daily , dosage depends on INR levels Yes BAN Bolaños CNP   metFORMIN (GLUCOPHAGE) 500 MG tablet Take 1 tablet by mouth 2 times daily (with meals)  Patient taking differently: Take 500 mg by mouth  Yes Richa RamirezgrBAN ag CNP   tamsulosin (FLOMAX) 0.4 MG capsule Take 0.4 mg by mouth daily Yes Historical Provider, MD       [x] Medications and dosages reviewed.     ROS:  [x]Full ROS obtained and negative except as mentioned in HPI      Physical Examination:    Vitals:    03/05/21 0900   BP: 118/78   Site: Left Upper Arm   Position: Sitting   Cuff Size: Large Adult   Pulse: 73   Resp: rehab  Discussed return to work with pt and Dr. Tania Damian. With his heavy lifting we recommend waiting another month before returning to work    Ischemic Cardiomyopathy:  Mild LV dysfunction with inferior HK on ECHO  On beta blocker  Add lisinopril 5  Stop norvasc to avoid hypotension  Chem on f/u    HTN:  /78 (Site: Left Upper Arm, Position: Sitting, Cuff Size: Large Adult)   Pulse 73   Resp 21   Ht 5' 8\" (1.727 m)   Wt 255 lb 3.2 oz (115.8 kg)   SpO2 98%   BMI 38.80 kg/m²   Controlled. Stop norvasc and add lisinopril as above    AFIB:  Post op afib  SCOTT closure with surgery  Remains on coumadin  Discussed with Dr. Nazario Stafford place MCOT  If no afib, stop coumadin. If he has afib will need to continue. If minimal may check MALATHI for SCOTT closure.     Plan:  Stop norvasc  Start lisinopril 5  MCOT  F/u 5 weeks to review with chem-7 and discuss return to work    Thank you for allowing me to participate in the care of this individual.      Anne Marie Castorena M.D., [facility]

## 2021-03-05 NOTE — PATIENT INSTRUCTIONS
Continue current medications  Heart monitor for 30 days   Follow up in 5 weeks    Dr Riddhi Santo will talk with Dr Ronni Garcia

## 2021-03-08 ENCOUNTER — HOSPITAL ENCOUNTER (OUTPATIENT)
Dept: CARDIAC REHAB | Age: 66
Setting detail: THERAPIES SERIES
Discharge: HOME | End: 2021-03-08
Payer: MEDICAID

## 2021-03-08 PROCEDURE — 93798 PHYS/QHP OP CAR RHAB W/ECG: CPT

## 2021-03-10 ENCOUNTER — HOSPITAL ENCOUNTER (OUTPATIENT)
Dept: CARDIAC REHAB | Age: 66
Setting detail: THERAPIES SERIES
Discharge: HOME | End: 2021-03-10
Payer: MEDICAID

## 2021-03-10 PROCEDURE — 93798 PHYS/QHP OP CAR RHAB W/ECG: CPT

## 2021-03-12 ENCOUNTER — HOSPITAL ENCOUNTER (OUTPATIENT)
Dept: CARDIAC REHAB | Age: 66
Setting detail: THERAPIES SERIES
Discharge: HOME | End: 2021-03-12
Payer: MEDICAID

## 2021-03-12 PROCEDURE — 93798 PHYS/QHP OP CAR RHAB W/ECG: CPT

## 2021-03-15 ENCOUNTER — HOSPITAL ENCOUNTER (OUTPATIENT)
Dept: CARDIAC REHAB | Age: 66
Setting detail: THERAPIES SERIES
Discharge: HOME | End: 2021-03-15
Payer: MEDICAID

## 2021-03-15 PROCEDURE — 93798 PHYS/QHP OP CAR RHAB W/ECG: CPT

## 2021-03-17 ENCOUNTER — HOSPITAL ENCOUNTER (OUTPATIENT)
Dept: CARDIAC REHAB | Age: 66
Setting detail: THERAPIES SERIES
Discharge: HOME | End: 2021-03-17
Payer: MEDICAID

## 2021-03-17 PROCEDURE — 93798 PHYS/QHP OP CAR RHAB W/ECG: CPT

## 2021-03-19 ENCOUNTER — HOSPITAL ENCOUNTER (OUTPATIENT)
Dept: CARDIAC REHAB | Age: 66
Setting detail: THERAPIES SERIES
Discharge: HOME | End: 2021-03-19
Payer: MEDICAID

## 2021-03-19 PROCEDURE — 93798 PHYS/QHP OP CAR RHAB W/ECG: CPT

## 2021-03-22 ENCOUNTER — HOSPITAL ENCOUNTER (OUTPATIENT)
Dept: CARDIAC REHAB | Age: 66
Setting detail: THERAPIES SERIES
Discharge: HOME | End: 2021-03-22
Payer: MEDICAID

## 2021-03-22 PROCEDURE — 93798 PHYS/QHP OP CAR RHAB W/ECG: CPT

## 2021-03-24 ENCOUNTER — HOSPITAL ENCOUNTER (OUTPATIENT)
Dept: CARDIAC REHAB | Age: 66
Setting detail: THERAPIES SERIES
Discharge: HOME | End: 2021-03-24
Payer: MEDICAID

## 2021-03-24 ENCOUNTER — HOSPITAL ENCOUNTER (OUTPATIENT)
Age: 66
Discharge: HOME | End: 2021-03-24
Payer: MEDICAID

## 2021-03-24 DIAGNOSIS — I10 ESSENTIAL HYPERTENSION: ICD-10-CM

## 2021-03-24 LAB
ANION GAP SERPL CALCULATED.3IONS-SCNC: 9 MMOL/L (ref 3–16)
BUN BLDV-MCNC: 27 MG/DL (ref 7–20)
CALCIUM SERPL-MCNC: 9.6 MG/DL (ref 8.3–10.6)
CHLORIDE BLD-SCNC: 110 MMOL/L (ref 99–110)
CO2: 26 MMOL/L (ref 21–32)
CREAT SERPL-MCNC: 1 MG/DL (ref 0.8–1.3)
GFR AFRICAN AMERICAN: >60
GFR NON-AFRICAN AMERICAN: >60
GLUCOSE BLD-MCNC: 112 MG/DL (ref 70–99)
POTASSIUM SERPL-SCNC: 4.8 MMOL/L (ref 3.5–5.1)
SODIUM BLD-SCNC: 145 MMOL/L (ref 136–145)

## 2021-03-24 PROCEDURE — 93798 PHYS/QHP OP CAR RHAB W/ECG: CPT

## 2021-03-24 PROCEDURE — 80048 BASIC METABOLIC PNL TOTAL CA: CPT

## 2021-03-24 PROCEDURE — 36415 COLL VENOUS BLD VENIPUNCTURE: CPT

## 2021-03-25 ENCOUNTER — TELEPHONE (OUTPATIENT)
Dept: CARDIOLOGY CLINIC | Age: 66
End: 2021-03-25

## 2021-03-25 NOTE — TELEPHONE ENCOUNTER
Dr Venita Cervantes reviewed lab results. Labs look good. FU as planned. LMOM for patient to call office back. Could not leave detailed message due to hipaa.

## 2021-03-26 ENCOUNTER — TELEPHONE (OUTPATIENT)
Dept: CARDIAC REHAB | Age: 66
End: 2021-03-26

## 2021-03-29 ENCOUNTER — HOSPITAL ENCOUNTER (OUTPATIENT)
Dept: CARDIAC REHAB | Age: 66
Setting detail: THERAPIES SERIES
Discharge: HOME | End: 2021-03-29
Payer: MEDICAID

## 2021-03-29 PROCEDURE — 93798 PHYS/QHP OP CAR RHAB W/ECG: CPT

## 2021-03-31 ENCOUNTER — HOSPITAL ENCOUNTER (OUTPATIENT)
Dept: CARDIAC REHAB | Age: 66
Setting detail: THERAPIES SERIES
Discharge: HOME | End: 2021-03-31
Payer: MEDICAID

## 2021-03-31 PROCEDURE — 93798 PHYS/QHP OP CAR RHAB W/ECG: CPT

## 2021-04-02 ENCOUNTER — HOSPITAL ENCOUNTER (OUTPATIENT)
Dept: CARDIAC REHAB | Age: 66
Setting detail: THERAPIES SERIES
Discharge: HOME | End: 2021-04-02
Payer: MEDICAID

## 2021-04-02 PROCEDURE — 93798 PHYS/QHP OP CAR RHAB W/ECG: CPT

## 2021-04-05 ENCOUNTER — TELEPHONE (OUTPATIENT)
Dept: CARDIOLOGY CLINIC | Age: 66
End: 2021-04-05

## 2021-04-05 ENCOUNTER — HOSPITAL ENCOUNTER (OUTPATIENT)
Dept: CARDIAC REHAB | Age: 66
Setting detail: THERAPIES SERIES
Discharge: HOME | End: 2021-04-05
Payer: MEDICAID

## 2021-04-05 PROCEDURE — 93798 PHYS/QHP OP CAR RHAB W/ECG: CPT

## 2021-04-05 NOTE — TELEPHONE ENCOUNTER
Pt calling asking to speak with the RN about getting his covid vaccine tomorrow and wants to know if [facility] approves  of it? pls call to advise thank you

## 2021-04-07 ENCOUNTER — HOSPITAL ENCOUNTER (OUTPATIENT)
Dept: CARDIAC REHAB | Age: 66
Setting detail: THERAPIES SERIES
Discharge: HOME | End: 2021-04-07
Payer: MEDICAID

## 2021-04-07 PROCEDURE — 93798 PHYS/QHP OP CAR RHAB W/ECG: CPT

## 2021-04-07 NOTE — TELEPHONE ENCOUNTER
I've attempted to call a few times -- unable to leave a message per HIPPA. I want to see if he got the vaccine and if not, inform him that Dr. Susan Llanes advises him to get it. Please continue to try and reach him.

## 2021-04-09 ENCOUNTER — HOSPITAL ENCOUNTER (OUTPATIENT)
Dept: CARDIAC REHAB | Age: 66
Setting detail: THERAPIES SERIES
End: 2021-04-09
Payer: MEDICAID

## 2021-04-09 ENCOUNTER — OFFICE VISIT (OUTPATIENT)
Dept: CARDIOLOGY CLINIC | Age: 66
End: 2021-04-09
Payer: MEDICAID

## 2021-04-09 ENCOUNTER — TELEPHONE (OUTPATIENT)
Dept: CARDIAC REHAB | Age: 66
End: 2021-04-09

## 2021-04-09 ENCOUNTER — TELEPHONE (OUTPATIENT)
Dept: CARDIOLOGY CLINIC | Age: 66
End: 2021-04-09

## 2021-04-09 VITALS
OXYGEN SATURATION: 98 % | HEIGHT: 68 IN | BODY MASS INDEX: 38.54 KG/M2 | DIASTOLIC BLOOD PRESSURE: 98 MMHG | RESPIRATION RATE: 18 BRPM | WEIGHT: 254.3 LBS | SYSTOLIC BLOOD PRESSURE: 150 MMHG | HEART RATE: 74 BPM

## 2021-04-09 DIAGNOSIS — R53.83 FATIGUE, UNSPECIFIED TYPE: ICD-10-CM

## 2021-04-09 DIAGNOSIS — I25.83 CORONARY ARTERY DISEASE DUE TO LIPID RICH PLAQUE: ICD-10-CM

## 2021-04-09 DIAGNOSIS — I50.22 CHRONIC SYSTOLIC HEART FAILURE (HCC): ICD-10-CM

## 2021-04-09 DIAGNOSIS — I48.91 ATRIAL FIBRILLATION, UNSPECIFIED TYPE (HCC): ICD-10-CM

## 2021-04-09 DIAGNOSIS — E78.49 OTHER HYPERLIPIDEMIA: ICD-10-CM

## 2021-04-09 DIAGNOSIS — R06.83 SNORES: Primary | ICD-10-CM

## 2021-04-09 DIAGNOSIS — I25.10 CORONARY ARTERY DISEASE DUE TO LIPID RICH PLAQUE: ICD-10-CM

## 2021-04-09 DIAGNOSIS — I10 ESSENTIAL HYPERTENSION: ICD-10-CM

## 2021-04-09 DIAGNOSIS — I25.10 CORONARY ARTERY DISEASE INVOLVING NATIVE CORONARY ARTERY OF NATIVE HEART WITHOUT ANGINA PECTORIS: ICD-10-CM

## 2021-04-09 PROCEDURE — 99214 OFFICE O/P EST MOD 30 MIN: CPT | Performed by: INTERNAL MEDICINE

## 2021-04-09 RX ORDER — LISINOPRIL 20 MG/1
TABLET ORAL
Qty: 90 TABLET | Refills: 3 | Status: SHIPPED | OUTPATIENT
Start: 2021-04-09 | End: 2022-02-24 | Stop reason: SDUPTHER

## 2021-04-09 NOTE — PATIENT INSTRUCTIONS
Take Lisinopril 20mg> 1/2 tablet every day x 2 weeks then take a whole tablet every day. Lab work just before next visit in about 6 weeks.

## 2021-04-09 NOTE — PROGRESS NOTES
[facility], [place]  Cardiac Consult     Referring Provider:  Kenisha Madrid MD     No chief complaint on file. History of Present Illness:  72 y.o. male seen in f/u for CAD s/p CABG. He underwent CABG 2020 for multivessel CAD. LV function normal on cath, mildly decreased with inferior HK on ECHO. He had post-op afib treated with short term amiodarone and was placed on coumadin. No obvious or symptomatic recurrence. He did undergo SCOTT clipping with surgery. He is in cardiac rehab with no afib documented as well. He is more active with improving exercise tolerance. He works in an [employer]. He says he lifts boxes all day that can be up to 50 lbs. He also has to move carts that weight up to 1000 lbs. He was seen 1 month ago and MCOT ordered to evaluate for PAF. No PAF seen. He is doing well, just not much energy. He does snore. Past Medical History:   has a past medical history of Anxiety, Cervical disc herniation, Coronary artery disease due to lipid rich plaque, HLD (hyperlipidemia), HTN (hypertension), Neck sprain, Obesity (BMI 30-39.9), Post traumatic stress disorder, Rotator cuff (capsule) sprain and strain, Sprain of shoulder, left, and Sprain of shoulder, right. Surgical History:   has a past surgical history that includes Ankle surgery (Right); hernia repair (Right); joint replacement; and Coronary artery bypass graft (N/A, 2020). Social History:  Social History     Tobacco Use    Smoking status: Former Smoker     Packs/day: 0.50     Types: Cigarettes     Quit date: 2020     Years since quittin.8    Smokeless tobacco: Never Used   Substance Use Topics    Alcohol use: Not Currently     Alcohol/week: 0.8 standard drinks     Types: 1 Standard drinks or equivalent per week        Family History:  family history includes Heart Disease in his father. Allergies:  Patient has no known allergies.      Home Medications:  Prior to Visit Medications    Medication Sig Taking? Authorizing Provider   lisinopril (PRINIVIL;ZESTRIL) 20 MG tablet Take 10mg every day x 2 weeks, then increase to 20mg every day. Yes Rupinder Bui MD   aspirin 325 MG tablet Take 325 mg by mouth daily Yes Historical Provider, MD   metoprolol tartrate (LOPRESSOR) 50 MG tablet Take 50 mg by mouth 2 times daily Yes Historical Provider, MD   atorvastatin (LIPITOR) 80 MG tablet Take 1 tablet by mouth nightly Yes BAN Tolentino CNP   Lancets MISC 1 each by Does not apply route 2 times daily Yes David Mraie MD   blood glucose monitor strips Test 2 times a day & as needed for symptoms of irregular blood glucose. Dispense sufficient amount for indicated testing frequency plus additional to accommodate PRN testing needs. Yes David Marie MD   Alcohol Swabs PADS 1 Units by Does not apply route 2 times daily Yes David Marie MD   acetaminophen (TYLENOL) 500 MG tablet Take 2 tablets by mouth daily  Patient taking differently: Take 1,000 mg by mouth 2 tabs bid Yes BAN Negron CNP   metFORMIN (GLUCOPHAGE) 500 MG tablet Take 1 tablet by mouth 2 times daily (with meals)  Patient taking differently: Take 500 mg by mouth  Yes BAN Negron CNP   tamsulosin (FLOMAX) 0.4 MG capsule Take 0.4 mg by mouth daily Yes Historical Provider, MD       [x] Medications and dosages reviewed.     ROS:  [x]Full ROS obtained and negative except as mentioned in HPI      Physical Examination:    Vitals:    04/09/21 0930   BP: (!) 150/98   Site: Right Upper Arm   Position: Sitting   Cuff Size: Large Adult   Pulse: 74   Resp: 18   SpO2: 98%   Weight: 254 lb 4.8 oz (115.3 kg)   Height: 5' 8\" (1.727 m)        · GENERAL: Well developed, well nourished, No acute distress  · NEUROLOGICAL: Alert and oriented  · PSYCH: Calm affect  · SKIN: Warm and dry, No visible rash,   · EYES: Pupils equal and round, Sclera non-icteric,   · HENT:  External ears and nose unremarkable, mucus membranes moist  · MUSCULOSKELETAL: Normal cephalic, neck supple  · CAROTID: Normal upstroke, no bruits  · CARDIAC: JVP normal, Normal PMI, regular rate and rhythm, normal S1S2, no murmur, rub, or gallop  · RESPIRATORY: Normal respiratory effort, clear to auscultation bilaterally  · EXTREMITIES: No edema  · GASTROINTESTINAL: normal bowel sounds, soft, non-tender, No hepatomegaly     CABG 11/16/2020    Procedure(s):  MALATHI. TCPB. RT EVH. CABG x 3, LIMA TO DISTAL LAD; SVG TO OM1; SVG TO RPDA. SCOTT CLIP WITH 45mm  ATRICLIP. PLACEMENT OF TEMPORARY VENTRICULAR PACING WIRE. DOPPLER FLOW VERIFICATION OF GRAFTS. BILATERAL INTERCOSTAL NERVE BLOCK WITH EXPAREL & MARCAINE.      ECHO 11/12/2020   Summary   -Mildly reduced global systolic function with an ejection fraction estimated   at 45%. -Inferoseptal, inferior, and inferolateral hypokinesis noted. -There is trivial tricuspid regurgitation with a RVSP estimation of 13 mmHg. -Grade I diastolic dysfunction with normal LV filling pressures. Avg.   E/e'=8.4    CARDIAC CATH 11/2020  Anatomy:   LM-30% distal  LAD-70% mid, 100% distal with left to left collaterals  D1-80% proximal  Cx-100% proximal with left to left collaterals  OM1-100% proximal with left to left collaterals  RCA-100% proximal with right to right and left-to-right collaterals  LVEF-55%     Impression:  1. Severe three-vessel CAD with extensive collateralization. 2.  Preserved LV systolic function with LVEF of 55%. LABS 12/8/2020  Creat=0.9, K=4.7    MCOT:  Tracings reviewed  Sinus, 1% PVC's. No afib  Two 2.8 pauses. Likely during sleep      Assessment:     CAD:  Stable. Multivessel CAD s/p CABG 11/2020  Finishing cardiac rehab  Doing well. OK to return to work. Papers filled out    Ischemic Cardiomyopathy:  Mild LV dysfunction with inferior HK on ECHO  On beta blocker  Added lisinopril 5+. BP high.  Increase to 10 mg x 2 weeks then 20 mg    Chem on f/u    HTN:  BP (!) 150/98 (Site: Right Upper Arm, Position: Sitting, Cuff Size: Large Adult)

## 2021-04-09 NOTE — TELEPHONE ENCOUNTER
PT seen today, 4/9, and needs a follow up with [facility] in 6 weeks. There are no appointments available at [facility] or [place]. Is he able to be worked in at either office? Pls call to advise. Thank you.

## 2021-04-12 ENCOUNTER — HOSPITAL ENCOUNTER (OUTPATIENT)
Dept: CARDIAC REHAB | Age: 66
Setting detail: THERAPIES SERIES
Discharge: HOME | End: 2021-04-12
Payer: MEDICAID

## 2021-04-12 DIAGNOSIS — I48.91 ATRIAL FIBRILLATION, UNSPECIFIED TYPE (HCC): ICD-10-CM

## 2021-04-12 DIAGNOSIS — I48.91 POSTOPERATIVE ATRIAL FIBRILLATION (HCC): ICD-10-CM

## 2021-04-12 DIAGNOSIS — I97.89 POSTOPERATIVE ATRIAL FIBRILLATION (HCC): ICD-10-CM

## 2021-04-12 PROCEDURE — 93228 REMOTE 30 DAY ECG REV/REPORT: CPT | Performed by: INTERNAL MEDICINE

## 2021-04-12 PROCEDURE — 93798 PHYS/QHP OP CAR RHAB W/ECG: CPT

## 2021-04-12 RX ORDER — METOPROLOL TARTRATE 50 MG/1
50 TABLET, FILM COATED ORAL 2 TIMES DAILY
Qty: 180 TABLET | Refills: 4 | Status: SHIPPED | OUTPATIENT
Start: 2021-04-12 | End: 2022-02-24 | Stop reason: SDUPTHER

## 2021-04-12 NOTE — TELEPHONE ENCOUNTER
Medication Refill    Medication needing refilled:  metoprolol tartrate (LOPRESSOR)      Dosage of the medication: 50 mg    How are you taking this medication (QD, BID, TID, QID, PRN): BID     30 or 90 day supply called in: 90 day supply    When will you run out of your medication:    Which Pharmacy are we sending the medication to?:[facility] [address] [place] [address], [facility] [address] [phone] [address] [address]   Phone:  [phone]  Fax:  [phone]

## 2021-04-14 ENCOUNTER — HOSPITAL ENCOUNTER (OUTPATIENT)
Dept: CARDIAC REHAB | Age: 66
Setting detail: THERAPIES SERIES
Discharge: HOME | End: 2021-04-14
Payer: MEDICAID

## 2021-04-14 PROCEDURE — 93798 PHYS/QHP OP CAR RHAB W/ECG: CPT

## 2021-04-14 NOTE — TELEPHONE ENCOUNTER
Pt has been scheduled with shelleyk at [facility] on 6/9/21 at 1145 am will have labs prior

## 2021-04-16 ENCOUNTER — HOSPITAL ENCOUNTER (OUTPATIENT)
Dept: CARDIAC REHAB | Age: 66
Setting detail: THERAPIES SERIES
Discharge: HOME | End: 2021-04-16
Payer: MEDICAID

## 2021-04-16 PROCEDURE — 93798 PHYS/QHP OP CAR RHAB W/ECG: CPT

## 2021-04-19 ENCOUNTER — HOSPITAL ENCOUNTER (OUTPATIENT)
Dept: CARDIAC REHAB | Age: 66
Setting detail: THERAPIES SERIES
Discharge: HOME | End: 2021-04-19
Payer: MEDICAID

## 2021-04-19 PROCEDURE — 93798 PHYS/QHP OP CAR RHAB W/ECG: CPT

## 2021-04-21 ENCOUNTER — TELEPHONE (OUTPATIENT)
Dept: CARDIAC REHAB | Age: 66
End: 2021-04-21

## 2021-04-21 ENCOUNTER — HOSPITAL ENCOUNTER (OUTPATIENT)
Dept: CARDIAC REHAB | Age: 66
Setting detail: THERAPIES SERIES
End: 2021-04-21
Payer: MEDICAID

## 2021-04-23 ENCOUNTER — HOSPITAL ENCOUNTER (OUTPATIENT)
Dept: CARDIAC REHAB | Age: 66
Setting detail: THERAPIES SERIES
Discharge: HOME | End: 2021-04-23
Payer: MEDICAID

## 2021-04-23 PROCEDURE — 93798 PHYS/QHP OP CAR RHAB W/ECG: CPT

## 2021-06-02 ENCOUNTER — HOSPITAL ENCOUNTER (OUTPATIENT)
Age: 66
Discharge: HOME | End: 2021-06-02
Payer: MEDICAID

## 2021-06-02 DIAGNOSIS — I25.10 CORONARY ARTERY DISEASE DUE TO LIPID RICH PLAQUE: ICD-10-CM

## 2021-06-02 DIAGNOSIS — E78.49 OTHER HYPERLIPIDEMIA: ICD-10-CM

## 2021-06-02 DIAGNOSIS — I25.83 CORONARY ARTERY DISEASE DUE TO LIPID RICH PLAQUE: ICD-10-CM

## 2021-06-02 LAB
ALT SERPL-CCNC: 13 U/L (ref 10–40)
ANION GAP SERPL CALCULATED.3IONS-SCNC: 9 MMOL/L (ref 3–16)
AST SERPL-CCNC: 19 U/L (ref 15–37)
BASOPHILS ABSOLUTE: 0 K/UL (ref 0–0.2)
BASOPHILS RELATIVE PERCENT: 0.8 %
BUN BLDV-MCNC: 23 MG/DL (ref 7–20)
CALCIUM SERPL-MCNC: 9.4 MG/DL (ref 8.3–10.6)
CHLORIDE BLD-SCNC: 109 MMOL/L (ref 99–110)
CHOLESTEROL, TOTAL: 124 MG/DL (ref 0–199)
CO2: 25 MMOL/L (ref 21–32)
CREAT SERPL-MCNC: 1 MG/DL (ref 0.8–1.3)
CREATININE URINE: 189.8 MG/DL (ref 39–259)
EOSINOPHILS ABSOLUTE: 0.2 K/UL (ref 0–0.6)
EOSINOPHILS RELATIVE PERCENT: 2.9 %
ESTIMATED AVERAGE GLUCOSE: 125.5 MG/DL
GFR AFRICAN AMERICAN: >60
GFR NON-AFRICAN AMERICAN: >60
GLUCOSE BLD-MCNC: 127 MG/DL (ref 70–99)
HBA1C MFR BLD: 6 %
HCT VFR BLD CALC: 40.6 % (ref 40.5–52.5)
HDLC SERPL-MCNC: 41 MG/DL (ref 40–60)
HEMOGLOBIN: 14 G/DL (ref 13.5–17.5)
LDL CHOLESTEROL CALCULATED: 57 MG/DL
LYMPHOCYTES ABSOLUTE: 1.6 K/UL (ref 1–5.1)
LYMPHOCYTES RELATIVE PERCENT: 29.7 %
MCH RBC QN AUTO: 32.7 PG (ref 26–34)
MCHC RBC AUTO-ENTMCNC: 34.4 G/DL (ref 31–36)
MCV RBC AUTO: 95 FL (ref 80–100)
MICROALBUMIN UR-MCNC: 1.3 MG/DL
MICROALBUMIN/CREAT UR-RTO: 6.8 MG/G (ref 0–30)
MONOCYTES ABSOLUTE: 0.6 K/UL (ref 0–1.3)
MONOCYTES RELATIVE PERCENT: 10.3 %
NEUTROPHILS ABSOLUTE: 3.1 K/UL (ref 1.7–7.7)
NEUTROPHILS RELATIVE PERCENT: 56.3 %
PDW BLD-RTO: 13.9 % (ref 12.4–15.4)
PLATELET # BLD: 216 K/UL (ref 135–450)
PMV BLD AUTO: 9.4 FL (ref 5–10.5)
POTASSIUM SERPL-SCNC: 4.9 MMOL/L (ref 3.5–5.1)
RBC # BLD: 4.28 M/UL (ref 4.2–5.9)
SODIUM BLD-SCNC: 143 MMOL/L (ref 136–145)
TRIGL SERPL-MCNC: 130 MG/DL (ref 0–150)
VITAMIN D 25-HYDROXY: 66.7 NG/ML
VLDLC SERPL CALC-MCNC: 26 MG/DL
WBC # BLD: 5.4 K/UL (ref 4–11)

## 2021-06-02 PROCEDURE — 80048 BASIC METABOLIC PNL TOTAL CA: CPT

## 2021-06-02 PROCEDURE — 85025 COMPLETE CBC W/AUTO DIFF WBC: CPT

## 2021-06-02 PROCEDURE — 80061 LIPID PANEL: CPT

## 2021-06-02 PROCEDURE — 82570 ASSAY OF URINE CREATININE: CPT

## 2021-06-02 PROCEDURE — 84450 TRANSFERASE (AST) (SGOT): CPT

## 2021-06-02 PROCEDURE — 84460 ALANINE AMINO (ALT) (SGPT): CPT

## 2021-06-02 PROCEDURE — 83036 HEMOGLOBIN GLYCOSYLATED A1C: CPT

## 2021-06-02 PROCEDURE — 82043 UR ALBUMIN QUANTITATIVE: CPT

## 2021-06-02 PROCEDURE — 82306 VITAMIN D 25 HYDROXY: CPT

## 2021-06-03 ENCOUNTER — TELEPHONE (OUTPATIENT)
Dept: CARDIOLOGY CLINIC | Age: 66
End: 2021-06-03

## 2021-06-03 NOTE — TELEPHONE ENCOUNTER
----- Message from Ino Garcia MD sent at 6/3/2021 12:30 PM EDT -----  Tell pt. their labs are good. F/u in clinic as planned.       [facility]

## 2021-06-03 NOTE — TELEPHONE ENCOUNTER
Call placed to patient who was not available for message below. lmor to return the call back to the office.

## 2021-06-09 ENCOUNTER — OFFICE VISIT (OUTPATIENT)
Dept: CARDIOLOGY CLINIC | Age: 66
End: 2021-06-09
Payer: MEDICAID

## 2021-06-09 VITALS
HEART RATE: 66 BPM | BODY MASS INDEX: 40.02 KG/M2 | DIASTOLIC BLOOD PRESSURE: 98 MMHG | SYSTOLIC BLOOD PRESSURE: 170 MMHG | OXYGEN SATURATION: 98 % | WEIGHT: 255 LBS | HEIGHT: 67 IN

## 2021-06-09 DIAGNOSIS — I25.83 CORONARY ARTERY DISEASE DUE TO LIPID RICH PLAQUE: Primary | ICD-10-CM

## 2021-06-09 DIAGNOSIS — I48.91 ATRIAL FIBRILLATION, UNSPECIFIED TYPE (HCC): ICD-10-CM

## 2021-06-09 DIAGNOSIS — I25.10 CORONARY ARTERY DISEASE DUE TO LIPID RICH PLAQUE: Primary | ICD-10-CM

## 2021-06-09 DIAGNOSIS — I10 ESSENTIAL HYPERTENSION: ICD-10-CM

## 2021-06-09 DIAGNOSIS — I25.5 ISCHEMIC CARDIOMYOPATHY: ICD-10-CM

## 2021-06-09 PROCEDURE — 99214 OFFICE O/P EST MOD 30 MIN: CPT | Performed by: INTERNAL MEDICINE

## 2021-06-09 RX ORDER — ACETAMINOPHEN AND CODEINE PHOSPHATE 60; 300 MG/1; MG/1
1 TABLET ORAL EVERY 4 HOURS PRN
COMMUNITY
End: 2022-11-02

## 2021-06-09 RX ORDER — HYDROCHLOROTHIAZIDE 25 MG/1
25 TABLET ORAL EVERY MORNING
Qty: 90 TABLET | Refills: 4 | Status: SHIPPED | OUTPATIENT
Start: 2021-06-09 | End: 2022-02-24 | Stop reason: SDUPTHER

## 2021-06-09 NOTE — PROGRESS NOTES
[address]  Cardiac Consult     Referring Provider:  Justin Christianson MD     Chief Complaint   Patient presents with    Coronary Artery Disease        History of Present Illness:  72 y.o. male seen in f/u for CAD s/p CABG. He underwent CABG 2020 for multivessel CAD. LV function normal on cath, mildly decreased with inferior HK on ECHO. He had post-op afib treated with short term amiodarone and was placed on coumadin. No obvious or symptomatic recurrence. He did undergo SCOTT clipping with surgery. He is in cardiac rehab with no afib documented as well. He is more active with improving exercise tolerance. He works in an [employer]. He says he lifts boxes all day that can be up to 50 lbs. He also has to move carts that weight up to 1000 lbs. MCOT ordered to evaluate for PAF. No PAF seen. He is back to work. 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 Still with so shortness or breath and chest is sore. Feels like he is slowly getting stronger. Past Medical History:   has a past medical history of Anxiety, Atrial fibrillation ([address] Cervical disc herniation, Coronary artery disease due to lipid rich plaque, HLD (hyperlipidemia), HTN (hypertension), Neck sprain, Obesity (BMI 30-39.9), Post traumatic stress disorder, Rotator cuff (capsule) sprain and strain, Sprain of shoulder, left, and Sprain of shoulder, right. Surgical History:   has a past surgical history that includes Ankle surgery (Right); hernia repair (Right); joint replacement; and Coronary artery bypass graft (N/A, 2020).      Social History:  Social History     Tobacco Use    Smoking status: Former Smoker     Packs/day: 0.50     Types: Cigarettes     Quit date: 2020     Years since quittin.9    Smokeless tobacco: Never Used   Substance Use Topics    Alcohol use: Not Currently     Alcohol/week: 0.8 standard drinks     Types: 1 Standard drinks or equivalent per week        Family History:  family history includes Heart Disease in his father. Allergies:  Patient has no known allergies. Home Medications:  Prior to Visit Medications    Medication Sig Taking? Authorizing Provider   acetaminophen-codeine (TYLENOL #4) 300-60 MG per tablet Take 1 tablet by mouth every 4 hours as needed for Pain. Yes Historical Provider, MD   metoprolol tartrate (LOPRESSOR) 50 MG tablet Take 1 tablet by mouth 2 times daily Yes Derick Shelley MD   lisinopril (PRINIVIL;ZESTRIL) 20 MG tablet Take 10mg every day x 2 weeks, then increase to 20mg every day. Patient taking differently: Take 20 mg by mouth daily  Yes Derick Shelley MD   aspirin 325 MG tablet Take 325 mg by mouth daily Yes Historical Provider, MD   atorvastatin (LIPITOR) 80 MG tablet Take 1 tablet by mouth nightly Yes BAN Barr CNP   acetaminophen (TYLENOL) 500 MG tablet Take 2 tablets by mouth daily  Patient taking differently: Take 1,000 mg by mouth 2 tabs bid Yes BAN Ac CNP   metFORMIN (GLUCOPHAGE) 500 MG tablet Take 1 tablet by mouth 2 times daily (with meals) Yes BAN Ac CNP   tamsulosin (FLOMAX) 0.4 MG capsule Take 0.4 mg by mouth daily Yes Historical Provider, MD   Lancets MISC 1 each by Does not apply route 2 times daily  New Garcia MD   blood glucose monitor strips Test 2 times a day & as needed for symptoms of irregular blood glucose. Dispense sufficient amount for indicated testing frequency plus additional to accommodate PRN testing needs. New Garcia MD   Alcohol Swabs PADS 1 Units by Does not apply route 2 times daily  New Garcia MD       [x] Medications and dosages reviewed.     ROS:  [x]Full ROS obtained and negative except as mentioned in HPI      Physical Examination:    Vitals:    06/09/21 1137 06/09/21 1140   BP: (!) 170/102 (!) 170/98   Pulse: 66    SpO2: 98%    Weight: 255 lb (115.7 kg)    Height: 5' 7\" (1.702 m)         · GENERAL: Well developed, well nourished, No acute distress  · NEUROLOGICAL: Alert and oriented  · PSYCH: Calm affect  · SKIN: Warm and dry, No visible rash,   · EYES: Pupils equal and round, Sclera non-icteric,   · HENT:  External ears and nose unremarkable, mucus membranes moist  · MUSCULOSKELETAL: Normal cephalic, neck supple  · CAROTID: Normal upstroke, no bruits  · CARDIAC: JVP normal, Normal PMI, regular rate and rhythm, normal S1S2, no murmur, rub, or gallop  · RESPIRATORY: Normal respiratory effort, clear to auscultation bilaterally  · EXTREMITIES: No edema  · GASTROINTESTINAL: normal bowel sounds, soft, non-tender, No hepatomegaly     CABG 11/16/2020    Procedure(s):  MALATHI. TCPB. RT EVH. CABG x 3, LIMA TO DISTAL LAD; SVG TO OM1; SVG TO RPDA. SCOTT CLIP WITH 45mm  ATRICLIP. PLACEMENT OF TEMPORARY VENTRICULAR PACING WIRE. DOPPLER FLOW VERIFICATION OF GRAFTS. BILATERAL INTERCOSTAL NERVE BLOCK WITH EXPAREL & MARCAINE.      ECHO 11/12/2020   Summary   -Mildly reduced global systolic function with an ejection fraction estimated   at 45%. -Inferoseptal, inferior, and inferolateral hypokinesis noted. -There is trivial tricuspid regurgitation with a RVSP estimation of 13 mmHg. -Grade I diastolic dysfunction with normal LV filling pressures. Avg.   E/e'=8.4    CARDIAC CATH 11/2020  Anatomy:   LM-30% distal  LAD-70% mid, 100% distal with left to left collaterals  D1-80% proximal  Cx-100% proximal with left to left collaterals  OM1-100% proximal with left to left collaterals  RCA-100% proximal with right to right and left-to-right collaterals  LVEF-55%     Impression:  1. Severe three-vessel CAD with extensive collateralization. 2.  Preserved LV systolic function with LVEF of 55%. LABS 12/8/2020  Creat=0.9, K=4.7    MCOT:  Tracings reviewed  Sinus, 1% PVC's. No afib  Two 2.8 pauses. Likely during sleep      Assessment:     CAD:  Stable. Multivessel CAD s/p CABG 11/2020  Stable.  Medical therapy    Ischemic Cardiomyopathy:  Mild LV dysfunction with inferior HK on ECHO  On beta blockerand lisinopril

## 2021-07-06 RX ORDER — ATORVASTATIN CALCIUM 80 MG/1
80 TABLET, FILM COATED ORAL NIGHTLY
Qty: 90 TABLET | Refills: 4 | Status: SHIPPED | OUTPATIENT
Start: 2021-07-06 | End: 2022-02-24 | Stop reason: SDUPTHER

## 2021-07-06 NOTE — TELEPHONE ENCOUNTER
Medication Refill    Medication needing refilled:  atorvastatin (LIPITOR) 80 MG   Dosage of the medication:    How are you taking this medication (QD, BID, TID, QID, PRN): one daily      30 or 90 day supply called in: 90 day supply    When will you run out of your medication:    Which Pharmacy are we sending the medication to?:   [address], [address] [place] [address] Drive [phone] [facility] [phone]   [address] [address]   Phone:  [phone]  Fax:  [phone]

## 2021-07-20 ENCOUNTER — OFFICE VISIT (OUTPATIENT)
Dept: PULMONOLOGY | Age: 66
End: 2021-07-20
Payer: MEDICAID

## 2021-07-20 VITALS
OXYGEN SATURATION: 98 % | DIASTOLIC BLOOD PRESSURE: 66 MMHG | HEIGHT: 67 IN | SYSTOLIC BLOOD PRESSURE: 124 MMHG | HEART RATE: 67 BPM | BODY MASS INDEX: 39.8 KG/M2 | WEIGHT: 253.6 LBS

## 2021-07-20 DIAGNOSIS — I50.22 CHRONIC SYSTOLIC HEART FAILURE (HCC): Chronic | ICD-10-CM

## 2021-07-20 DIAGNOSIS — I25.83 CORONARY ARTERY DISEASE DUE TO LIPID RICH PLAQUE: Chronic | ICD-10-CM

## 2021-07-20 DIAGNOSIS — I25.10 CORONARY ARTERY DISEASE DUE TO LIPID RICH PLAQUE: Chronic | ICD-10-CM

## 2021-07-20 DIAGNOSIS — R06.83 SNORING: ICD-10-CM

## 2021-07-20 DIAGNOSIS — E11.59 TYPE 2 DIABETES MELLITUS WITH OTHER CIRCULATORY COMPLICATION, WITHOUT LONG-TERM CURRENT USE OF INSULIN (HCC): Chronic | ICD-10-CM

## 2021-07-20 DIAGNOSIS — E66.9 OBESITY (BMI 30-39.9): Chronic | ICD-10-CM

## 2021-07-20 DIAGNOSIS — I10 ESSENTIAL HYPERTENSION: Chronic | ICD-10-CM

## 2021-07-20 DIAGNOSIS — G47.10 HYPERSOMNIA: Primary | ICD-10-CM

## 2021-07-20 PROBLEM — E78.5 HLD (HYPERLIPIDEMIA): Chronic | Status: ACTIVE | Noted: 2021-03-05

## 2021-07-20 PROBLEM — E11.9 DM2 (DIABETES MELLITUS, TYPE 2) (HCC): Chronic | Status: ACTIVE | Noted: 2020-11-19

## 2021-07-20 PROCEDURE — 99244 OFF/OP CNSLTJ NEW/EST MOD 40: CPT | Performed by: INTERNAL MEDICINE

## 2021-07-20 ASSESSMENT — ENCOUNTER SYMPTOMS
APNEA: 1
NAUSEA: 0
VOMITING: 0
CHOKING: 0
ALLERGIC/IMMUNOLOGIC NEGATIVE: 1
PHOTOPHOBIA: 0
CHEST TIGHTNESS: 0
ABDOMINAL PAIN: 0
EYE PAIN: 0
SHORTNESS OF BREATH: 0
RHINORRHEA: 0
ABDOMINAL DISTENTION: 0

## 2021-07-20 ASSESSMENT — SLEEP AND FATIGUE QUESTIONNAIRES
HOW LIKELY ARE YOU TO NOD OFF OR FALL ASLEEP WHILE SITTING QUIETLY AFTER LUNCH WITHOUT ALCOHOL: 1
NECK CIRCUMFERENCE (INCHES): 17.5
HOW LIKELY ARE YOU TO NOD OFF OR FALL ASLEEP WHILE SITTING AND READING: 1
HOW LIKELY ARE YOU TO NOD OFF OR FALL ASLEEP WHILE LYING DOWN TO REST IN THE AFTERNOON WHEN CIRCUMSTANCES PERMIT: 3
ESS TOTAL SCORE: 10
HOW LIKELY ARE YOU TO NOD OFF OR FALL ASLEEP IN A CAR, WHILE STOPPED FOR A FEW MINUTES IN TRAFFIC: 0
HOW LIKELY ARE YOU TO NOD OFF OR FALL ASLEEP WHILE SITTING INACTIVE IN A PUBLIC PLACE: 2
HOW LIKELY ARE YOU TO NOD OFF OR FALL ASLEEP WHILE WATCHING TV: 3
HOW LIKELY ARE YOU TO NOD OFF OR FALL ASLEEP WHEN YOU ARE A PASSENGER IN A CAR FOR AN HOUR WITHOUT A BREAK: 0
HOW LIKELY ARE YOU TO NOD OFF OR FALL ASLEEP WHILE SITTING AND TALKING TO SOMEONE: 0

## 2021-07-20 NOTE — PROGRESS NOTES
Mackenzie Ramos MD, Karen Adjutant, Seaview Cuff  Tiffanie Kehrt CNP  Madison Velazquez CNP [address]  Fadi Bird [facility], [address] [facility]- [phone]   [facility] [address]  Fadi Bird. [address] [facility]. Dinorah Daugherty [address] [phone]     [address] [place], [place] [address] [address] [place] [id number]  Dept: [phone]  Loc: [phone]    Assessment:      Visit Diagnoses and Associated Orders     Hypersomnia   (New Problem)  -  Primary    needs work-up    POLYSOMNOGRAPHY (PSG) - Diagnostic Testing [06284 Custom]   - Future Order         Snoring   (New Problem)      needs work-up    POLYSOMNOGRAPHY (PSG) - Diagnostic Testing [36040 Custom]   - Future Order         Coronary artery disease due to lipid rich plaque   (Stable)           Chronic systolic heart failure (HCC)   (Stable)           Essential hypertension   (Stable)           Type 2 diabetes mellitus with other circulatory complication, without long-term current use of insulin (HCC)   (Stable)           Obesity (BMI 30-39.9)   (Not Stable)                  Plan:      One or more undiagnosed new problem with uncertain prognosis till final diagnosis is made. Differential diagnosis includes but not limited to: JINA, PLMD's, narcolepsy, parasomnias. Reviewed JINA (which is the highest likelihood diagnosis): pathophysiology, diagnosis, complications and treatment. Instructed him not to drive if drowsy. Continue medications per his PCP and other physicians. Limit caffeine use after 3pm. Will do PSG to rule-out JINA and other sleep disorders. 1 wk follow up after study to review his results. The chronic medical conditions listed are directly related to the primary diagnosis listed above. The management of the primary diagnosis affects the secondary diagnosis and vice versa. Given low EF will need to watch for central apneas as well.     This information was analyzed to assess complexity and medical decision making in regards to further testing and management. Continue meds for: CAD, HTN, CHF, and DM. Pt would medically benefit from wt loss for JIAN (diet, exercise, surgical). Orders Placed This Encounter   Procedures    POLYSOMNOGRAPHY (PSG) - Diagnostic Testing          Subjective:     Patient ID: Abhay Judge is a 72 y.o. male. Chief Complaint   Patient presents with    Insomnia    Daytime Sleepiness       HPI:      Abhay Judge is a 72 y.o. male referred by Prakash Underwood MD for a sleep evaluation. He complains of: snoring, excessive daytime sleepiness , non-restorative sleep, nocturia, snorting awake, napping and tossing and turning at night. He denies: cataplexy and hypnagogic hallucinations. Symptoms began 7 years ago, gradually worsening since that time. Last EF was 45%. Previous evaluation and treatment has included- none    Chronic stable medical conditions: CAD, HTN, CHF, and DM  Chronic unstable medical conditions: obesity    DOT/CDL - No  FAA/'s license -No    Previous Report(s) Reviewed: historical medical records, office notes, andreferral letter(s). Pertinent data has been documented.     [place] - Total score: 10    Caffeine Intake - Coffee: 3-5 cup(s) per week    Social History     Socioeconomic History    Marital status:      Spouse name: Not on file    Number of children: 0    Years of education: 15    Highest education level: Not on file   Occupational History    Not on file   Tobacco Use    Smoking status: Former Smoker     Packs/day: 0.50     Types: Cigarettes     Quit date: 2020     Years since quittin.0    Smokeless tobacco: Never Used   Vaping Use    Vaping Use: Never used   Substance and Sexual Activity    Alcohol use: Not Currently     Alcohol/week: 0.8 standard drinks     Types: 1 Standard drinks or equivalent per week    Drug use: No    Sexual activity: Not on file   Other Topics Concern    Not on file   Social History Narrative    Not on file     Social Determinants of Health     Financial Resource Strain:     Difficulty of Paying Living Expenses:    Food Insecurity:     Worried About Running Out of Food in the Last Year:     [address] in the Last Year:    Transportation Needs:     Lack of Transportation (Medical):  Lack of Transportation (Non-Medical):    Physical Activity:     Days of Exercise per Week:     Minutes of Exercise per Session:    Stress:     Feeling of Stress :    Social Connections:     Frequency of Communication with Friends and Family:     Frequency of Social Gatherings with Friends and Family:     Attends [employer] Services:     Active Member of Clubs or Organizations:     Attends Club or Organization Meetings:     Marital Status:    Intimate Partner Violence:     Fear of Current or Ex-Partner:     Emotionally Abused:     Physically Abused:     Sexually Abused:         Current Outpatient Medications   Medication Instructions    acetaminophen (TYLENOL) 1,000 mg, Oral, DAILY    acetaminophen-codeine (TYLENOL #4) 300-60 MG per tablet 1 tablet, Oral, EVERY 4 HOURS PRN    Alcohol Swabs PADS 1 Units, Does not apply, 2 TIMES DAILY    aspirin 325 mg, Oral, DAILY    atorvastatin (LIPITOR) 80 mg, Oral, NIGHTLY    blood glucose monitor strips Test 2 times a day & as needed for symptoms of irregular blood glucose. Dispense sufficient amount for indicated testing frequency plus additional to accommodate PRN testing needs.  hydroCHLOROthiazide (HYDRODIURIL) 25 mg, Oral, EVERY MORNING    Lancets MISC 1 each, Does not apply, 2 TIMES DAILY    lisinopril (PRINIVIL;ZESTRIL) 20 MG tablet Take 10mg every day x 2 weeks, then increase to 20mg every day.     metFORMIN (GLUCOPHAGE) 500 mg, Oral, 2 TIMES DAILY WITH MEALS    metoprolol tartrate (LOPRESSOR) 50 mg, Oral, 2 TIMES DAILY    tamsulosin (FLOMAX) 0.4 mg, Oral, DAILY       Allergies as of 07/20/2021    (No Known Allergies)       Patient Active Problem List   Diagnosis    BWC Rotator cuff (capsule) sprain and strain    BWC Neck sprain and strain    BWC Sprain and strain of unspecified site of shoulder and upper arm    Post traumatic stress disorder    Neck sprain    Sprain of shoulder, left    Sprain of shoulder, right    Rotator cuff (capsule) sprain and strain    Essential hypertension    Cervical disc herniation    Anxiety    Abnormal stress test    Exertional chest pain    Unstable angina (HCC)    Coronary artery disease due to lipid rich plaque    CAD in native artery    DM2 (diabetes mellitus, type 2) (HCC)    Obesity (BMI 30-39. 9)    Atrial fibrillation ([address]    HLD (hyperlipidemia)    History of coronary artery bypass graft x 3    Chronic systolic heart failure (HCC)    Ischemic cardiomyopathy       Past Medical History:   Diagnosis Date    Anxiety     Atrial fibrillation (HCC)     Cervical disc herniation     Coronary artery disease due to lipid rich plaque 11/12/2020    HLD (hyperlipidemia) 3/5/2021    HTN (hypertension) 5/9/2013    Neck sprain     Obesity (BMI 30-39.9) 11/19/2020    Post traumatic stress disorder     Rotator cuff (capsule) sprain and strain     Sprain of shoulder, left     Sprain of shoulder, right        Past Surgical History:   Procedure Laterality Date    ANKLE SURGERY Right     around 2000    CORONARY ARTERY BYPASS GRAFT N/A 11/16/2020    MALATHI. TCPB. RT EVH. CABG x 3, LIMA TO DISTAL LAD; SVG TO OM1; SVG TO RPDA. SCOTT CLIP WITH 45mm  ATRICLIP. PLACEMENT OF TEMPORARY VENTRICULAR PACING WIRE. DOPPLER FLOW VERIFICATION OF GRAFTS. BILATERAL INTERCOSTAL NERVE BLOCK WITH EXPAREL & MARCAINE. performed by Dominick Viera MD at [address] Right     1980's    JOINT REPLACEMENT      Right shoulder        Family History   Problem Relation Age of Onset    Heart Disease Father        Review of Systems   Constitutional: Positive for fatigue and unexpected weight change.  Negative for activity change and appetite change. HENT: Positive for congestion. Negative for nosebleeds, postnasal drip, rhinorrhea and sneezing. Eyes: Negative for photophobia, pain and visual disturbance. Respiratory: Positive for apnea. Negative for choking, chest tightness and shortness of breath. Cardiovascular: Negative. Gastrointestinal: Negative for abdominal distention, abdominal pain, nausea and vomiting. Endocrine: Negative for cold intolerance and heat intolerance. Genitourinary: Positive for frequency. Negative for difficulty urinating, dysuria and urgency. Musculoskeletal: Positive for neck pain. Negative for neck stiffness. Skin: Negative. Allergic/Immunologic: Negative. Neurological: Negative for tremors, seizures, syncope and weakness. Hematological: Negative for adenopathy. Does not bruise/bleed easily. Psychiatric/Behavioral: Positive for sleep disturbance. Negative for agitation, behavioral problems and confusion. Objective:     Vitals:  Weight BMI   Wt Readings from Last 3 Encounters:   07/20/21 253 lb 9.6 oz (115 kg)   06/09/21 255 lb (115.7 kg)   04/09/21 254 lb 4.8 oz (115.3 kg)    Body mass index is 39.72 kg/m². BP HR SaO2   BP Readings from Last 3 Encounters:   07/20/21 124/66   06/09/21 (!) 170/98   04/09/21 (!) 150/98    Pulse Readings from Last 3 Encounters:   07/20/21 67   06/09/21 66   04/09/21 74    SpO2 Readings from Last 3 Encounters:   07/20/21 98%   06/09/21 98%   04/09/21 98%        Physical Exam  Vitals reviewed. Constitutional:       General: He is not in acute distress. Appearance: Normal appearance. He is well-developed. He is obese. He is not toxic-appearing or diaphoretic. HENT:      Head: Normocephalic and atraumatic. Not macrocephalic and not microcephalic. Right Ear: External ear normal.      Left Ear: External ear normal.      Nose: Nasal deformity, septal deviation and mucosal edema present. Mouth/Throat:      Lips: Pink.       Mouth: Mucous membranes are moist.      Tongue: No lesions. Palate: No mass. Pharynx: Uvula midline. Uvula swelling present. No oropharyngeal exudate. Tonsils: No tonsillar exudate or tonsillar abscesses. Comments: Tonsils: absent bilaterally  Eyes:      General: Lids are normal.      Extraocular Movements: Extraocular movements intact. Conjunctiva/sclera: Conjunctivae normal.      Pupils: Pupils are equal, round, and reactive to light. Neck:      Vascular: No JVD. Trachea: Trachea normal.      Comments: Neck Circ: 17.5 inches    Cardiovascular:      Rate and Rhythm: Normal rate and regular rhythm. Heart sounds: Normal heart sounds. Pulmonary:      Effort: Pulmonary effort is normal.      Breath sounds: Normal breath sounds. Abdominal:      General: Bowel sounds are normal.   Musculoskeletal:      Cervical back: Normal range of motion. Comments: No evidence of cyanosis or clubbing of nails   Skin:     General: Skin is warm. Nails: There is no clubbing. Neurological:      General: No focal deficit present. Mental Status: He is alert. Psychiatric:         Attention and Perception: Attention normal.         Mood and Affect: Mood and affect normal.         Speech: Speech normal.         Behavior: Behavior normal.         Thought Content:  Thought content normal.         Electronically signed by Esther Rodas MD on7/20/2021 at 11:11 AM

## 2021-07-23 ENCOUNTER — HOSPITAL ENCOUNTER (OUTPATIENT)
Dept: SLEEP CENTER | Age: 66
Discharge: HOME | End: 2021-07-23
Payer: MEDICAID

## 2021-07-23 DIAGNOSIS — R06.83 SNORING: ICD-10-CM

## 2021-07-23 DIAGNOSIS — G47.10 HYPERSOMNIA: ICD-10-CM

## 2021-07-23 PROCEDURE — 95810 POLYSOM 6/> YRS 4/> PARAM: CPT

## 2021-07-26 PROCEDURE — 95810 POLYSOM 6/> YRS 4/> PARAM: CPT | Performed by: INTERNAL MEDICINE

## 2021-07-27 ENCOUNTER — TELEPHONE (OUTPATIENT)
Dept: PULMONOLOGY | Age: 66
End: 2021-07-27

## 2021-08-02 ENCOUNTER — TELEPHONE (OUTPATIENT)
Dept: PULMONOLOGY | Age: 66
End: 2021-08-02

## 2021-08-09 ENCOUNTER — TELEPHONE (OUTPATIENT)
Dept: PULMONOLOGY | Age: 66
End: 2021-08-09

## 2021-08-10 ENCOUNTER — TELEPHONE (OUTPATIENT)
Dept: SLEEP CENTER | Age: 66
End: 2021-08-10

## 2021-08-10 NOTE — TELEPHONE ENCOUNTER
Called to schedule cpap sleep study per Dominic Gagnon. Left vm for the pt to return my call.      Had shots

## 2021-08-13 ENCOUNTER — OFFICE VISIT (OUTPATIENT)
Dept: CARDIOLOGY CLINIC | Age: 66
End: 2021-08-13
Payer: MEDICAID

## 2021-08-13 VITALS
SYSTOLIC BLOOD PRESSURE: 118 MMHG | OXYGEN SATURATION: 98 % | HEART RATE: 76 BPM | DIASTOLIC BLOOD PRESSURE: 76 MMHG | HEIGHT: 67 IN | BODY MASS INDEX: 39.08 KG/M2 | WEIGHT: 249 LBS

## 2021-08-13 DIAGNOSIS — I97.89 POSTOPERATIVE ATRIAL FIBRILLATION (HCC): ICD-10-CM

## 2021-08-13 DIAGNOSIS — G47.33 OSA (OBSTRUCTIVE SLEEP APNEA): ICD-10-CM

## 2021-08-13 DIAGNOSIS — I25.5 ISCHEMIC CARDIOMYOPATHY: ICD-10-CM

## 2021-08-13 DIAGNOSIS — I25.10 CAD IN NATIVE ARTERY: Primary | ICD-10-CM

## 2021-08-13 DIAGNOSIS — I48.91 POSTOPERATIVE ATRIAL FIBRILLATION (HCC): ICD-10-CM

## 2021-08-13 DIAGNOSIS — I10 ESSENTIAL HYPERTENSION: Chronic | ICD-10-CM

## 2021-08-13 PROCEDURE — 99214 OFFICE O/P EST MOD 30 MIN: CPT | Performed by: INTERNAL MEDICINE

## 2021-08-13 NOTE — PROGRESS NOTES
[facility]  Cardiac Consult     Referring Provider:  Daphne Ordoñez MD     Chief Complaint   Patient presents with    Follow-up     2 mo  no cardiac complaints        History of Present Illness:  72 y.o. male seen in f/u for CAD s/p CABG. He underwent CABG 2020 for multivessel CAD. LV function normal on cath, mildly decreased with inferior HK on ECHO. He had post-op afib treated with short term amiodarone and was placed on coumadin. No obvious or symptomatic recurrence. He did undergo SCOTT clipping with surgery. He is in cardiac rehab with no afib documented as well. He is more active with improving exercise tolerance. He works in an [employer]. He says he lifts boxes all day that can be up to 50 lbs. He also has to move carts that weight up to 1000 lbs. MCOT ordered to evaluate for PAF. No PAF seen. He is back to work. 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 He is doing better. Exercising 4 days a week. BP improved with HCTZ. Weight down 8 lbs. Some mild numbness in left chest from CABG. Past Medical History:   has a past medical history of Anxiety, Atrial fibrillation ([address] Cervical disc herniation, Coronary artery disease due to lipid rich plaque, HLD (hyperlipidemia), HTN (hypertension), Neck sprain, Obesity (BMI 30-39.9), Post traumatic stress disorder, Rotator cuff (capsule) sprain and strain, Sprain of shoulder, left, and Sprain of shoulder, right. Surgical History:   has a past surgical history that includes Ankle surgery (Right); hernia repair (Right); joint replacement; and Coronary artery bypass graft (N/A, 2020).      Social History:  Social History     Tobacco Use    Smoking status: Former Smoker     Packs/day: 0.50     Types: Cigarettes     Quit date: 2020     Years since quittin.1    Smokeless tobacco: Never Used   Substance Use Topics    Alcohol use: Not Currently     Alcohol/week: 0.8 standard drinks     Types: 1 Standard drinks or equivalent per week        Family History:  family history includes Heart Disease in his father. Allergies:  Patient has no known allergies. Home Medications:  Prior to Visit Medications    Medication Sig Taking? Authorizing Provider   atorvastatin (LIPITOR) 80 MG tablet Take 1 tablet by mouth nightly Yes Missael Pimentel MD   acetaminophen-codeine (TYLENOL #4) 300-60 MG per tablet Take 1 tablet by mouth every 4 hours as needed for Pain. Yes Historical Provider, MD   hydroCHLOROthiazide (HYDRODIURIL) 25 MG tablet Take 1 tablet by mouth every morning Yes Missael Pimentel MD   metoprolol tartrate (LOPRESSOR) 50 MG tablet Take 1 tablet by mouth 2 times daily Yes Missael Pimentel MD   lisinopril (PRINIVIL;ZESTRIL) 20 MG tablet Take 10mg every day x 2 weeks, then increase to 20mg every day. Patient taking differently: Take 20 mg by mouth daily  Yes Missael Pimentel MD   aspirin 325 MG tablet Take 325 mg by mouth daily Yes Historical Provider, MD   Lancets MISC 1 each by Does not apply route 2 times daily Yes Je Wilson MD   blood glucose monitor strips Test 2 times a day & as needed for symptoms of irregular blood glucose. Dispense sufficient amount for indicated testing frequency plus additional to accommodate PRN testing needs. Yes Je Wilson MD   Alcohol Swabs PADS 1 Units by Does not apply route 2 times daily Yes Je Wilson MD   acetaminophen (TYLENOL) 500 MG tablet Take 2 tablets by mouth daily  Patient taking differently: Take 1,000 mg by mouth 2 tabs bid Yes BAN Sanchez CNP   metFORMIN (GLUCOPHAGE) 500 MG tablet Take 1 tablet by mouth 2 times daily (with meals) Yes BAN Sanchez CNP   tamsulosin (FLOMAX) 0.4 MG capsule Take 0.4 mg by mouth daily Yes Historical Provider, MD       [x] Medications and dosages reviewed.     ROS:  [x]Full ROS obtained and negative except as mentioned in HPI      Physical Examination:    Vitals:    08/13/21 1417   BP: 118/76   Site: Left Upper Arm   Position: Sitting   Cuff Size: Large Adult   Pulse: 76   SpO2: 98%   Weight: 249 lb (112.9 kg)   Height: 5' 7\" (1.702 m)        · GENERAL: Well developed, well nourished, No acute distress  · NEUROLOGICAL: Alert and oriented  · PSYCH: Calm affect  · SKIN: Warm and dry, No visible rash,   · EYES: Pupils equal and round, Sclera non-icteric,   · HENT:  External ears and nose unremarkable, mucus membranes moist  · MUSCULOSKELETAL: Normal cephalic, neck supple  · CAROTID: Normal upstroke, no bruits  · CARDIAC: JVP normal, Normal PMI, regular rate and rhythm, normal S1S2, no murmur, rub, or gallop  · RESPIRATORY: Normal respiratory effort, clear to auscultation bilaterally  · EXTREMITIES: No LE edema  · GASTROINTESTINAL: normal bowel sounds, soft, non-tender, No hepatomegaly     CABG 11/16/2020    Procedure(s):  MALATHI. TCPB. RT EVH. CABG x 3, LIMA TO DISTAL LAD; SVG TO OM1; SVG TO RPDA. SCOTT CLIP WITH 45mm  ATRICLIP. PLACEMENT OF TEMPORARY VENTRICULAR PACING WIRE. DOPPLER FLOW VERIFICATION OF GRAFTS. BILATERAL INTERCOSTAL NERVE BLOCK WITH EXPAREL & MARCAINE.      ECHO 11/12/2020   Summary   -Mildly reduced global systolic function with an ejection fraction estimated   at 45%. -Inferoseptal, inferior, and inferolateral hypokinesis noted. -There is trivial tricuspid regurgitation with a RVSP estimation of 13 mmHg. -Grade I diastolic dysfunction with normal LV filling pressures. Avg.   E/e'=8.4    CARDIAC CATH 11/2020  Anatomy:   LM-30% distal  LAD-70% mid, 100% distal with left to left collaterals  D1-80% proximal  Cx-100% proximal with left to left collaterals  OM1-100% proximal with left to left collaterals  RCA-100% proximal with right to right and left-to-right collaterals  LVEF-55%     Impression:  1. Severe three-vessel CAD with extensive collateralization. 2.  Preserved LV systolic function with LVEF of 55%. LABS 16/2021  LDL=57    MCOT:  Tracings reviewed  Sinus, 1% PVC's. No afib  Two 2.8 pauses.  Likely during sleep      Assessment:     CAD:  Stable. Multivessel CAD s/p CABG 11/2020  Stable. Medical therapy    Ischemic Cardiomyopathy:  Mild LV dysfunction with inferior HK on ECHO  Continue beta blocker and lisinopril  20 mg  Stable      HTN:  /76 (Site: Left Upper Arm, Position: Sitting, Cuff Size: Large Adult)   Pulse 76   Ht 5' 7\" (1.702 m)   Wt 249 lb (112.9 kg)   SpO2 98%   BMI 39.00 kg/m²   Improved. Continue HCTZ 25. AFIB:  Post op afib  SCOTT closure with surgery  Off of anticoagulation   MCOT with no afib  Continue ASA    Probable sleep apnea;  Sleep apnea.  Awaiting CPAP    Plan:  Stable  Same meds  F/u 6 months    Thank you for allowing me to participate in the care of this individual.      Kate Infante M.D., [facility]

## 2021-09-13 DIAGNOSIS — G47.33 OBSTRUCTIVE SLEEP APNEA (ADULT) (PEDIATRIC): Primary | ICD-10-CM

## 2021-09-15 ENCOUNTER — HOSPITAL ENCOUNTER (OUTPATIENT)
Dept: SLEEP CENTER | Age: 66
Discharge: HOME | End: 2021-09-15
Payer: MEDICAID

## 2021-09-15 DIAGNOSIS — G47.33 OBSTRUCTIVE SLEEP APNEA (ADULT) (PEDIATRIC): ICD-10-CM

## 2021-09-15 PROCEDURE — 95811 POLYSOM 6/>YRS CPAP 4/> PARM: CPT

## 2021-09-22 ENCOUNTER — TELEPHONE (OUTPATIENT)
Dept: PULMONOLOGY | Age: 66
End: 2021-09-22

## 2021-09-22 PROCEDURE — 95811 POLYSOM 6/>YRS CPAP 4/> PARM: CPT | Performed by: INTERNAL MEDICINE

## 2021-09-27 ENCOUNTER — TELEPHONE (OUTPATIENT)
Dept: PULMONOLOGY | Age: 66
End: 2021-09-27

## 2021-09-29 ENCOUNTER — TELEPHONE (OUTPATIENT)
Dept: PULMONOLOGY | Age: 66
End: 2021-09-29

## 2021-09-29 NOTE — PROGRESS NOTES
Janene Eisenmenger         : 1955  [address]    Diagnosis: [x] JINA (G47.33) [] CSA (G47.31) [] Apnea (G47.30)   Length of Need: [x] 12 Months [] 99 Months [] Other:    Machine (YARIEL!): [] Respironics Dream Station   2   Auto [x] ResMed AirSense     Auto S11 [] Other:     []  CPAP () [x] Bilevel ()   Mode: [] Auto [] Spontaneous    Mode: [x] Auto [] Spontaneous       IPAP max 25 cmH2O  EPAP min 12 cmH2O  PS 3 cmH2O     Comfort Settings:   - Ramp Pressure: 6 cmH2O                                        - Ramp time: 15 min                                     -  Flex/EPR - 3 full time                                    - For ResMed Bilevel (TiMax-4 sec   TiMin- 0.2 sec)     Humidifier: [x] Heated ()        [x] Water chamber replacement ()/ 1 per 6 months        Mask:   [] Nasal () /1 per 3 months [x] Full Face () /1 per 3 months   [] Patient choice -Size and fit mask [x] Patient Choice - Size and fit mask   [] Dispense:  [] Dispense:    [] Headgear () / 1 per 3 months [x] Headgear () / 1 per 3 months   [] Replacement Nasal Cushion ()/2 per month [x] Interface Replacement ()/1 per month   [] Replacement Nasal Pillows ()/2 per month         Tubing: [x] Heated ()/1 per 3 months    [] Standard ()/1 per 3 months [] Other:           Filters: [x] Non-disposable ()/1 per 6 months     [x] Ultra-Fine, Disposable ()/2 per month        Miscellaneous: [] Chin Strap ()/ 1 per 6 months [] O2 bleed-in:       LPM   [] Oximetry on CPAP/Bilevel []  Other:    [x] Modem: ()         Start Order Date: 21    MEDICAL JUSTIFICATION:  I, the undersigned, certify that the above prescribed supplies are medically necessary for this patients wellbeing. In my opinion, the supplies are both reasonable and necessary in reference to accepted standards of medicalpractice in treatment of this patients condition.     Bettie Oh MD      NPI: [id number] Order Signed Date: 21    Electronically signed by Sukhjinder Hoffman MD on 2021 at 2:55 PM    Tanna Ricardo  1955  [address]  [phone] (home)   [phone] (mobile)      Insurance Info (confirm with patient if correct):  Payor/Plan Subscr  Sex Relation Sub.  Ins. ID Effective Group Num

## 2021-09-29 NOTE — TELEPHONE ENCOUNTER
Spoke wit pt to review titration study. Order to be sent to [facility]. F/U scheduled.

## 2021-12-23 ENCOUNTER — TELEPHONE (OUTPATIENT)
Dept: PULMONOLOGY | Age: 66
End: 2021-12-23

## 2021-12-23 NOTE — TELEPHONE ENCOUNTER
Patient left a voicemail asking if he had an appt scheduled and c/o head congestion. I LMTCB letting him know of his appt on 1/6 and telling him to call back if he needs anything else.

## 2021-12-27 NOTE — TELEPHONE ENCOUNTER
Patient left a voicemail stating he is having a very difficult time with his machine. His head is stopped up and his throat is sore. He said it has been 60 days and it is not working. I tried calling the patient back and phone just rings and rings. Not able to leave voicemail.

## 2022-02-24 ENCOUNTER — OFFICE VISIT (OUTPATIENT)
Dept: CARDIOLOGY CLINIC | Age: 67
End: 2022-02-24
Payer: MEDICAID

## 2022-02-24 ENCOUNTER — TELEPHONE (OUTPATIENT)
Dept: PULMONOLOGY | Age: 67
End: 2022-02-24

## 2022-02-24 VITALS
HEIGHT: 67 IN | OXYGEN SATURATION: 98 % | DIASTOLIC BLOOD PRESSURE: 76 MMHG | HEART RATE: 75 BPM | BODY MASS INDEX: 40.76 KG/M2 | SYSTOLIC BLOOD PRESSURE: 124 MMHG | WEIGHT: 259.7 LBS

## 2022-02-24 DIAGNOSIS — G47.33 OSA (OBSTRUCTIVE SLEEP APNEA): ICD-10-CM

## 2022-02-24 DIAGNOSIS — R06.02 SOB (SHORTNESS OF BREATH) ON EXERTION: ICD-10-CM

## 2022-02-24 DIAGNOSIS — I25.10 CORONARY ARTERY DISEASE DUE TO LIPID RICH PLAQUE: Primary | Chronic | ICD-10-CM

## 2022-02-24 DIAGNOSIS — R73.09 ELEVATED GLUCOSE: ICD-10-CM

## 2022-02-24 DIAGNOSIS — I10 ESSENTIAL HYPERTENSION: Chronic | ICD-10-CM

## 2022-02-24 DIAGNOSIS — I48.91 ATRIAL FIBRILLATION, UNSPECIFIED TYPE (HCC): ICD-10-CM

## 2022-02-24 DIAGNOSIS — I25.5 ISCHEMIC CARDIOMYOPATHY: ICD-10-CM

## 2022-02-24 DIAGNOSIS — I25.83 CORONARY ARTERY DISEASE DUE TO LIPID RICH PLAQUE: Primary | Chronic | ICD-10-CM

## 2022-02-24 PROCEDURE — 99214 OFFICE O/P EST MOD 30 MIN: CPT | Performed by: INTERNAL MEDICINE

## 2022-02-24 RX ORDER — ATORVASTATIN CALCIUM 80 MG/1
80 TABLET, FILM COATED ORAL NIGHTLY
Qty: 90 TABLET | Refills: 4 | Status: SHIPPED | OUTPATIENT
Start: 2022-02-24 | End: 2022-10-06

## 2022-02-24 RX ORDER — LISINOPRIL 20 MG/1
20 TABLET ORAL DAILY
Qty: 90 TABLET | Refills: 4 | Status: SHIPPED | OUTPATIENT
Start: 2022-02-24 | End: 2022-10-06 | Stop reason: SDUPTHER

## 2022-02-24 RX ORDER — METOPROLOL TARTRATE 50 MG/1
TABLET, FILM COATED ORAL
Qty: 270 TABLET | Refills: 4 | Status: SHIPPED | OUTPATIENT
Start: 2022-02-24 | End: 2022-10-06 | Stop reason: SDUPTHER

## 2022-02-24 RX ORDER — HYDROCHLOROTHIAZIDE 25 MG/1
25 TABLET ORAL EVERY MORNING
Qty: 90 TABLET | Refills: 4 | Status: SHIPPED | OUTPATIENT
Start: 2022-02-24 | End: 2022-10-06 | Stop reason: SDUPTHER

## 2022-02-24 RX ORDER — IRON POLYSACCHARIDE COMPLEX 180 MG
CAPSULE ORAL
COMMUNITY
Start: 2022-01-30 | End: 2022-04-15

## 2022-02-24 NOTE — TELEPHONE ENCOUNTER
Patient left a message stating Dr. Peggy Roy referred him back to us and he had a CPAP but that is definitely not working for him. Called patient and left a message that he could call the office to schedule an appointment.

## 2022-02-24 NOTE — PROGRESS NOTES
[facility]  Cardiac Consult     Referring Provider:  Regan Worthy MD     Chief Complaint   Patient presents with    6 Month Follow-Up     Short of breath with exertion.  Coronary Artery Disease        History of Present Illness:  77 y.o. male seen in f/u for CAD s/p CABG. He underwent CABG 2020 for multivessel CAD. LV function normal on cath, mildly decreased with inferior HK on ECHO. He had post-op afib treated with short term amiodarone and was placed on coumadin. No obvious or symptomatic recurrence. He did undergo SCOTT clipping with surgery. He has some chronic left upper chest discomfort/numbness since CABG from LIMA grafting. He has gained 10 lbs. Stopped CPAP. \"it didn't work for [employer]". Not exercising. Congestion when he wakes up. Sleeping poorly. More shortness of breath    Past Medical History:   has a past medical history of Anxiety, Atrial fibrillation ([address] Cervical disc herniation, Coronary artery disease due to lipid rich plaque, HLD (hyperlipidemia), HTN (hypertension), Neck sprain, Obesity (BMI 30-39.9), Post traumatic stress disorder, Rotator cuff (capsule) sprain and strain, Sprain of shoulder, left, and Sprain of shoulder, right. Surgical History:   has a past surgical history that includes Ankle surgery (Right); hernia repair (Right); joint replacement; and Coronary artery bypass graft (N/A, 2020). Social History:  Social History     Tobacco Use    Smoking status: Former Smoker     Packs/day: 0.50     Types: Cigarettes     Quit date: 2020     Years since quittin.6    Smokeless tobacco: Never Used   Substance Use Topics    Alcohol use: Not Currently     Alcohol/week: 0.8 standard drinks     Types: 1 Standard drinks or equivalent per week        Family History:  family history includes Heart Disease in his father. Allergies:  Patient has no known allergies. Home Medications:  Prior to Visit Medications    Medication Sig Taking? Authorizing Provider   NOELLE 391.3 (180 Fe) MG CAPS TAKE 1 CAPSULE BY MOUTH DAILY Yes Historical Provider, MD   atorvastatin (LIPITOR) 80 MG tablet Take 1 tablet by mouth nightly Yes Aileen Vázquez MD   acetaminophen-codeine (TYLENOL #4) 300-60 MG per tablet Take 1 tablet by mouth every 4 hours as needed for Pain. Yes Historical Provider, MD   hydroCHLOROthiazide (HYDRODIURIL) 25 MG tablet Take 1 tablet by mouth every morning Yes Aileen Vázquez MD   metoprolol tartrate (LOPRESSOR) 50 MG tablet Take 1 tablet by mouth 2 times daily  Patient taking differently: Take 50 mg by mouth 2 times daily 1.5 tablets two times a day Yes Aileen Vázquez MD   lisinopril (PRINIVIL;ZESTRIL) 20 MG tablet Take 10mg every day x 2 weeks, then increase to 20mg every day. Patient taking differently: Take 20 mg by mouth daily  Yes Aileen Vázquez MD   aspirin 325 MG tablet Take 325 mg by mouth daily Yes Historical Provider, MD   Lancets MISC 1 each by Does not apply route 2 times daily Yes Leo Donohue MD   blood glucose monitor strips Test 2 times a day & as needed for symptoms of irregular blood glucose. Dispense sufficient amount for indicated testing frequency plus additional to accommodate PRN testing needs. Yes Leo Donohue MD   Alcohol Swabs PADS 1 Units by Does not apply route 2 times daily Yes Leo Donohue MD   acetaminophen (TYLENOL) 500 MG tablet Take 2 tablets by mouth daily  Patient taking differently: Take 1,000 mg by mouth 2 tabs bid Yes BAN Peres CNP   metFORMIN (GLUCOPHAGE) 500 MG tablet Take 1 tablet by mouth 2 times daily (with meals) Yes BAN Peres CNP   tamsulosin (FLOMAX) 0.4 MG capsule Take 0.4 mg by mouth daily Yes Historical Provider, MD       [x] Medications and dosages reviewed.     ROS:  [x]Full ROS obtained and negative except as mentioned in HPI      Physical Examination:    Vitals:    02/24/22 0800   BP: 124/76   Site: Left Upper Arm   Position: Sitting   Cuff Size: Large Adult Pulse: 75   SpO2: 98%   Weight: 259 lb 11.2 oz (117.8 kg)   Height: 5' 7\" (1.702 m)        · GENERAL: Well developed, well nourished, No acute distress  · NEUROLOGICAL: Alert and oriented  · PSYCH: Calm affect  · SKIN: Warm and dry, No visible rash,   · EYES: Pupils equal and round, Sclera non-icteric,   · HENT:  External ears and nose unremarkable, mucus membranes moist  · MUSCULOSKELETAL: Normal cephalic, neck supple  · CAROTID: Normal upstroke, no bruits  · CARDIAC: JVP normal, Normal PMI, regular rate and rhythm, normal S1S2, No murmur, rub, or gallop  · RESPIRATORY: Normal respiratory effort, clear to auscultation bilaterally  · EXTREMITIES: No LE edema  · GASTROINTESTINAL: normal bowel sounds, soft, non-tender, No hepatomegaly     CABG 11/16/2020    Procedure(s):  MALATHI. TCPB. RT EVH. CABG x 3, LIMA TO DISTAL LAD; SVG TO OM1; SVG TO RPDA. SCOTT CLIP WITH 45mm  ATRICLIP. PLACEMENT OF TEMPORARY VENTRICULAR PACING WIRE. DOPPLER FLOW VERIFICATION OF GRAFTS. BILATERAL INTERCOSTAL NERVE BLOCK WITH EXPAREL & MARCAINE.      ECHO 11/12/2020   Summary   -Mildly reduced global systolic function with an ejection fraction estimated   at 45%. -Inferoseptal, inferior, and inferolateral hypokinesis noted. -There is trivial tricuspid regurgitation with a RVSP estimation of 13 mmHg. -Grade I diastolic dysfunction with normal LV filling pressures. Avg.   E/e'=8.4    CARDIAC CATH 11/2020  Anatomy:   LM-30% distal  LAD-70% mid, 100% distal with left to left collaterals  D1-80% proximal  Cx-100% proximal with left to left collaterals  OM1-100% proximal with left to left collaterals  RCA-100% proximal with right to right and left-to-right collaterals  LVEF-55%     Impression:  1. Severe three-vessel CAD with extensive collateralization. 2.  Preserved LV systolic function with LVEF of 55%. MCOT:  Tracings reviewed  Sinus, 1% PVC's. No afib  Two 2.8 pauses. Likely during sleep      ASSESSMENT:    CAD:  Stable.  Multivessel CAD s/p CABG 11/2020  Stable. Medical therapy  He must change lifestyle or is going to have recurrent issues. Ischemic Cardiomyopathy:  Mild LV dysfunction with inferior HK on ECHO  Continue beta blocker and lisinopril  20 mg  Increased dyspnea. Repeat ECHO      HTN:  /76 (Site: Left Upper Arm, Position: Sitting, Cuff Size: Large Adult)   Pulse 75   Ht 5' 7\" (1.702 m)   Wt 259 lb 11.2 oz (117.8 kg)   SpO2 98%   BMI 40.67 kg/m²   Improved. Continue HCTZ 25. AFIB:  Post op afib  SCOTT closure with surgery  Off of anticoagulation   MCOT with no afib  Continue ASA    Sleep apnea; Stopped CPAP  \"Didn't work\"  Complains of congestion  He can take benadryl qhs  Needs to see Dr. Kirstin Villalobos back to discuss    Plan:  Needs lifestyle change  Weight loss, exercise, needs to treat sleep apnea. Repeat ECHO due to dyspnea.  F/u to review  Consider    Thank you for allowing me to participate in the care of this individual.      Abner Varma M.D., [facility]

## 2022-03-02 ENCOUNTER — HOSPITAL ENCOUNTER (OUTPATIENT)
Age: 67
Discharge: HOME | End: 2022-03-02
Payer: MEDICAID

## 2022-03-02 DIAGNOSIS — I25.83 CORONARY ARTERY DISEASE DUE TO LIPID RICH PLAQUE: Chronic | ICD-10-CM

## 2022-03-02 DIAGNOSIS — R73.09 ELEVATED GLUCOSE: ICD-10-CM

## 2022-03-02 DIAGNOSIS — I25.10 CORONARY ARTERY DISEASE DUE TO LIPID RICH PLAQUE: Chronic | ICD-10-CM

## 2022-03-02 LAB
ALT SERPL-CCNC: 14 U/L (ref 10–40)
ANION GAP SERPL CALCULATED.3IONS-SCNC: 13 MMOL/L (ref 3–16)
AST SERPL-CCNC: 18 U/L (ref 15–37)
BUN BLDV-MCNC: 26 MG/DL (ref 7–20)
CALCIUM SERPL-MCNC: 9 MG/DL (ref 8.3–10.6)
CHLORIDE BLD-SCNC: 102 MMOL/L (ref 99–110)
CHOLESTEROL, FASTING: 136 MG/DL (ref 0–199)
CO2: 24 MMOL/L (ref 21–32)
CREAT SERPL-MCNC: 1.2 MG/DL (ref 0.8–1.3)
GFR AFRICAN AMERICAN: >60
GFR NON-AFRICAN AMERICAN: >60
GLUCOSE BLD-MCNC: 127 MG/DL (ref 70–99)
HCT VFR BLD CALC: 38.6 % (ref 40.5–52.5)
HDLC SERPL-MCNC: 39 MG/DL (ref 40–60)
HEMOGLOBIN: 12.9 G/DL (ref 13.5–17.5)
LDL CHOLESTEROL CALCULATED: 72 MG/DL
MCH RBC QN AUTO: 31.8 PG (ref 26–34)
MCHC RBC AUTO-ENTMCNC: 33.4 G/DL (ref 31–36)
MCV RBC AUTO: 95.1 FL (ref 80–100)
PDW BLD-RTO: 12.6 % (ref 12.4–15.4)
PLATELET # BLD: 192 K/UL (ref 135–450)
PMV BLD AUTO: 8.9 FL (ref 5–10.5)
POTASSIUM SERPL-SCNC: 4.8 MMOL/L (ref 3.5–5.1)
RBC # BLD: 4.05 M/UL (ref 4.2–5.9)
SODIUM BLD-SCNC: 139 MMOL/L (ref 136–145)
TRIGLYCERIDE, FASTING: 126 MG/DL (ref 0–150)
VLDLC SERPL CALC-MCNC: 25 MG/DL
WBC # BLD: 5.9 K/UL (ref 4–11)

## 2022-03-02 PROCEDURE — 80061 LIPID PANEL: CPT

## 2022-03-02 PROCEDURE — 84460 ALANINE AMINO (ALT) (SGPT): CPT

## 2022-03-02 PROCEDURE — 85027 COMPLETE CBC AUTOMATED: CPT

## 2022-03-02 PROCEDURE — 80048 BASIC METABOLIC PNL TOTAL CA: CPT

## 2022-03-02 PROCEDURE — 84450 TRANSFERASE (AST) (SGOT): CPT

## 2022-03-02 PROCEDURE — 83036 HEMOGLOBIN GLYCOSYLATED A1C: CPT

## 2022-03-02 PROCEDURE — 36415 COLL VENOUS BLD VENIPUNCTURE: CPT

## 2022-03-03 LAB
ESTIMATED AVERAGE GLUCOSE: 142.7 MG/DL
HBA1C MFR BLD: 6.6 %

## 2022-03-08 ENCOUNTER — TELEPHONE (OUTPATIENT)
Dept: CARDIOLOGY CLINIC | Age: 67
End: 2022-03-08

## 2022-03-08 NOTE — LETTER
[address] Card[facility] [address] [address]  Phone: [phone]  Fax: [phone]    Cam Hawley MD        March 8, 2022     Patient: Santosh Dykes   YOB: 1955   Date of Visit: 3/8/2022       [facility] : There have been multiple attempts to contact you with lab results- unsuccessfully. Your  labs are good. F/u in clinic as planned. If you have any questions or concerns, please don't hesitate to call.     Sincerely,        Cam Hawley MD

## 2022-04-15 ENCOUNTER — HOSPITAL ENCOUNTER (OUTPATIENT)
Dept: NON INVASIVE DIAGNOSTICS | Age: 67
Discharge: HOME | End: 2022-04-15
Payer: MEDICAID

## 2022-04-15 ENCOUNTER — OFFICE VISIT (OUTPATIENT)
Dept: CARDIOLOGY CLINIC | Age: 67
End: 2022-04-15
Payer: MEDICAID

## 2022-04-15 VITALS
WEIGHT: 255.9 LBS | DIASTOLIC BLOOD PRESSURE: 82 MMHG | HEART RATE: 75 BPM | HEIGHT: 67 IN | BODY MASS INDEX: 40.16 KG/M2 | SYSTOLIC BLOOD PRESSURE: 132 MMHG | OXYGEN SATURATION: 98 %

## 2022-04-15 DIAGNOSIS — E78.49 OTHER HYPERLIPIDEMIA: Chronic | ICD-10-CM

## 2022-04-15 DIAGNOSIS — I25.83 CORONARY ARTERY DISEASE DUE TO LIPID RICH PLAQUE: Primary | Chronic | ICD-10-CM

## 2022-04-15 DIAGNOSIS — I50.22 CHRONIC SYSTOLIC HEART FAILURE (HCC): Chronic | ICD-10-CM

## 2022-04-15 DIAGNOSIS — I25.5 ISCHEMIC CARDIOMYOPATHY: ICD-10-CM

## 2022-04-15 DIAGNOSIS — I25.10 CORONARY ARTERY DISEASE DUE TO LIPID RICH PLAQUE: Primary | Chronic | ICD-10-CM

## 2022-04-15 DIAGNOSIS — I10 ESSENTIAL HYPERTENSION: Chronic | ICD-10-CM

## 2022-04-15 DIAGNOSIS — R06.02 SOB (SHORTNESS OF BREATH) ON EXERTION: ICD-10-CM

## 2022-04-15 LAB
LV EF: 53 %
LVEF MODALITY: NORMAL

## 2022-04-15 PROCEDURE — 99214 OFFICE O/P EST MOD 30 MIN: CPT | Performed by: INTERNAL MEDICINE

## 2022-04-15 PROCEDURE — 93306 TTE W/DOPPLER COMPLETE: CPT

## 2022-04-15 RX ORDER — ACETAMINOPHEN 160 MG
TABLET,DISINTEGRATING ORAL DAILY
COMMUNITY

## 2022-04-15 NOTE — PATIENT INSTRUCTIONS
Continue regular exercise  Recommend Weight [employer] to aide in weight loss  If energy level does not improve in 30 days, then restart lipitor

## 2022-04-15 NOTE — PROGRESS NOTES
[address]  Cardiac Consult     Referring Provider:  Camilla Vaz MD     Chief Complaint   Patient presents with    Follow-up    Coronary Artery Disease     f/u echo    Hypertension    Hyperlipidemia        History of Present Illness:  77 y.o. male seen in f/u for CAD s/p CABG. He underwent CABG 2020 for multivessel CAD. LV function normal on cath, mildly decreased with inferior HK on ECHO. He had post-op afib treated with short term amiodarone and was placed on coumadin. No obvious or symptomatic recurrence. He did undergo SCOTT clipping with surgery. He has some chronic left upper chest discomfort/numbness since CABG from LIMA grafting. He has gained 10 lbs. Stopped CPAP. \"it didn't work for [employer]". Not exercising. Congestion when he wakes up. Sleeping poorly. More shortness of breath    Here today for ECHO. Low normal EF with mild inferior HK. He has stopped statin as he says his sister told him \"statins don't work for our family\". Not using CPAP and has not followed up with sleep medicine/. Weight remains up. Past Medical History:   has a past medical history of Anxiety, Atrial fibrillation ([address] Cervical disc herniation, Coronary artery disease due to lipid rich plaque, HLD (hyperlipidemia), HTN (hypertension), Neck sprain, Obesity (BMI 30-39.9), Post traumatic stress disorder, Rotator cuff (capsule) sprain and strain, Sprain of shoulder, left, and Sprain of shoulder, right. Surgical History:   has a past surgical history that includes Ankle surgery (Right); hernia repair (Right); joint replacement; and Coronary artery bypass graft (N/A, 2020).      Social History:  Social History     Tobacco Use    Smoking status: Former Smoker     Packs/day: 0.50     Types: Cigarettes     Quit date: 2020     Years since quittin.8    Smokeless tobacco: Never Used   Substance Use Topics    Alcohol use: Not Currently     Alcohol/week: 0.8 standard drinks     Types: 1 Standard drinks or equivalent per week        Family History:  family history includes Heart Disease in his father. Allergies:  Patient has no known allergies. Home Medications:  Prior to Visit Medications    Medication Sig Taking? Authorizing Provider   Cholecalciferol (VITAMIN D3) 50 MCG (2000 UT) CAPS Take by mouth daily Yes Historical Provider, MD   atorvastatin (LIPITOR) 80 MG tablet Take 1 tablet by mouth nightly Yes Jules Redding MD   hydroCHLOROthiazide (HYDRODIURIL) 25 MG tablet Take 1 tablet by mouth every morning Yes Jules Redding MD   metoprolol tartrate (LOPRESSOR) 50 MG tablet 1.5 tablets two times a day Yes Jules Redding MD   lisinopril (PRINIVIL;ZESTRIL) 20 MG tablet Take 1 tablet by mouth daily Yes Jules Redding MD   acetaminophen-codeine (TYLENOL #4) 300-60 MG per tablet Take 1 tablet by mouth every 4 hours as needed for Pain. Yes Historical Provider, MD   aspirin 325 MG tablet Take 325 mg by mouth daily Yes Historical Provider, MD   Lancets MISC 1 each by Does not apply route 2 times daily Yes Rodney Love MD   blood glucose monitor strips Test 2 times a day & as needed for symptoms of irregular blood glucose. Dispense sufficient amount for indicated testing frequency plus additional to accommodate PRN testing needs. Yes Rodney Love MD   Alcohol Swabs PADS 1 Units by Does not apply route 2 times daily Yes Rodney Love MD   acetaminophen (TYLENOL) 500 MG tablet Take 2 tablets by mouth daily  Patient taking differently: Take 1,000 mg by mouth 2 tabs bid Yes BAN Patel CNP   metFORMIN (GLUCOPHAGE) 500 MG tablet Take 1 tablet by mouth 2 times daily (with meals) Yes BAN Patel CNP   tamsulosin (FLOMAX) 0.4 MG capsule Take 0.4 mg by mouth daily Yes Historical Provider, MD       [x] Medications and dosages reviewed.     ROS:  [x]Full ROS obtained and negative except as mentioned in HPI      Physical Examination:    Vitals:    04/15/22 0831   BP: 132/82   Site: Left Upper Arm Position: Sitting   Cuff Size: Large Adult   Pulse: 75   SpO2: 98%   Weight: 255 lb 14.4 oz (116.1 kg)   Height: 5' 7\" (1.702 m)        · GENERAL: Well developed, well nourished, No acute distress  · NEUROLOGICAL: Alert and oriented  · PSYCH: Calm affect  · SKIN: Warm and dry, No visible rash,   · EYES: Pupils equal and round, Sclera non-icteric,   · HENT:  External ears and nose unremarkable, mucus membranes moist  · MUSCULOSKELETAL: Normal cephalic, neck supple  · CAROTID: Normal upstroke, no bruits  · CARDIAC: JVP normal, Normal PMI, regular rate and rhythm, normal S1S2, No murmur, rub, or gallop  · RESPIRATORY: Normal respiratory effort, clear to auscultation bilaterally  · EXTREMITIES: No LE edema  · GASTROINTESTINAL: normal bowel sounds, soft, non-tender, No hepatomegaly     CABG 11/16/2020    Procedure(s):  MALATHI. TCPB. RT EVH. CABG x 3, LIMA TO DISTAL LAD; SVG TO OM1; SVG TO RPDA. SCOTT CLIP WITH 45mm  ATRICLIP. PLACEMENT OF TEMPORARY VENTRICULAR PACING WIRE. DOPPLER FLOW VERIFICATION OF GRAFTS. BILATERAL INTERCOSTAL NERVE BLOCK WITH EXPAREL & MARCAINE.      ECHO 11/12/2020   Summary   -Mildly reduced global systolic function with an ejection fraction estimated   at 45%. -Inferoseptal, inferior, and inferolateral hypokinesis noted. -There is trivial tricuspid regurgitation with a RVSP estimation of 13 mmHg. -Grade I diastolic dysfunction with normal LV filling pressures. Avg.   E/e'=8.4    CARDIAC CATH 11/2020  Anatomy:   LM-30% distal  LAD-70% mid, 100% distal with left to left collaterals  D1-80% proximal  Cx-100% proximal with left to left collaterals  OM1-100% proximal with left to left collaterals  RCA-100% proximal with right to right and left-to-right collaterals  LVEF-55%     Impression:  1. Severe three-vessel CAD with extensive collateralization. 2.  Preserved LV systolic function with LVEF of 55%. MCOT:  Tracings reviewed  Sinus, 1% PVC's. No afib  Two 2.8 pauses.  Likely during sleep    ECHO 4/2022  Summary   -Left ventricular cavity size is normal.   -Ejection fraction is visually estimated to be 50-55%. -The inferior wall appears mildly hypokinetic.   -Indeterminate diastolic function. E/e' = 12.1.   -Left atrium is dilated. -The aortic valve appears sclerotic but opens well.   -Trivial tricuspid regurgitation.   -Right ventricle is normal in size. Reduced function suggested by TAPSE:   1.58 cm. RVS velocity: 10.2 cm/s. RVSP = 14 mmHG. ASSESSMENT:    CAD:  Stable. Multivessel CAD s/p CABG 11/2020  Stable. Medical therapy  He must change lifestyle or is going to have recurrent issues. Ischemic Cardiomyopathy:  Mild LV dysfunction with inferior HK on ECHO  Continue beta blocker and lisinopril  20 mg  ECHO stable 4/2022      HTN:  /82 (Site: Left Upper Arm, Position: Sitting, Cuff Size: Large Adult)   Pulse 75   Ht 5' 7\" (1.702 m)   Wt 255 lb 14.4 oz (116.1 kg)   SpO2 98%   BMI 40.08 kg/m²   Improved. Continue HCTZ 25. AFIB:  Post op afib  SCOTT closure with surgery  Off of anticoagulation   MCOT with no afib  Continue ASA    Sleep apnea; Severe sleep apnea 7/2021  Stopped CPAP  \"Didn't work\"  Encourage CPAP and f/u but he refuses    Hyperlipidemia:    LDL=72 on lipitor  Encouraged to continue lipitor  He says 'Statins don't work for my family\"  Stopped 30 days ago. He will consider retrying    Plan:  Stable cardiac status  Must change lifestyle and loss weight to feel better.   Recommend he try weight watchers  Not using CPAP and currently stopped lipitor  Discussed at length-f/u 6 months    Thank you for allowing me to participate in the care of this individual.      Be Mayo M.D., [facility] [place]

## 2022-07-27 ENCOUNTER — OFFICE VISIT (OUTPATIENT)
Dept: PAIN MANAGEMENT | Age: 67
End: 2022-07-27
Payer: MEDICAID

## 2022-07-27 VITALS
BODY MASS INDEX: 39.11 KG/M2 | HEART RATE: 80 BPM | DIASTOLIC BLOOD PRESSURE: 75 MMHG | SYSTOLIC BLOOD PRESSURE: 123 MMHG | OXYGEN SATURATION: 97 % | HEIGHT: 67 IN | WEIGHT: 249.2 LBS

## 2022-07-27 DIAGNOSIS — F43.22 ADJUSTMENT DISORDER WITH ANXIETY: ICD-10-CM

## 2022-07-27 DIAGNOSIS — Z51.81 ENCOUNTER FOR THERAPEUTIC DRUG MONITORING: ICD-10-CM

## 2022-07-27 DIAGNOSIS — G89.4 CHRONIC PAIN SYNDROME: ICD-10-CM

## 2022-07-27 DIAGNOSIS — M47.812 CERVICAL SPONDYLOSIS: ICD-10-CM

## 2022-07-27 DIAGNOSIS — M50.20 DISPLACEMENT OF CERVICAL INTERVERTEBRAL DISC WITHOUT MYELOPATHY: Primary | ICD-10-CM

## 2022-07-27 PROCEDURE — 1123F ACP DISCUSS/DSCN MKR DOCD: CPT | Performed by: NURSE PRACTITIONER

## 2022-07-27 PROCEDURE — 99204 OFFICE O/P NEW MOD 45 MIN: CPT | Performed by: NURSE PRACTITIONER

## 2022-07-27 RX ORDER — VIT C/B6/B5/MAGNESIUM/HERB 173 50-5-6-5MG
CAPSULE ORAL DAILY
COMMUNITY

## 2022-07-27 RX ORDER — ACETAMINOPHEN AND CODEINE PHOSPHATE 60; 300 MG/1; MG/1
1 TABLET ORAL 2 TIMES DAILY PRN
Qty: 40 TABLET | Refills: 0 | Status: SHIPPED | OUTPATIENT
Start: 2022-07-27 | End: 2022-08-16

## 2022-07-27 RX ORDER — B COMPLX/C/FOLIC/ZINC/CUP SU/E 500-0.4 MG
TABLET ORAL
COMMUNITY

## 2022-07-27 RX ORDER — MULTIVIT WITH MINERALS/LUTEIN
1000 TABLET ORAL DAILY
COMMUNITY

## 2022-07-27 RX ORDER — UREA 10 %
800 LOTION (ML) TOPICAL DAILY
COMMUNITY

## 2022-07-27 ASSESSMENT — ENCOUNTER SYMPTOMS
SHORTNESS OF BREATH: 1
BACK PAIN: 0
CONSTIPATION: 1
CHEST TIGHTNESS: 1
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
## 2022-09-06 DIAGNOSIS — Z00.6 RESEARCH STUDY PATIENT: Primary | ICD-10-CM

## 2022-09-06 DIAGNOSIS — Z98.890 S/P LEFT ATRIAL APPENDAGE LIGATION: ICD-10-CM

## 2022-09-07 ENCOUNTER — OFFICE VISIT (OUTPATIENT)
Dept: PAIN MANAGEMENT | Age: 67
End: 2022-09-07
Payer: MEDICAID

## 2022-09-07 VITALS
HEART RATE: 68 BPM | OXYGEN SATURATION: 99 % | DIASTOLIC BLOOD PRESSURE: 65 MMHG | WEIGHT: 247 LBS | SYSTOLIC BLOOD PRESSURE: 100 MMHG | BODY MASS INDEX: 38.69 KG/M2

## 2022-09-07 DIAGNOSIS — G89.4 CHRONIC PAIN SYNDROME: ICD-10-CM

## 2022-09-07 DIAGNOSIS — M50.20 DISPLACEMENT OF CERVICAL INTERVERTEBRAL DISC WITHOUT MYELOPATHY: ICD-10-CM

## 2022-09-07 DIAGNOSIS — F43.22 ADJUSTMENT DISORDER WITH ANXIETY: ICD-10-CM

## 2022-09-07 DIAGNOSIS — Z51.81 ENCOUNTER FOR THERAPEUTIC DRUG MONITORING: ICD-10-CM

## 2022-09-07 DIAGNOSIS — M47.812 CERVICAL SPONDYLOSIS: ICD-10-CM

## 2022-09-07 PROCEDURE — 99213 OFFICE O/P EST LOW 20 MIN: CPT | Performed by: NURSE PRACTITIONER

## 2022-09-07 PROCEDURE — 1123F ACP DISCUSS/DSCN MKR DOCD: CPT | Performed by: NURSE PRACTITIONER

## 2022-09-07 RX ORDER — ACETAMINOPHEN AND CODEINE PHOSPHATE 60; 300 MG/1; MG/1
1 TABLET ORAL 2 TIMES DAILY
Qty: 40 TABLET | Refills: 0 | Status: CANCELLED | OUTPATIENT
Start: 2022-09-07 | End: 2022-09-27

## 2022-09-07 RX ORDER — NALOXONE HYDROCHLORIDE 4 MG/.1ML
1 SPRAY NASAL PRN
Qty: 1 EACH | Refills: 0 | Status: SHIPPED | OUTPATIENT
Start: 2022-09-07

## 2022-09-07 NOTE — PROGRESS NOTES
Sterling Krueger  1955  [id number]      HISTORY OF PRESENT ILLNESS: Mr. Elyssa Douglass is a 77 y.o. male returns for a follow up visit for pain management  He has a diagnosis of   1. Displacement of cervical intervertebral disc without myelopathy    2. Cervical spondylosis    3. Encounter for therapeutic drug monitoring    4. Adjustment disorder with anxiety    5. Chronic pain syndrome    . As per Information Obtained from the PADT (Patient Assessment and Documentation Tool)    He complains of pain in the  neck and kaci arms. He rates the pain 4/10 and describes it as aching, burning. Current treatment regimen has helped relieve about 50% of the pain. He denies any side effects from the current pain regimen. Patient reports that since the last follow up visit the physical functioning is unchanged, family/social relationships are unchanged, mood is unchanged sleep patterns are unchanged, and that the overall functioning is unchanged. Patient denies misusing/abusing his narcotic pain medications or using any illegal drugs. Upon obtaining medical history from Mr. Elyssa Douglass    ALLERGIES: Patients list of allergies were reviewed     MEDICATIONS: Mr. Elyssa Douglass list of medications were reviewed. His current medications are   Outpatient Medications Prior to Visit   Medication Sig Dispense Refill    Flaxseed, Linseed, (FLAXSEED OIL PO) Take by mouth      Coenzyme Q10 (COQ-10 PO) Take 80 mg by mouth daily      EQL VITAMIN E PO Take by mouth      VANADYL SULFATE PO Take by mouth      NATURAL VITAMIN D PO Take by mouth      Multiple Vitamins-Minerals (MULTIVITAMIN/ZINC STRESS) TABS Take by mouth      Nutritional Supplements (GRAPESEED EXTRACT PO) Take by mouth      turmeric 500 MG CAPS Take by mouth daily      Ascorbic Acid (VITAMIN C) 1000 MG tablet Take 1,000 mg by mouth in the morning. Methylsulfonylmethane (MSM PO) Take by mouth      folic acid (FOLVITE) 315 MCG tablet Take 800 mcg by mouth in the morning. noted. He is not diaphoretic. Cardiovascular: Normal rate, regular rhythm, normal heart sounds, and does not have murmur. Pulmonary/Chest: Effort normal. No respiratory distress. He does not have wheezes in the lung fields. He has no rales. Neurological/Psychiatric:He is alert and oriented to person, place, and time. Coordination is  normal.  His mood isAppropriate and affect is Neutral/Euthymic(normal) . Prescription pain medication monitoring:                  MEDD current = less than 10              ORT Score = 0              Other Risk factors - stable, well mood              Date of Last Medication Agreement:07/27/2022              Date Naloxone prescribed:09/07/2022              UDT:                        Date of last UDT: 07/27/2022                                              Adverse report: No              [place]:                          Checked today: Yes                          Adverse report: no    IMPRESSION:   1. Displacement of cervical intervertebral disc without myelopathy    2. Cervical spondylosis    3. Encounter for therapeutic drug monitoring    4. Adjustment disorder with anxiety    5. Chronic pain syndrome        PLAN:  Informed verbal consent was obtained:    -Continue Tylenol #4 PRN, he takes this on Sundays.  -he does not need refill today  -Continue Voltaren    Analgesic Plan:              Continue present regimen:yes              Adjust dose of present analgesic:no              Switch analgesics:no              Add/Adjust concomitant therapy:no      Current Outpatient Medications   Medication Sig Dispense Refill    Flaxseed, Linseed, (FLAXSEED OIL PO) Take by mouth      Coenzyme Q10 (COQ-10 PO) Take 80 mg by mouth daily      EQL VITAMIN E PO Take by mouth      VANADYL SULFATE PO Take by mouth      NATURAL VITAMIN D PO Take by mouth      Multiple Vitamins-Minerals (MULTIVITAMIN/ZINC STRESS) TABS Take by mouth      Nutritional Supplements (GRAPESEED EXTRACT PO) Take by mouth turmeric 500 MG CAPS Take by mouth daily      Ascorbic Acid (VITAMIN C) 1000 MG tablet Take 1,000 mg by mouth in the morning. Methylsulfonylmethane (MSM PO) Take by mouth      folic acid (FOLVITE) 171 MCG tablet Take 800 mcg by mouth in the morning. RESVERATROL PO Take by mouth      Cholecalciferol (VITAMIN D3) 50 MCG (2000 UT) CAPS Take by mouth daily      atorvastatin (LIPITOR) 80 MG tablet Take 1 tablet by mouth nightly 90 tablet 4    hydroCHLOROthiazide (HYDRODIURIL) 25 MG tablet Take 1 tablet by mouth every morning 90 tablet 4    metoprolol tartrate (LOPRESSOR) 50 MG tablet 1.5 tablets two times a day 270 tablet 4    lisinopril (PRINIVIL;ZESTRIL) 20 MG tablet Take 1 tablet by mouth daily 90 tablet 4    acetaminophen-codeine (TYLENOL #4) 300-60 MG per tablet Take 1 tablet by mouth every 4 hours as needed for Pain. aspirin 325 MG tablet Take 325 mg by mouth daily      Lancets MISC 1 each by Does not apply route 2 times daily 100 each 0    blood glucose monitor strips Test 2 times a day & as needed for symptoms of irregular blood glucose. Dispense sufficient amount for indicated testing frequency plus additional to accommodate PRN testing needs. 100 strip 0    Alcohol Swabs PADS 1 Units by Does not apply route 2 times daily 100 each 0    metFORMIN (GLUCOPHAGE) 500 MG tablet Take 1 tablet by mouth 2 times daily (with meals) 60 tablet 3    tamsulosin (FLOMAX) 0.4 MG capsule Take 0.4 mg by mouth daily      diclofenac sodium (VOLTAREN) 1 % GEL Apply 2 g topically 2 times daily as needed for Pain 100 g 0     No current facility-administered medications for this visit. I will continue his current medication regimen  which is part of the above treatment schedule.  It has been helping with Mr. Kiran Covert chronic  medical problems which for this visit include:   Diagnoses of Displacement of cervical intervertebral disc without myelopathy, Cervical spondylosis, Encounter for therapeutic drug monitoring, Adjustment disorder with anxiety, and Chronic pain syndrome were pertinent to this visit. Risks and benefits of the medications and other alternative treatments  including no treatment were discussed with the patient. The common side effects of these medications were also explained to the patient. Informed verbal consent was obtained. Goals of current treatment regimen include improvement in pain, restoration of functioning- with focus on improvement in physical performance, general activity, work or disability,emotional distress, health care utilization and  decreased medication consumption. Will continue to monitor progress towards achieving/maintaining therapeutic goals with special emphasis on  1. Improvement in perceived interfernce  of pain with ADL's. Ability to do home exercises independently. Ability to do household chores indoor and/or outdoor work and social and leisure activities. Improve psychosocial and physical functioning. PROGRESSING      He was advised against drinking alcohol with the narcotic pain medicines, advised against driving or handling machinery while adjusting the dose of medicines or if having cognitive  issues related to the current medications. Risk of overdose and death, if medicines not taken as prescribed, were also discussed. If the patient develops new symptoms or if the symptoms worsen, the patient should call the office. While transcribing every attempt was made to maintain the accuracy of the note in terms of it's contents,there may have been some errors made inadvertently. Thank you for allowing me to participate in the care of this patient.     DIMPLE Bell    Cc: Marcus Camarena MD

## 2022-09-14 PROBLEM — E78.49 OTHER HYPERLIPIDEMIA: Status: ACTIVE | Noted: 2021-03-05

## 2022-10-06 ENCOUNTER — OFFICE VISIT (OUTPATIENT)
Dept: CARDIOLOGY CLINIC | Age: 67
End: 2022-10-06
Payer: MEDICAID

## 2022-10-06 ENCOUNTER — TELEPHONE (OUTPATIENT)
Dept: CARDIOLOGY CLINIC | Age: 67
End: 2022-10-06

## 2022-10-06 VITALS
WEIGHT: 242.9 LBS | HEART RATE: 71 BPM | BODY MASS INDEX: 38.12 KG/M2 | SYSTOLIC BLOOD PRESSURE: 114 MMHG | HEIGHT: 67 IN | OXYGEN SATURATION: 98 % | DIASTOLIC BLOOD PRESSURE: 78 MMHG

## 2022-10-06 DIAGNOSIS — I50.22 CHRONIC SYSTOLIC HEART FAILURE (HCC): ICD-10-CM

## 2022-10-06 DIAGNOSIS — E78.49 OTHER HYPERLIPIDEMIA: ICD-10-CM

## 2022-10-06 DIAGNOSIS — I10 ESSENTIAL HYPERTENSION: ICD-10-CM

## 2022-10-06 DIAGNOSIS — I25.10 CORONARY ARTERY DISEASE DUE TO LIPID RICH PLAQUE: Primary | ICD-10-CM

## 2022-10-06 DIAGNOSIS — I25.5 ISCHEMIC CARDIOMYOPATHY: ICD-10-CM

## 2022-10-06 DIAGNOSIS — I25.83 CORONARY ARTERY DISEASE DUE TO LIPID RICH PLAQUE: Primary | ICD-10-CM

## 2022-10-06 PROCEDURE — 1123F ACP DISCUSS/DSCN MKR DOCD: CPT | Performed by: INTERNAL MEDICINE

## 2022-10-06 PROCEDURE — 99214 OFFICE O/P EST MOD 30 MIN: CPT | Performed by: INTERNAL MEDICINE

## 2022-10-06 PROCEDURE — 93000 ELECTROCARDIOGRAM COMPLETE: CPT | Performed by: INTERNAL MEDICINE

## 2022-10-06 RX ORDER — HYDROCHLOROTHIAZIDE 25 MG/1
25 TABLET ORAL EVERY MORNING
Qty: 90 TABLET | Refills: 4 | Status: SHIPPED | OUTPATIENT
Start: 2022-10-06

## 2022-10-06 RX ORDER — ATORVASTATIN CALCIUM 80 MG/1
80 TABLET, FILM COATED ORAL NIGHTLY
Qty: 90 TABLET | Refills: 4 | Status: CANCELLED | OUTPATIENT
Start: 2022-10-06

## 2022-10-06 RX ORDER — METOPROLOL TARTRATE 50 MG/1
TABLET, FILM COATED ORAL
Qty: 270 TABLET | Refills: 4 | Status: SHIPPED | OUTPATIENT
Start: 2022-10-06 | End: 2022-10-12

## 2022-10-06 RX ORDER — ROSUVASTATIN CALCIUM 20 MG/1
20 TABLET, COATED ORAL DAILY
Qty: 90 TABLET | Refills: 1 | Status: SHIPPED | OUTPATIENT
Start: 2022-10-06

## 2022-10-06 RX ORDER — LISINOPRIL 20 MG/1
20 TABLET ORAL DAILY
Qty: 90 TABLET | Refills: 4 | Status: SHIPPED | OUTPATIENT
Start: 2022-10-06

## 2022-10-06 NOTE — PROGRESS NOTES
[facility]  Cardiac Consult     Referring Provider:  Fredy Lai MD     Chief Complaint   Patient presents with    Coronary Artery Disease    Hypertension    Hyperlipidemia    Cardiomyopathy    Chest Pain    Shortness of Breath    6 Month Follow-Up        History of Present Illness:  77 y.o. male seen in f/u for CAD s/p CABG. He underwent CABG 2020 for multivessel CAD. LV function normal on cath, mildly decreased with inferior HK on ECHO. He had post-op afib treated with short term amiodarone and was placed on coumadin. No obvious or symptomatic recurrence. He did undergo SCOTT clipping with surgery. He is doing OK. He has lost 15 lbs. Chronic chest pain since CABG. Chest soreness. Improved by smoking. \"I am on my second pack. It is the only thing that helps\". Not using CPAP. Stopped lipitor. \"My sister said that statins don't work for my family\". Past Medical History:   has a past medical history of Anxiety, Atrial fibrillation ([address] Cervical disc herniation, Cervical spondylosis, Coronary artery disease due to lipid rich plaque, HLD (hyperlipidemia), HTN (hypertension), Neck sprain, Obesity (BMI 30-39.9), Post traumatic stress disorder, Rotator cuff (capsule) sprain and strain, Sprain of shoulder, left, and Sprain of shoulder, right. Surgical History:   has a past surgical history that includes Ankle surgery (Right); hernia repair (Right); joint replacement; and Coronary artery bypass graft (N/A, 2020). Social History:  Social History     Tobacco Use    Smoking status: Every Day     Packs/day: 0.50     Types: Cigarettes     Last attempt to quit: 2020     Years since quittin.2    Smokeless tobacco: Never   Substance Use Topics    Alcohol use: Not Currently     Alcohol/week: 0.8 standard drinks     Types: 1 Standard drinks or equivalent per week        Family History:  family history includes Heart Disease in his father.      Allergies:  Patient has no known allergies. Home Medications:  Prior to Visit Medications    Medication Sig Taking? Authorizing Provider   hydroCHLOROthiazide (HYDRODIURIL) 25 MG tablet Take 1 tablet by mouth every morning Yes Julee Tate MD   metoprolol tartrate (LOPRESSOR) 50 MG tablet 1.5 tablets two times a day Yes Julee Tate MD   lisinopril (PRINIVIL;ZESTRIL) 20 MG tablet Take 1 tablet by mouth daily Yes Julee Tate MD   rosuvastatin (CRESTOR) 20 MG tablet Take 1 tablet by mouth daily Yes Julee Tate MD   naloxone [facility]) 4 MG/0.1ML LIQD nasal spray 1 spray by Nasal route as needed for Opioid Reversal Yes Royer Ferro, APRN - CNP   Flaxseed, Linseed, (FLAXSEED OIL PO) Take by mouth Yes Historical Provider, MD   Coenzyme Q10 (COQ-10 PO) Take 80 mg by mouth daily Yes Historical Provider, MD   EQL VITAMIN E PO Take by mouth Yes Historical Provider, MD   VANADYL SULFATE PO Take by mouth Yes Historical Provider, MD   NATURAL VITAMIN D PO Take by mouth Yes Historical Provider, MD   Multiple Vitamins-Minerals (MULTIVITAMIN/ZINC STRESS) TABS Take by mouth Yes Historical Provider, MD   Nutritional Supplements (GRAPESEED EXTRACT PO) Take by mouth Yes Historical Provider, MD   turmeric 500 MG CAPS Take by mouth daily Yes Historical Provider, MD   Ascorbic Acid (VITAMIN C) 1000 MG tablet Take 1,000 mg by mouth in the morning. Yes Historical Provider, MD   Methylsulfonylmethane (MSM PO) Take by mouth Yes Historical Provider, MD   folic acid (FOLVITE) 546 MCG tablet Take 800 mcg by mouth in the morning. Yes Historical Provider, MD   RESVERATROL PO Take by mouth Yes Historical Provider, MD   Cholecalciferol (VITAMIN D3) 50 MCG (2000 UT) CAPS Take by mouth daily Yes Historical Provider, MD   acetaminophen-codeine (TYLENOL #4) 300-60 MG per tablet Take 1 tablet by mouth every 4 hours as needed for Pain.  Yes Historical Provider, MD   aspirin 325 MG tablet Take 325 mg by mouth daily Yes Historical Provider, MD   Lancets MISC 1 each by Does not apply route 2 times daily Yes Heron Albright MD   blood glucose monitor strips Test 2 times a day & as needed for symptoms of irregular blood glucose. Dispense sufficient amount for indicated testing frequency plus additional to accommodate PRN testing needs. Yes Heron Albright MD   Alcohol Swabs PADS 1 Units by Does not apply route 2 times daily Yes Heron Albright MD   metFORMIN (GLUCOPHAGE) 500 MG tablet Take 1 tablet by mouth 2 times daily (with meals)  Patient taking differently: Take 500 mg by mouth daily Yes BAN Licona CNP   tamsulosin (FLOMAX) 0.4 MG capsule Take 0.4 mg by mouth daily Yes Historical Provider, MD   diclofenac sodium (VOLTAREN) 1 % GEL Apply 2 g topically 2 times daily as needed for Pain  BAN Schaeffer CNP       [x] Medications and dosages reviewed. ROS:  [x]Full ROS obtained and negative except as mentioned in HPI      Physical Examination:    Vitals:    10/06/22 0905   BP: 114/78   Site: Left Upper Arm   Position: Sitting   Cuff Size: Large Adult   Pulse: 71   SpO2: 98%   Weight: 242 lb 14.4 oz (110.2 kg)   Height: 5' 7\" (1.702 m)          GENERAL: Well developed, well nourished, No acute distress  NEUROLOGICAL: Alert and oriented  PSYCH: Calm affect  SKIN: Warm and dry, No visible rash,   EYES: Pupils equal and round, Sclera non-icteric,   HENT:  External ears and nose unremarkable, mucus membranes moist  MUSCULOSKELETAL: Normal cephalic, neck supple  CAROTID: Normal upstroke, no bruits  CARDIAC: JVP normal, Normal PMI, regular rate and rhythm, normal S1S2, No murmur, rub, or gallop  RESPIRATORY: Normal respiratory effort, clear to auscultation bilaterally  EXTREMITIES: No LE edema  GASTROINTESTINAL: normal bowel sounds, soft, non-tender, No hepatomegaly     CABG 11/16/2020    Procedure(s):  MALATHI. TCPB. RT EVH. CABG x 3, LIMA TO DISTAL LAD; SVG TO OM1; SVG TO RPDA. SCOTT CLIP WITH 45mm  ATRICLIP. PLACEMENT OF TEMPORARY VENTRICULAR PACING WIRE.  DOPPLER FLOW VERIFICATION OF GRAFTS. BILATERAL INTERCOSTAL NERVE BLOCK WITH EXPAREL & MARCAINE.      ECHO 11/12/2020   Summary   -Mildly reduced global systolic function with an ejection fraction estimated   at 45%. -Inferoseptal, inferior, and inferolateral hypokinesis noted. -There is trivial tricuspid regurgitation with a RVSP estimation of 13 mmHg. -Grade I diastolic dysfunction with normal LV filling pressures. Avg.   E/e'=8.4    CARDIAC CATH 11/2020  Anatomy:   LM-30% distal  LAD-70% mid, 100% distal with left to left collaterals  D1-80% proximal  Cx-100% proximal with left to left collaterals  OM1-100% proximal with left to left collaterals  RCA-100% proximal with right to right and left-to-right collaterals  LVEF-55%     Impression:  1. Severe three-vessel CAD with extensive collateralization. 2.  Preserved LV systolic function with LVEF of 55%. MCOT:  Tracings reviewed  Sinus, 1% PVC's. No afib  Two 2.8 pauses. Likely during sleep    ECHO 4/2022  Summary   -Left ventricular cavity size is normal.   -Ejection fraction is visually estimated to be 50-55%. -The inferior wall appears mildly hypokinetic.   -Indeterminate diastolic function. E/e' = 12.1.   -Left atrium is dilated. -The aortic valve appears sclerotic but opens well.   -Trivial tricuspid regurgitation.   -Right ventricle is normal in size. Reduced function suggested by TAPSE:   1.58 cm. RVS velocity: 10.2 cm/s. RVSP = 14 mmHG. EKG: NSR, RBBB, new    ASSESSMENT:    CAD:  Stable. Multivessel CAD s/p CABG 11/2020  Stable. Medical therapy  Lifestyle modification    Ischemic Cardiomyopathy:  Mild LV dysfunction with inferior HK on ECHO  Continue beta blocker and lisinopril  20 mg  ECHO stable 4/2022      HTN:  /78 (Site: Left Upper Arm, Position: Sitting, Cuff Size: Large Adult)   Pulse 71   Ht 5' 7\" (1.702 m)   Wt 242 lb 14.4 oz (110.2 kg)   SpO2 98%   BMI 38.04 kg/m²   Improved. Continue HCTZ 25.      AFIB:  Post op afib  SCOTT closure with surgery  Off of anticoagulation   MCOT with no afib  Continue ASA  Plan for CT through [facility] to verify closure    Sleep apnea; Severe sleep apnea 7/2021  Stopped CPAP  \"Didn't work\"  He agrees to see Dr. Dana Lam back to discuss    Hyperlipidemia:    Stopped lipitor as above  Agrees to try crestor 20. Lipids 3 months    New RBBB:  Possibly due to RV strain from untreated sleep apnea.   Recommend he see sleep medicine again  Check ECHO for RV on f/u    Plan:  Same meds  Continue weight loss  See sleep medicine  Crestor 20  F/u 3 months with lipids and echo    Thank you for allowing me to participate in the care of this individual.      Charles Amos M.D., [facility]

## 2022-10-06 NOTE — PATIENT INSTRUCTIONS
Follow up with Dr Marilyn Bardales in 3 months   Continue to work on [employer] and exercise. Stop smoking. See Dr Ismael Krishnan for ideas on sleep soon. Call for any questions or concerns.

## 2022-10-06 NOTE — TELEPHONE ENCOUNTER
Pt called Dr. Damián Coppola office and the next appt will be in Jan. 2023 after [facility] appt. Pt stated they can get Pt in with a NP sometime in Jan.  Pt is wanting to know if he should schedule with a NP are wait to schedule with Dr. Damián Coppola .   Please advise

## 2022-10-12 DIAGNOSIS — I25.10 CORONARY ARTERY DISEASE DUE TO LIPID RICH PLAQUE: ICD-10-CM

## 2022-10-12 DIAGNOSIS — I25.83 CORONARY ARTERY DISEASE DUE TO LIPID RICH PLAQUE: ICD-10-CM

## 2022-10-12 DIAGNOSIS — I25.5 ISCHEMIC CARDIOMYOPATHY: ICD-10-CM

## 2022-10-12 DIAGNOSIS — I10 ESSENTIAL HYPERTENSION: ICD-10-CM

## 2022-10-12 RX ORDER — METOPROLOL TARTRATE 75 MG/1
TABLET, FILM COATED ORAL
Qty: 180 TABLET | Refills: 4 | Status: SHIPPED | OUTPATIENT
Start: 2022-10-12

## 2022-10-27 ENCOUNTER — HOSPITAL ENCOUNTER (OUTPATIENT)
Dept: CT IMAGING | Age: 67
Discharge: HOME | End: 2022-10-27
Payer: MEDICAID

## 2022-10-27 DIAGNOSIS — Z00.6 RESEARCH STUDY PATIENT: ICD-10-CM

## 2022-10-27 DIAGNOSIS — Z98.890 S/P LEFT ATRIAL APPENDAGE LIGATION: ICD-10-CM

## 2022-10-27 LAB
CREAT SERPL-MCNC: 1.3 MG/DL (ref 0.8–1.3)
GFR SERPL CREATININE-BSD FRML MDRD: >60 ML/MIN/{1.73_M2}

## 2022-10-27 PROCEDURE — 75574 CT ANGIO HRT W/3D IMAGE: CPT

## 2022-10-27 PROCEDURE — 36415 COLL VENOUS BLD VENIPUNCTURE: CPT

## 2022-10-27 PROCEDURE — 6360000004 HC RX CONTRAST MEDICATION: Performed by: PSYCHIATRY & NEUROLOGY

## 2022-10-27 PROCEDURE — 82565 ASSAY OF CREATININE: CPT

## 2022-10-27 RX ADMIN — IOPAMIDOL 70 ML: 755 INJECTION, SOLUTION INTRAVENOUS at 10:53

## 2022-11-02 ENCOUNTER — OFFICE VISIT (OUTPATIENT)
Dept: PAIN MANAGEMENT | Age: 67
End: 2022-11-02
Payer: COMMERCIAL

## 2022-11-02 VITALS
OXYGEN SATURATION: 100 % | SYSTOLIC BLOOD PRESSURE: 124 MMHG | HEART RATE: 75 BPM | BODY MASS INDEX: 37.34 KG/M2 | DIASTOLIC BLOOD PRESSURE: 80 MMHG | WEIGHT: 238.4 LBS

## 2022-11-02 DIAGNOSIS — G89.4 CHRONIC PAIN SYNDROME: ICD-10-CM

## 2022-11-02 DIAGNOSIS — M50.20 DISPLACEMENT OF CERVICAL INTERVERTEBRAL DISC WITHOUT MYELOPATHY: ICD-10-CM

## 2022-11-02 DIAGNOSIS — Z51.81 ENCOUNTER FOR THERAPEUTIC DRUG MONITORING: ICD-10-CM

## 2022-11-02 DIAGNOSIS — F43.22 ADJUSTMENT DISORDER WITH ANXIETY: ICD-10-CM

## 2022-11-02 DIAGNOSIS — M47.812 CERVICAL SPONDYLOSIS: ICD-10-CM

## 2022-11-02 PROCEDURE — 3078F DIAST BP <80 MM HG: CPT | Performed by: NURSE PRACTITIONER

## 2022-11-02 PROCEDURE — 1123F ACP DISCUSS/DSCN MKR DOCD: CPT | Performed by: NURSE PRACTITIONER

## 2022-11-02 PROCEDURE — 3074F SYST BP LT 130 MM HG: CPT | Performed by: NURSE PRACTITIONER

## 2022-11-02 PROCEDURE — 99213 OFFICE O/P EST LOW 20 MIN: CPT | Performed by: NURSE PRACTITIONER

## 2022-11-02 RX ORDER — ACETAMINOPHEN AND CODEINE PHOSPHATE 60; 300 MG/1; MG/1
1 TABLET ORAL 2 TIMES DAILY
Qty: 40 TABLET | Refills: 0 | Status: SHIPPED | OUTPATIENT
Start: 2022-11-02 | End: 2022-11-22

## 2022-11-02 NOTE — PROGRESS NOTES
[facility]  1955  [id number]      HISTORY OF PRESENT ILLNESS: Mr. Tulio Wagner is a 77 y.o. male returns for a follow up visit for pain management  He has a diagnosis of   1. Displacement of cervical intervertebral disc without myelopathy    2. Cervical spondylosis    3. Encounter for therapeutic drug monitoring    4. Adjustment disorder with anxiety    5. Chronic pain syndrome    . As per Information Obtained from the PADT (Patient Assessment and Documentation Tool)    He complains of pain in the  neck. He rates the pain 3/10 and describes it as aching, shooting. Current treatment regimen has helped relieve about 70% of the pain. He denies any side effects from the current pain regimen. Patient reports that since the last follow up visit the physical functioning is unchanged, family/social relationships are unchanged, mood is unchanged sleep patterns are unchanged, and that the overall functioning is unchanged. Patient denies misusing/abusing his narcotic pain medications or using any illegal drugs. Upon obtaining medical history from Mr. Tulio Wagner    ALLERGIES: Patients list of allergies were reviewed     MEDICATIONS: Mr. Tulio Wagner list of medications were reviewed. His current medications are   Outpatient Medications Prior to Visit   Medication Sig Dispense Refill    metoprolol tartrate 75 MG TABS Take 1 tablet by mouth two times a day 180 tablet 4    hydroCHLOROthiazide (HYDRODIURIL) 25 MG tablet Take 1 tablet by mouth every morning 90 tablet 4    lisinopril (PRINIVIL;ZESTRIL) 20 MG tablet Take 1 tablet by mouth daily 90 tablet 4    rosuvastatin (CRESTOR) 20 MG tablet Take 1 tablet by mouth daily 90 tablet 1    naloxone (NARCAN) 4 MG/0.1ML LIQD nasal spray 1 spray by Nasal route as needed for Opioid Reversal 1 each 0    Flaxseed, Linseed, (FLAXSEED OIL PO) Take by mouth      Coenzyme Q10 (COQ-10 PO) Take 80 mg by mouth daily      EQL VITAMIN E PO Take by mouth      VANADYL SULFATE PO Take by mouth      NATURAL VITAMIN D PO Take by mouth      Multiple Vitamins-Minerals (MULTIVITAMIN/ZINC STRESS) TABS Take by mouth      Nutritional Supplements (GRAPESEED EXTRACT PO) Take by mouth      turmeric 500 MG CAPS Take by mouth daily      Ascorbic Acid (VITAMIN C) 1000 MG tablet Take 1,000 mg by mouth in the morning. Methylsulfonylmethane (MSM PO) Take by mouth      folic acid (FOLVITE) 771 MCG tablet Take 800 mcg by mouth in the morning. RESVERATROL PO Take by mouth      Cholecalciferol (VITAMIN D3) 50 MCG (2000 UT) CAPS Take by mouth daily      acetaminophen-codeine (TYLENOL #4) 300-60 MG per tablet Take 1 tablet by mouth every 4 hours as needed for Pain. aspirin 325 MG tablet Take 325 mg by mouth daily      Lancets MISC 1 each by Does not apply route 2 times daily 100 each 0    blood glucose monitor strips Test 2 times a day & as needed for symptoms of irregular blood glucose. Dispense sufficient amount for indicated testing frequency plus additional to accommodate PRN testing needs. 100 strip 0    Alcohol Swabs PADS 1 Units by Does not apply route 2 times daily 100 each 0    metFORMIN (GLUCOPHAGE) 500 MG tablet Take 1 tablet by mouth 2 times daily (with meals) (Patient taking differently: Take 500 mg by mouth daily) 60 tablet 3    tamsulosin (FLOMAX) 0.4 MG capsule Take 0.4 mg by mouth daily      diclofenac sodium (VOLTAREN) 1 % GEL Apply 2 g topically 2 times daily as needed for Pain 100 g 0     No facility-administered medications prior to visit. SOCIAL/FAMILY/PAST MEDICAL HISTORY: Mr. Gio Cabrera, family and past medical history was reviewed. REVIEW OF SYSTEMS:    Respiratory: Negative for apnea, chest tightness and shortness of breath or change in baseline breathing. Gastrointestinal: Negative for nausea, vomiting, abdominal pain, diarrhea, constipation, blood in stool and abdominal distention. PHYSICAL EXAM:   Nursing note and vitals reviewed.  /80   Pulse 75 Wt 238 lb 6.4 oz (108.1 kg)   SpO2 100%   BMI 37.34 kg/m²   Constitutional: He appears well-developed and well-nourished. No acute distress. Skin: Skin is warm and dry, good turgor. No rash noted. He is not diaphoretic. Cardiovascular: Normal rate, regular rhythm, normal heart sounds, and does not have murmur. Pulmonary/Chest: Effort normal. No respiratory distress. He does not have wheezes in the lung fields. He has no rales. Neurological/Psychiatric:He is alert and oriented to person, place, and time. Coordination is  normal.  His mood isAppropriate and affect is Neutral/Euthymic(normal) . Prescription pain medication monitoring:                  MEDD current = less than 10              ORT Score = 0              Other Risk factors - no red flags              Date of Last Medication Agreement: 07/27/2022              Date Naloxone prescribed:09/07/2022              UDT:                          Date of last UDT: 07/27/2022                          Adverse report: No               OARRS:                          Checked today: Yes                          Adverse report: No    IMPRESSION:   1. Displacement of cervical intervertebral disc without myelopathy    2. Cervical spondylosis    3. Encounter for therapeutic drug monitoring    4. Adjustment disorder with anxiety    5.  Chronic pain syndrome        PLAN:  Informed verbal consent was obtained:  -continue tylenol #4, #40, he takes sparingly, usually for work on sundays  -F/U 3 months    Analgesic Plan:              Continue present regimen: yes              Adjust dose of present analgesic:no              Switch analgesics:no              Add/Adjust concomitant therapy:no      Current Outpatient Medications   Medication Sig Dispense Refill    metoprolol tartrate 75 MG TABS Take 1 tablet by mouth two times a day 180 tablet 4    hydroCHLOROthiazide (HYDRODIURIL) 25 MG tablet Take 1 tablet by mouth every morning 90 tablet 4    lisinopril (PRINIVIL;ZESTRIL) 20 MG tablet Take 1 tablet by mouth daily 90 tablet 4    rosuvastatin (CRESTOR) 20 MG tablet Take 1 tablet by mouth daily 90 tablet 1    naloxone (NARCAN) 4 MG/0.1ML LIQD nasal spray 1 spray by Nasal route as needed for Opioid Reversal 1 each 0    Flaxseed, Linseed, (FLAXSEED OIL PO) Take by mouth      Coenzyme Q10 (COQ-10 PO) Take 80 mg by mouth daily      EQL VITAMIN E PO Take by mouth      VANADYL SULFATE PO Take by mouth      NATURAL VITAMIN D PO Take by mouth      Multiple Vitamins-Minerals (MULTIVITAMIN/ZINC STRESS) TABS Take by mouth      Nutritional Supplements (GRAPESEED EXTRACT PO) Take by mouth      turmeric 500 MG CAPS Take by mouth daily      Ascorbic Acid (VITAMIN C) 1000 MG tablet Take 1,000 mg by mouth in the morning. Methylsulfonylmethane (MSM PO) Take by mouth      folic acid (FOLVITE) 138 MCG tablet Take 800 mcg by mouth in the morning. RESVERATROL PO Take by mouth      Cholecalciferol (VITAMIN D3) 50 MCG (2000 UT) CAPS Take by mouth daily      acetaminophen-codeine (TYLENOL #4) 300-60 MG per tablet Take 1 tablet by mouth every 4 hours as needed for Pain. aspirin 325 MG tablet Take 325 mg by mouth daily      Lancets MISC 1 each by Does not apply route 2 times daily 100 each 0    blood glucose monitor strips Test 2 times a day & as needed for symptoms of irregular blood glucose. Dispense sufficient amount for indicated testing frequency plus additional to accommodate PRN testing needs. 100 strip 0    Alcohol Swabs PADS 1 Units by Does not apply route 2 times daily 100 each 0    metFORMIN (GLUCOPHAGE) 500 MG tablet Take 1 tablet by mouth 2 times daily (with meals) (Patient taking differently: Take 500 mg by mouth daily) 60 tablet 3    tamsulosin (FLOMAX) 0.4 MG capsule Take 0.4 mg by mouth daily      diclofenac sodium (VOLTAREN) 1 % GEL Apply 2 g topically 2 times daily as needed for Pain 100 g 0     No current facility-administered medications for this visit.      I will continue his current medication regimen  which is part of the above treatment schedule. It has been helping with Mr. Sarah Devine chronic  medical problems which for this visit include:   Diagnoses of Displacement of cervical intervertebral disc without myelopathy, Cervical spondylosis, Encounter for therapeutic drug monitoring, Adjustment disorder with anxiety, and Chronic pain syndrome were pertinent to this visit. Risks and benefits of the medications and other alternative treatments  including no treatment were discussed with the patient. The common side effects of these medications were also explained to the patient. Informed verbal consent was obtained. Goals of current treatment regimen include improvement in pain, restoration of functioning- with focus on improvement in physical performance, general activity, work or disability,emotional distress, health care utilization and  decreased medication consumption. Will continue to monitor progress towards achieving/maintaining therapeutic goals with special emphasis on  1. Improvement in perceived interfernce  of pain with ADL's. Ability to do home exercises independently. Ability to do household chores indoor and/or outdoor work and social and leisure activities. Improve psychosocial and physical functioning. - he is maintaining his treatment goal with the current regimen. He was advised against drinking alcohol with the narcotic pain medicines, advised against driving or handling machinery while adjusting the dose of medicines or if having cognitive  issues related to the current medications. Risk of overdose and death, if medicines not taken as prescribed, were also discussed. If the patient develops new symptoms or if the symptoms worsen, the patient should call the office. While transcribing every attempt was made to maintain the accuracy of the note in terms of it's contents,there may have been some errors made inadvertently.   Thank you for allowing me to participate in the care of this patient.     DIMPLE Lopez    Cc: Aimee Velazquez MD

## 2023-01-05 ENCOUNTER — TELEPHONE (OUTPATIENT)
Dept: PULMONOLOGY | Age: 68
End: 2023-01-05

## 2023-01-05 NOTE — TELEPHONE ENCOUNTER
Pt VAMSI stating he wants to be texted the [facility] address. He said not to call him as he didn't have his phone on him at the moment. I'm unaware if we are allowed to text. Please advise.     Ph. [phone]

## 2023-01-19 ENCOUNTER — TELEPHONE (OUTPATIENT)
Dept: CARDIOLOGY CLINIC | Age: 68
End: 2023-01-19

## 2023-01-19 ENCOUNTER — HOSPITAL ENCOUNTER (OUTPATIENT)
Dept: NON INVASIVE DIAGNOSTICS | Age: 68
Discharge: HOME | End: 2023-01-19
Payer: MEDICAID

## 2023-01-19 DIAGNOSIS — I25.5 ISCHEMIC CARDIOMYOPATHY: ICD-10-CM

## 2023-01-19 DIAGNOSIS — I25.10 CORONARY ARTERY DISEASE DUE TO LIPID RICH PLAQUE: ICD-10-CM

## 2023-01-19 DIAGNOSIS — I25.83 CORONARY ARTERY DISEASE DUE TO LIPID RICH PLAQUE: ICD-10-CM

## 2023-01-19 LAB
LV EF: 55 %
LVEF MODALITY: NORMAL

## 2023-01-19 PROCEDURE — 93306 TTE W/DOPPLER COMPLETE: CPT

## 2023-01-19 NOTE — TELEPHONE ENCOUNTER
Sherita Acosta several times to call to let pt know [facility] is out of the office today. We wanted him to keep his echo appt and reschedule w/[facility] for another day. No answer & voicemail is full. No contacts on his HIPPA form.

## 2023-01-24 ENCOUNTER — TELEPHONE (OUTPATIENT)
Dept: CARDIOLOGY CLINIC | Age: 68
End: 2023-01-24

## 2023-01-24 NOTE — LETTER
[facility]  Dept: [phone]  Dept Fax: [phone]      2023    Patient: Maryjane Hendricks  : 1955  DOS: 2023    To Whom it May Concern:    My office staff has been trying to contact you in regards to your echocardiogram results. Your echocardiogram looks normal. You have an appointment scheduled with me on 23 at 7:30 am at the [place] office. I look forward to seeing you then. If you have any questions or concerns, please do not hesitate to call.     Sincerely,    Elle Styles MD

## 2023-01-24 NOTE — TELEPHONE ENCOUNTER
----- Message from Eugenia Quinteros MD sent at 1/23/2023 12:36 PM EST -----  ECHO is normal.   F/u as planned.     1 [facility]

## 2023-02-01 ENCOUNTER — OFFICE VISIT (OUTPATIENT)
Dept: PAIN MANAGEMENT | Age: 68
End: 2023-02-01

## 2023-02-01 VITALS
OXYGEN SATURATION: 98 % | WEIGHT: 235 LBS | HEART RATE: 67 BPM | DIASTOLIC BLOOD PRESSURE: 81 MMHG | BODY MASS INDEX: 36.81 KG/M2 | SYSTOLIC BLOOD PRESSURE: 126 MMHG

## 2023-02-01 DIAGNOSIS — F43.22 ADJUSTMENT DISORDER WITH ANXIETY: ICD-10-CM

## 2023-02-01 DIAGNOSIS — G89.4 CHRONIC PAIN SYNDROME: ICD-10-CM

## 2023-02-01 DIAGNOSIS — M47.812 CERVICAL SPONDYLOSIS: ICD-10-CM

## 2023-02-01 DIAGNOSIS — Z51.81 ENCOUNTER FOR THERAPEUTIC DRUG MONITORING: ICD-10-CM

## 2023-02-01 DIAGNOSIS — M50.20 DISPLACEMENT OF CERVICAL INTERVERTEBRAL DISC WITHOUT MYELOPATHY: ICD-10-CM

## 2023-02-01 RX ORDER — ACETAMINOPHEN AND CODEINE PHOSPHATE 60; 300 MG/1; MG/1
1 TABLET ORAL 2 TIMES DAILY
Qty: 40 TABLET | Refills: 0 | Status: SHIPPED | OUTPATIENT
Start: 2023-02-01 | End: 2023-02-21

## 2023-02-01 NOTE — PROGRESS NOTES
Job English  1955  [id number]      HISTORY OF PRESENT ILLNESS: Mr. Bhavik Katz is a 79 y.o. male returns for a follow up visit for pain management  He has a diagnosis of   1. Displacement of cervical intervertebral disc without myelopathy    2. Cervical spondylosis    3. Encounter for therapeutic drug monitoring    4. Adjustment disorder with anxiety    5. Chronic pain syndrome    . As per Information Obtained from the PADT (Patient Assessment and Documentation Tool)    He complains of pain in the neck. He rates the pain 4/10 and describes it as aching. Current treatment regimen has helped relieve about 30% of the pain. He denies any side effects from the current pain regimen. Patient reports that since the last follow up visit the physical functioning is unchanged, family/social relationships are unchanged, mood is unchanged sleep patterns are unchanged, and that the overall functioning is unchanged. Patient denies misusing/abusing his narcotic pain medications or using any illegal drugs. Upon obtaining medical history from Mr. Bhavik Katz    ALLERGIES: Patients list of allergies were reviewed     MEDICATIONS: Mr. Bhavik Katz list of medications were reviewed. His current medications are   Outpatient Medications Prior to Visit   Medication Sig Dispense Refill    metoprolol tartrate 75 MG TABS Take 1 tablet by mouth two times a day 180 tablet 4    hydroCHLOROthiazide (HYDRODIURIL) 25 MG tablet Take 1 tablet by mouth every morning 90 tablet 4    lisinopril (PRINIVIL;ZESTRIL) 20 MG tablet Take 1 tablet by mouth daily 90 tablet 4    rosuvastatin (CRESTOR) 20 MG tablet Take 1 tablet by mouth daily 90 tablet 1    naloxone (NARCAN) 4 MG/0.1ML LIQD nasal spray 1 spray by Nasal route as needed for Opioid Reversal 1 each 0    Flaxseed, Linseed, (FLAXSEED OIL PO) Take by mouth      Coenzyme Q10 (COQ-10 PO) Take 80 mg by mouth daily      EQL VITAMIN E PO Take by mouth      VANADYL SULFATE PO Take by mouth NATURAL VITAMIN D PO Take by mouth      Multiple Vitamins-Minerals (MULTIVITAMIN/ZINC STRESS) TABS Take by mouth      Nutritional Supplements (GRAPESEED EXTRACT PO) Take by mouth      turmeric 500 MG CAPS Take by mouth daily      Ascorbic Acid (VITAMIN C) 1000 MG tablet Take 1,000 mg by mouth in the morning. Methylsulfonylmethane (MSM PO) Take by mouth      folic acid (FOLVITE) 144 MCG tablet Take 800 mcg by mouth in the morning. RESVERATROL PO Take by mouth      diclofenac sodium (VOLTAREN) 1 % GEL Apply 2 g topically 2 times daily as needed for Pain 100 g 0    Cholecalciferol (VITAMIN D3) 50 MCG (2000 UT) CAPS Take by mouth daily      aspirin 325 MG tablet Take 325 mg by mouth daily      Lancets MISC 1 each by Does not apply route 2 times daily 100 each 0    blood glucose monitor strips Test 2 times a day & as needed for symptoms of irregular blood glucose. Dispense sufficient amount for indicated testing frequency plus additional to accommodate PRN testing needs. 100 strip 0    Alcohol Swabs PADS 1 Units by Does not apply route 2 times daily 100 each 0    metFORMIN (GLUCOPHAGE) 500 MG tablet Take 1 tablet by mouth 2 times daily (with meals) (Patient taking differently: Take 500 mg by mouth daily) 60 tablet 3    tamsulosin (FLOMAX) 0.4 MG capsule Take 0.4 mg by mouth daily       No facility-administered medications prior to visit. SOCIAL/FAMILY/PAST MEDICAL HISTORY: Mr. Pierce Cassette, family and past medical history was reviewed. REVIEW OF SYSTEMS:    Respiratory: Negative for apnea, chest tightness and shortness of breath or change in baseline breathing. Gastrointestinal: Negative for nausea, vomiting, abdominal pain, diarrhea, constipation, blood in stool and abdominal distention. PHYSICAL EXAM:   Nursing note and vitals reviewed. There were no vitals taken for this visit. Constitutional: He appears well-developed and well-nourished. No acute distress.    Skin: Skin is warm and dry, good turgor. No rash noted. He is not diaphoretic. Cardiovascular: Normal rate, regular rhythm, normal heart sounds, and does not have murmur. Pulmonary/Chest: Effort normal. No respiratory distress. He does not have wheezes in the lung fields. He has no rales. Neurological/Psychiatric:He is alert and oriented to person, place, and time. Coordination is  normal.  His mood isAppropriate and affect is Neutral/Euthymic(normal) . Prescription pain medication monitoring:                  MEDD current = 18              ORT Score = 0              Other Risk factors - stable mood               Date of Last Medication Agreement: 07/27/2022              Date Naloxone prescribed: 09/07/2022              UDT:                          Date of last UDT: 07/27/2022                          Adverse report: No              OARRS:                          Checked today: Yes                          Adverse report: No    IMPRESSION:   1. Displacement of cervical intervertebral disc without myelopathy    2. Cervical spondylosis    3. Encounter for therapeutic drug monitoring    4. Adjustment disorder with anxiety    5.  Chronic pain syndrome        PLAN:  Informed verbal consent was obtained:  -refill tylenol #4, #40 he only takes when he works  -random UDS today for monitoring   -F/U 3 months    Analgesic Plan:              Continue present regimen:yes              Adjust dose of present analgesic:no              Switch analgesics:no              Add/Adjust concomitant therapy:no      Current Outpatient Medications   Medication Sig Dispense Refill    metoprolol tartrate 75 MG TABS Take 1 tablet by mouth two times a day 180 tablet 4    hydroCHLOROthiazide (HYDRODIURIL) 25 MG tablet Take 1 tablet by mouth every morning 90 tablet 4    lisinopril (PRINIVIL;ZESTRIL) 20 MG tablet Take 1 tablet by mouth daily 90 tablet 4    rosuvastatin (CRESTOR) 20 MG tablet Take 1 tablet by mouth daily 90 tablet 1    naloxone (NARCAN) 4 MG/0.1ML LIQD nasal spray 1 spray by Nasal route as needed for Opioid Reversal 1 each 0    Flaxseed, Linseed, (FLAXSEED OIL PO) Take by mouth      Coenzyme Q10 (COQ-10 PO) Take 80 mg by mouth daily      EQL VITAMIN E PO Take by mouth      VANADYL SULFATE PO Take by mouth      NATURAL VITAMIN D PO Take by mouth      Multiple Vitamins-Minerals (MULTIVITAMIN/ZINC STRESS) TABS Take by mouth      Nutritional Supplements (GRAPESEED EXTRACT PO) Take by mouth      turmeric 500 MG CAPS Take by mouth daily      Ascorbic Acid (VITAMIN C) 1000 MG tablet Take 1,000 mg by mouth in the morning. Methylsulfonylmethane (MSM PO) Take by mouth      folic acid (FOLVITE) 234 MCG tablet Take 800 mcg by mouth in the morning. RESVERATROL PO Take by mouth      diclofenac sodium (VOLTAREN) 1 % GEL Apply 2 g topically 2 times daily as needed for Pain 100 g 0    Cholecalciferol (VITAMIN D3) 50 MCG (2000 UT) CAPS Take by mouth daily      aspirin 325 MG tablet Take 325 mg by mouth daily      Lancets MISC 1 each by Does not apply route 2 times daily 100 each 0    blood glucose monitor strips Test 2 times a day & as needed for symptoms of irregular blood glucose. Dispense sufficient amount for indicated testing frequency plus additional to accommodate PRN testing needs. 100 strip 0    Alcohol Swabs PADS 1 Units by Does not apply route 2 times daily 100 each 0    metFORMIN (GLUCOPHAGE) 500 MG tablet Take 1 tablet by mouth 2 times daily (with meals) (Patient taking differently: Take 500 mg by mouth daily) 60 tablet 3    tamsulosin (FLOMAX) 0.4 MG capsule Take 0.4 mg by mouth daily       No current facility-administered medications for this visit. I will continue his current medication regimen  which is part of the above treatment schedule.  It has been helping with Mr. Janell Henao chronic  medical problems which for this visit include:   Diagnoses of Displacement of cervical intervertebral disc without myelopathy, Cervical spondylosis, Encounter for therapeutic drug monitoring, Adjustment disorder with anxiety, and Chronic pain syndrome were pertinent to this visit. Risks and benefits of the medications and other alternative treatments  including no treatment were discussed with the patient. The common side effects of these medications were also explained to the patient. Informed verbal consent was obtained. Goals of current treatment regimen include improvement in pain, restoration of functioning- with focus on improvement in physical performance, general activity, work or disability,emotional distress, health care utilization and  decreased medication consumption. Will continue to monitor progress towards achieving/maintaining therapeutic goals with special emphasis on  1. Improvement in perceived interfernce  of pain with ADL's. Ability to do home exercises independently. Ability to do household chores indoor and/or outdoor work and social and leisure activities. Improve psychosocial and physical functioning. - he is showing progression towards this treatment goal with the current regimen. He was advised against drinking alcohol with the narcotic pain medicines, advised against driving or handling machinery while adjusting the dose of medicines or if having cognitive  issues related to the current medications. Risk of overdose and death, if medicines not taken as prescribed, were also discussed. If the patient develops new symptoms or if the symptoms worsen, the patient should call the office. While transcribing every attempt was made to maintain the accuracy of the note in terms of it's contents,there may have been some errors made inadvertently. Thank you for allowing me to participate in the care of this patient.     DIMPLE Miller    Cc: Siva Fernandez MD

## 2023-02-16 ENCOUNTER — OFFICE VISIT (OUTPATIENT)
Dept: CARDIOLOGY CLINIC | Age: 68
End: 2023-02-16
Payer: MEDICAID

## 2023-02-16 VITALS
OXYGEN SATURATION: 98 % | BODY MASS INDEX: 36.52 KG/M2 | HEART RATE: 76 BPM | SYSTOLIC BLOOD PRESSURE: 116 MMHG | WEIGHT: 232.7 LBS | DIASTOLIC BLOOD PRESSURE: 74 MMHG | HEIGHT: 67 IN

## 2023-02-16 DIAGNOSIS — I48.91 ATRIAL FIBRILLATION, UNSPECIFIED TYPE (HCC): ICD-10-CM

## 2023-02-16 DIAGNOSIS — I10 ESSENTIAL HYPERTENSION: Chronic | ICD-10-CM

## 2023-02-16 DIAGNOSIS — I25.5 ISCHEMIC CARDIOMYOPATHY: ICD-10-CM

## 2023-02-16 DIAGNOSIS — E11.9 TYPE 2 DIABETES MELLITUS WITHOUT COMPLICATION, WITHOUT LONG-TERM CURRENT USE OF INSULIN (HCC): ICD-10-CM

## 2023-02-16 DIAGNOSIS — G47.33 OSA (OBSTRUCTIVE SLEEP APNEA): ICD-10-CM

## 2023-02-16 DIAGNOSIS — E78.2 MIXED HYPERLIPIDEMIA: ICD-10-CM

## 2023-02-16 DIAGNOSIS — I45.10 RBBB: ICD-10-CM

## 2023-02-16 DIAGNOSIS — I25.10 CAD IN NATIVE ARTERY: Primary | ICD-10-CM

## 2023-02-16 PROCEDURE — 99214 OFFICE O/P EST MOD 30 MIN: CPT | Performed by: INTERNAL MEDICINE

## 2023-02-16 PROCEDURE — 3074F SYST BP LT 130 MM HG: CPT | Performed by: INTERNAL MEDICINE

## 2023-02-16 PROCEDURE — 3078F DIAST BP <80 MM HG: CPT | Performed by: INTERNAL MEDICINE

## 2023-02-16 PROCEDURE — 1123F ACP DISCUSS/DSCN MKR DOCD: CPT | Performed by: INTERNAL MEDICINE

## 2023-02-16 RX ORDER — IRON POLYSACCHARIDE COMPLEX 180 MG
CAPSULE ORAL
COMMUNITY
Start: 2022-12-29

## 2023-02-16 NOTE — PROGRESS NOTES
[address]  Cardiac Consult     Referring Provider:  Bill Mari MD     Chief Complaint   Patient presents with    Coronary Artery Disease    Hypertension    Cardiomyopathy    Hyperlipidemia    3 Month Follow-Up        History of Present Illness:  79 y.o. male seen in f/u for CAD s/p CABG. He underwent CABG 2020 for multivessel CAD. LV function normal on cath, mildly decreased with inferior HK on ECHO. He had post-op afib treated with short term amiodarone and was placed on coumadin. No obvious or symptomatic recurrence. He did undergo SCOTT clipping with surgery. He is about the same. Stopped crestor due to muscle aches. Missed sleep apnea appointment. \"I couldn't find the office\". Past Medical History:   has a past medical history of Anxiety, Atrial fibrillation ([address] Cervical disc herniation, Cervical spondylosis, Coronary artery disease due to lipid rich plaque, HLD (hyperlipidemia), HTN (hypertension), Neck sprain, Obesity (BMI 30-39.9), Post traumatic stress disorder, Rotator cuff (capsule) sprain and strain, Sprain of shoulder, left, and Sprain of shoulder, right. Surgical History:   has a past surgical history that includes Ankle surgery (Right); hernia repair (Right); joint replacement; and Coronary artery bypass graft (N/A, 2020). Social History:  Social History     Tobacco Use    Smoking status: Every Day     Packs/day: 0.50     Types: Cigarettes     Last attempt to quit: 2020     Years since quittin.6    Smokeless tobacco: Never   Substance Use Topics    Alcohol use: Not Currently     Alcohol/week: 0.8 standard drinks     Types: 1 Standard drinks or equivalent per week        Family History:  family history includes Heart Disease in his father. Allergies:  Patient has no known allergies. Home Medications:  Prior to Visit Medications    Medication Sig Taking?  Authorizing Provider   NOELLE 391.3 (180 Fe) MG CAPS TAKE 1 CAPSULE BY MOUTH DAILY Yes Historical Provider, MD   NIACIN PO Take 1 tablet by mouth daily Yes Historical Provider, MD   acetaminophen-codeine (TYLENOL #4) 300-60 MG per tablet Take 1 tablet by mouth 2 times daily for 20 days. Max Daily Amount: 2 tablets Yes BAN Marin CNP   diclofenac sodium (VOLTAREN) 1 % GEL Apply 2 g topically 2 times daily as needed for Pain Yes BAN Marin CNP   metoprolol tartrate 75 MG TABS Take 1 tablet by mouth two times a day Yes Byron Abdalla MD   hydroCHLOROthiazide (HYDRODIURIL) 25 MG tablet Take 1 tablet by mouth every morning Yes Byron Abdalla MD   lisinopril (PRINIVIL;ZESTRIL) 20 MG tablet Take 1 tablet by mouth daily Yes Byron Abdalla MD   naloxone [facility]) 4 MG/0.1ML LIQD nasal spray 1 spray by Nasal route as needed for Opioid Reversal Yes BAN Marin CNP   Flaxseed, Linseed, (FLAXSEED OIL PO) Take by mouth Yes Historical Provider, MD   Coenzyme Q10 (COQ-10 PO) Take 80 mg by mouth daily Yes Historical Provider, MD   EQL VITAMIN E PO Take by mouth Yes Historical Provider, MD   VANADYL SULFATE PO Take by mouth Yes Historical Provider, MD   NATURAL VITAMIN D PO Take by mouth Yes Historical Provider, MD   Multiple Vitamins-Minerals (MULTIVITAMIN/ZINC STRESS) TABS Take by mouth Yes Historical Provider, MD   Nutritional Supplements (GRAPESEED EXTRACT PO) Take by mouth Yes Historical Provider, MD   turmeric 500 MG CAPS Take by mouth daily Yes Historical Provider, MD   Ascorbic Acid (VITAMIN C) 1000 MG tablet Take 1,000 mg by mouth in the morning. Yes Historical Provider, MD   Methylsulfonylmethane (MSM PO) Take by mouth Yes Historical Provider, MD   folic acid (FOLVITE) 378 MCG tablet Take 800 mcg by mouth in the morning.  Yes Historical Provider, MD   RESVERATROL PO Take by mouth Yes Historical Provider, MD   Cholecalciferol (VITAMIN D3) 50 MCG (2000 UT) CAPS Take by mouth daily Yes Historical Provider, MD   aspirin 325 MG tablet Take 325 mg by mouth daily Yes Historical Provider, MD Lancets MISC 1 each by Does not apply route 2 times daily Yes Sonam Ashley MD   blood glucose monitor strips Test 2 times a day & as needed for symptoms of irregular blood glucose. Dispense sufficient amount for indicated testing frequency plus additional to accommodate PRN testing needs. Yes Sonam Ashley MD   Alcohol Swabs PADS 1 Units by Does not apply route 2 times daily Yes Sonam Ashley MD   metFORMIN (GLUCOPHAGE) 500 MG tablet Take 1 tablet by mouth 2 times daily (with meals)  Patient taking differently: Take 500 mg by mouth daily Yes Nicole Marcelo, APRN - CNP   tamsulosin (FLOMAX) 0.4 MG capsule Take 0.4 mg by mouth daily Yes Historical Provider, MD       [x] Medications and dosages reviewed. ROS:  [x]Full ROS obtained and negative except as mentioned in HPI      Physical Examination:    Vitals:    02/16/23 0721   BP: 116/74   Site: Left Upper Arm   Position: Sitting   Cuff Size: Medium Adult   Pulse: 76   SpO2: 98%   Weight: 232 lb 11.2 oz (105.6 kg)   Height: 5' 7\" (1.702 m)          GENERAL: Well developed, well nourished, No acute distress  NEUROLOGICAL: Alert and oriented  PSYCH: Calm affect  SKIN: Warm and dry, No visible rash,   EYES: Pupils equal and round, Sclera non-icteric,   HENT:  External ears and nose unremarkable, mucus membranes moist  MUSCULOSKELETAL: Normal cephalic, neck supple  CAROTID: Normal upstroke, no bruits  CARDIAC: JVP normal, Normal PMI, regular rate and rhythm, normal S1S2, No murmur, rub, or gallop  RESPIRATORY: Normal respiratory effort, clear to auscultation bilaterally  EXTREMITIES: No LE edema  GASTROINTESTINAL: normal bowel sounds, soft, non-tender, No hepatomegaly     CABG 11/16/2020    Procedure(s):  MALATHI. TCPB. RT EVH. CABG x 3, LIMA TO DISTAL LAD; SVG TO OM1; SVG TO RPDA. SCOTT CLIP WITH 45mm  ATRICLIP. PLACEMENT OF TEMPORARY VENTRICULAR PACING WIRE. DOPPLER FLOW VERIFICATION OF GRAFTS.  BILATERAL INTERCOSTAL NERVE BLOCK WITH EXPAREL & MARCAINE.      ECHO 11/12/2020 Summary   -Mildly reduced global systolic function with an ejection fraction estimated   at 45%. -Inferoseptal, inferior, and inferolateral hypokinesis noted. -There is trivial tricuspid regurgitation with a RVSP estimation of 13 mmHg. -Grade I diastolic dysfunction with normal LV filling pressures. Avg.   E/e'=8.4    CARDIAC CATH 11/2020  Anatomy:   LM-30% distal  LAD-70% mid, 100% distal with left to left collaterals  D1-80% proximal  Cx-100% proximal with left to left collaterals  OM1-100% proximal with left to left collaterals  RCA-100% proximal with right to right and left-to-right collaterals  LVEF-55%     Impression:  1. Severe three-vessel CAD with extensive collateralization. 2.  Preserved LV systolic function with LVEF of 55%. MCOT:  Tracings reviewed  Sinus, 1% PVC's. No afib  Two 2.8 pauses. Likely during sleep    ECHO 4/2022  Summary   -Left ventricular cavity size is normal.   -Ejection fraction is visually estimated to be 50-55%. -The inferior wall appears mildly hypokinetic.   -Indeterminate diastolic function. E/e' = 12.1.   -Left atrium is dilated. -The aortic valve appears sclerotic but opens well.   -Trivial tricuspid regurgitation.   -Right ventricle is normal in size. Reduced function suggested by TAPSE:   1.58 cm. RVS velocity: 10.2 cm/s. RVSP = 14 mmHG. ECHO 1/2023-Reviewed Borderline RV size and function- TAPSE=1.6   Summary   Normal left ventricle size, wall thickness, and systolic function with an   estimated ejection fraction of 55%. No regional wall motion abnormalities are seen. There is reversal of E/A inflow velocities across the mitral valve. Aortic valve appears sclerotic but opens adequately. ASSESSMENT:    CAD:  Stable. Multivessel CAD s/p CABG 11/2020  Stable.  Medical therapy  Continue lifestyle modification    Ischemic Cardiomyopathy:  LV function now normal on ECHO 1/2023  Continue beta blocker and lisinopril  20 mg    HTN:  /74 (Site: Left Upper Arm, Position: Sitting, Cuff Size: Medium Adult)   Pulse 76   Ht 5' 7\" (1.702 m)   Wt 232 lb 11.2 oz (105.6 kg)   SpO2 98%   BMI 36.45 kg/m²   Improved. Continue HCTZ 25. AFIB:  Post op afib  SCOTT closure with surgery  Off of anticoagulation   MCOT with no afib  Continue ASA  Plan for CT through [facility] to verify closure    Sleep apnea; Severe sleep apnea 7/2021  Stopped CPAP  Missed sleep clinic appointment  Discussed risks of not treating sleep apnea    Hyperlipidemia:    Stopped lipitor and now crestor due to muscle aches  Try to get Rapatha    New RBBB:  Possibly due to RV strain from untreated sleep apnea.   Still recommend he see sleep medicine again  RV borderline on ECHO  Discussed long term effects of untreated sleep apnea    Plan:  Try to get Rapatha  F/u 3 months  Repeat labs    Thank you for allowing me to participate in the care of this individual.      Jimena Matthews M.D., [facility]

## 2023-02-17 ENCOUNTER — HOSPITAL ENCOUNTER (OUTPATIENT)
Age: 68
Discharge: HOME | End: 2023-02-17
Payer: MEDICAID

## 2023-02-17 DIAGNOSIS — I25.10 CAD IN NATIVE ARTERY: ICD-10-CM

## 2023-02-17 DIAGNOSIS — E11.9 TYPE 2 DIABETES MELLITUS WITHOUT COMPLICATION, WITHOUT LONG-TERM CURRENT USE OF INSULIN (HCC): ICD-10-CM

## 2023-02-17 LAB
ALT SERPL-CCNC: 8 U/L (ref 10–40)
ANION GAP SERPL CALCULATED.3IONS-SCNC: 13 MMOL/L (ref 3–16)
AST SERPL-CCNC: 18 U/L (ref 15–37)
BUN BLDV-MCNC: 26 MG/DL (ref 7–20)
CALCIUM SERPL-MCNC: 9.7 MG/DL (ref 8.3–10.6)
CHLORIDE BLD-SCNC: 105 MMOL/L (ref 99–110)
CHOLESTEROL, FASTING: 229 MG/DL (ref 0–199)
CO2: 25 MMOL/L (ref 21–32)
CREAT SERPL-MCNC: 1.6 MG/DL (ref 0.8–1.3)
ESTIMATED AVERAGE GLUCOSE: 122.6 MG/DL
GFR SERPL CREATININE-BSD FRML MDRD: 47 ML/MIN/{1.73_M2}
GLUCOSE BLD-MCNC: 118 MG/DL (ref 70–99)
HBA1C MFR BLD: 5.9 %
HCT VFR BLD CALC: 37.3 % (ref 40.5–52.5)
HDLC SERPL-MCNC: 35 MG/DL (ref 40–60)
HEMOGLOBIN: 12.5 G/DL (ref 13.5–17.5)
LDL CHOLESTEROL CALCULATED: ABNORMAL MG/DL
LDL CHOLESTEROL DIRECT: 139 MG/DL
MCH RBC QN AUTO: 33.4 PG (ref 26–34)
MCHC RBC AUTO-ENTMCNC: 33.6 G/DL (ref 31–36)
MCV RBC AUTO: 99.4 FL (ref 80–100)
PDW BLD-RTO: 12.7 % (ref 12.4–15.4)
PLATELET # BLD: 271 K/UL (ref 135–450)
PMV BLD AUTO: 8.7 FL (ref 5–10.5)
POTASSIUM SERPL-SCNC: 4 MMOL/L (ref 3.5–5.1)
RBC # BLD: 3.75 M/UL (ref 4.2–5.9)
SODIUM BLD-SCNC: 143 MMOL/L (ref 136–145)
TRIGLYCERIDE, FASTING: 418 MG/DL (ref 0–150)
VLDLC SERPL CALC-MCNC: ABNORMAL MG/DL
WBC # BLD: 8.4 K/UL (ref 4–11)

## 2023-02-17 PROCEDURE — 84460 ALANINE AMINO (ALT) (SGPT): CPT

## 2023-02-17 PROCEDURE — 83721 ASSAY OF BLOOD LIPOPROTEIN: CPT

## 2023-02-17 PROCEDURE — 83036 HEMOGLOBIN GLYCOSYLATED A1C: CPT

## 2023-02-17 PROCEDURE — 36415 COLL VENOUS BLD VENIPUNCTURE: CPT

## 2023-02-17 PROCEDURE — 80048 BASIC METABOLIC PNL TOTAL CA: CPT

## 2023-02-17 PROCEDURE — 84450 TRANSFERASE (AST) (SGOT): CPT

## 2023-02-17 PROCEDURE — 85027 COMPLETE CBC AUTOMATED: CPT

## 2023-02-17 PROCEDURE — 80061 LIPID PANEL: CPT

## 2023-02-20 ENCOUNTER — TELEPHONE (OUTPATIENT)
Dept: CARDIOLOGY CLINIC | Age: 68
End: 2023-02-20

## 2023-02-20 DIAGNOSIS — N28.9 RENAL INSUFFICIENCY: Primary | ICD-10-CM

## 2023-02-20 NOTE — LETTER
[address] Cardiology[address]  Dept: [phone]  Dept Fax: [phone]      2023    Patient: Meek Cee  : 1955  DOS: 2023    Mr Iliana Bustosars:    My office has been trying to contact you regarding some abnormal lab results. We have not been able to reach you via phone. We have also sent you a message via My Chart. Please call my office as soon as possible to review results and important instructions.       Sincerely,    Cori John MD

## 2023-02-20 NOTE — TELEPHONE ENCOUNTER
----- Message from Liberty Gonzalez MD sent at 2/20/2023  1:28 PM EST -----  Lipids high-Try to obtain rapatha    Creat up- Stop HCTZ. Hydrate more. Recheck 1 week and f/u PCP.     [facility]

## 2023-02-22 NOTE — TELEPHONE ENCOUNTER
Called patient again. Got VM and it is full. Called sister Tova Pedersen (alternate  in chart. She states he works night shift and sleeps during the day. Best days to get a hold of him are Thursday and Friday. She will send him a message via text to call our office. Sent Repatha to [employer] and faxed over OV and lipid panel to them as  well.

## 2023-02-24 ENCOUNTER — PATIENT MESSAGE (OUTPATIENT)
Dept: CARDIOLOGY CLINIC | Age: 68
End: 2023-02-24

## 2023-02-27 NOTE — TELEPHONE ENCOUNTER
Attempted to call both patient ans sister again. Unable to speak with either of them. Letter created and mailed to patient.

## 2023-03-13 NOTE — TELEPHONE ENCOUNTER
I spoke pt and relayed message per MMK. Pt verbalized understanding. He requested order be sent to PCP office and he stated that he is taking Crestor every 3 days.  Order faxed

## 2023-05-03 ENCOUNTER — OFFICE VISIT (OUTPATIENT)
Dept: PAIN MANAGEMENT | Age: 68
End: 2023-05-03

## 2023-05-03 VITALS
BODY MASS INDEX: 35.77 KG/M2 | HEART RATE: 67 BPM | OXYGEN SATURATION: 98 % | DIASTOLIC BLOOD PRESSURE: 78 MMHG | WEIGHT: 228.4 LBS | SYSTOLIC BLOOD PRESSURE: 137 MMHG

## 2023-05-03 DIAGNOSIS — M50.20 DISPLACEMENT OF CERVICAL INTERVERTEBRAL DISC WITHOUT MYELOPATHY: ICD-10-CM

## 2023-05-03 DIAGNOSIS — G89.4 CHRONIC PAIN SYNDROME: ICD-10-CM

## 2023-05-03 DIAGNOSIS — F43.22 ADJUSTMENT DISORDER WITH ANXIETY: ICD-10-CM

## 2023-05-03 DIAGNOSIS — M47.812 CERVICAL SPONDYLOSIS: ICD-10-CM

## 2023-05-03 DIAGNOSIS — Z51.81 ENCOUNTER FOR THERAPEUTIC DRUG MONITORING: ICD-10-CM

## 2023-05-03 RX ORDER — ACETAMINOPHEN AND CODEINE PHOSPHATE 60; 300 MG/1; MG/1
1 TABLET ORAL 2 TIMES DAILY
Qty: 40 TABLET | Refills: 0 | Status: SHIPPED | OUTPATIENT
Start: 2023-05-03 | End: 2023-05-23

## 2023-05-03 NOTE — PROGRESS NOTES
Alisaanastasiya Noel  1955  [id number]      HISTORY OF PRESENT ILLNESS: Mr. Elizabeth Salgado is a 79 y.o. male returns for a follow up visit for pain management  He has a diagnosis of   1. Displacement of cervical intervertebral disc without myelopathy    2. Cervical spondylosis    3. Encounter for therapeutic drug monitoring    4. Adjustment disorder with anxiety    5. Chronic pain syndrome    . As per Information Obtained from the PADT (Patient Assessment and Documentation Tool)    He complains of pain in the bilateral arms, right lower legs. He rates the pain 4/10 and describes it as numbness. Current treatment regimen has helped relieve about 50% of the pain. He denies any side effects from the current pain regimen. Patient reports that since the last follow up visit the physical functioning is unchanged, family/social relationships are unchanged, mood is unchanged sleep patterns are unchanged, and that the overall functioning is unchanged. Patient denies misusing/abusing his narcotic pain medications or using any illegal drugs. Upon obtaining medical history from Mr. Elizabeth Salgado    ALLERGIES: Patients list of allergies were reviewed     MEDICATIONS: Mr. Elizabeth Salgado list of medications were reviewed. His current medications are   Outpatient Medications Prior to Visit   Medication Sig Dispense Refill    Evolocumab 140 MG/ML SOAJ Inject 1 pen into the skin every 14 days 6 Adjustable Dose Pre-filled Pen Syringe 4    PROFE 391.3 (180 Fe) MG CAPS TAKE 1 CAPSULE BY MOUTH DAILY      NIACIN PO Take 1 tablet by mouth daily      diclofenac sodium (VOLTAREN) 1 % GEL Apply 2 g topically 2 times daily as needed for Pain 100 g 0    metoprolol tartrate 75 MG TABS Take 1 tablet by mouth two times a day 180 tablet 4    lisinopril (PRINIVIL;ZESTRIL) 20 MG tablet Take 1 tablet by mouth daily 90 tablet 4    naloxone (NARCAN) 4 MG/0.1ML LIQD nasal spray 1 spray by Nasal route as needed for Opioid Reversal 1 each 0    Flaxseed,

## 2023-05-25 ENCOUNTER — OFFICE VISIT (OUTPATIENT)
Dept: CARDIOLOGY CLINIC | Age: 68
End: 2023-05-25
Payer: MEDICARE

## 2023-05-25 ENCOUNTER — TELEPHONE (OUTPATIENT)
Dept: CARDIOLOGY CLINIC | Age: 68
End: 2023-05-25

## 2023-05-25 VITALS
BODY MASS INDEX: 34.72 KG/M2 | DIASTOLIC BLOOD PRESSURE: 74 MMHG | SYSTOLIC BLOOD PRESSURE: 126 MMHG | WEIGHT: 221.2 LBS | HEART RATE: 84 BPM | HEIGHT: 67 IN | OXYGEN SATURATION: 96 %

## 2023-05-25 DIAGNOSIS — E78.2 MIXED HYPERLIPIDEMIA: ICD-10-CM

## 2023-05-25 DIAGNOSIS — G47.33 OSA (OBSTRUCTIVE SLEEP APNEA): ICD-10-CM

## 2023-05-25 DIAGNOSIS — I48.91 ATRIAL FIBRILLATION, UNSPECIFIED TYPE (HCC): ICD-10-CM

## 2023-05-25 DIAGNOSIS — I25.5 ISCHEMIC CARDIOMYOPATHY: ICD-10-CM

## 2023-05-25 DIAGNOSIS — I45.10 RBBB: ICD-10-CM

## 2023-05-25 DIAGNOSIS — I25.83 CORONARY ARTERY DISEASE DUE TO LIPID RICH PLAQUE: Primary | ICD-10-CM

## 2023-05-25 DIAGNOSIS — I25.10 CORONARY ARTERY DISEASE DUE TO LIPID RICH PLAQUE: Primary | ICD-10-CM

## 2023-05-25 DIAGNOSIS — I10 ESSENTIAL HYPERTENSION: Chronic | ICD-10-CM

## 2023-05-25 PROCEDURE — G8427 DOCREV CUR MEDS BY ELIG CLIN: HCPCS | Performed by: INTERNAL MEDICINE

## 2023-05-25 PROCEDURE — 4004F PT TOBACCO SCREEN RCVD TLK: CPT | Performed by: INTERNAL MEDICINE

## 2023-05-25 PROCEDURE — G8417 CALC BMI ABV UP PARAM F/U: HCPCS | Performed by: INTERNAL MEDICINE

## 2023-05-25 PROCEDURE — 3017F COLORECTAL CA SCREEN DOC REV: CPT | Performed by: INTERNAL MEDICINE

## 2023-05-25 PROCEDURE — 3074F SYST BP LT 130 MM HG: CPT | Performed by: INTERNAL MEDICINE

## 2023-05-25 PROCEDURE — 99214 OFFICE O/P EST MOD 30 MIN: CPT | Performed by: INTERNAL MEDICINE

## 2023-05-25 PROCEDURE — 3078F DIAST BP <80 MM HG: CPT | Performed by: INTERNAL MEDICINE

## 2023-05-25 PROCEDURE — 1123F ACP DISCUSS/DSCN MKR DOCD: CPT | Performed by: INTERNAL MEDICINE

## 2023-05-25 RX ORDER — LISINOPRIL 20 MG/1
20 TABLET ORAL DAILY
Qty: 90 TABLET | Refills: 4 | Status: SHIPPED | OUTPATIENT
Start: 2023-05-25

## 2023-05-25 RX ORDER — METFORMIN HYDROCHLORIDE 500 MG/1
500 TABLET, EXTENDED RELEASE ORAL
COMMUNITY

## 2023-05-25 RX ORDER — METOPROLOL TARTRATE 75 MG/1
TABLET, FILM COATED ORAL
Qty: 180 TABLET | Refills: 4 | Status: SHIPPED | OUTPATIENT
Start: 2023-05-25

## 2023-05-25 NOTE — PATIENT INSTRUCTIONS
Will try to get Repatha again  Labs with PCP today    [address] [place], [place]  [address] [place] [address], [address] [place] [address] [address]  [facility], [address]  Phone: [phone]    Fasting labs in 3 months if starting Repatha  Follow up in 6 months

## 2023-05-25 NOTE — TELEPHONE ENCOUNTER
Aj called in requesting last office notes from 5/25 and lipid from 2/17    For any questions Aj can be reached at [phone]      Fax [phone] Prednisone Counseling:  I discussed with the patient the risks of prolonged use of prednisone including but not limited to weight gain, insomnia, osteoporosis, mood changes, diabetes, susceptibility to infection, glaucoma and high blood pressure.  In cases where prednisone use is prolonged, patients should be monitored with blood pressure checks, serum glucose levels and an eye exam.  Additionally, the patient may need to be placed on GI prophylaxis, PCP prophylaxis, and calcium and vitamin D supplementation and/or a bisphosphonate.  The patient verbalized understanding of the proper use and the possible adverse effects of prednisone.  All of the patient's questions and concerns were addressed. 5-Fu Pregnancy And Lactation Text: This medication is Pregnancy Category X and contraindicated in pregnancy and in women who may become pregnant. It is unknown if this medication is excreted in breast milk. Colchicine Counseling:  Patient counseled regarding adverse effects including but not limited to stomach upset (nausea, vomiting, stomach pain, or diarrhea).  Patient instructed to limit alcohol consumption while taking this medication.  Colchicine may reduce blood counts especially with prolonged use.  The patient understands that monitoring of kidney function and blood counts may be required, especially at baseline. The patient verbalized understanding of the proper use and possible adverse effects of colchicine.  All of the patient's questions and concerns were addressed. Dupixent Pregnancy And Lactation Text: This medication likely crosses the placenta but the risk for the fetus is uncertain. This medication is excreted in breast milk. Doxepin Counseling:  Patient advised that the medication is sedating and not to drive a car after taking this medication. Patient informed of potential adverse effects including but not limited to dry mouth, urinary retention, and blurry vision.  The patient verbalized understanding of the proper use and possible adverse effects of doxepin.  All of the patient's questions and concerns were addressed. Clindamycin Pregnancy And Lactation Text: This medication can be used in pregnancy if certain situations. Clindamycin is also present in breast milk. Cyclophosphamide Pregnancy And Lactation Text: This medication is Pregnancy Category D and it isn't considered safe during pregnancy. This medication is excreted in breast milk. Stelara Counseling:  I discussed with the patient the risks of ustekinumab including but not limited to immunosuppression, malignancy, posterior leukoencephalopathy syndrome, and serious infections.  The patient understands that monitoring is required including a PPD at baseline and must alert us or the primary physician if symptoms of infection or other concerning signs are noted. Solaraze Counseling:  I discussed with the patient the risks of Solaraze including but not limited to erythema, scaling, itching, weeping, crusting, and pain. Birth Control Pills Pregnancy And Lactation Text: This medication should be avoided if pregnant and for the first 30 days post-partum. Enbrel Counseling:  I discussed with the patient the risks of etanercept including but not limited to myelosuppression, immunosuppression, autoimmune hepatitis, demyelinating diseases, lymphoma, and infections.  The patient understands that monitoring is required including a PPD at baseline and must alert us or the primary physician if symptoms of infection or other concerning signs are noted. Doxepin Pregnancy And Lactation Text: This medication is Pregnancy Category C and it isn't known if it is safe during pregnancy. It is also excreted in breast milk and breast feeding isn't recommended. Prednisone Pregnancy And Lactation Text: This medication is Pregnancy Category C and it isn't know if it is safe during pregnancy. This medication is excreted in breast milk. Fluconazole Pregnancy And Lactation Text: This medication is Pregnancy Category C and it isn't know if it is safe during pregnancy. It is also excreted in breast milk. Drysol Counseling:  I discussed with the patient the risks of drysol/aluminum chloride including but not limited to skin rash, itching, irritation, burning. Doxycycline Pregnancy And Lactation Text: This medication is Pregnancy Category D and not consider safe during pregnancy. It is also excreted in breast milk but is considered safe for shorter treatment courses. Stelara Pregnancy And Lactation Text: This medication is Pregnancy Category B and is considered safe during pregnancy. It is unknown if this medication is excreted in breast milk. Spironolactone Counseling: Patient advised regarding risks of diarrhea, abdominal pain, hyperkalemia, birth defects (for female patients), liver toxicity and renal toxicity. The patient may need blood work to monitor liver and kidney function and potassium levels while on therapy. The patient verbalized understanding of the proper use and possible adverse effects of spironolactone.  All of the patient's questions and concerns were addressed. Solaraze Pregnancy And Lactation Text: This medication is Pregnancy Category B and is considered safe. There is some data to suggest avoiding during the third trimester. It is unknown if this medication is excreted in breast milk. Colchicine Pregnancy And Lactation Text: This medication is Pregnancy Category C and isn't considered safe during pregnancy. It is excreted in breast milk. Fluconazole Counseling:  Patient counseled regarding adverse effects of fluconazole including but not limited to headache, diarrhea, nausea, upset stomach, liver function test abnormalities, taste disturbance, and stomach pain.  There is a rare possibility of liver failure that can occur when taking fluconazole.  The patient understands that monitoring of LFTs and kidney function test may be required, especially at baseline. The patient verbalized understanding of the proper use and possible adverse effects of fluconazole.  All of the patient's questions and concerns were addressed. Doxycycline Counseling:  Patient counseled regarding possible photosensitivity and increased risk for sunburn.  Patient instructed to avoid sunlight, if possible.  When exposed to sunlight, patients should wear protective clothing, sunglasses, and sunscreen.  The patient was instructed to call the office immediately if the following severe adverse effects occur:  hearing changes, easy bruising/bleeding, severe headache, or vision changes.  The patient verbalized understanding of the proper use and possible adverse effects of doxycycline.  All of the patient's questions and concerns were addressed. Cyclosporine Counseling:  I discussed with the patient the risks of cyclosporine including but not limited to hypertension, gingival hyperplasia,myelosuppression, immunosuppression, liver damage, kidney damage, neurotoxicity, lymphoma, and serious infections. The patient understands that monitoring is required including baseline blood pressure, CBC, CMP, lipid panel and uric acid, and then 1-2 times monthly CMP and blood pressure. Erythromycin Counseling:  I discussed with the patient the risks of erythromycin including but not limited to GI upset, allergic reaction, drug rash, diarrhea, increase in liver enzymes, and yeast infections. Griseofulvin Counseling:  I discussed with the patient the risks of griseofulvin including but not limited to photosensitivity, cytopenia, liver damage, nausea/vomiting and severe allergy.  The patient understands that this medication is best absorbed when taken with a fatty meal (e.g., ice cream or french fries). Methotrexate Counseling:  Patient counseled regarding adverse effects of methotrexate including but not limited to nausea, vomiting, abnormalities in liver function tests. Patients may develop mouth sores, rash, diarrhea, and abnormalities in blood counts. The patient understands that monitoring is required including LFT's and blood counts.  There is a rare possibility of scarring of the liver and lung problems that can occur when taking methotrexate. Persistent nausea, loss of appetite, pale stools, dark urine, cough, and shortness of breath should be reported immediately. Patient advised to discontinue methotrexate treatment at least three months before attempting to become pregnant.  I discussed the need for folate supplements while taking methotrexate.  These supplements can decrease side effects during methotrexate treatment. The patient verbalized understanding of the proper use and possible adverse effects of methotrexate.  All of the patient's questions and concerns were addressed. Taltz Counseling: I discussed with the patient the risks of ixekizumab including but not limited to immunosuppression, serious infections, worsening of inflammatory bowel disease and drug reactions.  The patient understands that monitoring is required including a PPD at baseline and must alert us or the primary physician if symptoms of infection or other concerning signs are noted. Topical Retinoid Pregnancy And Lactation Text: This medication is Pregnancy Category C. It is unknown if this medication is excreted in breast milk. Dapsone Pregnancy And Lactation Text: This medication is Pregnancy Category C and is not considered safe during pregnancy or breast feeding. Include Pregnancy/Lactation Warning?: No Drysol Pregnancy And Lactation Text: This medication is considered safe during pregnancy and breast feeding. Spironolactone Pregnancy And Lactation Text: This medication can cause feminization of the male fetus and should be avoided during pregnancy. The active metabolite is also found in breast milk. Topical Retinoid counseling:  Patient advised to apply a pea-sized amount only at bedtime and wait 30 minutes after washing their face before applying.  If too drying, patient may add a non-comedogenic moisturizer. The patient verbalized understanding of the proper use and possible adverse effects of retinoids.  All of the patient's questions and concerns were addressed. Hydroxyzine Counseling: Patient advised that the medication is sedating and not to drive a car after taking this medication.  Patient informed of potential adverse effects including but not limited to dry mouth, urinary retention, and blurry vision.  The patient verbalized understanding of the proper use and possible adverse effects of hydroxyzine.  All of the patient's questions and concerns were addressed. Dapsone Counseling: I discussed with the patient the risks of dapsone including but not limited to hemolytic anemia, agranulocytosis, rashes, methemoglobinemia, kidney failure, peripheral neuropathy, headaches, GI upset, and liver toxicity.  Patients who start dapsone require monitoring including baseline LFTs and weekly CBCs for the first month, then every month thereafter.  The patient verbalized understanding of the proper use and possible adverse effects of dapsone.  All of the patient's questions and concerns were addressed. Griseofulvin Pregnancy And Lactation Text: This medication is Pregnancy Category X and is known to cause serious birth defects. It is unknown if this medication is excreted in breast milk but breast feeding should be avoided. Sski Pregnancy And Lactation Text: This medication is Pregnancy Category D and isn't considered safe during pregnancy. It is excreted in breast milk. Elidel Counseling: Patient may experience a mild burning sensation during topical application. Elidel is not approved in children less than 2 years of age. There have been case reports of hematologic and skin malignancies in patients using topical calcineurin inhibitors although causality is questionable. Acitretin Counseling:  I discussed with the patient the risks of acitretin including but not limited to hair loss, dry lips/skin/eyes, liver damage, hyperlipidemia, depression/suicidal ideation, photosensitivity.  Serious rare side effects can include but are not limited to pancreatitis, pseudotumor cerebri, bony changes, clot formation/stroke/heart attack.  Patient understands that alcohol is contraindicated since it can result in liver toxicity and significantly prolong the elimination of the drug by many years. SSKI Counseling:  I discussed with the patient the risks of SSKI including but not limited to thyroid abnormalities, metallic taste, GI upset, fever, headache, acne, arthralgias, paraesthesias, lymphadenopathy, easy bleeding, arrhythmias, and allergic reaction. Erythromycin Pregnancy And Lactation Text: This medication is Pregnancy Category B and is considered safe during pregnancy. It is also excreted in breast milk. Taltz Pregnancy And Lactation Text: The risk during pregnancy and breastfeeding is uncertain with this medication. Humira Counseling:  I discussed with the patient the risks of adalimumab including but not limited to myelosuppression, immunosuppression, autoimmune hepatitis, demyelinating diseases, lymphoma, and serious infections.  The patient understands that monitoring is required including a PPD at baseline and must alert us or the primary physician if symptoms of infection or other concerning signs are noted. Hydroxyzine Pregnancy And Lactation Text: This medication is not safe during pregnancy and should not be taken. It is also excreted in breast milk and breast feeding isn't recommended. Metronidazole Counseling:  I discussed with the patient the risks of metronidazole including but not limited to seizures, nausea/vomiting, a metallic taste in the mouth, nausea/vomiting and severe allergy. Tremfya Counseling: I discussed with the patient the risks of guselkumab including but not limited to immunosuppression, serious infections, and drug reactions.  The patient understands that monitoring is required including a PPD at baseline and must alert us or the primary physician if symptoms of infection or other concerning signs are noted. Thalidomide Counseling: I discussed with the patient the risks of thalidomide including but not limited to birth defects, anxiety, weakness, chest pain, dizziness, cough and severe allergy. Tazorac Counseling:  Patient advised that medication is irritating and drying.  Patient may need to apply sparingly and wash off after an hour before eventually leaving it on overnight.  The patient verbalized understanding of the proper use and possible adverse effects of tazorac.  All of the patient's questions and concerns were addressed. Albendazole Counseling:  I discussed with the patient the risks of albendazole including but not limited to cytopenia, kidney damage, nausea/vomiting and severe allergy.  The patient understands that this medication is being used in an off-label manner. Acitretin Pregnancy And Lactation Text: This medication is Pregnancy Category X and should not be given to women who are pregnant or may become pregnant in the future. This medication is excreted in breast milk. Glycopyrrolate Pregnancy And Lactation Text: This medication is Pregnancy Category B and is considered safe during pregnancy. It is unknown if it is excreted breast milk. Itraconazole Counseling:  I discussed with the patient the risks of itraconazole including but not limited to liver damage, nausea/vomiting, neuropathy, and severe allergy.  The patient understands that this medication is best absorbed when taken with acidic beverages such as non-diet cola or ginger ale.  The patient understands that monitoring is required including baseline LFTs and repeat LFTs at intervals.  The patient understands that they are to contact us or the primary physician if concerning signs are noted. Erivedge Counseling- I discussed with the patient the risks of Erivedge including but not limited to nausea, vomiting, diarrhea, constipation, weight loss, changes in the sense of taste, decreased appetite, muscle spasms, and hair loss.  The patient verbalized understanding of the proper use and possible adverse effects of Erivedge.  All of the patient's questions and concerns were addressed. Hydroxychloroquine Counseling:  I discussed with the patient that a baseline ophthalmologic exam is needed at the start of therapy and every year thereafter while on therapy. A CBC may also be warranted for monitoring.  The side effects of this medication were discussed with the patient, including but not limited to agranulocytosis, aplastic anemia, seizures, rashes, retinopathy, and liver toxicity. Patient instructed to call the office should any adverse effect occur.  The patient verbalized understanding of the proper use and possible adverse effects of Plaquenil.  All the patient's questions and concerns were addressed. Tazorac Pregnancy And Lactation Text: This medication is not safe during pregnancy. It is unknown if this medication is excreted in breast milk. Gabapentin Counseling: I discussed with the patient the risks of gabapentin including but not limited to dizziness, somnolence, fatigue and ataxia. Thalidomide Pregnancy And Lactation Text: This medication is Pregnancy Category X and is absolutely contraindicated during pregnancy. It is unknown if it is excreted in breast milk. Bexarotene Counseling:  I discussed with the patient the risks of bexarotene including but not limited to hair loss, dry lips/skin/eyes, liver abnormalities, hyperlipidemia, pancreatitis, depression/suicidal ideation, photosensitivity, drug rash/allergic reactions, hypothyroidism, anemia, leukopenia, infection, cataracts, and teratogenicity.  Patient understands that they will need regular blood tests to check lipid profile, liver function tests, white blood cell count, thyroid function tests and pregnancy test if applicable. Eucrisa Counseling: Patient may experience a mild burning sensation during topical application. Eucrisa is not approved in children less than 2 years of age. Ilumya Counseling: I discussed with the patient the risks of tildrakizumab including but not limited to immunosuppression, malignancy, posterior leukoencephalopathy syndrome, and serious infections.  The patient understands that monitoring is required including a PPD at baseline and must alert us or the primary physician if symptoms of infection or other concerning signs are noted. Hydroquinone Counseling:  Patient advised that medication may result in skin irritation, lightening (hypopigmentation), dryness, and burning.  In the event of skin irritation, the patient was advised to reduce the amount of the drug applied or use it less frequently.  Rarely, spots that are treated with hydroquinone can become darker (pseudoochronosis).  Should this occur, patient instructed to stop medication and call the office. The patient verbalized understanding of the proper use and possible adverse effects of hydroquinone.  All of the patient's questions and concerns were addressed. Valtrex Counseling: I discussed with the patient the risks of valacyclovir including but not limited to kidney damage, nausea, vomiting and severe allergy.  The patient understands that if the infection seems to be worsening or is not improving, they are to call. Isotretinoin Counseling: Patient should get monthly blood tests, not donate blood, not drive at night if vision affected, not share medication, and not undergo elective surgery for 6 months after tx completed. Side effects reviewed, pt to contact office should one occur. Topical Clindamycin Counseling: Patient counseled that this medication may cause skin irritation or allergic reactions.  In the event of skin irritation, the patient was advised to reduce the amount of the drug applied or use it less frequently.   The patient verbalized understanding of the proper use and possible adverse effects of clindamycin.  All of the patient's questions and concerns were addressed. Albendazole Pregnancy And Lactation Text: This medication is Pregnancy Category C and it isn't known if it is safe during pregnancy. It is also excreted in breast milk. Bexarotene Pregnancy And Lactation Text: This medication is Pregnancy Category X and should not be given to women who are pregnant or may become pregnant. This medication should not be used if you are breast feeding. Eucrisa Pregnancy And Lactation Text: This medication has not been assigned a Pregnancy Risk Category but animal studies failed to show danger with the topical medication. It is unknown if the medication is excreted in breast milk. Metronidazole Pregnancy And Lactation Text: This medication is Pregnancy Category B and considered safe during pregnancy.  It is also excreted in breast milk. Xeljanz Counseling: I discussed with the patient the risks of Xeljanz therapy including increased risk of infection, liver issues, headache, diarrhea, or cold symptoms. Live vaccines should be avoided. They were instructed to call if they have any problems. Hydroxychloroquine Pregnancy And Lactation Text: This medication has been shown to cause fetal harm but it isn't assigned a Pregnancy Risk Category. There are small amounts excreted in breast milk. Valtrex Pregnancy And Lactation Text: this medication is Pregnancy Category B and is considered safe during pregnancy. This medication is not directly found in breast milk but it's metabolite acyclovir is present. Xolair Counseling:  Patient informed of potential adverse effects including but not limited to fever, muscle aches, rash and allergic reactions.  The patient verbalized understanding of the proper use and possible adverse effects of Xolair.  All of the patient's questions and concerns were addressed. Glycopyrrolate Counseling:  I discussed with the patient the risks of glycopyrrolate including but not limited to skin rash, drowsiness, dry mouth, difficulty urinating, and blurred vision. Isotretinoin Pregnancy And Lactation Text: This medication is Pregnancy Category X and is considered extremely dangerous during pregnancy. It is unknown if it is excreted in breast milk. Ivermectin Counseling:  Patient instructed to take medication on an empty stomach with a full glass of water.  Patient informed of potential adverse effects including but not limited to nausea, diarrhea, dizziness, itching, and swelling of the extremities or lymph nodes.  The patient verbalized understanding of the proper use and possible adverse effects of ivermectin.  All of the patient's questions and concerns were addressed. Minocycline Counseling: Patient advised regarding possible photosensitivity and discoloration of the teeth, skin, lips, tongue and gums.  Patient instructed to avoid sunlight, if possible.  When exposed to sunlight, patients should wear protective clothing, sunglasses, and sunscreen.  The patient was instructed to call the office immediately if the following severe adverse effects occur:  hearing changes, easy bruising/bleeding, severe headache, or vision changes.  The patient verbalized understanding of the proper use and possible adverse effects of minocycline.  All of the patient's questions and concerns were addressed. Xelsarabjitz Pregnancy And Lactation Text: This medication is Pregnancy Category D and is not considered safe during pregnancy.  The risk during breast feeding is also uncertain. Nsaids Counseling: NSAID Counseling: I discussed with the patient that NSAIDs should be taken with food. Prolonged use of NSAIDs can result in the development of stomach ulcers.  Patient advised to stop taking NSAIDs if abdominal pain occurs.  The patient verbalized understanding of the proper use and possible adverse effects of NSAIDs.  All of the patient's questions and concerns were addressed. Infliximab Counseling:  I discussed with the patient the risks of infliximab including but not limited to myelosuppression, immunosuppression, autoimmune hepatitis, demyelinating diseases, lymphoma, and serious infections.  The patient understands that monitoring is required including a PPD at baseline and must alert us or the primary physician if symptoms of infection or other concerning signs are noted. Topical Clindamycin Pregnancy And Lactation Text: This medication is Pregnancy Category B and is considered safe during pregnancy. It is unknown if it is excreted in breast milk. Rituxan Counseling:  I discussed with the patient the risks of Rituxan infusions. Side effects can include infusion reactions, severe drug rashes including mucocutaneous reactions, reactivation of latent hepatitis and other infections and rarely progressive multifocal leukoencephalopathy.  All of the patient's questions and concerns were addressed. High Dose Vitamin A Counseling: Side effects reviewed, pt to contact office should one occur. Quinolones Counseling:  I discussed with the patient the risks of fluoroquinolones including but not limited to GI upset, allergic reaction, drug rash, diarrhea, dizziness, photosensitivity, yeast infections, liver function test abnormalities, tendonitis/tendon rupture. Odomzo Counseling- I discussed with the patient the risks of Odomzo including but not limited to nausea, vomiting, diarrhea, constipation, weight loss, changes in the sense of taste, decreased appetite, muscle spasms, and hair loss.  The patient verbalized understanding of the proper use and possible adverse effects of Odomzo.  All of the patient's questions and concerns were addressed. Topical Sulfur Applications Counseling: Topical Sulfur Counseling: Patient counseled that this medication may cause skin irritation or allergic reactions.  In the event of skin irritation, the patient was advised to reduce the amount of the drug applied or use it less frequently.   The patient verbalized understanding of the proper use and possible adverse effects of topical sulfur application.  All of the patient's questions and concerns were addressed. Nsaids Pregnancy And Lactation Text: These medications are considered safe up to 30 weeks gestation. It is excreted in breast milk. Minocycline Pregnancy And Lactation Text: This medication is Pregnancy Category D and not consider safe during pregnancy. It is also excreted in breast milk. Zyclara Counseling:  I discussed with the patient the risks of imiquimod including but not limited to erythema, scaling, itching, weeping, crusting, and pain.  Patient understands that the inflammatory response to imiquimod is variable from person to person and was educated regarded proper titration schedule.  If flu-like symptoms develop, patient knows to discontinue the medication and contact us. Rituxan Pregnancy And Lactation Text: This medication is Pregnancy Category C and it isn't know if it is safe during pregnancy. It is unknown if this medication is excreted in breast milk but similar antibodies are known to be excreted. Azithromycin Counseling:  I discussed with the patient the risks of azithromycin including but not limited to GI upset, allergic reaction, drug rash, diarrhea, and yeast infections. Xolair Pregnancy And Lactation Text: This medication is Pregnancy Category B and is considered safe during pregnancy. This medication is excreted in breast milk. Imiquimod Counseling:  I discussed with the patient the risks of imiquimod including but not limited to erythema, scaling, itching, weeping, crusting, and pain.  Patient understands that the inflammatory response to imiquimod is variable from person to person and was educated regarded proper titration schedule.  If flu-like symptoms develop, patient knows to discontinue the medication and contact us. Topical Sulfur Applications Pregnancy And Lactation Text: This medication is Pregnancy Category C and has an unknown safety profile during pregnancy. It is unknown if this topical medication is excreted in breast milk. Siliq Counseling:  I discussed with the patient the risks of Siliq including but not limited to new or worsening depression, suicidal thoughts and behavior, immunosuppression, malignancy, posterior leukoencephalopathy syndrome, and serious infections.  The patient understands that monitoring is required including a PPD at baseline and must alert us or the primary physician if symptoms of infection or other concerning signs are noted. There is also a special program designed to monitor depression which is required with Siliq. Ketoconazole Counseling:   Patient counseled regarding improving absorption with orange juice.  Adverse effects include but are not limited to breast enlargement, headache, diarrhea, nausea, upset stomach, liver function test abnormalities, taste disturbance, and stomach pain.  There is a rare possibility of liver failure that can occur when taking ketoconazole. The patient understands that monitoring of LFTs may be required, especially at baseline. The patient verbalized understanding of the proper use and possible adverse effects of ketoconazole.  All of the patient's questions and concerns were addressed. Rifampin Counseling: I discussed with the patient the risks of rifampin including but not limited to liver damage, kidney damage, red-orange body fluids, nausea/vomiting and severe allergy. High Dose Vitamin A Pregnancy And Lactation Text: High dose vitamin A therapy is contraindicated during pregnancy and breast feeding. Minoxidil Counseling: Minoxidil is a topical medication which can increase blood flow where it is applied. It is uncertain how this medication increases hair growth. Side effects are uncommon and include stinging and allergic reactions. Otezla Counseling: The side effects of Otezla were discussed with the patient, including but not limited to worsening or new depression, weight loss, diarrhea, nausea, upper respiratory tract infection, and headache. Patient instructed to call the office should any adverse effect occur.  The patient verbalized understanding of the proper use and possible adverse effects of Otezla.  All the patient's questions and concerns were addressed. Azithromycin Pregnancy And Lactation Text: This medication is considered safe during pregnancy and is also secreted in breast milk. Bactrim Counseling:  I discussed with the patient the risks of sulfa antibiotics including but not limited to GI upset, allergic reaction, drug rash, diarrhea, dizziness, photosensitivity, and yeast infections.  Rarely, more serious reactions can occur including but not limited to aplastic anemia, agranulocytosis, methemoglobinemia, blood dyscrasias, liver or kidney failure, lung infiltrates or desquamative/blistering drug rashes. Rifampin Pregnancy And Lactation Text: This medication is Pregnancy Category C and it isn't know if it is safe during pregnancy. It is also excreted in breast milk and should not be used if you are breast feeding. Azathioprine Counseling:  I discussed with the patient the risks of azathioprine including but not limited to myelosuppression, immunosuppression, hepatotoxicity, lymphoma, and infections.  The patient understands that monitoring is required including baseline LFTs, Creatinine, possible TPMP genotyping and weekly CBCs for the first month and then every 2 weeks thereafter.  The patient verbalized understanding of the proper use and possible adverse effects of azathioprine.  All of the patient's questions and concerns were addressed. Otezla Pregnancy And Lactation Text: This medication is Pregnancy Category C and it isn't known if it is safe during pregnancy. It is unknown if it is excreted in breast milk. Ketoconazole Pregnancy And Lactation Text: This medication is Pregnancy Category C and it isn't know if it is safe during pregnancy. It is also excreted in breast milk and breast feeding isn't recommended. Arava Counseling:  Patient counseled regarding adverse effects of Arava including but not limited to nausea, vomiting, abnormalities in liver function tests. Patients may develop mouth sores, rash, diarrhea, and abnormalities in blood counts. The patient understands that monitoring is required including LFTs and blood counts.  There is a rare possibility of scarring of the liver and lung problems that can occur when taking methotrexate. Persistent nausea, loss of appetite, pale stools, dark urine, cough, and shortness of breath should be reported immediately. Patient advised to discontinue Arava treatment and consult with a physician prior to attempting conception. The patient will have to undergo a treatment to eliminate Arava from the body prior to conception. Oxybutynin Counseling:  I discussed with the patient the risks of oxybutynin including but not limited to skin rash, drowsiness, dry mouth, difficulty urinating, and blurred vision. Sarecycline Counseling: Patient advised regarding possible photosensitivity and discoloration of the teeth, skin, lips, tongue and gums.  Patient instructed to avoid sunlight, if possible.  When exposed to sunlight, patients should wear protective clothing, sunglasses, and sunscreen.  The patient was instructed to call the office immediately if the following severe adverse effects occur:  hearing changes, easy bruising/bleeding, severe headache, or vision changes.  The patient verbalized understanding of the proper use and possible adverse effects of sarecycline.  All of the patient's questions and concerns were addressed. Picato Counseling:  I discussed with the patient the risks of Picato including but not limited to erythema, scaling, itching, weeping, crusting, and pain. Benzoyl Peroxide Counseling: Patient counseled that medicine may cause skin irritation and bleach clothing.  In the event of skin irritation, the patient was advised to reduce the amount of the drug applied or use it less frequently.   The patient verbalized understanding of the proper use and possible adverse effects of benzoyl peroxide.  All of the patient's questions and concerns were addressed. Terbinafine Counseling: Patient counseling regarding adverse effects of terbinafine including but not limited to headache, diarrhea, rash, upset stomach, liver function test abnormalities, itching, taste/smell disturbance, nausea, abdominal pain, and flatulence.  There is a rare possibility of liver failure that can occur when taking terbinafine.  The patient understands that a baseline LFT and kidney function test may be required. The patient verbalized understanding of the proper use and possible adverse effects of terbinafine.  All of the patient's questions and concerns were addressed. Azathioprine Pregnancy And Lactation Text: This medication is Pregnancy Category D and isn't considered safe during pregnancy. It is unknown if this medication is excreted in breast milk. Bactrim Pregnancy And Lactation Text: This medication is Pregnancy Category D and is known to cause fetal risk.  It is also excreted in breast milk. Simponi Counseling:  I discussed with the patient the risks of golimumab including but not limited to myelosuppression, immunosuppression, autoimmune hepatitis, demyelinating diseases, lymphoma, and serious infections.  The patient understands that monitoring is required including a PPD at baseline and must alert us or the primary physician if symptoms of infection or other concerning signs are noted. Cimzia Counseling:  I discussed with the patient the risks of Cimzia including but not limited to immunosuppression, allergic reactions and infections.  The patient understands that monitoring is required including a PPD at baseline and must alert us or the primary physician if symptoms of infection or other concerning signs are noted. Carac Counseling:  I discussed with the patient the risks of Carac including but not limited to erythema, scaling, itching, weeping, crusting, and pain. Cosentyx Counseling:  I discussed with the patient the risks of Cosentyx including but not limited to worsening of Crohn's disease, immunosuppression, allergic reactions and infections.  The patient understands that monitoring is required including a PPD at baseline and must alert us or the primary physician if symptoms of infection or other concerning signs are noted. Cimzia Pregnancy And Lactation Text: This medication crosses the placenta but can be considered safe in certain situations. Cimzia may be excreted in breast milk. Terbinafine Pregnancy And Lactation Text: This medication is Pregnancy Category B and is considered safe during pregnancy. It is also excreted in breast milk and breast feeding isn't recommended. Benzoyl Peroxide Pregnancy And Lactation Text: This medication is Pregnancy Category C. It is unknown if benzoyl peroxide is excreted in breast milk. Cephalexin Counseling: I counseled the patient regarding use of cephalexin as an antibiotic for prophylactic and/or therapeutic purposes. Cephalexin (commonly prescribed under brand name Keflex) is a cephalosporin antibiotic which is active against numerous classes of bacteria, including most skin bacteria. Side effects may include nausea, diarrhea, gastrointestinal upset, rash, hives, yeast infections, and in rare cases, hepatitis, kidney disease, seizures, fever, confusion, neurologic symptoms, and others. Patients with severe allergies to penicillin medications are cautioned that there is about a 10% incidence of cross-reactivity with cephalosporins. When possible, patients with penicillin allergies should use alternatives to cephalosporins for antibiotic therapy. Cellcept Counseling:  I discussed with the patient the risks of mycophenolate mofetil including but not limited to infection/immunosuppression, GI upset, hypokalemia, hypercholesterolemia, bone marrow suppression, lymphoproliferative disorders, malignancy, GI ulceration/bleed/perforation, colitis, interstitial lung disease, kidney failure, progressive multifocal leukoencephalopathy, and birth defects.  The patient understands that monitoring is required including a baseline creatinine and regular CBC testing. In addition, patient must alert us immediately if symptoms of infection or other concerning signs are noted. Cimetidine Counseling:  I discussed with the patient the risks of Cimetidine including but not limited to gynecomastia, headache, diarrhea, nausea, drowsiness, arrhythmias, pancreatitis, skin rashes, psychosis, bone marrow suppression and kidney toxicity. Cephalexin Pregnancy And Lactation Text: This medication is Pregnancy Category B and considered safe during pregnancy.  It is also excreted in breast milk but can be used safely for shorter doses. Detail Level: Detailed Tetracycline Counseling: Patient counseled regarding possible photosensitivity and increased risk for sunburn.  Patient instructed to avoid sunlight, if possible.  When exposed to sunlight, patients should wear protective clothing, sunglasses, and sunscreen.  The patient was instructed to call the office immediately if the following severe adverse effects occur:  hearing changes, easy bruising/bleeding, severe headache, or vision changes.  The patient verbalized understanding of the proper use and possible adverse effects of tetracycline.  All of the patient's questions and concerns were addressed. Patient understands to avoid pregnancy while on therapy due to potential birth defects. Protopic Counseling: Patient may experience a mild burning sensation during topical application. Protopic is not approved in children less than 2 years of age. There have been case reports of hematologic and skin malignancies in patients using topical calcineurin inhibitors although causality is questionable. Skyrizi Counseling: I discussed with the patient the risks of risankizumab-rzaa including but not limited to immunosuppression, and serious infections.  The patient understands that monitoring is required including a PPD at baseline and must alert us or the primary physician if symptoms of infection or other concerning signs are noted. Clofazimine Counseling:  I discussed with the patient the risks of clofazimine including but not limited to skin and eye pigmentation, liver damage, nausea/vomiting, gastrointestinal bleeding and allergy. Dupixent Counseling: I discussed with the patient the risks of dupilumab including but not limited to eye infection and irritation, cold sores, injection site reactions, worsening of asthma, allergic reactions and increased risk of parasitic infection.  Live vaccines should be avoided while taking dupilumab. Dupilumab will also interact with certain medications such as warfarin and cyclosporine. The patient understands that monitoring is required and they must alert us or the primary physician if symptoms of infection or other concerning signs are noted. 5-Fu Counseling: 5-Fluorouracil Counseling:  I discussed with the patient the risks of 5-fluorouracil including but not limited to erythema, scaling, itching, weeping, crusting, and pain. Methotrexate Pregnancy And Lactation Text: This medication is Pregnancy Category X and is known to cause fetal harm. This medication is excreted in breast milk. Cyclophosphamide Counseling:  I discussed with the patient the risks of cyclophosphamide including but not limited to hair loss, hormonal abnormalities, decreased fertility, abdominal pain, diarrhea, nausea and vomiting, bone marrow suppression and infection. The patient understands that monitoring is required while taking this medication. Birth Control Pills Counseling: Birth Control Pill Counseling: I discussed with the patient the potential side effects of OCPs including but not limited to increased risk of stroke, heart attack, thrombophlebitis, deep venous thrombosis, hepatic adenomas, breast changes, GI upset, headaches, and depression.  The patient verbalized understanding of the proper use and possible adverse effects of OCPs. All of the patient's questions and concerns were addressed. Protopic Pregnancy And Lactation Text: This medication is Pregnancy Category C. It is unknown if this medication is excreted in breast milk when applied topically. Clindamycin Counseling: I counseled the patient regarding use of clindamycin as an antibiotic for prophylactic and/or therapeutic purposes. Clindamycin is active against numerous classes of bacteria, including skin bacteria. Side effects may include nausea, diarrhea, gastrointestinal upset, rash, hives, yeast infections, and in rare cases, colitis.

## 2023-05-25 NOTE — PROGRESS NOTES
regurgitation with a RVSP estimation of 13 mmHg. -Grade I diastolic dysfunction with normal LV filling pressures. Avg.   E/e'=8.4    CARDIAC CATH 11/2020  Anatomy:   LM-30% distal  LAD-70% mid, 100% distal with left to left collaterals  D1-80% proximal  Cx-100% proximal with left to left collaterals  OM1-100% proximal with left to left collaterals  RCA-100% proximal with right to right and left-to-right collaterals  LVEF-55%     Impression:  1. Severe three-vessel CAD with extensive collateralization. 2.  Preserved LV systolic function with LVEF of 55%. MCOT:  Tracings reviewed  Sinus, 1% PVC's. No afib  Two 2.8 pauses. Likely during sleep    ECHO 4/2022  Summary   -Left ventricular cavity size is normal.   -Ejection fraction is visually estimated to be 50-55%. -The inferior wall appears mildly hypokinetic.   -Indeterminate diastolic function. E/e' = 12.1.   -Left atrium is dilated. -The aortic valve appears sclerotic but opens well.   -Trivial tricuspid regurgitation.   -Right ventricle is normal in size. Reduced function suggested by TAPSE:   1.58 cm. RVS velocity: 10.2 cm/s. RVSP = 14 mmHG. ECHO 1/2023-Reviewed Borderline RV size and function- TAPSE=1.6   Summary   Normal left ventricle size, wall thickness, and systolic function with an   estimated ejection fraction of 55%. No regional wall motion abnormalities are seen. There is reversal of E/A inflow velocities across the mitral valve. Aortic valve appears sclerotic but opens adequately. CT CHEST 10/2022  IMPRESSION:  Post treatment changes from left atrial appendage clip. No residual left  atrial appendage is seen. No thrombus in the left atrium     Coronary artery disease with bypass grafts, partially imaged     A few tiny punctate pulmonary nodules are seen,, similar compared to 2020. No specific imaging follow-up recommended    ASSESSMENT:    CAD:  Stable.  Multivessel CAD s/p CABG 11/2020  Continue medical

## 2023-05-30 ENCOUNTER — HOSPITAL ENCOUNTER (INPATIENT)
Age: 68
LOS: 3 days | Discharge: HOME | End: 2023-06-03
Attending: STUDENT IN AN ORGANIZED HEALTH CARE EDUCATION/TRAINING PROGRAM | Admitting: STUDENT IN AN ORGANIZED HEALTH CARE EDUCATION/TRAINING PROGRAM
Payer: MEDICARE

## 2023-05-30 ENCOUNTER — APPOINTMENT (OUTPATIENT)
Dept: CT IMAGING | Age: 68
End: 2023-05-30
Payer: MEDICARE

## 2023-05-30 DIAGNOSIS — K56.609 COLON OBSTRUCTION (HCC): ICD-10-CM

## 2023-05-30 DIAGNOSIS — R10.31 RIGHT LOWER QUADRANT ABDOMINAL PAIN: ICD-10-CM

## 2023-05-30 DIAGNOSIS — K56.600 PARTIAL OBSTRUCTION OF COLON (HCC): Primary | ICD-10-CM

## 2023-05-30 LAB
ALBUMIN SERPL-MCNC: 4.2 G/DL (ref 3.4–5)
ALBUMIN/GLOB SERPL: 1.3 {RATIO} (ref 1.1–2.2)
ALP SERPL-CCNC: 191 U/L (ref 40–129)
ALT SERPL-CCNC: 14 U/L (ref 10–40)
ANION GAP SERPL CALCULATED.3IONS-SCNC: 12 MMOL/L (ref 3–16)
AST SERPL-CCNC: 42 U/L (ref 15–37)
BASOPHILS # BLD: 0 K/UL (ref 0–0.2)
BASOPHILS NFR BLD: 0.2 %
BILIRUB SERPL-MCNC: 1.2 MG/DL (ref 0–1)
BUN SERPL-MCNC: 27 MG/DL (ref 7–20)
CALCIUM SERPL-MCNC: 9.9 MG/DL (ref 8.3–10.6)
CHLORIDE SERPL-SCNC: 103 MMOL/L (ref 99–110)
CO2 SERPL-SCNC: 24 MMOL/L (ref 21–32)
CREAT SERPL-MCNC: 1.3 MG/DL (ref 0.8–1.3)
DEPRECATED RDW RBC AUTO: 12.4 % (ref 12.4–15.4)
EOSINOPHIL # BLD: 0 K/UL (ref 0–0.6)
EOSINOPHIL NFR BLD: 0.2 %
GFR SERPLBLD CREATININE-BSD FMLA CKD-EPI: >60 ML/MIN/{1.73_M2}
GLUCOSE SERPL-MCNC: 148 MG/DL (ref 70–99)
HCT VFR BLD AUTO: 38.9 % (ref 40.5–52.5)
HGB BLD-MCNC: 12.8 G/DL (ref 13.5–17.5)
LACTATE BLDV-SCNC: 1.1 MMOL/L (ref 0.4–1.9)
LIPASE SERPL-CCNC: 22 U/L (ref 13–60)
LYMPHOCYTES # BLD: 0.8 K/UL (ref 1–5.1)
LYMPHOCYTES NFR BLD: 6.5 %
MCH RBC QN AUTO: 32.6 PG (ref 26–34)
MCHC RBC AUTO-ENTMCNC: 33 G/DL (ref 31–36)
MCV RBC AUTO: 98.8 FL (ref 80–100)
MONOCYTES # BLD: 0.7 K/UL (ref 0–1.3)
MONOCYTES NFR BLD: 5.1 %
NEUTROPHILS # BLD: 11.5 K/UL (ref 1.7–7.7)
NEUTROPHILS NFR BLD: 88 %
PLATELET # BLD AUTO: 285 K/UL (ref 135–450)
PMV BLD AUTO: 9.1 FL (ref 5–10.5)
POTASSIUM SERPL-SCNC: 4.5 MMOL/L (ref 3.5–5.1)
PROT SERPL-MCNC: 7.4 G/DL (ref 6.4–8.2)
RBC # BLD AUTO: 3.94 M/UL (ref 4.2–5.9)
SODIUM SERPL-SCNC: 139 MMOL/L (ref 136–145)
WBC # BLD AUTO: 13 K/UL (ref 4–11)

## 2023-05-30 PROCEDURE — 74176 CT ABD & PELVIS W/O CONTRAST: CPT

## 2023-05-30 PROCEDURE — 83690 ASSAY OF LIPASE: CPT

## 2023-05-30 PROCEDURE — 36415 COLL VENOUS BLD VENIPUNCTURE: CPT

## 2023-05-30 PROCEDURE — 99285 EMERGENCY DEPT VISIT HI MDM: CPT

## 2023-05-30 PROCEDURE — 96374 THER/PROPH/DIAG INJ IV PUSH: CPT

## 2023-05-30 PROCEDURE — 96375 TX/PRO/DX INJ NEW DRUG ADDON: CPT

## 2023-05-30 PROCEDURE — 83605 ASSAY OF LACTIC ACID: CPT

## 2023-05-30 PROCEDURE — 6360000002 HC RX W HCPCS: Performed by: NURSE PRACTITIONER

## 2023-05-30 PROCEDURE — 80053 COMPREHEN METABOLIC PANEL: CPT

## 2023-05-30 PROCEDURE — 85025 COMPLETE CBC W/AUTO DIFF WBC: CPT

## 2023-05-30 PROCEDURE — 2580000003 HC RX 258: Performed by: NURSE PRACTITIONER

## 2023-05-30 RX ORDER — ONDANSETRON 2 MG/ML
4 INJECTION INTRAMUSCULAR; INTRAVENOUS EVERY 30 MIN PRN
Status: DISCONTINUED | OUTPATIENT
Start: 2023-05-30 | End: 2023-05-31

## 2023-05-30 RX ORDER — 0.9 % SODIUM CHLORIDE 0.9 %
1000 INTRAVENOUS SOLUTION INTRAVENOUS ONCE
Status: COMPLETED | OUTPATIENT
Start: 2023-05-30 | End: 2023-05-30

## 2023-05-30 RX ORDER — KETOROLAC TROMETHAMINE 30 MG/ML
15 INJECTION, SOLUTION INTRAMUSCULAR; INTRAVENOUS ONCE
Status: COMPLETED | OUTPATIENT
Start: 2023-05-30 | End: 2023-05-30

## 2023-05-30 RX ADMIN — KETOROLAC TROMETHAMINE 15 MG: 30 INJECTION, SOLUTION INTRAMUSCULAR; INTRAVENOUS at 21:55

## 2023-05-30 RX ADMIN — ONDANSETRON 4 MG: 2 INJECTION INTRAMUSCULAR; INTRAVENOUS at 21:55

## 2023-05-30 RX ADMIN — SODIUM CHLORIDE 1000 ML: 9 INJECTION, SOLUTION INTRAVENOUS at 21:54

## 2023-05-30 ASSESSMENT — ENCOUNTER SYMPTOMS
ABDOMINAL PAIN: 1
VOMITING: 0
SHORTNESS OF BREATH: 0
NAUSEA: 0
CHEST TIGHTNESS: 0
DIARRHEA: 0

## 2023-05-30 ASSESSMENT — PAIN SCALES - GENERAL
PAINLEVEL_OUTOF10: 6
PAINLEVEL_OUTOF10: 3

## 2023-05-30 ASSESSMENT — PAIN DESCRIPTION - LOCATION
LOCATION: ABDOMEN
LOCATION: ABDOMEN

## 2023-05-31 ENCOUNTER — ANESTHESIA EVENT (OUTPATIENT)
Dept: ENDOSCOPY | Age: 68
End: 2023-05-31
Payer: MEDICARE

## 2023-05-31 PROBLEM — K56.609 COLONIC OBSTRUCTION (HCC): Status: ACTIVE | Noted: 2023-05-31

## 2023-05-31 LAB
ALBUMIN SERPL-MCNC: 3.6 G/DL (ref 3.4–5)
ALBUMIN/GLOB SERPL: 1.2 {RATIO} (ref 1.1–2.2)
ALP SERPL-CCNC: 157 U/L (ref 40–129)
ALT SERPL-CCNC: 9 U/L (ref 10–40)
ANION GAP SERPL CALCULATED.3IONS-SCNC: 11 MMOL/L (ref 3–16)
AST SERPL-CCNC: 31 U/L (ref 15–37)
BACTERIA URNS QL MICRO: NORMAL /HPF
BASOPHILS # BLD: 0 K/UL (ref 0–0.2)
BASOPHILS NFR BLD: 0.4 %
BILIRUB SERPL-MCNC: 1.2 MG/DL (ref 0–1)
BILIRUB UR QL STRIP.AUTO: NEGATIVE
BUN SERPL-MCNC: 29 MG/DL (ref 7–20)
CALCIUM SERPL-MCNC: 9.1 MG/DL (ref 8.3–10.6)
CHLORIDE SERPL-SCNC: 108 MMOL/L (ref 99–110)
CLARITY UR: CLEAR
CO2 SERPL-SCNC: 23 MMOL/L (ref 21–32)
COLOR UR: ABNORMAL
CREAT SERPL-MCNC: 1.2 MG/DL (ref 0.8–1.3)
DEPRECATED RDW RBC AUTO: 12.4 % (ref 12.4–15.4)
EOSINOPHIL # BLD: 0.2 K/UL (ref 0–0.6)
EOSINOPHIL NFR BLD: 1.8 %
EPI CELLS #/AREA URNS AUTO: 0 /HPF (ref 0–5)
GFR SERPLBLD CREATININE-BSD FMLA CKD-EPI: >60 ML/MIN/{1.73_M2}
GLUCOSE BLD-MCNC: 111 MG/DL (ref 70–99)
GLUCOSE BLD-MCNC: 122 MG/DL (ref 70–99)
GLUCOSE BLD-MCNC: 125 MG/DL (ref 70–99)
GLUCOSE BLD-MCNC: 135 MG/DL (ref 70–99)
GLUCOSE SERPL-MCNC: 101 MG/DL (ref 70–99)
GLUCOSE UR STRIP.AUTO-MCNC: NEGATIVE MG/DL
HCT VFR BLD AUTO: 35.7 % (ref 40.5–52.5)
HGB BLD-MCNC: 11.7 G/DL (ref 13.5–17.5)
HGB UR QL STRIP.AUTO: NEGATIVE
HYALINE CASTS #/AREA URNS AUTO: 0 /LPF (ref 0–8)
INR PPP: 1.07 (ref 0.84–1.16)
KETONES UR STRIP.AUTO-MCNC: ABNORMAL MG/DL
LEUKOCYTE ESTERASE UR QL STRIP.AUTO: NEGATIVE
LYMPHOCYTES # BLD: 1.4 K/UL (ref 1–5.1)
LYMPHOCYTES NFR BLD: 13.9 %
MCH RBC QN AUTO: 32.7 PG (ref 26–34)
MCHC RBC AUTO-ENTMCNC: 32.9 G/DL (ref 31–36)
MCV RBC AUTO: 99.3 FL (ref 80–100)
MONOCYTES # BLD: 0.8 K/UL (ref 0–1.3)
MONOCYTES NFR BLD: 8.1 %
NEUTROPHILS # BLD: 7.9 K/UL (ref 1.7–7.7)
NEUTROPHILS NFR BLD: 75.8 %
NITRITE UR QL STRIP.AUTO: NEGATIVE
PERFORMED ON: ABNORMAL
PH UR STRIP.AUTO: 5 [PH] (ref 5–8)
PLATELET # BLD AUTO: 253 K/UL (ref 135–450)
PMV BLD AUTO: 9.2 FL (ref 5–10.5)
POTASSIUM SERPL-SCNC: 4.3 MMOL/L (ref 3.5–5.1)
PROT SERPL-MCNC: 6.5 G/DL (ref 6.4–8.2)
PROT UR STRIP.AUTO-MCNC: 30 MG/DL
PROTHROMBIN TIME: 13.9 SEC (ref 11.5–14.8)
RBC # BLD AUTO: 3.6 M/UL (ref 4.2–5.9)
RBC CLUMPS #/AREA URNS AUTO: 0 /HPF (ref 0–4)
SODIUM SERPL-SCNC: 142 MMOL/L (ref 136–145)
SP GR UR STRIP.AUTO: 1.02 (ref 1–1.03)
UA COMPLETE W REFLEX CULTURE PNL UR: ABNORMAL
UA DIPSTICK W REFLEX MICRO PNL UR: YES
URN SPEC COLLECT METH UR: ABNORMAL
UROBILINOGEN UR STRIP-ACNC: 1 E.U./DL
WBC # BLD AUTO: 10.4 K/UL (ref 4–11)
WBC #/AREA URNS AUTO: 1 /HPF (ref 0–5)

## 2023-05-31 PROCEDURE — 6370000000 HC RX 637 (ALT 250 FOR IP): Performed by: STUDENT IN AN ORGANIZED HEALTH CARE EDUCATION/TRAINING PROGRAM

## 2023-05-31 PROCEDURE — 1200000000 HC SEMI PRIVATE

## 2023-05-31 PROCEDURE — 6360000002 HC RX W HCPCS: Performed by: STUDENT IN AN ORGANIZED HEALTH CARE EDUCATION/TRAINING PROGRAM

## 2023-05-31 PROCEDURE — 36415 COLL VENOUS BLD VENIPUNCTURE: CPT

## 2023-05-31 PROCEDURE — 85025 COMPLETE CBC W/AUTO DIFF WBC: CPT

## 2023-05-31 PROCEDURE — 80053 COMPREHEN METABOLIC PANEL: CPT

## 2023-05-31 PROCEDURE — 6370000000 HC RX 637 (ALT 250 FOR IP): Performed by: PHYSICIAN ASSISTANT

## 2023-05-31 PROCEDURE — 2580000003 HC RX 258: Performed by: STUDENT IN AN ORGANIZED HEALTH CARE EDUCATION/TRAINING PROGRAM

## 2023-05-31 PROCEDURE — 81001 URINALYSIS AUTO W/SCOPE: CPT

## 2023-05-31 PROCEDURE — 85610 PROTHROMBIN TIME: CPT

## 2023-05-31 RX ORDER — LISINOPRIL 20 MG/1
20 TABLET ORAL DAILY
Status: DISCONTINUED | OUTPATIENT
Start: 2023-05-31 | End: 2023-06-03 | Stop reason: HOSPADM

## 2023-05-31 RX ORDER — INSULIN LISPRO 100 [IU]/ML
0-4 INJECTION, SOLUTION INTRAVENOUS; SUBCUTANEOUS NIGHTLY
Status: DISCONTINUED | OUTPATIENT
Start: 2023-05-31 | End: 2023-06-03 | Stop reason: HOSPADM

## 2023-05-31 RX ORDER — SODIUM CHLORIDE, SODIUM LACTATE, POTASSIUM CHLORIDE, CALCIUM CHLORIDE 600; 310; 30; 20 MG/100ML; MG/100ML; MG/100ML; MG/100ML
INJECTION, SOLUTION INTRAVENOUS CONTINUOUS
Status: ACTIVE | OUTPATIENT
Start: 2023-05-31 | End: 2023-05-31

## 2023-05-31 RX ORDER — SODIUM CHLORIDE 0.9 % (FLUSH) 0.9 %
5-40 SYRINGE (ML) INJECTION EVERY 12 HOURS SCHEDULED
Status: DISCONTINUED | OUTPATIENT
Start: 2023-05-31 | End: 2023-06-03 | Stop reason: HOSPADM

## 2023-05-31 RX ORDER — ATORVASTATIN CALCIUM 80 MG/1
80 TABLET, FILM COATED ORAL DAILY
COMMUNITY

## 2023-05-31 RX ORDER — ACETAMINOPHEN 325 MG/1
650 TABLET ORAL EVERY 6 HOURS PRN
Status: DISCONTINUED | OUTPATIENT
Start: 2023-05-31 | End: 2023-06-03 | Stop reason: HOSPADM

## 2023-05-31 RX ORDER — ONDANSETRON 4 MG/1
4 TABLET, ORALLY DISINTEGRATING ORAL EVERY 8 HOURS PRN
Status: DISCONTINUED | OUTPATIENT
Start: 2023-05-31 | End: 2023-06-03 | Stop reason: HOSPADM

## 2023-05-31 RX ORDER — ONDANSETRON 2 MG/ML
4 INJECTION INTRAMUSCULAR; INTRAVENOUS EVERY 6 HOURS PRN
Status: DISCONTINUED | OUTPATIENT
Start: 2023-05-31 | End: 2023-06-03 | Stop reason: HOSPADM

## 2023-05-31 RX ORDER — DEXTROSE MONOHYDRATE 100 MG/ML
INJECTION, SOLUTION INTRAVENOUS CONTINUOUS PRN
Status: DISCONTINUED | OUTPATIENT
Start: 2023-05-31 | End: 2023-06-03 | Stop reason: HOSPADM

## 2023-05-31 RX ORDER — INSULIN LISPRO 100 [IU]/ML
0-4 INJECTION, SOLUTION INTRAVENOUS; SUBCUTANEOUS
Status: DISCONTINUED | OUTPATIENT
Start: 2023-05-31 | End: 2023-06-03 | Stop reason: HOSPADM

## 2023-05-31 RX ORDER — ACETAMINOPHEN 650 MG/1
650 SUPPOSITORY RECTAL EVERY 6 HOURS PRN
Status: DISCONTINUED | OUTPATIENT
Start: 2023-05-31 | End: 2023-06-03 | Stop reason: HOSPADM

## 2023-05-31 RX ORDER — SODIUM CHLORIDE 0.9 % (FLUSH) 0.9 %
5-40 SYRINGE (ML) INJECTION PRN
Status: DISCONTINUED | OUTPATIENT
Start: 2023-05-31 | End: 2023-06-03 | Stop reason: HOSPADM

## 2023-05-31 RX ORDER — CELECOXIB 200 MG/1
200 CAPSULE ORAL 2 TIMES DAILY
COMMUNITY

## 2023-05-31 RX ORDER — SODIUM CHLORIDE 9 MG/ML
INJECTION, SOLUTION INTRAVENOUS PRN
Status: DISCONTINUED | OUTPATIENT
Start: 2023-05-31 | End: 2023-06-03 | Stop reason: HOSPADM

## 2023-05-31 RX ORDER — MORPHINE SULFATE 2 MG/ML
2 INJECTION, SOLUTION INTRAMUSCULAR; INTRAVENOUS EVERY 4 HOURS PRN
Status: DISCONTINUED | OUTPATIENT
Start: 2023-05-31 | End: 2023-06-03 | Stop reason: HOSPADM

## 2023-05-31 RX ORDER — MAGNESIUM SULFATE IN WATER 40 MG/ML
2000 INJECTION, SOLUTION INTRAVENOUS PRN
Status: DISCONTINUED | OUTPATIENT
Start: 2023-05-31 | End: 2023-06-03 | Stop reason: HOSPADM

## 2023-05-31 RX ORDER — POTASSIUM CHLORIDE 7.45 MG/ML
10 INJECTION INTRAVENOUS PRN
Status: DISCONTINUED | OUTPATIENT
Start: 2023-05-31 | End: 2023-06-03 | Stop reason: HOSPADM

## 2023-05-31 RX ORDER — POLYETHYLENE GLYCOL 3350 17 G/17G
17 POWDER, FOR SOLUTION ORAL DAILY PRN
Status: DISCONTINUED | OUTPATIENT
Start: 2023-05-31 | End: 2023-06-01

## 2023-05-31 RX ORDER — ASPIRIN 325 MG
325 TABLET ORAL DAILY
Status: DISCONTINUED | OUTPATIENT
Start: 2023-05-31 | End: 2023-06-01

## 2023-05-31 RX ADMIN — SODIUM CHLORIDE, PRESERVATIVE FREE 10 ML: 5 INJECTION INTRAVENOUS at 03:14

## 2023-05-31 RX ADMIN — MORPHINE SULFATE 2 MG: 2 INJECTION, SOLUTION INTRAMUSCULAR; INTRAVENOUS at 21:57

## 2023-05-31 RX ADMIN — MORPHINE SULFATE 2 MG: 2 INJECTION, SOLUTION INTRAMUSCULAR; INTRAVENOUS at 06:56

## 2023-05-31 RX ADMIN — LISINOPRIL 20 MG: 20 TABLET ORAL at 09:10

## 2023-05-31 RX ADMIN — METOPROLOL TARTRATE 37.5 MG: 25 TABLET, FILM COATED ORAL at 21:57

## 2023-05-31 RX ADMIN — METOPROLOL TARTRATE 37.5 MG: 25 TABLET, FILM COATED ORAL at 09:10

## 2023-05-31 RX ADMIN — SODIUM CHLORIDE, POTASSIUM CHLORIDE, SODIUM LACTATE AND CALCIUM CHLORIDE: 600; 310; 30; 20 INJECTION, SOLUTION INTRAVENOUS at 03:16

## 2023-05-31 RX ADMIN — POLYETHYLENE GLYCOL-3350 AND ELECTROLYTES 4000 ML: 236; 6.74; 5.86; 2.97; 22.74 POWDER, FOR SOLUTION ORAL at 11:31

## 2023-05-31 RX ADMIN — MORPHINE SULFATE 2 MG: 2 INJECTION, SOLUTION INTRAMUSCULAR; INTRAVENOUS at 03:14

## 2023-05-31 RX ADMIN — Medication 10 ML: at 21:57

## 2023-05-31 ASSESSMENT — PAIN SCALES - GENERAL
PAINLEVEL_OUTOF10: 3
PAINLEVEL_OUTOF10: 8
PAINLEVEL_OUTOF10: 3
PAINLEVEL_OUTOF10: 6

## 2023-05-31 ASSESSMENT — PAIN DESCRIPTION - ORIENTATION
ORIENTATION: RIGHT;LOWER
ORIENTATION: RIGHT;LOWER
ORIENTATION: LOWER

## 2023-05-31 ASSESSMENT — PAIN DESCRIPTION - PAIN TYPE
TYPE: ACUTE PAIN

## 2023-05-31 ASSESSMENT — PAIN DESCRIPTION - LOCATION
LOCATION: ABDOMEN

## 2023-05-31 ASSESSMENT — PAIN DESCRIPTION - DESCRIPTORS
DESCRIPTORS: ACHING
DESCRIPTORS: DISCOMFORT
DESCRIPTORS: ACHING
DESCRIPTORS: ACHING

## 2023-05-31 ASSESSMENT — PAIN DESCRIPTION - FREQUENCY: FREQUENCY: INTERMITTENT

## 2023-05-31 ASSESSMENT — LIFESTYLE VARIABLES: SMOKING_STATUS: 1

## 2023-05-31 ASSESSMENT — PAIN DESCRIPTION - ONSET: ONSET: ON-GOING

## 2023-05-31 NOTE — CARE COORDINATION
Discharge Planning:     (CM) reviewed the patient's chart to assess needs. Patient's Readmission Risk Score is 13%. Patient's medical insurance is Medicare and Medicaid. Patient's PCP is Dr. Pool Garcia. No needs anticipated, at this time. CM team to follow. Staff to inform CM if additional discharge needs arise.       Otilio FAUST, ANA, [facility] -   [phone]    Electronically signed by CHRISTIANNE Watt on 5/31/2023 at 12:11 PM

## 2023-05-31 NOTE — ED PROVIDER NOTES
ordered, EKG's are interpreted by the Emergency Department Physician in the absence of a cardiologist.  Please see their note for interpretation of EKG. RADIOLOGY:   Non-plain film images such as CT, Ultrasound and MRI are read by the radiologist. Plain radiographic images are visualized and preliminarily interpreted by the ED Provider with the below findings:        Interpretation per the Radiologist below, if available at the time of this note:    CT ABDOMEN PELVIS WO CONTRAST Additional Contrast? None   Final Result   1. Findings suspicious for apple-core lesion in the ascending colon near the   hepatic flexure resulting in at least partial colonic obstruction. 2.  Multiple suspicious ill-defined liver lesions concerning for metastatic   disease. 3.  Punctate nonobstructing right renal calculi. 4.  Diverticulosis. CT ABDOMEN PELVIS WO CONTRAST Additional Contrast? None    Result Date: 5/30/2023  EXAMINATION: CT OF THE ABDOMEN AND PELVIS WITHOUT CONTRAST 5/30/2023 9:21 pm TECHNIQUE: CT of the abdomen and pelvis was performed without the administration of intravenous contrast. Multiplanar reformatted images are provided for review. Automated exposure control, iterative reconstruction, and/or weight based adjustment of the mA/kV was utilized to reduce the radiation dose to as low as reasonably achievable.  COMPARISON: 11/13/2020 HISTORY: ORDERING SYSTEM PROVIDED HISTORY: appendicitis TECHNOLOGIST PROVIDED HISTORY: Reason for exam:->appendicitis Additional Contrast?->None Decision Support Exception - unselect if not a suspected or confirmed emergency medical condition->Emergency Medical Condition (MA) Reason for Exam: appendicitis Relevant Medical/Surgical History: Abdominal Pain (Pt via self from home, c/o RLQ pain for a week, pt states it was hard to pee until today, had a large output suddenly, had large bowel movement that helped pain but it came back, still has appendix) FINDINGS:

## 2023-05-31 NOTE — ANESTHESIA PRE PROCEDURE
Department of Anesthesiology  Preprocedure Note       Name:  Ruby Sousa   Age:  79 y.o.  :  1955                                          MRN:  [id number]         Date:  2023      Surgeon: Lux Smith):  Madeleine Cooney MD    Procedure: Procedure(s):  COLONOSCOPY DIAGNOSTIC    Medications prior to admission:   Prior to Admission medications    Medication Sig Start Date End Date Taking?  Authorizing Provider   celecoxib (CELEBREX) 200 MG capsule Take 1 capsule by mouth 2 times daily   Yes Historical Provider, MD   atorvastatin (LIPITOR) 80 MG tablet Take 1 tablet by mouth daily   Yes Historical Provider, MD   metFORMIN (GLUCOPHAGE) 500 MG tablet Take 2 tablets by mouth 2 times daily (with meals)   Yes Historical Provider, MD   GRAPE SEED CR PO Take by mouth    Historical Provider, MD GLORY Ryan Golden 106    Historical Provider, MD   Metoprolol Tartrate 75 MG TABS Take 1/2  tablet by mouth two times a day 23   Dillon Penn MD   lisinopril (PRINIVIL;ZESTRIL) 20 MG tablet Take 1 tablet by mouth daily 23   Dillon Penn MD   Evolocumab 140 MG/ML SOAJ Inject 1 pen into the skin every 14 days  Patient not taking: Reported on 2023   Dillon Penn MD   PROFE 391.3 (180 Fe) MG CAPS TAKE 1 CAPSULE BY MOUTH DAILY  Patient not taking: Reported on 22   Historical Provider, MD   NIACIN PO Take 1 tablet by mouth daily    Historical Provider, MD   naloxone Darnella Primmer) 4 MG/0.1ML LIQD nasal spray 1 spray by Nasal route as needed for Opioid Reversal 22   Sachin Cook APRN - CNP   Flaxseed, Linseed, (FLAXSEED OIL PO) Take by mouth    Historical Provider, MD   Coenzyme Q10 (COQ-10 PO) Take 80 mg by mouth daily    Historical Provider, MD   EQL VITAMIN E PO Take by mouth    Historical Provider, MD   VANADYL SULFATE PO Take by mouth    Historical Provider, MD   NATURAL VITAMIN D PO Take by mouth    Historical Provider, MD   Nutritional Supplements (GRAPESEED

## 2023-05-31 NOTE — H&P
VITAMIN D PO Take by mouth    Historical Provider, MD   Nutritional Supplements (GRAPESEED EXTRACT PO) Take by mouth    Historical Provider, MD   turmeric 500 MG CAPS Take by mouth daily    Historical Provider, MD   Ascorbic Acid (VITAMIN C) 1000 MG tablet Take 1 tablet by mouth daily    Historical Provider, MD   Methylsulfonylmethane (MSM PO) Take by mouth    Historical Provider, MD   folic acid (FOLVITE) 277 MCG tablet Take 1 tablet by mouth daily    Historical Provider, MD   RESVERATROL PO Take by mouth    Historical Provider, MD   Cholecalciferol (VITAMIN D3) 50 MCG (2000 UT) CAPS Take by mouth daily    Historical Provider, MD   aspirin 325 MG tablet Take 1 tablet by mouth daily    Historical Provider, MD   Lancets MISC 1 each by Does not apply route 2 times daily 11/22/20   Rodolfo Spaulding MD   blood glucose monitor strips Test 2 times a day & as needed for symptoms of irregular blood glucose. Dispense sufficient amount for indicated testing frequency plus additional to accommodate PRN testing needs.  11/22/20   Rodolfo Spaulding MD   Alcohol Swabs PADS 1 Units by Does not apply route 2 times daily 11/22/20   Rodolfo Spaulding MD   tamsulosin (FLOMAX) 0.4 MG capsule Take 1 capsule by mouth daily    Historical Provider, MD       Medications:     Medications:        Infusions:       PRN Meds:   ondansetron, 4 mg, Q30 Min PRN        Data:     CBC:   Recent Labs     05/30/23  2145   WBC 13.0*   HGB 12.8*      MCV 98.8   RDW 12.4   LYMPHOPCT 6.5   MONOPCT 5.1   BASOPCT 0.2   MONOSABS 0.7   LYMPHSABS 0.8*   EOSABS 0.0   BASOSABS 0.0     CMP:    Recent Labs     05/30/23  2145      K 4.5      CO2 24   BUN 27*   CREATININE 1.3   GLUCOSE 148*   LABALBU 4.2   CALCIUM 9.9   BILITOT 1.2*   ALKPHOS 191*   AST 42*   ALT 14     Lipids:   Lab Results   Component Value Date/Time    CHOL 124 06/02/2021 07:55 AM    HDL 35 02/17/2023 07:40 AM    TRIG 130 06/02/2021 07:55 AM     Hemoglobin A1C:   Lab Results   Component Value

## 2023-05-31 NOTE — ED NOTES
Patient states his pain is now a 3/10 and he is feeling better      Julius VANCE Paniter  05/30/23 2993

## 2023-05-31 NOTE — ED NOTES
Report called to JOSELITO RN verbalized understanding and denied any need for further information, patient to be transported to unit at this time      Mark Calhoun RN  05/31/23 0147

## 2023-06-01 ENCOUNTER — ANESTHESIA (OUTPATIENT)
Dept: ENDOSCOPY | Age: 68
End: 2023-06-01
Payer: MEDICARE

## 2023-06-01 LAB
ALBUMIN SERPL-MCNC: 3.3 G/DL (ref 3.4–5)
ALBUMIN/GLOB SERPL: 1 {RATIO} (ref 1.1–2.2)
ALP SERPL-CCNC: 173 U/L (ref 40–129)
ALT SERPL-CCNC: 11 U/L (ref 10–40)
ANION GAP SERPL CALCULATED.3IONS-SCNC: 8 MMOL/L (ref 3–16)
AST SERPL-CCNC: 38 U/L (ref 15–37)
BILIRUB SERPL-MCNC: 1.2 MG/DL (ref 0–1)
BUN SERPL-MCNC: 19 MG/DL (ref 7–20)
CALCIUM SERPL-MCNC: 9.4 MG/DL (ref 8.3–10.6)
CHLORIDE SERPL-SCNC: 105 MMOL/L (ref 99–110)
CO2 SERPL-SCNC: 25 MMOL/L (ref 21–32)
CREAT SERPL-MCNC: 1.1 MG/DL (ref 0.8–1.3)
DEPRECATED RDW RBC AUTO: 12.3 % (ref 12.4–15.4)
GFR SERPLBLD CREATININE-BSD FMLA CKD-EPI: >60 ML/MIN/{1.73_M2}
GLUCOSE BLD-MCNC: 101 MG/DL (ref 70–99)
GLUCOSE BLD-MCNC: 107 MG/DL (ref 70–99)
GLUCOSE BLD-MCNC: 108 MG/DL (ref 70–99)
GLUCOSE BLD-MCNC: 112 MG/DL (ref 70–99)
GLUCOSE SERPL-MCNC: 109 MG/DL (ref 70–99)
HCT VFR BLD AUTO: 36.2 % (ref 40.5–52.5)
HGB BLD-MCNC: 12 G/DL (ref 13.5–17.5)
MCH RBC QN AUTO: 32.9 PG (ref 26–34)
MCHC RBC AUTO-ENTMCNC: 33.1 G/DL (ref 31–36)
MCV RBC AUTO: 99.4 FL (ref 80–100)
PERFORMED ON: ABNORMAL
PLATELET # BLD AUTO: 235 K/UL (ref 135–450)
PMV BLD AUTO: 8.8 FL (ref 5–10.5)
POTASSIUM SERPL-SCNC: 4.3 MMOL/L (ref 3.5–5.1)
PROT SERPL-MCNC: 6.5 G/DL (ref 6.4–8.2)
RBC # BLD AUTO: 3.64 M/UL (ref 4.2–5.9)
SODIUM SERPL-SCNC: 138 MMOL/L (ref 136–145)
WBC # BLD AUTO: 10 K/UL (ref 4–11)

## 2023-06-01 PROCEDURE — 6370000000 HC RX 637 (ALT 250 FOR IP): Performed by: NURSE PRACTITIONER

## 2023-06-01 PROCEDURE — 0DBP8ZZ EXCISION OF RECTUM, VIA NATURAL OR ARTIFICIAL OPENING ENDOSCOPIC: ICD-10-PCS | Performed by: INTERNAL MEDICINE

## 2023-06-01 PROCEDURE — 0DBK8ZX EXCISION OF ASCENDING COLON, VIA NATURAL OR ARTIFICIAL OPENING ENDOSCOPIC, DIAGNOSTIC: ICD-10-PCS | Performed by: INTERNAL MEDICINE

## 2023-06-01 PROCEDURE — 2580000003 HC RX 258: Performed by: INTERNAL MEDICINE

## 2023-06-01 PROCEDURE — 88341 IMHCHEM/IMCYTCHM EA ADD ANTB: CPT

## 2023-06-01 PROCEDURE — 6360000002 HC RX W HCPCS: Performed by: NURSE ANESTHETIST, CERTIFIED REGISTERED

## 2023-06-01 PROCEDURE — 88342 IMHCHEM/IMCYTCHM 1ST ANTB: CPT

## 2023-06-01 PROCEDURE — 3700000000 HC ANESTHESIA ATTENDED CARE: Performed by: INTERNAL MEDICINE

## 2023-06-01 PROCEDURE — 80053 COMPREHEN METABOLIC PANEL: CPT

## 2023-06-01 PROCEDURE — 3609010300 HC COLONOSCOPY W/BIOPSY SINGLE/MULTIPLE: Performed by: INTERNAL MEDICINE

## 2023-06-01 PROCEDURE — 7100000000 HC PACU RECOVERY - FIRST 15 MIN: Performed by: INTERNAL MEDICINE

## 2023-06-01 PROCEDURE — 3609010600 HC COLONOSCOPY POLYPECTOMY SNARE/COLD BIOPSY: Performed by: INTERNAL MEDICINE

## 2023-06-01 PROCEDURE — 36415 COLL VENOUS BLD VENIPUNCTURE: CPT

## 2023-06-01 PROCEDURE — 2709999900 HC NON-CHARGEABLE SUPPLY: Performed by: INTERNAL MEDICINE

## 2023-06-01 PROCEDURE — 88305 TISSUE EXAM BY PATHOLOGIST: CPT

## 2023-06-01 PROCEDURE — 3700000001 HC ADD 15 MINUTES (ANESTHESIA): Performed by: INTERNAL MEDICINE

## 2023-06-01 PROCEDURE — 6370000000 HC RX 637 (ALT 250 FOR IP): Performed by: INTERNAL MEDICINE

## 2023-06-01 PROCEDURE — 1200000000 HC SEMI PRIVATE

## 2023-06-01 PROCEDURE — 2580000003 HC RX 258: Performed by: NURSE ANESTHETIST, CERTIFIED REGISTERED

## 2023-06-01 PROCEDURE — 2500000003 HC RX 250 WO HCPCS: Performed by: NURSE ANESTHETIST, CERTIFIED REGISTERED

## 2023-06-01 PROCEDURE — 85027 COMPLETE CBC AUTOMATED: CPT

## 2023-06-01 PROCEDURE — 7100000001 HC PACU RECOVERY - ADDTL 15 MIN: Performed by: INTERNAL MEDICINE

## 2023-06-01 PROCEDURE — 82378 CARCINOEMBRYONIC ANTIGEN: CPT

## 2023-06-01 RX ORDER — PROPOFOL 10 MG/ML
INJECTION, EMULSION INTRAVENOUS CONTINUOUS PRN
Status: DISCONTINUED | OUTPATIENT
Start: 2023-06-01 | End: 2023-06-01 | Stop reason: SDUPTHER

## 2023-06-01 RX ORDER — SODIUM CHLORIDE 9 MG/ML
INJECTION, SOLUTION INTRAVENOUS CONTINUOUS
Status: DISCONTINUED | OUTPATIENT
Start: 2023-06-01 | End: 2023-06-01 | Stop reason: HOSPADM

## 2023-06-01 RX ORDER — POLYETHYLENE GLYCOL 3350 17 G/17G
17 POWDER, FOR SOLUTION ORAL DAILY
Status: DISCONTINUED | OUTPATIENT
Start: 2023-06-01 | End: 2023-06-03 | Stop reason: HOSPADM

## 2023-06-01 RX ORDER — PROPOFOL 10 MG/ML
INJECTION, EMULSION INTRAVENOUS PRN
Status: DISCONTINUED | OUTPATIENT
Start: 2023-06-01 | End: 2023-06-01 | Stop reason: SDUPTHER

## 2023-06-01 RX ORDER — ASPIRIN 325 MG
325 TABLET ORAL DAILY
Status: DISCONTINUED | OUTPATIENT
Start: 2023-06-03 | End: 2023-06-03 | Stop reason: HOSPADM

## 2023-06-01 RX ORDER — LIDOCAINE HYDROCHLORIDE 20 MG/ML
INJECTION, SOLUTION INFILTRATION; PERINEURAL PRN
Status: DISCONTINUED | OUTPATIENT
Start: 2023-06-01 | End: 2023-06-01 | Stop reason: SDUPTHER

## 2023-06-01 RX ORDER — TAMSULOSIN HYDROCHLORIDE 0.4 MG/1
0.4 CAPSULE ORAL DAILY
Status: DISCONTINUED | OUTPATIENT
Start: 2023-06-01 | End: 2023-06-03 | Stop reason: HOSPADM

## 2023-06-01 RX ORDER — SODIUM CHLORIDE 9 MG/ML
INJECTION, SOLUTION INTRAVENOUS CONTINUOUS PRN
Status: DISCONTINUED | OUTPATIENT
Start: 2023-06-01 | End: 2023-06-01 | Stop reason: SDUPTHER

## 2023-06-01 RX ADMIN — METOPROLOL TARTRATE 37.5 MG: 25 TABLET, FILM COATED ORAL at 22:02

## 2023-06-01 RX ADMIN — PROPOFOL 50 MG: 10 INJECTION, EMULSION INTRAVENOUS at 07:33

## 2023-06-01 RX ADMIN — PROPOFOL 50 MG: 10 INJECTION, EMULSION INTRAVENOUS at 07:36

## 2023-06-01 RX ADMIN — Medication 10 ML: at 22:03

## 2023-06-01 RX ADMIN — POLYETHYLENE GLYCOL 3350 17 G: 17 POWDER, FOR SOLUTION ORAL at 13:59

## 2023-06-01 RX ADMIN — METOPROLOL TARTRATE 37.5 MG: 25 TABLET, FILM COATED ORAL at 11:16

## 2023-06-01 RX ADMIN — Medication 10 ML: at 11:19

## 2023-06-01 RX ADMIN — LIDOCAINE HYDROCHLORIDE 100 MG: 20 INJECTION, SOLUTION INFILTRATION; PERINEURAL at 07:33

## 2023-06-01 RX ADMIN — SODIUM CHLORIDE: 9 INJECTION, SOLUTION INTRAVENOUS at 07:27

## 2023-06-01 RX ADMIN — LISINOPRIL 20 MG: 20 TABLET ORAL at 11:17

## 2023-06-01 RX ADMIN — SODIUM CHLORIDE: 9 INJECTION, SOLUTION INTRAVENOUS at 07:18

## 2023-06-01 RX ADMIN — PROPOFOL 100 MCG/KG/MIN: 10 INJECTION, EMULSION INTRAVENOUS at 07:37

## 2023-06-01 RX ADMIN — TAMSULOSIN HYDROCHLORIDE 0.4 MG: 0.4 CAPSULE ORAL at 13:59

## 2023-06-01 ASSESSMENT — PAIN DESCRIPTION - FREQUENCY
FREQUENCY: INTERMITTENT
FREQUENCY: INTERMITTENT

## 2023-06-01 ASSESSMENT — PAIN DESCRIPTION - DESCRIPTORS
DESCRIPTORS: ACHING
DESCRIPTORS: CRAMPING

## 2023-06-01 ASSESSMENT — PAIN DESCRIPTION - PAIN TYPE
TYPE: ACUTE PAIN
TYPE: ACUTE PAIN

## 2023-06-01 ASSESSMENT — PAIN DESCRIPTION - ONSET
ONSET: ON-GOING
ONSET: ON-GOING

## 2023-06-01 ASSESSMENT — PAIN SCALES - GENERAL
PAINLEVEL_OUTOF10: 2
PAINLEVEL_OUTOF10: 2
PAINLEVEL_OUTOF10: 3
PAINLEVEL_OUTOF10: 2
PAINLEVEL_OUTOF10: 2
PAINLEVEL_OUTOF10: 0

## 2023-06-01 ASSESSMENT — PAIN DESCRIPTION - LOCATION
LOCATION: ABDOMEN

## 2023-06-01 ASSESSMENT — PAIN SCALES - WONG BAKER
WONGBAKER_NUMERICALRESPONSE: 2

## 2023-06-01 ASSESSMENT — PAIN DESCRIPTION - ORIENTATION
ORIENTATION: RIGHT;LOWER

## 2023-06-01 ASSESSMENT — PAIN - FUNCTIONAL ASSESSMENT: PAIN_FUNCTIONAL_ASSESSMENT: NONE - DENIES PAIN

## 2023-06-01 ASSESSMENT — LIFESTYLE VARIABLES: SMOKING_STATUS: 1

## 2023-06-01 NOTE — ANESTHESIA PRE PROCEDURE
Department of Anesthesiology  Preprocedure Note       Name:  Nyla Bello   Age:  79 y.o.  :  1955                                          MRN:  [id number]         Date:  2023      Surgeon: Nawaf Rucker):  Katie Desai MD    Procedure: Procedure(s):  COLONOSCOPY DIAGNOSTIC    Medications prior to admission:   Prior to Admission medications    Medication Sig Start Date End Date Taking?  Authorizing Provider   celecoxib (CELEBREX) 200 MG capsule Take 1 capsule by mouth 2 times daily    Historical Provider, MD   atorvastatin (LIPITOR) 80 MG tablet Take 1 tablet by mouth daily    Historical Provider, MD   metFORMIN (GLUCOPHAGE) 500 MG tablet Take 2 tablets by mouth 2 times daily (with meals)    Historical Provider, MD   GRAPE SEED CR PO Take by mouth    Historical Provider, MD GLORY Ryan Golden 106    Historical Provider, MD   Metoprolol Tartrate 75 MG TABS Take 1/2  tablet by mouth two times a day 23   Derrick Beal MD   lisinopril (PRINIVIL;ZESTRIL) 20 MG tablet Take 1 tablet by mouth daily 23   Derrick Beal MD   Evolocumab 140 MG/ML SOAJ Inject 1 pen into the skin every 14 days  Patient not taking: Reported on 2023   Derrick Beal MD   PROFE 391.3 (180 Fe) MG CAPS TAKE 1 CAPSULE BY MOUTH DAILY  Patient not taking: Reported on 22   Historical Provider, MD   NIACIN PO Take 1 tablet by mouth daily    Historical Provider, MD   naloxone Olu Rao) 4 MG/0.1ML LIQD nasal spray 1 spray by Nasal route as needed for Opioid Reversal 22   Oralee Cuff, APRN - CNP   Flaxseed, Linseed, (FLAXSEED OIL PO) Take by mouth    Historical Provider, MD   Coenzyme Q10 (COQ-10 PO) Take 80 mg by mouth daily    Historical Provider, MD   EQL VITAMIN E PO Take by mouth    Historical Provider, MD   VANADYL SULFATE PO Take by mouth    Historical Provider, MD   NATURAL VITAMIN D PO Take by mouth    Historical Provider, MD   Nutritional Supplements (GRAPESEED EXTRACT PO)

## 2023-06-01 NOTE — ANESTHESIA POSTPROCEDURE EVALUATION
Department of Anesthesiology  Postprocedure Note    Patient: Nino Sepulveda  MRN: [id number]  YOB: 1955  Date of evaluation: 6/1/2023      Procedure Summary     Date: 06/01/23 Room / Location: [address] [place] [address]    Anesthesia Start: 0646 Anesthesia Stop: 0805    Procedures:       COLONOSCOPY WITH BIOPSY      COLONOSCOPY POLYPECTOMY SNARE/COLD BIOPSY Diagnosis:       Colon obstruction ([address]      (Colon obstruction ([address] [C97.542])    Surgeons: Radha Ott MD Responsible Provider: Jaelyn Avendaño MD    Anesthesia Type: MAC ASA Status: 3          Anesthesia Type: No value filed.     Shelby Phase I: Shelby Score: 10    Shelby Phase II:        Anesthesia Post Evaluation    Patient location during evaluation: PACU  Patient participation: complete - patient participated  Level of consciousness: awake  Airway patency: patent  Nausea & Vomiting: no vomiting and no nausea  Complications: no  Cardiovascular status: hemodynamically stable  Respiratory status: acceptable  Hydration status: stable  Multimodal analgesia pain management approach

## 2023-06-02 ENCOUNTER — APPOINTMENT (OUTPATIENT)
Dept: GENERAL RADIOLOGY | Age: 68
End: 2023-06-02
Payer: MEDICARE

## 2023-06-02 ENCOUNTER — ANESTHESIA EVENT (OUTPATIENT)
Dept: OPERATING ROOM | Age: 68
End: 2023-06-02
Payer: MEDICARE

## 2023-06-02 ENCOUNTER — ANESTHESIA (OUTPATIENT)
Dept: OPERATING ROOM | Age: 68
End: 2023-06-02
Payer: MEDICARE

## 2023-06-02 PROBLEM — C18.2 MALIGNANT NEOPLASM OF ASCENDING COLON (HCC): Status: ACTIVE | Noted: 2023-06-02

## 2023-06-02 PROBLEM — K56.600 PARTIAL OBSTRUCTION OF COLON (HCC): Status: ACTIVE | Noted: 2023-05-31

## 2023-06-02 LAB
ALBUMIN SERPL-MCNC: 3.4 G/DL (ref 3.4–5)
ALBUMIN/GLOB SERPL: 1 {RATIO} (ref 1.1–2.2)
ALP SERPL-CCNC: 194 U/L (ref 40–129)
ALT SERPL-CCNC: 18 U/L (ref 10–40)
ANION GAP SERPL CALCULATED.3IONS-SCNC: 12 MMOL/L (ref 3–16)
AST SERPL-CCNC: 47 U/L (ref 15–37)
BILIRUB SERPL-MCNC: 0.6 MG/DL (ref 0–1)
BUN SERPL-MCNC: 19 MG/DL (ref 7–20)
CALCIUM SERPL-MCNC: 9.3 MG/DL (ref 8.3–10.6)
CEA SERPL-MCNC: 116.4 NG/ML (ref 0–5)
CHLORIDE SERPL-SCNC: 106 MMOL/L (ref 99–110)
CO2 SERPL-SCNC: 22 MMOL/L (ref 21–32)
CREAT SERPL-MCNC: 1.2 MG/DL (ref 0.8–1.3)
DEPRECATED RDW RBC AUTO: 12.2 % (ref 12.4–15.4)
GFR SERPLBLD CREATININE-BSD FMLA CKD-EPI: >60 ML/MIN/{1.73_M2}
GLUCOSE BLD-MCNC: 102 MG/DL (ref 70–99)
GLUCOSE BLD-MCNC: 106 MG/DL (ref 70–99)
GLUCOSE BLD-MCNC: 114 MG/DL (ref 70–99)
GLUCOSE BLD-MCNC: 211 MG/DL (ref 70–99)
GLUCOSE SERPL-MCNC: 105 MG/DL (ref 70–99)
HCT VFR BLD AUTO: 37.2 % (ref 40.5–52.5)
HGB BLD-MCNC: 12.4 G/DL (ref 13.5–17.5)
MCH RBC QN AUTO: 32.9 PG (ref 26–34)
MCHC RBC AUTO-ENTMCNC: 33.2 G/DL (ref 31–36)
MCV RBC AUTO: 98.9 FL (ref 80–100)
PERFORMED ON: ABNORMAL
PLATELET # BLD AUTO: 242 K/UL (ref 135–450)
PMV BLD AUTO: 8.8 FL (ref 5–10.5)
POTASSIUM SERPL-SCNC: 4.1 MMOL/L (ref 3.5–5.1)
PROT SERPL-MCNC: 6.8 G/DL (ref 6.4–8.2)
RBC # BLD AUTO: 3.76 M/UL (ref 4.2–5.9)
SODIUM SERPL-SCNC: 140 MMOL/L (ref 136–145)
WBC # BLD AUTO: 7.9 K/UL (ref 4–11)

## 2023-06-02 PROCEDURE — 6370000000 HC RX 637 (ALT 250 FOR IP): Performed by: INTERNAL MEDICINE

## 2023-06-02 PROCEDURE — 7100000001 HC PACU RECOVERY - ADDTL 15 MIN: Performed by: SURGERY

## 2023-06-02 PROCEDURE — 71045 X-RAY EXAM CHEST 1 VIEW: CPT

## 2023-06-02 PROCEDURE — 6360000002 HC RX W HCPCS: Performed by: SURGERY

## 2023-06-02 PROCEDURE — C1788 PORT, INDWELLING, IMP: HCPCS | Performed by: SURGERY

## 2023-06-02 PROCEDURE — 2500000003 HC RX 250 WO HCPCS: Performed by: SURGERY

## 2023-06-02 PROCEDURE — 77001 FLUOROGUIDE FOR VEIN DEVICE: CPT | Performed by: SURGERY

## 2023-06-02 PROCEDURE — 3600000012 HC SURGERY LEVEL 2 ADDTL 15MIN: Performed by: SURGERY

## 2023-06-02 PROCEDURE — 6370000000 HC RX 637 (ALT 250 FOR IP): Performed by: NURSE PRACTITIONER

## 2023-06-02 PROCEDURE — 02HV33Z INSERTION OF INFUSION DEVICE INTO SUPERIOR VENA CAVA, PERCUTANEOUS APPROACH: ICD-10-PCS | Performed by: SURGERY

## 2023-06-02 PROCEDURE — 36561 INSERT TUNNELED CV CATH: CPT | Performed by: SURGERY

## 2023-06-02 PROCEDURE — 85027 COMPLETE CBC AUTOMATED: CPT

## 2023-06-02 PROCEDURE — 3600000002 HC SURGERY LEVEL 2 BASE: Performed by: SURGERY

## 2023-06-02 PROCEDURE — 3700000001 HC ADD 15 MINUTES (ANESTHESIA): Performed by: SURGERY

## 2023-06-02 PROCEDURE — 2580000003 HC RX 258: Performed by: REGISTERED NURSE

## 2023-06-02 PROCEDURE — 2580000003 HC RX 258: Performed by: SURGERY

## 2023-06-02 PROCEDURE — 36415 COLL VENOUS BLD VENIPUNCTURE: CPT

## 2023-06-02 PROCEDURE — 1200000000 HC SEMI PRIVATE

## 2023-06-02 PROCEDURE — 2709999900 HC NON-CHARGEABLE SUPPLY: Performed by: SURGERY

## 2023-06-02 PROCEDURE — 2500000003 HC RX 250 WO HCPCS: Performed by: REGISTERED NURSE

## 2023-06-02 PROCEDURE — 77001 FLUOROGUIDE FOR VEIN DEVICE: CPT

## 2023-06-02 PROCEDURE — 94760 N-INVAS EAR/PLS OXIMETRY 1: CPT

## 2023-06-02 PROCEDURE — 0JH60WZ INSERTION OF TOTALLY IMPLANTABLE VASCULAR ACCESS DEVICE INTO CHEST SUBCUTANEOUS TISSUE AND FASCIA, OPEN APPROACH: ICD-10-PCS | Performed by: SURGERY

## 2023-06-02 PROCEDURE — 2580000003 HC RX 258: Performed by: INTERNAL MEDICINE

## 2023-06-02 PROCEDURE — 6370000000 HC RX 637 (ALT 250 FOR IP): Performed by: SURGERY

## 2023-06-02 PROCEDURE — 6360000002 HC RX W HCPCS: Performed by: REGISTERED NURSE

## 2023-06-02 PROCEDURE — APPNB30 APP NON BILLABLE TIME 0-30 MINS: Performed by: NURSE PRACTITIONER

## 2023-06-02 PROCEDURE — 7100000000 HC PACU RECOVERY - FIRST 15 MIN: Performed by: SURGERY

## 2023-06-02 PROCEDURE — 80053 COMPREHEN METABOLIC PANEL: CPT

## 2023-06-02 PROCEDURE — 3700000000 HC ANESTHESIA ATTENDED CARE: Performed by: SURGERY

## 2023-06-02 PROCEDURE — A4217 STERILE WATER/SALINE, 500 ML: HCPCS | Performed by: SURGERY

## 2023-06-02 DEVICE — PORT IMPL INFUSION 16X26 MM PWR INJ POLYURETHANE CATH XCELA: Type: IMPLANTABLE DEVICE | Site: CHEST | Status: FUNCTIONAL

## 2023-06-02 RX ORDER — SODIUM CHLORIDE 9 MG/ML
INJECTION, SOLUTION INTRAVENOUS CONTINUOUS PRN
Status: DISCONTINUED | OUTPATIENT
Start: 2023-06-02 | End: 2023-06-02 | Stop reason: SDUPTHER

## 2023-06-02 RX ORDER — LIDOCAINE HYDROCHLORIDE 20 MG/ML
INJECTION, SOLUTION EPIDURAL; INFILTRATION; INTRACAUDAL; PERINEURAL PRN
Status: DISCONTINUED | OUTPATIENT
Start: 2023-06-02 | End: 2023-06-02 | Stop reason: SDUPTHER

## 2023-06-02 RX ORDER — HYDROCODONE BITARTRATE AND ACETAMINOPHEN 5; 325 MG/1; MG/1
2 TABLET ORAL EVERY 4 HOURS PRN
Status: DISCONTINUED | OUTPATIENT
Start: 2023-06-02 | End: 2023-06-03 | Stop reason: HOSPADM

## 2023-06-02 RX ORDER — BUPIVACAINE HYDROCHLORIDE 5 MG/ML
INJECTION, SOLUTION EPIDURAL; INTRACAUDAL
Status: COMPLETED | OUTPATIENT
Start: 2023-06-02 | End: 2023-06-02

## 2023-06-02 RX ORDER — HYDROCODONE BITARTRATE AND ACETAMINOPHEN 5; 325 MG/1; MG/1
1 TABLET ORAL EVERY 4 HOURS PRN
Status: DISCONTINUED | OUTPATIENT
Start: 2023-06-02 | End: 2023-06-03 | Stop reason: HOSPADM

## 2023-06-02 RX ORDER — PROPOFOL 10 MG/ML
INJECTION, EMULSION INTRAVENOUS PRN
Status: DISCONTINUED | OUTPATIENT
Start: 2023-06-02 | End: 2023-06-02 | Stop reason: SDUPTHER

## 2023-06-02 RX ORDER — ONDANSETRON 2 MG/ML
INJECTION INTRAMUSCULAR; INTRAVENOUS PRN
Status: DISCONTINUED | OUTPATIENT
Start: 2023-06-02 | End: 2023-06-02 | Stop reason: SDUPTHER

## 2023-06-02 RX ORDER — DEXAMETHASONE SODIUM PHOSPHATE 4 MG/ML
INJECTION, SOLUTION INTRA-ARTICULAR; INTRALESIONAL; INTRAMUSCULAR; INTRAVENOUS; SOFT TISSUE PRN
Status: DISCONTINUED | OUTPATIENT
Start: 2023-06-02 | End: 2023-06-02 | Stop reason: SDUPTHER

## 2023-06-02 RX ORDER — FENTANYL CITRATE 50 UG/ML
INJECTION, SOLUTION INTRAMUSCULAR; INTRAVENOUS PRN
Status: DISCONTINUED | OUTPATIENT
Start: 2023-06-02 | End: 2023-06-02 | Stop reason: SDUPTHER

## 2023-06-02 RX ADMIN — LISINOPRIL 20 MG: 20 TABLET ORAL at 09:03

## 2023-06-02 RX ADMIN — FENTANYL CITRATE 50 MCG: 50 INJECTION, SOLUTION INTRAMUSCULAR; INTRAVENOUS at 14:51

## 2023-06-02 RX ADMIN — Medication 10 ML: at 09:04

## 2023-06-02 RX ADMIN — ONDANSETRON 4 MG: 2 INJECTION INTRAMUSCULAR; INTRAVENOUS at 14:45

## 2023-06-02 RX ADMIN — METOPROLOL TARTRATE 37.5 MG: 25 TABLET, FILM COATED ORAL at 20:58

## 2023-06-02 RX ADMIN — CEFAZOLIN 2000 MG: 2 INJECTION, POWDER, FOR SOLUTION INTRAMUSCULAR; INTRAVENOUS at 15:00

## 2023-06-02 RX ADMIN — TAMSULOSIN HYDROCHLORIDE 0.4 MG: 0.4 CAPSULE ORAL at 09:03

## 2023-06-02 RX ADMIN — SODIUM CHLORIDE: 9 INJECTION, SOLUTION INTRAVENOUS at 14:37

## 2023-06-02 RX ADMIN — METOPROLOL TARTRATE 37.5 MG: 25 TABLET, FILM COATED ORAL at 09:03

## 2023-06-02 RX ADMIN — FENTANYL CITRATE 50 MCG: 50 INJECTION, SOLUTION INTRAMUSCULAR; INTRAVENOUS at 14:41

## 2023-06-02 RX ADMIN — Medication 10 ML: at 21:02

## 2023-06-02 RX ADMIN — PROPOFOL 200 MG: 10 INJECTION, EMULSION INTRAVENOUS at 14:41

## 2023-06-02 RX ADMIN — PHENYLEPHRINE HYDROCHLORIDE 200 MCG: 10 INJECTION INTRAVENOUS at 14:56

## 2023-06-02 RX ADMIN — POLYETHYLENE GLYCOL 3350 17 G: 17 POWDER, FOR SOLUTION ORAL at 09:04

## 2023-06-02 RX ADMIN — DEXAMETHASONE SODIUM PHOSPHATE 4 MG: 4 INJECTION, SOLUTION INTRAMUSCULAR; INTRAVENOUS at 14:45

## 2023-06-02 RX ADMIN — PHENYLEPHRINE HYDROCHLORIDE 100 MCG: 10 INJECTION INTRAVENOUS at 14:50

## 2023-06-02 RX ADMIN — PHENYLEPHRINE HYDROCHLORIDE 200 MCG: 10 INJECTION INTRAVENOUS at 14:53

## 2023-06-02 RX ADMIN — LIDOCAINE HYDROCHLORIDE 100 MG: 20 INJECTION, SOLUTION EPIDURAL; INFILTRATION; INTRACAUDAL; PERINEURAL at 14:41

## 2023-06-02 ASSESSMENT — PAIN SCALES - WONG BAKER
WONGBAKER_NUMERICALRESPONSE: 2

## 2023-06-02 ASSESSMENT — PAIN DESCRIPTION - DESCRIPTORS
DESCRIPTORS: TENDER
DESCRIPTORS: TENDER

## 2023-06-02 ASSESSMENT — PAIN DESCRIPTION - PAIN TYPE: TYPE: ACUTE PAIN

## 2023-06-02 ASSESSMENT — LIFESTYLE VARIABLES: SMOKING_STATUS: 1

## 2023-06-02 ASSESSMENT — PAIN DESCRIPTION - LOCATION: LOCATION: ABDOMEN

## 2023-06-02 ASSESSMENT — PAIN SCALES - GENERAL: PAINLEVEL_OUTOF10: 1

## 2023-06-02 ASSESSMENT — PAIN DESCRIPTION - ORIENTATION: ORIENTATION: RIGHT;LOWER

## 2023-06-02 NOTE — ANESTHESIA POSTPROCEDURE EVALUATION
Department of Anesthesiology  Postprocedure Note    Patient: Eliverto Hammans  MRN: [id number]  YOB: 1955  Date of evaluation: 6/2/2023      Procedure Summary     Date: 06/02/23 Room / Location: [address]    Anesthesia Start: 0809 Anesthesia Stop:     Procedure: PORT A CATH PLACEMENT (Left: Neck) Diagnosis:       Colonic obstruction ([address]      Lesion of colon      (Colonic obstruction ([address] [K56.609])      (Lesion of colon [K63.9])    Surgeons: Vanna Montes MD Responsible Provider: Ramandeep Trejo MD    Anesthesia Type: general ASA Status: 3          Anesthesia Type: No value filed.     Shelby Phase I: Shelby Score: 10    Shelby Phase II:        Anesthesia Post Evaluation    Patient location during evaluation: PACU  Patient participation: complete - patient participated  Level of consciousness: awake  Airway patency: patent  Nausea & Vomiting: no vomiting and no nausea  Complications: no  Cardiovascular status: hemodynamically stable  Respiratory status: acceptable  Hydration status: stable  Multimodal analgesia pain management approach (2) cough or sneeze

## 2023-06-02 NOTE — CONSULTS
[facility]    Consult Note      Requesting Physician:  Dr. Boris Christine:  Metastatic colon cancer      HISTORY OF PRESENT ILLNESS:    Mr. Wesley Gonzales  is a 79 y.o. male we are seeing in consultation for metastatic colon cancer. He presented with progressive left-sided abdominal pain. Initially the pain was intermittent. He experienced difficulty with bowel movements last week. His pain got worse earlier this week. He felt better after diarrhea. But the pain recurred and he went to emergency department. He CT scan showed apple core lesion in the ascending colon near hepatic flexure and multiple hepatic lesions. He underwent colonoscopy by Dr. Mel Plata today. Circumferential mass at the distal ascending colon with significant narrowing of the lumen. The scope was unable to pass. 14 mm rectal polyp was removed. Good amount of specimens were obtained with the biopsies. He has had intentionally lost about 20 to 30 pounds because of his cardiac condition. He never had a colonoscopy prior to this admission. ICD-10-CM    1. Partial obstruction of colon ([facility]  K56.600       2. Right lower quadrant abdominal pain  R10.31       3.  Colon obstruction [facility])  K56.609 Surgical Pathology     Surgical Pathology     Surgical Pathology     Surgical Pathology             Past Medical History:  Past Medical History:   Diagnosis Date    Anxiety     Atrial fibrillation ([facility]     Cervical disc herniation     Cervical spondylosis 7/27/2022    Coronary artery disease due to lipid rich plaque 11/12/2020    HLD (hyperlipidemia) 3/5/2021    HTN (hypertension) 5/9/2013    Neck sprain     Obesity (BMI 30-39.9) 11/19/2020    Post traumatic stress disorder     Rotator cuff (capsule) sprain and strain     Sprain of shoulder, left     Sprain of shoulder, right        Past Surgical History:  Past
Does not apply route 2 times daily 100 each 0    blood glucose monitor strips Test 2 times a day & as needed for symptoms of irregular blood glucose. Dispense sufficient amount for indicated testing frequency plus additional to accommodate PRN testing needs. 100 strip 0    Alcohol Swabs PADS 1 Units by Does not apply route 2 times daily 100 each 0    tamsulosin (FLOMAX) 0.4 MG capsule Take 1 capsule by mouth daily              Allergies  No Known Allergies   Social   Social History     Tobacco Use    Smoking status: Every Day     Packs/day: 0.50     Types: Cigarettes     Last attempt to quit: 2020     Years since quittin.9    Smokeless tobacco: Never   Substance Use Topics    Alcohol use: Not Currently     Alcohol/week: 0.8 standard drinks     Types: 1 Standard drinks or equivalent per week        Family History   Problem Relation Age of Onset    Heart Disease Father                 Physical Exam  Blood pressure 118/64, pulse 72, temperature 98.4 °F (36.9 °C), temperature source Oral, resp. rate 16, height 5' 7\" (1.702 m), weight 220 lb 3.2 oz (99.9 kg), SpO2 96 %. General appearance: alert, cooperative, no distress, appears stated age  Eyes: Anicteric  Head: Normocephalic, without obvious abnormality  Lungs: clear to auscultation bilaterally, Normal Effort  Heart: regular rate and rhythm, normal S1 and S2, no murmurs or rubs  Abdomen: soft, non-tender. Bowel sounds normal. No  organomegaly.    Extremities: atraumatic, no cyanosis or edema  Skin: warm and dry, no jaundice  Neuro: Grossly intact, A&OX3  Musculoskeletal: 5/5  strength BUE      Data Review:    Recent Labs     23   WBC 13.0* 10.4   HGB 12.8* 11.7*   HCT 38.9* 35.7*   MCV 98.8 99.3    253     Recent Labs     23    142   K 4.5 4.3    108   CO2 24 23   BUN 27* 29*   CREATININE 1.3 1.2     Recent Labs     23   AST 42* 31   ALT 14 9*   BILITOT

## 2023-06-02 NOTE — OP NOTE
Operative Note      Patient: Catherine Feliciano  YOB: 1955  MRN: [id number]    Date of Procedure: 6/2/2023                                                                                                       Preoperative diagnosis -metastatic colon cancer    Postoperative diagnosis -same    Procedure - placement of left subclavian Port-A-Cath    Surgeon-Kenneth Steinberg with local    EBL-minimal    Operative indications and consent -51-year-old who is brought in today for placement of a Port-A-Cath for treatment of colon cancer. The patient was explained the risks, benefits and possible complications including risk of bleeding, pneumothorax, infection and port malfunction. Details of the procedure - the patient was brought to the operating room and placed in the supine position on the operative table. A timeout was performed. The patient was prepped and draped in the usual sterile fashion. The bed was placed in the Trendelenburg position. The skin and subcutaneous tissue was infiltrated with 1% lidocaine. A wire was then placed into the left subclavian vein. This was confirmed with fluoroscopy. The dilator and sheath were then placed over the wire. The wire and dilator were then removed and the Port-A-Cath tubing was fed into the distal superior vena cava. This was again confirmed with fluoroscopy. The skin and subcutaneous tissue was injected with 1% lidocaine approximately 2 cm beneath the initial insertion site. A 3 cm transverse incision was made and a prepectoral pocket was created using cautery. The Port-A-Cath tubing was fed from the initial insertion site into the pocket and then connected to the Port-A-Cath reservoir. The reservoir was then placed in the pocket and sutured in place with 2-0 Prolene sutures. The port was accessed. It had excellent blood return and flushed easily. The entire device was surveyed under fluoroscopy.   There were no

## 2023-06-02 NOTE — BRIEF OP NOTE
Brief Postoperative Note      Patient: Madeleine Little  YOB: 1955  MRN: [id number]    Date of Procedure: 6/2/2023    Pre-Op Diagnosis Codes:     * Colonic obstruction ([address] [N52.652]     * Lesion of colon [K63.9]    Post-Op Diagnosis: Same       Procedure(s):  PORT A CATH PLACEMENT    Surgeon(s):  Gilford Freud, MD    Assistant:  First Assistant: Almita Andujar RN    Anesthesia: General    Estimated Blood Loss (mL): Minimal    Complications: None    Specimens:   * No specimens in log *    Implants:  Implant Name Type Inv.  Item Serial No.  Lot No. LRB No. Used Action   PORT IMPL INFUSION [id number] MM PWR INJ POLYURETHANE CATH [facility] [id number]  PORT IMPL INFUSION [id number] MM PWR INJ POLYURETHANE [facility]ATH [facility] [id number] Left 1 Implanted         Drains: * No LDAs found *    Findings: left sided port placed      Electronically signed by Gilford Freud, MD on 6/2/2023 at 3:09 PM

## 2023-06-02 NOTE — ANESTHESIA PRE PROCEDURE
Department of Anesthesiology  Preprocedure Note       Name:  Luis Angel Torrez   Age:  79 y.o.  :  1955                                          MRN:  [id number]         Date:  2023      Surgeon: Ashley Ventura):  Brittni Purcell MD    Procedure: Procedure(s):  PORT A CATH PLACEMENT    Medications prior to admission:   Prior to Admission medications    Medication Sig Start Date End Date Taking?  Authorizing Provider   celecoxib (CELEBREX) 200 MG capsule Take 1 capsule by mouth 2 times daily    Historical Provider, MD   atorvastatin (LIPITOR) 80 MG tablet Take 1 tablet by mouth daily    Historical Provider, MD   metFORMIN (GLUCOPHAGE) 500 MG tablet Take 2 tablets by mouth 2 times daily (with meals)    Historical Provider, MD   GRAPE SEED CR PO Take by mouth    Historical Provider, MD GLORY Ryan Golden 106    Historical Provider, MD   Metoprolol Tartrate 75 MG TABS Take 1/2  tablet by mouth two times a day 23   Shaina Joy MD   lisinopril (PRINIVIL;ZESTRIL) 20 MG tablet Take 1 tablet by mouth daily 23   Shaina Joy MD   Evolocumab 140 MG/ML SOAJ Inject 1 pen into the skin every 14 days  Patient not taking: Reported on 2023   Shaina Joy MD   PROFE 391.3 (180 Fe) MG CAPS TAKE 1 CAPSULE BY MOUTH DAILY  Patient not taking: Reported on 22   Historical Provider, MD   NIACIN PO Take 1 tablet by mouth daily    Historical Provider, MD   naloxone [facility]) 4 MG/0.1ML LIQD nasal spray 1 spray by Nasal route as needed for Opioid Reversal 22   Marcela Holland APRN - CNP   Flaxseed, Linseed, (FLAXSEED OIL PO) Take by mouth    Historical Provider, MD   Coenzyme Q10 (COQ-10 PO) Take 80 mg by mouth daily    Historical Provider, MD   EQL VITAMIN E PO Take by mouth    Historical Provider, MD   VANADYL SULFATE PO Take by mouth    Historical Provider, MD   NATURAL VITAMIN D PO Take by mouth    Historical Provider, MD   Nutritional Supplements (GRAPESEED EXTRACT

## 2023-06-03 VITALS
HEART RATE: 77 BPM | OXYGEN SATURATION: 97 % | HEIGHT: 67 IN | RESPIRATION RATE: 21 BRPM | DIASTOLIC BLOOD PRESSURE: 74 MMHG | BODY MASS INDEX: 32.8 KG/M2 | TEMPERATURE: 97.5 F | WEIGHT: 209 LBS | SYSTOLIC BLOOD PRESSURE: 133 MMHG

## 2023-06-03 LAB
GLUCOSE BLD-MCNC: 109 MG/DL (ref 70–99)
GLUCOSE BLD-MCNC: 129 MG/DL (ref 70–99)
PERFORMED ON: ABNORMAL
PERFORMED ON: ABNORMAL

## 2023-06-03 PROCEDURE — 6370000000 HC RX 637 (ALT 250 FOR IP): Performed by: SURGERY

## 2023-06-03 PROCEDURE — 2580000003 HC RX 258: Performed by: SURGERY

## 2023-06-03 PROCEDURE — 99024 POSTOP FOLLOW-UP VISIT: CPT | Performed by: SURGERY

## 2023-06-03 RX ORDER — POLYETHYLENE GLYCOL 3350 17 G/17G
17 POWDER, FOR SOLUTION ORAL DAILY PRN
Qty: 510 G | Refills: 0 | Status: SHIPPED | OUTPATIENT
Start: 2023-06-03 | End: 2023-07-03

## 2023-06-03 RX ORDER — WHEAT DEXTRIN 3 G/3.8 G
POWDER (GRAM) ORAL
Qty: 245 G | Refills: 2 | Status: SHIPPED | OUTPATIENT
Start: 2023-06-03

## 2023-06-03 RX ADMIN — ASPIRIN 325 MG: 325 TABLET ORAL at 09:21

## 2023-06-03 RX ADMIN — POLYETHYLENE GLYCOL 3350 17 G: 17 POWDER, FOR SOLUTION ORAL at 09:22

## 2023-06-03 RX ADMIN — METOPROLOL TARTRATE 37.5 MG: 25 TABLET, FILM COATED ORAL at 09:21

## 2023-06-03 RX ADMIN — LISINOPRIL 20 MG: 20 TABLET ORAL at 09:21

## 2023-06-03 RX ADMIN — TAMSULOSIN HYDROCHLORIDE 0.4 MG: 0.4 CAPSULE ORAL at 09:21

## 2023-06-03 RX ADMIN — Medication 10 ML: at 09:22

## 2023-06-03 ASSESSMENT — PAIN SCALES - GENERAL: PAINLEVEL_OUTOF10: 0

## 2023-06-03 NOTE — DISCHARGE SUMMARY
[facility] DISCHARGE SUMMARY    Patient Demographics    Patient. Miladys Garsia  Date of Birth. 1955  MRN. [id number]     Primary care provider. Carla Gottlieb MD  (Tel: [phone])    Admit date: 5/30/2023    Discharge date (blank if same as Note Date): Note Date: 6/3/2023     Reason for Hospitalization. Chief Complaint   Patient presents with    Abdominal Pain     Pt via self from home, c/o RLQ pain for a week, pt states it was hard to pee until today, had a large output suddenly, had large bowel movement that helped pain but it came back, still has appendix         Significant Findings. Principal Problem:    Partial obstruction of colon ([address]  Active Problems:    Malignant neoplasm of ascending colon (HCC)  Resolved Problems:    * No resolved hospital problems. *       Problems and results from this hospitalization that need follow up. Biopsy results pending    Significant test results and incidental findings. CT abd/pelvis 5/30/2023:  IMPRESSION:  1. Findings suspicious for apple-core lesion in the ascending colon near the hepatic flexure resulting in at least partial colonic obstruction. 2.  Multiple suspicious ill-defined liver lesions concerning for metastatic  disease. 3.  Punctate nonobstructing right renal calculi. 4.  Diverticulosis. Invasive procedures and treatments. 6/2/2023: Port a cath placement    Colonoscopy 6/1/2023:  FINDINGS  Anal Canal - Normal  Rectum - Sessile Polyp of size 14.0 mm. Polypectomy done by hot  snare.   retroflexed view of rectum normal.  Sigmoid Colon - Diverticulosis mild  Descending Colon - Normal  Splenic Flexure - Normal  Transverse Colon - Normal  Hepatic Flexure - Normal  Ascending Colon - In the distal ascending colon, just proximal to the hepatic flexure, a circumferential mass was found with severe  luminal narrowing, no complete

## 2023-06-03 NOTE — PROGRESS NOTES
[facility]    6/2/2023 4:37 PM        Name: Ruby Sousa . Admitted: 5/30/2023  Primary Care Provider: Rachell Olivares MD (Tel: [phone])    Chief complaint: Presented with abdominal pain associated with loose stools for past few days. CT shows lesion in ascending colon causing partial colonic obstruction and multiple ill defined liver lesions concerning for metastatic disease. Admitted for further testing and GI evaluation. Subjective:  Patient seen in PACU. He is s/p port placement. Awake and alert, feels well. Denies chest pain, shortness of breath, abdominal pain, nausea. Reports BM this morning. Awaiting CXR results post procedure.      6/2/2023: Port a cath placement    Reviewed interval ancillary notes    Current Medications  [START ON 6/3/2023] aspirin tablet 325 mg, Daily  polyethylene glycol (GLYCOLAX) packet 17 g, Daily  tamsulosin (FLOMAX) capsule 0.4 mg, Daily  lisinopril (PRINIVIL;ZESTRIL) tablet 20 mg, Daily  metoprolol tartrate (LOPRESSOR) tablet 37.5 mg, BID  sodium chloride flush 0.9 % injection 5-40 mL, 2 times per day  sodium chloride flush 0.9 % injection 5-40 mL, PRN  0.9 % sodium chloride infusion, PRN  potassium chloride 10 mEq/100 mL IVPB (Peripheral Line), PRN  magnesium sulfate 2000 mg in 50 mL IVPB premix, PRN  ondansetron (ZOFRAN-ODT) disintegrating tablet 4 mg, Q8H PRN   Or  ondansetron (ZOFRAN) injection 4 mg, Q6H PRN  acetaminophen (TYLENOL) tablet 650 mg, Q6H PRN   Or  acetaminophen (TYLENOL) suppository 650 mg, Q6H PRN  morphine (PF) injection 2 mg, Q4H PRN  dextrose bolus 10% 125 mL, PRN   Or  dextrose bolus 10% 250 mL, PRN  glucagon (rDNA) injection 1 mg, PRN  dextrose 10 % infusion, Continuous PRN  insulin lispro (HUMALOG) injection vial 0-4 Units, TID WC  insulin lispro (HUMALOG) injection vial 0-4 Units, Nightly        Objective:  /77   Pulse 65
4 Eyes Skin Assessment     The patient is being assess for  Admission    I agree that 2 RN's have performed a thorough Head to Toe Skin Assessment on the patient. ALL assessment sites listed below have been assessed. Areas assessed by both nurses:   [x]   Head, Face, and Ears   [x]   Shoulders, Back, and Chest  [x]   Arms, Elbows, and Hands   [x]   Coccyx, Sacrum, and IschIum  [x]   Legs, Feet, and Heels        Does the Patient have Skin Breakdown?   No         Charan Prevention initiated:  No   Wound Care Orders initiated:  No      [facility] nurse consulted for Pressure Injury (Stage 3,4, Unstageable, DTI, NWPT, and Complex wounds), New and Established Ostomies:  No      Nurse 1 eSignature: Electronically signed by Rudolph Fernandez RN on 5/31/23 at 5:15 AM EDT    **SHARE this note so that the co-signing nurse is able to place an eSignature**    Nurse 2 eSignature: Electronically signed by Jessica Cantu RN on 5/31/23 at 5:39 AM EDT
Colonosocpy on chart, circumferential mass distal ascending colon not obstructing but severely narrowed lumen/scope unable to pass; 14 mm rectal polyp removed by hot snare. Rec  benefiber 1 tbsp daily and miralax to avoid constipation  consult oncology  follow up biopsy results  further recommendations per oncology  ok for very soft diet since no complete obstruction  refer back for surveillance colonoscopy as needed per oncology (liver mets on ct scan). I discussed results/ plan with patient  Otherwise will sign off, call if needed.
Discharge instructions gone over with pt. All questions asked and answered, pt verbalized understanding. Peripheral IV removed, small amount of bleeding noted, dressing applied. All pt belongings with patient at time of discharge, pt taken to ED entrance via wheelchair.
Dr Eleni Alegre at the bedside. Discussed prognosis and answered questions. No change in patients mood.
Initial shift assessment completed, see complex assessment in doc flowsheet. Vss, afebrile, denies any pain at this time. States after the procedure the pain is better. Continues to make comments about how quick he will pass away after diagnosis of cancer. Call light within reach, encouraged to call for nurse as needed throughout the shift.
No change noted breathing easy and even. Denies nausea. Up to the bathroom independently.
PT stable. Vss. Ready for transfer to floor. Awaiting MD to speak at bedside as instructed by Norman RN.
Patient admitted to 3309. Patient is alert and oriented. Vital signs WNL, respirations easy and unlabored. Admission completed. Call light within reached. Patient complain of abdominal pain, rates 6/10. PRN Morphine given. Patient resting in bed.  Will continue to monitor
Pt arrived from OR to PACU bay 7. Report received from OR staff. Pt awake and alert. Surgical incisions dressings in place to L side of chest. Pt on RA, NSR, and VSS. Will continue to monitor.
Pt meets criteria to be transported to 3A-pt transported via wheelchair with RN
Pt received into bay 9 from Endo. Pt drowsy, yet oriented. O2 at 4L/NC, resp easy. Report obtained. Denies pain. Vss. Pt stable.
Pt stable. Vss. MD spoke to pt. Ready for transfer. Floor called.
Pt tolerating oral clear liquid diet at this time.
Received report from [facility] awaiting chest x-ray results before transfer back to floor
Shift assessment completed, VSS, scheduled medications given per [facility], pt denies any pain and has no complaints/questions at this time. Brakes locked, bed in lowest position and call light within reach.
Shift assessment completed, VSS, scheduled medications given per [facility], pt has no questions/concerns at this time. Brakes locked, bed in lowest position and call light within reach.
Shift assessment completed, VSS, scheduled medications given per [facility], pt is calm, cooperative and pleasant to speak with, all questions asked and answered, pt verbalized understanding. Standard safety measures in place, call light within reach.
Shift assessment completed. VSS, scheduled meds given/held per MAR orders. Pt is A&Ox4. Pt has no complaints or needs at this time. Brakes locked, bed in lowest position, pt is independent. All belongings and call light within reach.
Spoke with endo at 0620. Reported pt had finished mary and has clear/yellow bowel. Waiting for transport. Consent signed and ready.
[facility]    6/1/2023 10:55 AM        Name: Roark Fleischer . Admitted: 5/30/2023  Primary Care Provider: Zoltan Reid MD (Tel: [phone])    Chief complaint: Presented with abdominal pain associated with loose stools for past few days. CT shows lesion in ascending colon causing partial colonic obstruction and multiple ill defined liver lesions concerning for metastatic disease. Admitted for further testing and GI evaluation. Subjective:  Sitting on side of bed, sister visiting. Patient offers no complaints, says he feels good. Denies chest pain, shortness of breath, abdominal pain, nausea. Reviewed colonoscopy findings, oncology consult pending.      Reviewed interval ancillary notes    Current Medications  [START ON 6/3/2023] aspirin tablet 325 mg, Daily  lisinopril (PRINIVIL;ZESTRIL) tablet 20 mg, Daily  metoprolol tartrate (LOPRESSOR) tablet 37.5 mg, BID  sodium chloride flush 0.9 % injection 5-40 mL, 2 times per day  sodium chloride flush 0.9 % injection 5-40 mL, PRN  0.9 % sodium chloride infusion, PRN  potassium chloride 10 mEq/100 mL IVPB (Peripheral Line), PRN  magnesium sulfate 2000 mg in 50 mL IVPB premix, PRN  ondansetron (ZOFRAN-ODT) disintegrating tablet 4 mg, Q8H PRN   Or  ondansetron (ZOFRAN) injection 4 mg, Q6H PRN  polyethylene glycol (GLYCOLAX) packet 17 g, Daily PRN  acetaminophen (TYLENOL) tablet 650 mg, Q6H PRN   Or  acetaminophen (TYLENOL) suppository 650 mg, Q6H PRN  morphine (PF) injection 2 mg, Q4H PRN  dextrose bolus 10% 125 mL, PRN   Or  dextrose bolus 10% 250 mL, PRN  glucagon (rDNA) injection 1 mg, PRN  dextrose 10 % infusion, Continuous PRN  insulin lispro (HUMALOG) injection vial 0-4 Units, TID WC  insulin lispro (HUMALOG) injection vial 0-4 Units, Nightly        Objective:  BP (!) 160/82   Pulse 83   Temp 98.6 °F (37 °C) (Temporal)   Resp 18   Ht 5' 7\"
Teaching / education initiated regarding perioperative experience, expectations, and pain management during stay. Patient verbalized understanding.
Teaching / education initiated regarding perioperative experience, expectations, and pain management during stay. Patient verbalized understanding.
132/72 -- -- 79 21 96 % --   06/01/23 0815 126/73 -- -- 77 18 95 % --   06/01/23 0810 119/72 97.8 °F (36.6 °C) -- 76 20 99 % --   06/01/23 0805 113/76 -- -- 84 16 98 % --   06/01/23 0801 106/73 97.1 °F (36.2 °C) Temporal 83 19 99 % --       24HR INTAKE/OUTPUT:    Intake/Output Summary (Last 24 hours) at 6/2/2023 0754  Last data filed at 6/1/2023 1119  Gross per 24 hour   Intake 10 ml   Output 5 ml   Net 5 ml       CONSTITUTIONAL: awake, alert, cooperative, no apparent distress   EYES: pupils equal, round and reactive to light, sclera clear and conjunctiva normal  ENT: Normocephalic, without obvious abnormality, atraumatic  NECK: supple, symmetrical, no jugular venous distension and no carotid bruits   HEMATOLOGIC/LYMPHATIC: no cervical, supraclavicular or axillary lymphadenopathy   LUNGS: no increased work of breathing and clear to auscultation   CARDIOVASCULAR: regular rate and rhythm, normal S1 and S2, no murmur noted  ABDOMEN: normal bowel sounds x 4, soft, non-distended, non-tender, no masses palpated, no hepatosplenomgaly   MUSCULOSKELETAL: full range of motion noted, tone is normal  NEUROLOGIC: awake, alert, oriented to name, place and time. Motor skills grossly intact. SKIN: Normal skin color, texture, turgor and no jaundice. appears intact   EXTREMITIES: no LE edema     DATA:  CBC:    Recent Labs     06/02/23  0539 06/01/23  0431 05/31/23  0528 05/30/23  2145   WBC 7.9 10.0 10.4 13.0*   NEUTROABS  --   --  7.9* 11.5*   LYMPHOPCT  --   --  13.9 6.5   RBC 3.76* 3.64* 3.60* 3.94*   HGB 12.4* 12.0* 11.7* 12.8*   HCT 37.2* 36.2* 35.7* 38.9*   MCV 98.9 99.4 99.3 98.8   MCH 32.9 32.9 32.7 32.6   MCHC 33.2 33.1 32.9 33.0   RDW 12.2* 12.3* 12.4 12.4    235 253 285       PT/INR:    Recent Labs     06/02/23  0539 06/01/23  0431 05/31/23  0528 05/30/23  2145   PROT 6.8 6.5 6.5 7.4   INR  --   --  1.07  --      PTT:  No results for input(s): APTT in the last 720 hours.     CMP:    Recent Labs     06/02/23  0539
BMI 34.49 kg/m²     Intake/Output Summary (Last 24 hours) at 5/31/2023 1318  Last data filed at 5/31/2023 0636  Gross per 24 hour   Intake --   Output 120 ml   Net -120 ml      Wt Readings from Last 3 Encounters:   05/31/23 220 lb 3.2 oz (99.9 kg)   05/25/23 221 lb 3.2 oz (100.3 kg)   05/03/23 228 lb 6.4 oz (103.6 kg)       General appearance:  Appears comfortable  Eyes: Sclera clear. Pupils equal.  ENT: Moist oral mucosa. Trachea midline, no adenopathy. Cardiovascular: Regular rhythm, normal S1, S2. No murmur. No edema in lower extremities  Respiratory: Not using accessory muscles. Good inspiratory effort. Clear to auscultation bilaterally, no wheeze or crackles. GI: Abdomen soft, no tenderness, not distended, normal bowel sounds  Musculoskeletal: No cyanosis in digits, neck supple  Neurology: CN 2-12 grossly intact. No speech or motor deficits  Psych: Normal affect. Alert and oriented in time, place and person  Skin: Warm, dry, normal turgor    Labs and Tests:  CBC:   Recent Labs     05/30/23 2145 05/31/23  0528   WBC 13.0* 10.4   HGB 12.8* 11.7*    253     BMP:    Recent Labs     05/30/23 2145 05/31/23  0528    142   K 4.5 4.3    108   CO2 24 23   BUN 27* 29*   CREATININE 1.3 1.2   GLUCOSE 148* 101*     Hepatic:   Recent Labs     05/30/23 2145 05/31/23  0528   AST 42* 31   ALT 14 9*   BILITOT 1.2* 1.2*   ALKPHOS 191* 157*     CT abd/pelvis 5/30/2023:  IMPRESSION:  1. Findings suspicious for apple-core lesion in the ascending colon near the hepatic flexure resulting in at least partial colonic obstruction. 2.  Multiple suspicious ill-defined liver lesions concerning for metastatic  disease. 3.  Punctate nonobstructing right renal calculi. 4.  Diverticulosis. Problem List  Principal Problem:    Colonic obstruction ([address]  Resolved Problems:    * No resolved hospital problems.  *       Assessment & Plan:     Abdominal pain / partial colonic obstruction  - CT scan with apple
Unstable angina (HCC)    Coronary artery disease due to lipid rich plaque    CAD in native artery    DM2 (diabetes mellitus, type 2) (HCC)    Obesity (BMI 30-39. 9)    Atrial fibrillation ([address]    Other hyperlipidemia    History of coronary artery bypass graft x 3    Chronic systolic heart failure (HCC)    Ischemic cardiomyopathy    JINA (obstructive sleep apnea)    Adjustment disorder with anxiety    Cervical spondylosis    Chronic pain syndrome    RBBB    Partial obstruction of colon ([address]    Malignant neoplasm of ascending colon (HCC)     OR Date 6/2/23, left subclavian port placement with Dr. Carolyn Villanueva for metastatic ascending colon cancer    Plan:  1. Pain controlled  2. Tolerating diet  3. Passing stool  4. Port in place, can use when needed  5. Defer management of remainder of medical comorbidities to primary and consulting teams  6. Discharge planning, may discharge from surgical standpoint, follow electively as needed    [address] [facility] GABINO Cannon MD, FACS  6/3/2023  10:47 AM

## 2023-06-03 NOTE — CARE COORDINATION
06/03/23 1400   IMM Letter   IMM Letter given to Patient/Family/Significant other/Guardian/POA/by: Helen Riley: The patient is okay with having a less then three hour notice.    IMM Letter date given: 06/03/23   IMM Letter time given: 1400

## 2023-06-03 NOTE — PLAN OF CARE
Problem: Discharge Planning  Goal: Discharge to home or other facility with appropriate resources  6/2/2023 2323 by Adrianna Brunson RN  Outcome: Progressing     Problem: Pain  Goal: Verbalizes/displays adequate comfort level or baseline comfort level  6/2/2023 2323 by Adrianna Brunson RN  Outcome: Progressing     Problem: Chronic Conditions and Co-morbidities  Goal: Patient's chronic conditions and co-morbidity symptoms are monitored and maintained or improved  6/2/2023 2323 by Adrianna Brunson RN  Outcome: Progressing

## 2023-06-03 NOTE — PLAN OF CARE
Problem: Discharge Planning  Goal: Discharge to home or other facility with appropriate resources  Outcome: Completed     Problem: Pain  Goal: Verbalizes/displays adequate comfort level or baseline comfort level  Outcome: Completed     Problem: Safety - Adult  Goal: Free from fall injury  Outcome: Completed     Problem: Chronic Conditions and Co-morbidities  Goal: Patient's chronic conditions and co-morbidity symptoms are monitored and maintained or improved  Outcome: Completed     Problem: ABCDS Injury Assessment  Goal: Absence of physical injury  Outcome: Completed

## 2023-08-02 ENCOUNTER — OFFICE VISIT (OUTPATIENT)
Dept: PAIN MANAGEMENT | Age: 68
End: 2023-08-02

## 2023-08-02 VITALS
BODY MASS INDEX: 33.33 KG/M2 | SYSTOLIC BLOOD PRESSURE: 125 MMHG | WEIGHT: 212.8 LBS | DIASTOLIC BLOOD PRESSURE: 88 MMHG | OXYGEN SATURATION: 98 % | HEART RATE: 66 BPM

## 2023-08-02 DIAGNOSIS — K56.600 PARTIAL OBSTRUCTION OF COLON (HCC): ICD-10-CM

## 2023-08-02 DIAGNOSIS — C18.2 MALIGNANT NEOPLASM OF ASCENDING COLON (HCC): ICD-10-CM

## 2023-08-02 DIAGNOSIS — F43.22 ADJUSTMENT DISORDER WITH ANXIETY: ICD-10-CM

## 2023-08-02 DIAGNOSIS — G89.4 CHRONIC PAIN SYNDROME: Primary | ICD-10-CM

## 2023-08-02 DIAGNOSIS — M50.20 DISPLACEMENT OF CERVICAL INTERVERTEBRAL DISC WITHOUT MYELOPATHY: ICD-10-CM

## 2023-08-02 DIAGNOSIS — M47.812 CERVICAL SPONDYLOSIS: ICD-10-CM

## 2023-08-02 DIAGNOSIS — Z51.81 ENCOUNTER FOR THERAPEUTIC DRUG MONITORING: ICD-10-CM

## 2023-08-02 RX ORDER — TRAMADOL HYDROCHLORIDE 50 MG/1
50 TABLET ORAL DAILY PRN
Qty: 30 TABLET | Refills: 0 | Status: SHIPPED | OUTPATIENT
Start: 2023-08-02 | End: 2023-09-01

## 2023-08-02 NOTE — PROGRESS NOTES
has been helping with Mr. Ravinder Aguilar chronic  medical problems which for this visit include: The primary encounter diagnosis was Chronic pain syndrome. Diagnoses of Displacement of cervical intervertebral disc without myelopathy, Cervical spondylosis, Encounter for therapeutic drug monitoring, Adjustment disorder with anxiety, Malignant neoplasm of ascending colon ([address]), and Partial obstruction of colon ([address]) were also pertinent to this visit. Risks and benefits of the medications and other alternative treatments  including no treatment were discussed with the patient. The common side effects of these medications were also explained to the patient. Informed verbal consent was obtained. Goals of current treatment regimen include improvement in pain, restoration of functioning- with focus on improvement in physical performance, general activity, work or disability,emotional distress, health care utilization and  decreased medication consumption. Will continue to monitor progress towards achieving/maintaining therapeutic goals with special emphasis on  1. Improvement in perceived interfernce  of pain with ADL's. Ability to do home exercises independently. Ability to do household chores indoor and/or outdoor work and social and leisure activities. Improve psychosocial and physical functioning.- he is showing progression towards this treatment goal with the current regimen. He was advised against drinking alcohol with the narcotic pain medicines, advised against driving or handling machinery while adjusting the dose of medicines or if having cognitive  issues related to the current medications. Risk of overdose and death, if medicines not taken as prescribed, were also discussed. If the patient develops new symptoms or if the symptoms worsen, the patient should call the office.     While transcribing every attempt was made to maintain the accuracy of the note in terms of it's contents,there may have been some

## 2023-11-01 ENCOUNTER — OFFICE VISIT (OUTPATIENT)
Dept: CARDIOLOGY CLINIC | Age: 68
End: 2023-11-01
Payer: MEDICARE

## 2023-11-01 ENCOUNTER — OFFICE VISIT (OUTPATIENT)
Dept: PAIN MANAGEMENT | Age: 68
End: 2023-11-01
Payer: COMMERCIAL

## 2023-11-01 VITALS
OXYGEN SATURATION: 98 % | HEART RATE: 62 BPM | SYSTOLIC BLOOD PRESSURE: 110 MMHG | HEIGHT: 67 IN | BODY MASS INDEX: 32.51 KG/M2 | WEIGHT: 207.1 LBS | DIASTOLIC BLOOD PRESSURE: 68 MMHG

## 2023-11-01 VITALS
HEART RATE: 86 BPM | BODY MASS INDEX: 32.58 KG/M2 | DIASTOLIC BLOOD PRESSURE: 83 MMHG | WEIGHT: 208 LBS | SYSTOLIC BLOOD PRESSURE: 124 MMHG | OXYGEN SATURATION: 98 %

## 2023-11-01 DIAGNOSIS — M50.20 DISPLACEMENT OF CERVICAL INTERVERTEBRAL DISC WITHOUT MYELOPATHY: ICD-10-CM

## 2023-11-01 DIAGNOSIS — C18.2 MALIGNANT NEOPLASM OF ASCENDING COLON (HCC): ICD-10-CM

## 2023-11-01 DIAGNOSIS — I25.10 CORONARY ARTERY DISEASE DUE TO LIPID RICH PLAQUE: ICD-10-CM

## 2023-11-01 DIAGNOSIS — Z51.81 ENCOUNTER FOR THERAPEUTIC DRUG MONITORING: ICD-10-CM

## 2023-11-01 DIAGNOSIS — F43.22 ADJUSTMENT DISORDER WITH ANXIETY: ICD-10-CM

## 2023-11-01 DIAGNOSIS — I10 ESSENTIAL HYPERTENSION: Chronic | ICD-10-CM

## 2023-11-01 DIAGNOSIS — E78.49 OTHER HYPERLIPIDEMIA: ICD-10-CM

## 2023-11-01 DIAGNOSIS — G47.33 OSA (OBSTRUCTIVE SLEEP APNEA): ICD-10-CM

## 2023-11-01 DIAGNOSIS — I25.83 CORONARY ARTERY DISEASE DUE TO LIPID RICH PLAQUE: ICD-10-CM

## 2023-11-01 DIAGNOSIS — I45.10 RBBB: ICD-10-CM

## 2023-11-01 DIAGNOSIS — I25.10 CAD IN NATIVE ARTERY: Primary | ICD-10-CM

## 2023-11-01 DIAGNOSIS — K56.600 PARTIAL OBSTRUCTION OF COLON (HCC): ICD-10-CM

## 2023-11-01 DIAGNOSIS — M47.812 CERVICAL SPONDYLOSIS: ICD-10-CM

## 2023-11-01 DIAGNOSIS — I25.5 ISCHEMIC CARDIOMYOPATHY: ICD-10-CM

## 2023-11-01 DIAGNOSIS — G89.4 CHRONIC PAIN SYNDROME: ICD-10-CM

## 2023-11-01 PROCEDURE — 3079F DIAST BP 80-89 MM HG: CPT | Performed by: NURSE PRACTITIONER

## 2023-11-01 PROCEDURE — 3074F SYST BP LT 130 MM HG: CPT | Performed by: INTERNAL MEDICINE

## 2023-11-01 PROCEDURE — 3078F DIAST BP <80 MM HG: CPT | Performed by: INTERNAL MEDICINE

## 2023-11-01 PROCEDURE — 1036F TOBACCO NON-USER: CPT | Performed by: INTERNAL MEDICINE

## 2023-11-01 PROCEDURE — 3074F SYST BP LT 130 MM HG: CPT | Performed by: NURSE PRACTITIONER

## 2023-11-01 PROCEDURE — 1123F ACP DISCUSS/DSCN MKR DOCD: CPT | Performed by: NURSE PRACTITIONER

## 2023-11-01 PROCEDURE — 99212 OFFICE O/P EST SF 10 MIN: CPT | Performed by: NURSE PRACTITIONER

## 2023-11-01 PROCEDURE — G8417 CALC BMI ABV UP PARAM F/U: HCPCS | Performed by: INTERNAL MEDICINE

## 2023-11-01 PROCEDURE — G8484 FLU IMMUNIZE NO ADMIN: HCPCS | Performed by: INTERNAL MEDICINE

## 2023-11-01 PROCEDURE — 3017F COLORECTAL CA SCREEN DOC REV: CPT | Performed by: INTERNAL MEDICINE

## 2023-11-01 PROCEDURE — 99214 OFFICE O/P EST MOD 30 MIN: CPT | Performed by: INTERNAL MEDICINE

## 2023-11-01 PROCEDURE — 1123F ACP DISCUSS/DSCN MKR DOCD: CPT | Performed by: INTERNAL MEDICINE

## 2023-11-01 PROCEDURE — G8427 DOCREV CUR MEDS BY ELIG CLIN: HCPCS | Performed by: INTERNAL MEDICINE

## 2023-11-01 RX ORDER — LISINOPRIL 20 MG/1
10 TABLET ORAL DAILY
Qty: 90 TABLET | Refills: 4
Start: 2023-11-01

## 2023-11-01 NOTE — PROGRESS NOTES
[facility]  Cardiac Consult     Referring Provider:  Joy Arrington MD     Chief Complaint   Patient presents with    Coronary Artery Disease     Pt denies cardiac symptoms at this time. History of Present Illness:  79 y.o. male seen in f/u for CAD s/p CABG. He underwent CABG 2020 for multivessel CAD. LV function normal on cath, mildly decreased with inferior HK on ECHO. He had post-op afib treated with short term amiodarone and was placed on coumadin. No obvious or symptomatic recurrence. He did undergo SCOTT clipping with surgery. He was diagnosed with Stage 4 colon CA. Undergoing chemo. Lost 40-50 lbs that he relates to not eating potatoes and rice. Weak. Chronic chest soreness since surgery unchanged. Past Medical History:   has a past medical history of Anxiety, Atrial fibrillation ([address]), Cervical disc herniation, Cervical spondylosis, Coronary artery disease due to lipid rich plaque, HLD (hyperlipidemia), HTN (hypertension), Malignant neoplasm of ascending colon (HCC), Neck sprain, Obesity (BMI 30-39.9), Post traumatic stress disorder, Rotator cuff (capsule) sprain and strain, Sprain of shoulder, left, and Sprain of shoulder, right. Surgical History:   has a past surgical history that includes Ankle surgery (Right); hernia repair (Right); joint replacement; Coronary artery bypass graft (N/A, 2020); Colonoscopy (N/A, 2023); Colonoscopy (2023); and Port Surgery (Left, 2023). Social History:  Social History     Tobacco Use    Smoking status: Former     Packs/day: .5     Types: Cigarettes     Quit date: 10/26/2023     Years since quittin.0    Smokeless tobacco: Never   Substance Use Topics    Alcohol use: Not Currently     Alcohol/week: 0.8 standard drinks of alcohol     Types: 1 Standard drinks or equivalent per week        Family History:  family history includes Heart Disease in his father. Allergies:  Patient has no known allergies.

## 2023-11-01 NOTE — PROGRESS NOTES
of it's contents,there may have been some errors made inadvertently. Thank you for allowing me to participate in the care of this patient.     DIMPLE Lu    Cc: Zahraa Alvarez MD

## 2023-11-01 NOTE — PATIENT INSTRUCTIONS
Decrease Lisinopril to 10 mg daily  Keep an eye on your BP at home- would like above 100 on top  Check sitting and then stand for a minute and check- if BP drops when standing call office  Call office if BP consistently low  Follow up in 6 months

## 2023-12-15 ENCOUNTER — HOSPITAL ENCOUNTER (OUTPATIENT)
Dept: CT IMAGING | Age: 68
Discharge: HOME | End: 2023-12-15
Attending: INTERNAL MEDICINE
Payer: MEDICARE

## 2023-12-15 DIAGNOSIS — C18.2 MALIGNANT NEOPLASM OF ASCENDING COLON (HCC): ICD-10-CM

## 2023-12-15 LAB
BUN SERPL-MCNC: 12 MG/DL (ref 7–20)
CREAT SERPL-MCNC: 1.1 MG/DL (ref 0.8–1.3)
GFR SERPLBLD CREATININE-BSD FMLA CKD-EPI: >60 ML/MIN/{1.73_M2}

## 2023-12-15 PROCEDURE — 36415 COLL VENOUS BLD VENIPUNCTURE: CPT

## 2023-12-15 PROCEDURE — 74177 CT ABD & PELVIS W/CONTRAST: CPT

## 2023-12-15 PROCEDURE — 6360000004 HC RX CONTRAST MEDICATION: Performed by: INTERNAL MEDICINE

## 2023-12-15 PROCEDURE — 82565 ASSAY OF CREATININE: CPT

## 2023-12-15 PROCEDURE — 84520 ASSAY OF UREA NITROGEN: CPT

## 2023-12-15 RX ADMIN — IOPAMIDOL 75 ML: 755 INJECTION, SOLUTION INTRAVENOUS at 07:43

## 2023-12-15 RX ADMIN — DIATRIZOATE MEGLUMINE AND DIATRIZOATE SODIUM 20 ML: 660; 100 LIQUID ORAL; RECTAL at 07:43

## 2024-02-07 ENCOUNTER — OFFICE VISIT (OUTPATIENT)
Dept: PAIN MANAGEMENT | Age: 69
End: 2024-02-07

## 2024-02-07 VITALS
HEART RATE: 82 BPM | OXYGEN SATURATION: 98 % | DIASTOLIC BLOOD PRESSURE: 85 MMHG | WEIGHT: 207.6 LBS | SYSTOLIC BLOOD PRESSURE: 123 MMHG | BODY MASS INDEX: 32.51 KG/M2

## 2024-02-07 DIAGNOSIS — K56.600 PARTIAL OBSTRUCTION OF COLON (HCC): ICD-10-CM

## 2024-02-07 DIAGNOSIS — G89.4 CHRONIC PAIN SYNDROME: ICD-10-CM

## 2024-02-07 DIAGNOSIS — F43.22 ADJUSTMENT DISORDER WITH ANXIETY: ICD-10-CM

## 2024-02-07 DIAGNOSIS — M50.20 DISPLACEMENT OF CERVICAL INTERVERTEBRAL DISC WITHOUT MYELOPATHY: ICD-10-CM

## 2024-02-07 DIAGNOSIS — M47.812 CERVICAL SPONDYLOSIS: ICD-10-CM

## 2024-02-07 DIAGNOSIS — C18.2 MALIGNANT NEOPLASM OF ASCENDING COLON (HCC): ICD-10-CM

## 2024-02-07 DIAGNOSIS — Z51.81 ENCOUNTER FOR THERAPEUTIC DRUG MONITORING: ICD-10-CM

## 2024-02-07 RX ORDER — CAPECITABINE 500 MG/1
1500 TABLET, FILM COATED ORAL
COMMUNITY
Start: 2023-11-29

## 2024-02-07 NOTE — PROGRESS NOTES
Ashutosh Shaffer  1955  [id number]      HISTORY OF PRESENT ILLNESS: Mr. Shaffer is a 68 y.o. male returns for a follow up visit for pain management  He has a diagnosis of   1. Displacement of cervical intervertebral disc without myelopathy    2. Cervical spondylosis    3. Encounter for therapeutic drug monitoring    4. Adjustment disorder with anxiety    5. Chronic pain syndrome    6. Malignant neoplasm of ascending colon (HCC)    7. Partial obstruction of colon (HCC)    .      New Medications since Last Office visit have been reviewed with patient.     As per Information Obtained from the PADT (Patient Assessment and Documentation Tool)    He complains of pain in the neck, right shoulder. He rates the pain 2/10 and describes it as aching. Current treatment regimen has helped relieve about 0% of the pain since beginning treatment plan.  He denies any side effects from the current pain regimen. Patient reports that since implementation of their treatment plan; their physical functioning is worse, family/social relationships are unchanged, mood is unchanged sleep patterns are better.  Patient denies/admits that any of the above have changed since last office visit. Patient denies misusing/abusing his narcotic pain medications or using any illegal drugs.      Upon obtaining medical history from Mr. Shaffer    ALLERGIES: Patients list of allergies were reviewed     MEDICATIONS: Mr. Shaffer list of medications were reviewed.His current medications are   Outpatient Medications Prior to Visit   Medication Sig Dispense Refill    capecitabine (XELODA) 500 MG chemo tablet Take 3 tablets by mouth      SELENIUM PO Take 1 capsule by mouth daily      lisinopril (PRINIVIL;ZESTRIL) 20 MG tablet Take 0.5 tablets by mouth daily 90 tablet 4    NONFORMULARY Take 1 capsule by mouth daily Nitric oxide      NONFORMULARY Take 1 capsule by mouth daily Mushroom compound      metFORMIN (GLUCOPHAGE) 500 MG tablet Take 2 tablets by

## 2024-04-02 ENCOUNTER — HOSPITAL ENCOUNTER (OUTPATIENT)
Dept: CT IMAGING | Age: 69
Discharge: HOME | End: 2024-04-02
Attending: INTERNAL MEDICINE
Payer: COMMERCIAL

## 2024-04-02 DIAGNOSIS — C18.2 MALIGNANT NEOPLASM OF ASCENDING COLON (HCC): ICD-10-CM

## 2024-04-02 PROCEDURE — 74176 CT ABD & PELVIS W/O CONTRAST: CPT

## 2024-05-01 ENCOUNTER — OFFICE VISIT (OUTPATIENT)
Dept: PAIN MANAGEMENT | Age: 69
End: 2024-05-01

## 2024-05-01 VITALS
BODY MASS INDEX: 31.82 KG/M2 | WEIGHT: 203.2 LBS | HEART RATE: 87 BPM | SYSTOLIC BLOOD PRESSURE: 113 MMHG | OXYGEN SATURATION: 96 % | DIASTOLIC BLOOD PRESSURE: 74 MMHG

## 2024-05-01 DIAGNOSIS — G89.4 CHRONIC PAIN SYNDROME: ICD-10-CM

## 2024-05-01 DIAGNOSIS — M50.20 DISPLACEMENT OF CERVICAL INTERVERTEBRAL DISC WITHOUT MYELOPATHY: ICD-10-CM

## 2024-05-01 DIAGNOSIS — M47.812 CERVICAL SPONDYLOSIS: ICD-10-CM

## 2024-05-01 DIAGNOSIS — C18.2 MALIGNANT NEOPLASM OF ASCENDING COLON (HCC): Primary | ICD-10-CM

## 2024-05-01 DIAGNOSIS — K56.600 PARTIAL OBSTRUCTION OF COLON (HCC): ICD-10-CM

## 2024-05-01 DIAGNOSIS — F43.22 ADJUSTMENT DISORDER WITH ANXIETY: ICD-10-CM

## 2024-05-01 DIAGNOSIS — Z51.81 ENCOUNTER FOR THERAPEUTIC DRUG MONITORING: ICD-10-CM

## 2024-05-01 RX ORDER — ACETAMINOPHEN AND CODEINE PHOSPHATE 300; 60 MG/1; MG/1
1 TABLET ORAL DAILY PRN
Qty: 20 TABLET | Refills: 0 | Status: SHIPPED | OUTPATIENT
Start: 2024-05-01 | End: 2024-05-21

## 2024-05-01 NOTE — PROGRESS NOTES
yes              Adjust dose of present analgesic:no              Switch analgesics:no              Add/Adjust concomitant therapy:no    Current Outpatient Medications   Medication Sig Dispense Refill    capecitabine (XELODA) 500 MG chemo tablet Take 3 tablets by mouth      SELENIUM PO Take 1 capsule by mouth daily      lisinopril (PRINIVIL;ZESTRIL) 20 MG tablet Take 0.5 tablets by mouth daily 90 tablet 4    NONFORMULARY Take 1 capsule by mouth daily Nitric oxide      NONFORMULARY Take 1 capsule by mouth daily Mushroom compound      metFORMIN (GLUCOPHAGE) 500 MG tablet Take 2 tablets by mouth 2 times daily (with meals)      GRAPE SEED CR PO Take by mouth      UNABLE TO FIND Chronium Pilotator      Metoprolol Tartrate 75 MG TABS Take 1/2  tablet by mouth two times a day 180 tablet 4    NIACIN PO Take 1 tablet by mouth daily      Flaxseed, Linseed, (FLAXSEED OIL PO) Take by mouth      Coenzyme Q10 (COQ-10 PO) Take 80 mg by mouth daily      EQL VITAMIN E PO Take by mouth      VANADYL SULFATE PO Take by mouth      NATURAL VITAMIN D PO Take by mouth      Nutritional Supplements (GRAPESEED EXTRACT PO) Take by mouth      turmeric 500 MG CAPS Take by mouth daily      Ascorbic Acid (VITAMIN C) 1000 MG tablet Take 2 tablets by mouth daily      Methylsulfonylmethane (MSM PO) Take by mouth      folic acid (FOLVITE) 800 MCG tablet Take 1 tablet by mouth daily      RESVERATROL PO Take by mouth      Cholecalciferol (VITAMIN D3) 50 MCG (2000 UT) CAPS Take by mouth daily      aspirin 325 MG tablet Take 1 tablet by mouth daily      Lancets MISC 1 each by Does not apply route 2 times daily 100 each 0    blood glucose monitor strips Test 2 times a day & as needed for symptoms of irregular blood glucose. Dispense sufficient amount for indicated testing frequency plus additional to accommodate PRN testing needs. 100 strip 0    Alcohol Swabs PADS 1 Units by Does not apply route 2 times daily 100 each 0    tamsulosin (FLOMAX) 0.4 MG

## 2024-05-07 ENCOUNTER — HOSPITAL ENCOUNTER (OUTPATIENT)
Dept: GENERAL RADIOLOGY | Age: 69
Discharge: HOME | End: 2024-05-07
Payer: COMMERCIAL

## 2024-05-07 ENCOUNTER — HOSPITAL ENCOUNTER (OUTPATIENT)
Age: 69
Discharge: HOME | End: 2024-05-07
Payer: COMMERCIAL

## 2024-05-07 DIAGNOSIS — M25.531 RIGHT WRIST PAIN: ICD-10-CM

## 2024-05-07 PROCEDURE — 73110 X-RAY EXAM OF WRIST: CPT

## 2024-05-15 ENCOUNTER — TELEPHONE (OUTPATIENT)
Dept: CARDIOLOGY CLINIC | Age: 69
End: 2024-05-15

## 2024-05-15 ENCOUNTER — OFFICE VISIT (OUTPATIENT)
Dept: CARDIOLOGY CLINIC | Age: 69
End: 2024-05-15
Payer: COMMERCIAL

## 2024-05-15 VITALS
HEIGHT: 67 IN | OXYGEN SATURATION: 96 % | HEART RATE: 64 BPM | SYSTOLIC BLOOD PRESSURE: 116 MMHG | WEIGHT: 198 LBS | BODY MASS INDEX: 31.08 KG/M2 | DIASTOLIC BLOOD PRESSURE: 64 MMHG

## 2024-05-15 DIAGNOSIS — I48.91 POSTOPERATIVE ATRIAL FIBRILLATION (HCC): ICD-10-CM

## 2024-05-15 DIAGNOSIS — I10 ESSENTIAL HYPERTENSION: Chronic | ICD-10-CM

## 2024-05-15 DIAGNOSIS — I25.10 CAD IN NATIVE ARTERY: Primary | ICD-10-CM

## 2024-05-15 DIAGNOSIS — I45.10 RBBB: ICD-10-CM

## 2024-05-15 DIAGNOSIS — G47.33 OSA (OBSTRUCTIVE SLEEP APNEA): ICD-10-CM

## 2024-05-15 DIAGNOSIS — I25.5 ISCHEMIC CARDIOMYOPATHY: ICD-10-CM

## 2024-05-15 DIAGNOSIS — I97.89 POSTOPERATIVE ATRIAL FIBRILLATION (HCC): ICD-10-CM

## 2024-05-15 PROBLEM — E78.2 MIXED HYPERLIPIDEMIA: Status: ACTIVE | Noted: 2021-03-05

## 2024-05-15 PROCEDURE — 1036F TOBACCO NON-USER: CPT | Performed by: INTERNAL MEDICINE

## 2024-05-15 PROCEDURE — 3078F DIAST BP <80 MM HG: CPT | Performed by: INTERNAL MEDICINE

## 2024-05-15 PROCEDURE — 3017F COLORECTAL CA SCREEN DOC REV: CPT | Performed by: INTERNAL MEDICINE

## 2024-05-15 PROCEDURE — 3074F SYST BP LT 130 MM HG: CPT | Performed by: INTERNAL MEDICINE

## 2024-05-15 PROCEDURE — G8427 DOCREV CUR MEDS BY ELIG CLIN: HCPCS | Performed by: INTERNAL MEDICINE

## 2024-05-15 PROCEDURE — G8417 CALC BMI ABV UP PARAM F/U: HCPCS | Performed by: INTERNAL MEDICINE

## 2024-05-15 PROCEDURE — 99214 OFFICE O/P EST MOD 30 MIN: CPT | Performed by: INTERNAL MEDICINE

## 2024-05-15 PROCEDURE — 1123F ACP DISCUSS/DSCN MKR DOCD: CPT | Performed by: INTERNAL MEDICINE

## 2024-05-15 RX ORDER — METOPROLOL SUCCINATE 50 MG/1
50 TABLET, EXTENDED RELEASE ORAL DAILY
Qty: 90 TABLET | Refills: 4 | Status: SHIPPED | OUTPATIENT
Start: 2024-05-15

## 2024-05-15 NOTE — TELEPHONE ENCOUNTER
Dr Marcus wants patient to have fasting lipid panel done sometime soon at his convenience. Order placed. Tried calling pt. VM is full. Unable to leave message. Mailed order slips to home address. Please try calling again.

## 2024-05-15 NOTE — PROGRESS NOTES
[facility]  Cardiac Consult     Referring Provider:  Seun Wild MD     Chief Complaint   Patient presents with    6 Month Follow-Up    Coronary Artery Disease    Hypertension    Hyperlipidemia        History of Present Illness:  68 y.o. male seen in f/u for CAD s/p CABG.     He underwent CABG 2020 for multivessel CAD. LV function normal on cath, mildly decreased with inferior HK on ECHO. He had post-op afib treated with short term amiodarone and was placed on coumadin. No obvious or symptomatic recurrence. He did undergo SCOTT clipping with surgery.     He was diagnosed with Stage 4 colon CA. Undergoing chemo. He is doing well.  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 He has untreated sleep apnea but has lost 40-50 lbs.       Past Medical History:   has a past medical history of Anxiety, Atrial fibrillation (HCC), Cervical disc herniation, Cervical spondylosis, Coronary artery disease due to lipid rich plaque, HLD (hyperlipidemia), HTN (hypertension), Malignant neoplasm of ascending colon (HCC), Neck sprain, Obesity (BMI 30-39.9), Post traumatic stress disorder, Rotator cuff (capsule) sprain and strain, Sprain of shoulder, left, and Sprain of shoulder, right.    Surgical History:   has a past surgical history that includes Ankle surgery (Right); hernia repair (Right); joint replacement; Coronary artery bypass graft (N/A, 2020); Colonoscopy (N/A, 2023); Colonoscopy (2023); and Port Surgery (Left, 2023).     Social History:  Social History     Tobacco Use    Smoking status: Former     Current packs/day: 0.00     Types: Cigarettes     Quit date: 10/26/2023     Years since quittin.5    Smokeless tobacco: Never   Substance Use Topics    Alcohol use: Not Currently     Alcohol/week: 0.8 standard drinks of alcohol     Types: 1 Standard drinks or equivalent per week        Family History:  family history includes Heart Disease in his father.

## 2024-05-15 NOTE — PATIENT INSTRUCTIONS
Stop Metoprolol Tartrate and start Metoprolol Succinate 50 mg daily  Fasting labs to check cholesterol  Follow up in 9 months

## 2024-05-31 ENCOUNTER — HOSPITAL ENCOUNTER (OUTPATIENT)
Age: 69
Discharge: HOME | End: 2024-05-31
Payer: MEDICARE

## 2024-05-31 DIAGNOSIS — I25.10 CAD IN NATIVE ARTERY: ICD-10-CM

## 2024-05-31 LAB
CHOLEST SERPL-MCNC: 155 MG/DL (ref 0–199)
HDLC SERPL-MCNC: 40 MG/DL (ref 40–60)
LDL CHOLESTEROL: 79 MG/DL
TRIGL SERPL-MCNC: 178 MG/DL (ref 0–150)
VLDLC SERPL CALC-MCNC: 36 MG/DL

## 2024-05-31 PROCEDURE — 80061 LIPID PANEL: CPT

## 2024-05-31 PROCEDURE — 36415 COLL VENOUS BLD VENIPUNCTURE: CPT

## 2024-06-03 ENCOUNTER — TELEPHONE (OUTPATIENT)
Dept: CARDIOLOGY CLINIC | Age: 69
End: 2024-06-03

## 2024-06-03 NOTE — TELEPHONE ENCOUNTER
----- Message from Duong Marcus MD sent at 6/3/2024 11:45 AM EDT -----  Cholesterol slightly high. Recommend that we check on Rapavikia.    EMERYK

## 2024-07-07 ENCOUNTER — HOSPITAL ENCOUNTER (EMERGENCY)
Age: 69
Discharge: HOME | End: 2024-07-07
Attending: EMERGENCY MEDICINE
Payer: MEDICARE

## 2024-07-07 VITALS
BODY MASS INDEX: 28.45 KG/M2 | OXYGEN SATURATION: 96 % | HEIGHT: 68 IN | RESPIRATION RATE: 20 BRPM | HEART RATE: 87 BPM | WEIGHT: 187.7 LBS | SYSTOLIC BLOOD PRESSURE: 121 MMHG | DIASTOLIC BLOOD PRESSURE: 55 MMHG | TEMPERATURE: 98.4 F

## 2024-07-07 DIAGNOSIS — T78.3XXA ANGIOEDEMA, INITIAL ENCOUNTER: Primary | ICD-10-CM

## 2024-07-07 DIAGNOSIS — T46.4X5A ADVERSE EFFECT OF ANGIOTENSIN-CONVERTING ENZYME INHIBITOR, INITIAL ENCOUNTER: ICD-10-CM

## 2024-07-07 LAB
ANION GAP SERPL CALCULATED.3IONS-SCNC: 10 MMOL/L (ref 3–16)
ANISOCYTOSIS BLD QL SMEAR: ABNORMAL
BASOPHILS # BLD: 0 K/UL (ref 0–0.2)
BASOPHILS NFR BLD: 0 %
BUN SERPL-MCNC: 27 MG/DL (ref 7–20)
CALCIUM SERPL-MCNC: 9.2 MG/DL (ref 8.3–10.6)
CHLORIDE SERPL-SCNC: 107 MMOL/L (ref 99–110)
CO2 SERPL-SCNC: 25 MMOL/L (ref 21–32)
CREAT SERPL-MCNC: 1.2 MG/DL (ref 0.8–1.3)
DEPRECATED RDW RBC AUTO: 19.8 % (ref 12.4–15.4)
EOSINOPHIL # BLD: 0 K/UL (ref 0–0.6)
EOSINOPHIL NFR BLD: 1 %
GFR SERPLBLD CREATININE-BSD FMLA CKD-EPI: 66 ML/MIN/{1.73_M2}
GLUCOSE SERPL-MCNC: 123 MG/DL (ref 70–99)
HCT VFR BLD AUTO: 40 % (ref 40.5–52.5)
HGB BLD-MCNC: 14 G/DL (ref 13.5–17.5)
LYMPHOCYTES # BLD: 1 K/UL (ref 1–5.1)
LYMPHOCYTES NFR BLD: 21 %
MACROCYTES BLD QL SMEAR: ABNORMAL
MCH RBC QN AUTO: 41 PG (ref 26–34)
MCHC RBC AUTO-ENTMCNC: 35 G/DL (ref 31–36)
MCV RBC AUTO: 117.2 FL (ref 80–100)
MONOCYTES # BLD: 0.1 K/UL (ref 0–1.3)
MONOCYTES NFR BLD: 3 %
NEUTROPHILS # BLD: 3.6 K/UL (ref 1.7–7.7)
NEUTROPHILS NFR BLD: 75 %
OVALOCYTES BLD QL SMEAR: ABNORMAL
PATH INTERP BLD-IMP: YES
PLATELET # BLD AUTO: 117 K/UL (ref 135–450)
PLATELET BLD QL SMEAR: ABNORMAL
PMV BLD AUTO: 8.7 FL (ref 5–10.5)
POLYCHROMASIA BLD QL SMEAR: ABNORMAL
POTASSIUM SERPL-SCNC: 3.9 MMOL/L (ref 3.5–5.1)
RBC # BLD AUTO: 3.41 M/UL (ref 4.2–5.9)
SLIDE REVIEW: ABNORMAL
SODIUM SERPL-SCNC: 142 MMOL/L (ref 136–145)
WBC # BLD AUTO: 4.8 K/UL (ref 4–11)

## 2024-07-07 PROCEDURE — 80048 BASIC METABOLIC PNL TOTAL CA: CPT

## 2024-07-07 PROCEDURE — 85025 COMPLETE CBC W/AUTO DIFF WBC: CPT

## 2024-07-07 PROCEDURE — 2500000003 HC RX 250 WO HCPCS: Performed by: NURSE PRACTITIONER

## 2024-07-07 PROCEDURE — 99284 EMERGENCY DEPT VISIT MOD MDM: CPT

## 2024-07-07 PROCEDURE — 6360000002 HC RX W HCPCS: Performed by: NURSE PRACTITIONER

## 2024-07-07 PROCEDURE — 96374 THER/PROPH/DIAG INJ IV PUSH: CPT

## 2024-07-07 PROCEDURE — 2580000003 HC RX 258: Performed by: NURSE PRACTITIONER

## 2024-07-07 PROCEDURE — 96375 TX/PRO/DX INJ NEW DRUG ADDON: CPT

## 2024-07-07 PROCEDURE — 99283 EMERGENCY DEPT VISIT LOW MDM: CPT

## 2024-07-07 PROCEDURE — 96372 THER/PROPH/DIAG INJ SC/IM: CPT

## 2024-07-07 RX ORDER — FAMOTIDINE 10 MG/ML
20 INJECTION, SOLUTION INTRAVENOUS ONCE
Status: COMPLETED | OUTPATIENT
Start: 2024-07-07 | End: 2024-07-07

## 2024-07-07 RX ORDER — EPINEPHRINE 1 MG/ML
0.3 INJECTION, SOLUTION INTRAMUSCULAR; SUBCUTANEOUS ONCE
Status: COMPLETED | OUTPATIENT
Start: 2024-07-07 | End: 2024-07-07

## 2024-07-07 RX ORDER — DIPHENHYDRAMINE HYDROCHLORIDE 50 MG/ML
25 INJECTION INTRAMUSCULAR; INTRAVENOUS ONCE
Status: COMPLETED | OUTPATIENT
Start: 2024-07-07 | End: 2024-07-07

## 2024-07-07 RX ADMIN — FAMOTIDINE 20 MG: 10 INJECTION, SOLUTION INTRAVENOUS at 20:52

## 2024-07-07 RX ADMIN — WATER 125 MG: 1 INJECTION INTRAMUSCULAR; INTRAVENOUS; SUBCUTANEOUS at 20:52

## 2024-07-07 RX ADMIN — DIPHENHYDRAMINE HYDROCHLORIDE 25 MG: 50 INJECTION INTRAMUSCULAR; INTRAVENOUS at 20:52

## 2024-07-07 RX ADMIN — EPINEPHRINE 0.3 MG: 1 INJECTION INTRAMUSCULAR; INTRAVENOUS; SUBCUTANEOUS at 20:58

## 2024-07-07 ASSESSMENT — PAIN - FUNCTIONAL ASSESSMENT: PAIN_FUNCTIONAL_ASSESSMENT: NONE - DENIES PAIN

## 2024-07-08 LAB — PATH INTERP BLD-IMP: NORMAL

## 2024-07-08 NOTE — PROGRESS NOTES
Pt in bed resting no s/s of distress. Pt states their hasn't been any improvement with tongue, and no worsening.

## 2024-07-08 NOTE — ED PROVIDER NOTES
[facility] Emergency Department      Pt Name: Ashutosh Shaffer  MRN: [id number]  Birthdate 1955  Date of evaluation: 7/7/2024  Provider: JOSE G BLAKE MD  I personally saw Ashutosh Shaffer and made and approved the management plan with the advanced practice provider.  I take responsibility for the patient management.     HPI: Ashutosh Shaffer presented with   Chief Complaint   Patient presents with    Oral Swelling     Pt to ED c/o significant tongue swelling starting today, states had bananas coffee and peanuts today but no known allergie, states having difficulty swallowing. Hx intestinal CA last injection about 2 weeks ago to port to left chest. AAOx4, NAD, resp e/u on RA, airway patent.     Ashutosh Shaffer has a past medical history of Anxiety, Atrial fibrillation (HCC), Cervical disc herniation, Cervical spondylosis (7/27/2022), Coronary artery disease due to lipid rich plaque (11/12/2020), HLD (hyperlipidemia) (3/5/2021), HTN (hypertension) (5/9/2013), Malignant neoplasm of ascending colon (HCC) (6/2/2023), Neck sprain, Obesity (BMI 30-39.9) (11/19/2020), Post traumatic stress disorder, Rotator cuff (capsule) sprain and strain, Sprain of shoulder, left, and Sprain of shoulder, right.  He has a past surgical history that includes Ankle surgery (Right); hernia repair (Right); joint replacement; Coronary artery bypass graft (N/A, 11/16/2020); Colonoscopy (N/A, 6/1/2023); Colonoscopy (6/1/2023); and Port Surgery (Left, 6/2/2023).    No current facility-administered medications on file prior to encounter.     Current Outpatient Medications on File Prior to Encounter   Medication Sig Dispense Refill    metoprolol succinate (TOPROL XL) 50 MG extended release tablet Take 1 tablet by mouth daily 90 tablet 4    capecitabine (XELODA) 500 MG chemo tablet Take 3 tablets by mouth      SELENIUM PO Take 1 capsule by mouth daily      lisinopril (PRINIVIL;ZESTRIL) 20 MG tablet Take 0.5 tablets by mouth daily 90 
Patient reports that he is indeed on this medication.    CC/HPI Summary, DDx, ED Course, and Reassessment:     Briefly, this is a 68 year old male who presents to the emergency department with complaint of tongue swelling.  Patient reports onset of symptoms at about 3 PM, states that his tongue has doubled in size since 5 PM.  He does report history of intermittent lip swelling although has never been diagnosed with angioedema.  States that he is currently being treated for intestinal cancer and does see Dr. Toussaint from [facility].    He is having trouble swallowing secretions, spitting saliva.  No difficulty breathing.  No air hunger.    CBC and CMP are unremarkable.    Patient did have improvement of symptoms with EpiPen, Pepcid, Solu-Medrol, and Benadryl.  He was observed in the emergency department for 3 hours and swelling lessened.  He is given strict return precautions and outpatient follow-up.  The patient does verbalize understanding and he will stop the lisinopril.    I estimate there is LOW risk for ANAPHYLAXIS, BARRON-JANE SYNDROME, OR TOXIC EPIDERMAL NECROLYSIS thus I consider the discharge disposition reasonable.        Disposition Considerations (include 1 Tests not done, Shared Decision Making, Pt Expectation of Test or Tx.): Shared decision making: Initial differential diagnoses were discussed with this patient, along with physical exam findings and an explanation what evaluation studies were necessary and why. Labs and Imaging results were explained to the patient in detail, including explanation of what these results mean. All treatment and disposition options were discussed with the patient and a treatment plan with the patient's best short and long term care was made in collaboration with the patient.    I did consider lactate    Appropriate for outpatient management follow up      I am the Primary Clinician of Record.    FINAL IMPRESSION      1. Angioedema, initial encounter    2. Adverse effect of

## 2024-07-17 ENCOUNTER — TELEPHONE (OUTPATIENT)
Dept: CARDIOLOGY CLINIC | Age: 69
End: 2024-07-17

## 2024-07-17 NOTE — TELEPHONE ENCOUNTER
Medication Question/Concern    What is the name of the medication you need to speak with someone about?  Evolocumab (REPATHA SURECLICK)   Dosage of the medication:  140 MG/ML SOAJ   How are you taking this medication:   Inject 140 mg into the skin every 14 days   What issues/concerns are you having with this medication:    ALEX is calling to inform us that this patient does not fill his scripts at [facility].  Please advise.

## 2024-07-17 NOTE — TELEPHONE ENCOUNTER
Bernadette states prior auth is needed for the Repatha and that can be done by calling [phone] or online [email or web address].

## 2024-07-17 NOTE — TELEPHONE ENCOUNTER
Spoke with pharmacy they stated they have tried to reach out to patient several times without response. Advised pharmacy that patient is hard of hearing and doesn't hear the phone ring.    Pharmacy stated that maybe we should script to a pharmacy where patient could go in and  because they have to be able to call patient to set up deliveries.

## 2024-07-17 NOTE — TELEPHONE ENCOUNTER
Submitted PA for REPATHA  [facility]ia [facility]: [id number] STATUS: CaseId:[id number];Status:Approved;Review Type:Prior Auth;Coverage Start Date:06/17/2024;Coverage End Date:07/17/2025;     Follow up done daily; if no decision with in three days we will refax.  If another three days goes by with no decision will call the insurance for status.

## 2024-07-18 ENCOUNTER — HOSPITAL ENCOUNTER (OUTPATIENT)
Dept: CT IMAGING | Age: 69
Discharge: HOME | End: 2024-07-18
Payer: MEDICARE

## 2024-07-18 DIAGNOSIS — C18.2 MALIGNANT NEOPLASM OF ASCENDING COLON (HCC): ICD-10-CM

## 2024-07-18 PROCEDURE — 71260 CT THORAX DX C+: CPT

## 2024-07-18 PROCEDURE — 6360000004 HC RX CONTRAST MEDICATION

## 2024-07-18 RX ADMIN — IOPAMIDOL 75 ML: 755 INJECTION, SOLUTION INTRAVENOUS at 16:09

## 2024-07-18 NOTE — TELEPHONE ENCOUNTER
GIAN from [facility] is calling for last ov notes and lipid panel for Repatha Auth. Please fax to [phone]    If any questions please call GIAN at [phone]

## 2024-07-19 RX ORDER — EVOLOCUMAB 140 MG/ML
140 INJECTION, SOLUTION SUBCUTANEOUS
Qty: 6 ML | Refills: 4 | Status: SHIPPED | OUTPATIENT
Start: 2024-07-19

## 2024-07-19 NOTE — TELEPHONE ENCOUNTER
Repatha sent to [facility] on [facility] (pharmacy that he normally uses). Please try to call patient to let him know.

## 2024-08-01 NOTE — TELEPHONE ENCOUNTER
LMOM for pt to call back for results. Unable to leave message per hippa.
Spoke with the patient and advised him of his results per NPTS. He voiced understanding .  Call complete
128

## 2024-08-07 ENCOUNTER — OFFICE VISIT (OUTPATIENT)
Dept: PAIN MANAGEMENT | Age: 69
End: 2024-08-07

## 2024-08-07 VITALS
DIASTOLIC BLOOD PRESSURE: 85 MMHG | OXYGEN SATURATION: 98 % | BODY MASS INDEX: 28.04 KG/M2 | HEART RATE: 94 BPM | SYSTOLIC BLOOD PRESSURE: 124 MMHG | WEIGHT: 184.4 LBS

## 2024-08-07 DIAGNOSIS — F43.22 ADJUSTMENT DISORDER WITH ANXIETY: ICD-10-CM

## 2024-08-07 DIAGNOSIS — M50.20 DISPLACEMENT OF CERVICAL INTERVERTEBRAL DISC WITHOUT MYELOPATHY: ICD-10-CM

## 2024-08-07 DIAGNOSIS — K56.600 PARTIAL OBSTRUCTION OF COLON (HCC): ICD-10-CM

## 2024-08-07 DIAGNOSIS — M47.812 CERVICAL SPONDYLOSIS: ICD-10-CM

## 2024-08-07 DIAGNOSIS — Z51.81 ENCOUNTER FOR THERAPEUTIC DRUG MONITORING: ICD-10-CM

## 2024-08-07 DIAGNOSIS — C18.2 MALIGNANT NEOPLASM OF ASCENDING COLON (HCC): Primary | ICD-10-CM

## 2024-08-07 DIAGNOSIS — G89.4 CHRONIC PAIN SYNDROME: ICD-10-CM

## 2024-08-07 NOTE — PROGRESS NOTES
Ashutosh Shaffer  1955  [id number]      HISTORY OF PRESENT ILLNESS: Mr. Shaffer is a 68 y.o. male returns for a follow up visit for pain management  He has a diagnosis of   1. Malignant neoplasm of ascending colon (HCC)    2. Displacement of cervical intervertebral disc without myelopathy    3. Partial obstruction of colon (HCC)    4. Cervical spondylosis    5. Encounter for therapeutic drug monitoring    6. Adjustment disorder with anxiety    7. Chronic pain syndrome    .      New Medications since Last Office visit have been reviewed with patient.     As per Information Obtained from the PADT (Patient Assessment and Documentation Tool)    He complains of pain in the neck. He rates the pain 5/10 and describes it as aching. Current treatment regimen has helped relieve about 50% of the pain since beginning treatment plan.  He denies any side effects from the current pain regimen. Patient reports that since implementation of their treatment plan; their physical functioning is worse, family/social relationships are unchanged, mood is worse sleep patterns are unchanged, and that the overall functioning is worse.  Patient denies/admits that any of the above have changed since last office visit. Patient denies misusing/abusing his narcotic pain medications or using any illegal drugs.      Upon obtaining medical history from Mr. Shaffer    ALLERGIES: Patients list of allergies were reviewed     MEDICATIONS: Mr. Shaffer list of medications were reviewed.His current medications are   Outpatient Medications Prior to Visit   Medication Sig Dispense Refill    Evolocumab (REPATHA SURECLICK) 140 MG/ML SOAJ Inject 140 mg into the skin every 14 days 6 mL 4    metoprolol succinate (TOPROL XL) 50 MG extended release tablet Take 1 tablet by mouth daily 90 tablet 4    capecitabine (XELODA) 500 MG chemo tablet Take 3 tablets by mouth      SELENIUM PO Take 1 capsule by mouth daily      lisinopril (PRINIVIL;ZESTRIL) 20 MG

## 2024-08-17 ENCOUNTER — HOSPITAL ENCOUNTER (INPATIENT)
Age: 69
LOS: 5 days | Discharge: HOME | DRG: 393 | End: 2024-08-22
Attending: STUDENT IN AN ORGANIZED HEALTH CARE EDUCATION/TRAINING PROGRAM | Admitting: STUDENT IN AN ORGANIZED HEALTH CARE EDUCATION/TRAINING PROGRAM
Payer: MEDICARE

## 2024-08-17 ENCOUNTER — APPOINTMENT (OUTPATIENT)
Dept: CT IMAGING | Age: 69
DRG: 393 | End: 2024-08-17
Payer: MEDICARE

## 2024-08-17 DIAGNOSIS — K52.9 ENTERITIS: ICD-10-CM

## 2024-08-17 DIAGNOSIS — R11.2 NAUSEA AND VOMITING, UNSPECIFIED VOMITING TYPE: ICD-10-CM

## 2024-08-17 DIAGNOSIS — D69.6 THROMBOCYTOPENIA (HCC): ICD-10-CM

## 2024-08-17 DIAGNOSIS — N39.0 URINARY TRACT INFECTION WITHOUT HEMATURIA, SITE UNSPECIFIED: Primary | ICD-10-CM

## 2024-08-17 PROBLEM — I25.5 ISCHEMIC CARDIOMYOPATHY: Status: RESOLVED | Noted: 2021-06-09 | Resolved: 2024-08-17

## 2024-08-17 PROBLEM — K56.600 PARTIAL OBSTRUCTION OF COLON (HCC): Status: RESOLVED | Noted: 2023-05-31 | Resolved: 2024-08-17

## 2024-08-17 PROBLEM — I20.0 UNSTABLE ANGINA (HCC): Status: RESOLVED | Noted: 2020-11-11 | Resolved: 2024-08-17

## 2024-08-17 PROBLEM — E66.9 OBESITY (BMI 30-39.9): Chronic | Status: RESOLVED | Noted: 2020-11-19 | Resolved: 2024-08-17

## 2024-08-17 PROBLEM — N40.0 BPH (BENIGN PROSTATIC HYPERPLASIA): Status: ACTIVE | Noted: 2024-08-17

## 2024-08-17 PROBLEM — Z87.19 HX SBO: Status: ACTIVE | Noted: 2024-08-17

## 2024-08-17 PROBLEM — R94.39 ABNORMAL STRESS TEST: Status: RESOLVED | Noted: 2020-11-11 | Resolved: 2024-08-17

## 2024-08-17 PROBLEM — R07.9 EXERTIONAL CHEST PAIN: Status: RESOLVED | Noted: 2020-11-11 | Resolved: 2024-08-17

## 2024-08-17 LAB
ALBUMIN SERPL-MCNC: 4 G/DL (ref 3.4–5)
ALBUMIN/GLOB SERPL: 1.4 {RATIO} (ref 1.1–2.2)
ALP SERPL-CCNC: 154 U/L (ref 40–129)
ALT SERPL-CCNC: 15 U/L (ref 10–40)
ANION GAP SERPL CALCULATED.3IONS-SCNC: 9 MMOL/L (ref 3–16)
AST SERPL-CCNC: 26 U/L (ref 15–37)
BACTERIA URNS QL MICRO: NORMAL /HPF
BILIRUB SERPL-MCNC: 1.5 MG/DL (ref 0–1)
BILIRUB UR QL STRIP.AUTO: NEGATIVE
BUN SERPL-MCNC: 33 MG/DL (ref 7–20)
CALCIUM SERPL-MCNC: 9.9 MG/DL (ref 8.3–10.6)
CHLORIDE SERPL-SCNC: 100 MMOL/L (ref 99–110)
CLARITY UR: CLEAR
CO2 SERPL-SCNC: 28 MMOL/L (ref 21–32)
COLOR UR: ABNORMAL
CREAT SERPL-MCNC: 1.3 MG/DL (ref 0.8–1.3)
EPI CELLS #/AREA URNS AUTO: 1 /HPF (ref 0–5)
ETHANOLAMINE SERPL-MCNC: NORMAL MG/DL (ref 0–0.08)
FLUAV RNA RESP QL NAA+PROBE: NOT DETECTED
FLUBV RNA RESP QL NAA+PROBE: NOT DETECTED
GFR SERPLBLD CREATININE-BSD FMLA CKD-EPI: 60 ML/MIN/{1.73_M2}
GLUCOSE SERPL-MCNC: 155 MG/DL (ref 70–99)
GLUCOSE UR STRIP.AUTO-MCNC: NEGATIVE MG/DL
HGB UR QL STRIP.AUTO: NEGATIVE
HYALINE CASTS #/AREA URNS AUTO: 4 /LPF (ref 0–8)
KETONES UR STRIP.AUTO-MCNC: NEGATIVE MG/DL
LACTATE BLDV-SCNC: 2.1 MMOL/L (ref 0.4–2)
LACTATE BLDV-SCNC: 2.2 MMOL/L (ref 0.4–2)
LEUKOCYTE ESTERASE UR QL STRIP.AUTO: ABNORMAL
LIPASE SERPL-CCNC: 19 U/L (ref 13–60)
NITRITE UR QL STRIP.AUTO: POSITIVE
PH UR STRIP.AUTO: 5.5 [PH] (ref 5–8)
POTASSIUM SERPL-SCNC: 4.5 MMOL/L (ref 3.5–5.1)
PROT SERPL-MCNC: 6.9 G/DL (ref 6.4–8.2)
PROT UR STRIP.AUTO-MCNC: 30 MG/DL
RBC CLUMPS #/AREA URNS AUTO: 0 /HPF (ref 0–4)
SARS-COV-2 RNA RESP QL NAA+PROBE: NOT DETECTED
SODIUM SERPL-SCNC: 137 MMOL/L (ref 136–145)
SP GR UR STRIP.AUTO: >=1.03 (ref 1–1.03)
UA COMPLETE W REFLEX CULTURE PNL UR: ABNORMAL
UA DIPSTICK W REFLEX MICRO PNL UR: YES
URN SPEC COLLECT METH UR: ABNORMAL
UROBILINOGEN UR STRIP-ACNC: 1 E.U./DL
WBC #/AREA URNS AUTO: 5 /HPF (ref 0–5)

## 2024-08-17 PROCEDURE — 81001 URINALYSIS AUTO W/SCOPE: CPT

## 2024-08-17 PROCEDURE — 83690 ASSAY OF LIPASE: CPT

## 2024-08-17 PROCEDURE — 87086 URINE CULTURE/COLONY COUNT: CPT

## 2024-08-17 PROCEDURE — 82746 ASSAY OF FOLIC ACID SERUM: CPT

## 2024-08-17 PROCEDURE — 1200000000 HC SEMI PRIVATE

## 2024-08-17 PROCEDURE — 2580000003 HC RX 258: Performed by: PHYSICIAN ASSISTANT

## 2024-08-17 PROCEDURE — 87186 SC STD MICRODIL/AGAR DIL: CPT

## 2024-08-17 PROCEDURE — 83605 ASSAY OF LACTIC ACID: CPT

## 2024-08-17 PROCEDURE — 6360000002 HC RX W HCPCS: Performed by: PHYSICIAN ASSISTANT

## 2024-08-17 PROCEDURE — 96361 HYDRATE IV INFUSION ADD-ON: CPT

## 2024-08-17 PROCEDURE — 96375 TX/PRO/DX INJ NEW DRUG ADDON: CPT

## 2024-08-17 PROCEDURE — 80053 COMPREHEN METABOLIC PANEL: CPT

## 2024-08-17 PROCEDURE — 85025 COMPLETE CBC W/AUTO DIFF WBC: CPT

## 2024-08-17 PROCEDURE — 82077 ASSAY SPEC XCP UR&BREATH IA: CPT

## 2024-08-17 PROCEDURE — 82607 VITAMIN B-12: CPT

## 2024-08-17 PROCEDURE — 87077 CULTURE AEROBIC IDENTIFY: CPT

## 2024-08-17 PROCEDURE — 87184 SC STD DISK METHOD PER PLATE: CPT

## 2024-08-17 PROCEDURE — 87040 BLOOD CULTURE FOR BACTERIA: CPT

## 2024-08-17 PROCEDURE — 87636 SARSCOV2 & INF A&B AMP PRB: CPT

## 2024-08-17 PROCEDURE — 2580000003 HC RX 258: Performed by: STUDENT IN AN ORGANIZED HEALTH CARE EDUCATION/TRAINING PROGRAM

## 2024-08-17 PROCEDURE — 74176 CT ABD & PELVIS W/O CONTRAST: CPT

## 2024-08-17 PROCEDURE — 99285 EMERGENCY DEPT VISIT HI MDM: CPT

## 2024-08-17 PROCEDURE — 96374 THER/PROPH/DIAG INJ IV PUSH: CPT

## 2024-08-17 RX ORDER — ONDANSETRON 2 MG/ML
4 INJECTION INTRAMUSCULAR; INTRAVENOUS ONCE
Status: COMPLETED | OUTPATIENT
Start: 2024-08-17 | End: 2024-08-17

## 2024-08-17 RX ORDER — 0.9 % SODIUM CHLORIDE 0.9 %
1000 INTRAVENOUS SOLUTION INTRAVENOUS ONCE
Status: COMPLETED | OUTPATIENT
Start: 2024-08-17 | End: 2024-08-18

## 2024-08-17 RX ORDER — FOLIC ACID 1 MG/1
1 TABLET ORAL DAILY
Status: DISCONTINUED | OUTPATIENT
Start: 2024-08-18 | End: 2024-08-22 | Stop reason: HOSPADM

## 2024-08-17 RX ORDER — MORPHINE SULFATE 4 MG/ML
4 INJECTION, SOLUTION INTRAMUSCULAR; INTRAVENOUS
Status: COMPLETED | OUTPATIENT
Start: 2024-08-17 | End: 2024-08-17

## 2024-08-17 RX ORDER — SODIUM CHLORIDE 9 MG/ML
INJECTION, SOLUTION INTRAVENOUS PRN
Status: DISCONTINUED | OUTPATIENT
Start: 2024-08-17 | End: 2024-08-22 | Stop reason: HOSPADM

## 2024-08-17 RX ORDER — 0.9 % SODIUM CHLORIDE 0.9 %
1000 INTRAVENOUS SOLUTION INTRAVENOUS ONCE
Status: COMPLETED | OUTPATIENT
Start: 2024-08-17 | End: 2024-08-17

## 2024-08-17 RX ORDER — SODIUM CHLORIDE 0.9 % (FLUSH) 0.9 %
5-40 SYRINGE (ML) INJECTION EVERY 12 HOURS SCHEDULED
Status: DISCONTINUED | OUTPATIENT
Start: 2024-08-17 | End: 2024-08-22 | Stop reason: HOSPADM

## 2024-08-17 RX ORDER — ASCORBIC ACID 500 MG
2000 TABLET ORAL DAILY
Status: DISCONTINUED | OUTPATIENT
Start: 2024-08-18 | End: 2024-08-17

## 2024-08-17 RX ORDER — GLUCOSAMINE/MSM/CHONDROITIN A 500-83-400
80 TABLET ORAL DAILY
Status: DISCONTINUED | OUTPATIENT
Start: 2024-08-18 | End: 2024-08-17 | Stop reason: RX

## 2024-08-17 RX ORDER — VITAMIN B COMPLEX
2000 TABLET ORAL DAILY
Status: DISCONTINUED | OUTPATIENT
Start: 2024-08-18 | End: 2024-08-22 | Stop reason: HOSPADM

## 2024-08-17 RX ORDER — HYDROMORPHONE HYDROCHLORIDE 1 MG/ML
1 INJECTION, SOLUTION INTRAMUSCULAR; INTRAVENOUS; SUBCUTANEOUS ONCE
Status: COMPLETED | OUTPATIENT
Start: 2024-08-17 | End: 2024-08-17

## 2024-08-17 RX ORDER — METOPROLOL SUCCINATE 50 MG/1
50 TABLET, EXTENDED RELEASE ORAL DAILY
Status: DISCONTINUED | OUTPATIENT
Start: 2024-08-18 | End: 2024-08-22 | Stop reason: HOSPADM

## 2024-08-17 RX ORDER — B-COMPLEX WITH VITAMIN C
100 TABLET ORAL DAILY
Status: DISCONTINUED | OUTPATIENT
Start: 2024-08-18 | End: 2024-08-17 | Stop reason: RX

## 2024-08-17 RX ORDER — VIT C/B6/B5/MAGNESIUM/HERB 173 50-5-6-5MG
500 CAPSULE ORAL DAILY
Status: DISCONTINUED | OUTPATIENT
Start: 2024-08-18 | End: 2024-08-17 | Stop reason: RX

## 2024-08-17 RX ORDER — ACETAMINOPHEN 325 MG/1
650 TABLET ORAL EVERY 6 HOURS PRN
Status: DISCONTINUED | OUTPATIENT
Start: 2024-08-17 | End: 2024-08-22 | Stop reason: HOSPADM

## 2024-08-17 RX ORDER — ONDANSETRON 4 MG/1
4 TABLET, ORALLY DISINTEGRATING ORAL EVERY 8 HOURS PRN
Status: DISCONTINUED | OUTPATIENT
Start: 2024-08-17 | End: 2024-08-18

## 2024-08-17 RX ORDER — TAMSULOSIN HYDROCHLORIDE 0.4 MG/1
0.4 CAPSULE ORAL DAILY
Status: DISCONTINUED | OUTPATIENT
Start: 2024-08-18 | End: 2024-08-22 | Stop reason: HOSPADM

## 2024-08-17 RX ORDER — MAGNESIUM SULFATE IN WATER 40 MG/ML
2000 INJECTION, SOLUTION INTRAVENOUS PRN
Status: DISCONTINUED | OUTPATIENT
Start: 2024-08-17 | End: 2024-08-22 | Stop reason: HOSPADM

## 2024-08-17 RX ORDER — ENOXAPARIN SODIUM 100 MG/ML
40 INJECTION SUBCUTANEOUS DAILY
Status: DISCONTINUED | OUTPATIENT
Start: 2024-08-18 | End: 2024-08-22 | Stop reason: HOSPADM

## 2024-08-17 RX ORDER — POTASSIUM CHLORIDE 7.45 MG/ML
10 INJECTION INTRAVENOUS PRN
Status: DISCONTINUED | OUTPATIENT
Start: 2024-08-17 | End: 2024-08-22 | Stop reason: HOSPADM

## 2024-08-17 RX ORDER — POLYETHYLENE GLYCOL 3350 17 G/17G
17 POWDER, FOR SOLUTION ORAL DAILY PRN
Status: DISCONTINUED | OUTPATIENT
Start: 2024-08-17 | End: 2024-08-22 | Stop reason: HOSPADM

## 2024-08-17 RX ORDER — ASPIRIN 325 MG
325 TABLET ORAL DAILY
Status: DISCONTINUED | OUTPATIENT
Start: 2024-08-18 | End: 2024-08-22 | Stop reason: HOSPADM

## 2024-08-17 RX ORDER — ACETAMINOPHEN 650 MG/1
650 SUPPOSITORY RECTAL EVERY 6 HOURS PRN
Status: DISCONTINUED | OUTPATIENT
Start: 2024-08-17 | End: 2024-08-22 | Stop reason: HOSPADM

## 2024-08-17 RX ORDER — POTASSIUM CHLORIDE 20 MEQ/1
40 TABLET, EXTENDED RELEASE ORAL PRN
Status: DISCONTINUED | OUTPATIENT
Start: 2024-08-17 | End: 2024-08-22 | Stop reason: HOSPADM

## 2024-08-17 RX ORDER — ONDANSETRON 2 MG/ML
4 INJECTION INTRAMUSCULAR; INTRAVENOUS EVERY 6 HOURS PRN
Status: DISCONTINUED | OUTPATIENT
Start: 2024-08-17 | End: 2024-08-18

## 2024-08-17 RX ORDER — SODIUM CHLORIDE 0.9 % (FLUSH) 0.9 %
5-40 SYRINGE (ML) INJECTION PRN
Status: DISCONTINUED | OUTPATIENT
Start: 2024-08-17 | End: 2024-08-22 | Stop reason: HOSPADM

## 2024-08-17 RX ADMIN — SODIUM CHLORIDE 1000 ML: 9 INJECTION, SOLUTION INTRAVENOUS at 22:48

## 2024-08-17 RX ADMIN — Medication 10 ML: at 22:32

## 2024-08-17 RX ADMIN — HYDROMORPHONE HYDROCHLORIDE 1 MG: 1 INJECTION, SOLUTION INTRAMUSCULAR; INTRAVENOUS; SUBCUTANEOUS at 20:04

## 2024-08-17 RX ADMIN — WATER 1000 MG: 1 INJECTION INTRAMUSCULAR; INTRAVENOUS; SUBCUTANEOUS at 19:56

## 2024-08-17 RX ADMIN — SODIUM CHLORIDE 1000 ML: 9 INJECTION, SOLUTION INTRAVENOUS at 18:30

## 2024-08-17 RX ADMIN — ONDANSETRON 4 MG: 2 INJECTION INTRAMUSCULAR; INTRAVENOUS at 18:32

## 2024-08-17 RX ADMIN — MORPHINE SULFATE 4 MG: 4 INJECTION, SOLUTION INTRAMUSCULAR; INTRAVENOUS at 18:33

## 2024-08-17 ASSESSMENT — PAIN DESCRIPTION - ONSET: ONSET: ON-GOING

## 2024-08-17 ASSESSMENT — PAIN SCALES - GENERAL: PAINLEVEL_OUTOF10: 5

## 2024-08-17 ASSESSMENT — PAIN - FUNCTIONAL ASSESSMENT
PAIN_FUNCTIONAL_ASSESSMENT: 0-10
PAIN_FUNCTIONAL_ASSESSMENT: ACTIVITIES ARE NOT PREVENTED

## 2024-08-17 ASSESSMENT — PAIN DESCRIPTION - FREQUENCY: FREQUENCY: CONTINUOUS

## 2024-08-17 ASSESSMENT — PAIN DESCRIPTION - LOCATION: LOCATION: ABDOMEN;BACK

## 2024-08-17 ASSESSMENT — PAIN DESCRIPTION - PAIN TYPE: TYPE: ACUTE PAIN

## 2024-08-17 ASSESSMENT — PAIN DESCRIPTION - DESCRIPTORS: DESCRIPTORS: CRAMPING

## 2024-08-17 ASSESSMENT — PAIN DESCRIPTION - ORIENTATION: ORIENTATION: RIGHT;LEFT;LOWER

## 2024-08-17 NOTE — ED PROVIDER NOTES
[facility] EMERGENCY DEPARTMENT  EMERGENCY DEPARTMENT ENCOUNTER        Pt Name: Ashutosh Shaffer  MRN: [id number]  Birthdate 1955  Date of evaluation: 8/17/2024  Provider: Carlos Xiong PA-C  PCP: Seun Wild MD  Note Started: 6:01 PM EDT 8/17/24      JOSE. I have evaluated this patient.        CHIEF COMPLAINT       Chief Complaint   Patient presents with    Fatigue     Pt brought to the hospital due to having fatigue, abdominal cramping, emesis, hx of intestinal cancer.  States that he has had very poor PO intake.         HISTORY OF PRESENT ILLNESS: 1 or more Elements     History From: pt      Ashutosh Shaffer is a 68 y.o. male with past medical history of hypertension, colon cancer on chemotherapy who presents with abdominal pain, nausea.  Patient reports 3 days of generalized abdominal pain, cramping, worse with eating.  He had 6 episodes of small bowel movements on Thursday and has had none since.  He has decreased oral intake due to nausea and pain.  He does use Zofran at home with intermittent relief.  Last chemo was 8 days ago.    Nursing Notes were all reviewed and agreed with or any disagreements were addressed in the HPI.    REVIEW OF SYSTEMS :      Review of Systems   All other systems reviewed and are negative.      Positives and Pertinent negatives as per HPI.       PAST MEDICAL HISTORY    has a past medical history of Anxiety, Atrial fibrillation (HCC), Cervical disc herniation, Cervical spondylosis (7/27/2022), Coronary artery disease due to lipid rich plaque (11/12/2020), HLD (hyperlipidemia) (3/5/2021), HTN (hypertension) (5/9/2013), Malignant neoplasm of ascending colon (HCC) (6/2/2023), Neck sprain, Obesity (BMI 30-39.9) (11/19/2020), Post traumatic stress disorder, Rotator cuff (capsule) sprain and strain, Sprain of shoulder, left, and Sprain of shoulder, right.     SURGICAL HISTORY     Past Surgical History:   Procedure Laterality Date    ANKLE SURGERY Right

## 2024-08-18 LAB
ANION GAP SERPL CALCULATED.3IONS-SCNC: 13 MMOL/L (ref 3–16)
ANISOCYTOSIS BLD QL SMEAR: ABNORMAL
BASOPHILS # BLD: 0 K/UL (ref 0–0.2)
BASOPHILS # BLD: 0 K/UL (ref 0–0.2)
BASOPHILS NFR BLD: 0 %
BASOPHILS NFR BLD: 0.2 %
BUN SERPL-MCNC: 31 MG/DL (ref 7–20)
CALCIUM SERPL-MCNC: 8.8 MG/DL (ref 8.3–10.6)
CHLORIDE SERPL-SCNC: 103 MMOL/L (ref 99–110)
CO2 SERPL-SCNC: 22 MMOL/L (ref 21–32)
CREAT SERPL-MCNC: 1.2 MG/DL (ref 0.8–1.3)
DEPRECATED RDW RBC AUTO: 17.4 % (ref 12.4–15.4)
DEPRECATED RDW RBC AUTO: 17.6 % (ref 12.4–15.4)
EOSINOPHIL # BLD: 0 K/UL (ref 0–0.6)
EOSINOPHIL # BLD: 0 K/UL (ref 0–0.6)
EOSINOPHIL NFR BLD: 0 %
EOSINOPHIL NFR BLD: 1 %
FOLATE SERPL-MCNC: 39.8 NG/ML (ref 4.78–24.2)
GFR SERPLBLD CREATININE-BSD FMLA CKD-EPI: 66 ML/MIN/{1.73_M2}
GLUCOSE SERPL-MCNC: 127 MG/DL (ref 70–99)
HCT VFR BLD AUTO: 38.1 % (ref 40.5–52.5)
HCT VFR BLD AUTO: 41.9 % (ref 40.5–52.5)
HGB BLD-MCNC: 13.3 G/DL (ref 13.5–17.5)
HGB BLD-MCNC: 14.9 G/DL (ref 13.5–17.5)
LYMPHOCYTES # BLD: 0.4 K/UL (ref 1–5.1)
LYMPHOCYTES # BLD: 0.7 K/UL (ref 1–5.1)
LYMPHOCYTES NFR BLD: 18.3 %
LYMPHOCYTES NFR BLD: 8 %
MACROCYTES BLD QL SMEAR: ABNORMAL
MCH RBC QN AUTO: 42 PG (ref 26–34)
MCH RBC QN AUTO: 42.3 PG (ref 26–34)
MCHC RBC AUTO-ENTMCNC: 35 G/DL (ref 31–36)
MCHC RBC AUTO-ENTMCNC: 35.6 G/DL (ref 31–36)
MCV RBC AUTO: 119 FL (ref 80–100)
MCV RBC AUTO: 120.1 FL (ref 80–100)
MONOCYTES # BLD: 0.2 K/UL (ref 0–1.3)
MONOCYTES # BLD: 0.3 K/UL (ref 0–1.3)
MONOCYTES NFR BLD: 3 %
MONOCYTES NFR BLD: 7 %
NEUTROPHILS # BLD: 3 K/UL (ref 1.7–7.7)
NEUTROPHILS # BLD: 4.7 K/UL (ref 1.7–7.7)
NEUTROPHILS NFR BLD: 73.5 %
NEUTROPHILS NFR BLD: 87 %
NEUTS BAND NFR BLD MANUAL: 2 % (ref 0–7)
PATH INTERP BLD-IMP: NO
PATH INTERP BLD-IMP: NO
PLATELET # BLD AUTO: 63 K/UL (ref 135–450)
PLATELET # BLD AUTO: 74 K/UL (ref 135–450)
PLATELET BLD QL SMEAR: ABNORMAL
PMV BLD AUTO: 8 FL (ref 5–10.5)
PMV BLD AUTO: 8.5 FL (ref 5–10.5)
POTASSIUM SERPL-SCNC: 4.2 MMOL/L (ref 3.5–5.1)
RBC # BLD AUTO: 3.17 M/UL (ref 4.2–5.9)
RBC # BLD AUTO: 3.52 M/UL (ref 4.2–5.9)
SLIDE REVIEW: ABNORMAL
SODIUM SERPL-SCNC: 138 MMOL/L (ref 136–145)
VIT B12 SERPL-MCNC: 518 PG/ML (ref 211–911)
WBC # BLD AUTO: 4.1 K/UL (ref 4–11)
WBC # BLD AUTO: 5.3 K/UL (ref 4–11)

## 2024-08-18 PROCEDURE — 2580000003 HC RX 258: Performed by: STUDENT IN AN ORGANIZED HEALTH CARE EDUCATION/TRAINING PROGRAM

## 2024-08-18 PROCEDURE — 6360000002 HC RX W HCPCS: Performed by: INTERNAL MEDICINE

## 2024-08-18 PROCEDURE — 6370000000 HC RX 637 (ALT 250 FOR IP): Performed by: STUDENT IN AN ORGANIZED HEALTH CARE EDUCATION/TRAINING PROGRAM

## 2024-08-18 PROCEDURE — 2580000003 HC RX 258: Performed by: PHYSICIAN ASSISTANT

## 2024-08-18 PROCEDURE — 85025 COMPLETE CBC W/AUTO DIFF WBC: CPT

## 2024-08-18 PROCEDURE — 96361 HYDRATE IV INFUSION ADD-ON: CPT

## 2024-08-18 PROCEDURE — 1200000000 HC SEMI PRIVATE

## 2024-08-18 PROCEDURE — 96372 THER/PROPH/DIAG INJ SC/IM: CPT

## 2024-08-18 PROCEDURE — 36415 COLL VENOUS BLD VENIPUNCTURE: CPT

## 2024-08-18 PROCEDURE — 6360000002 HC RX W HCPCS: Performed by: PHYSICIAN ASSISTANT

## 2024-08-18 PROCEDURE — 6360000002 HC RX W HCPCS: Performed by: STUDENT IN AN ORGANIZED HEALTH CARE EDUCATION/TRAINING PROGRAM

## 2024-08-18 PROCEDURE — 96376 TX/PRO/DX INJ SAME DRUG ADON: CPT

## 2024-08-18 PROCEDURE — 80048 BASIC METABOLIC PNL TOTAL CA: CPT

## 2024-08-18 RX ORDER — SODIUM CHLORIDE 9 MG/ML
INJECTION, SOLUTION INTRAVENOUS CONTINUOUS
Status: DISCONTINUED | OUTPATIENT
Start: 2024-08-18 | End: 2024-08-20

## 2024-08-18 RX ORDER — ONDANSETRON 4 MG/1
4 TABLET, ORALLY DISINTEGRATING ORAL EVERY 4 HOURS PRN
Status: DISCONTINUED | OUTPATIENT
Start: 2024-08-18 | End: 2024-08-18 | Stop reason: SDUPTHER

## 2024-08-18 RX ORDER — HYDROMORPHONE HYDROCHLORIDE 1 MG/ML
0.5 INJECTION, SOLUTION INTRAMUSCULAR; INTRAVENOUS; SUBCUTANEOUS
Status: DISCONTINUED | OUTPATIENT
Start: 2024-08-18 | End: 2024-08-22 | Stop reason: HOSPADM

## 2024-08-18 RX ORDER — MORPHINE SULFATE 2 MG/ML
2 INJECTION, SOLUTION INTRAMUSCULAR; INTRAVENOUS EVERY 4 HOURS PRN
Status: DISCONTINUED | OUTPATIENT
Start: 2024-08-18 | End: 2024-08-18

## 2024-08-18 RX ORDER — ONDANSETRON 2 MG/ML
4 INJECTION INTRAMUSCULAR; INTRAVENOUS EVERY 4 HOURS PRN
Status: DISCONTINUED | OUTPATIENT
Start: 2024-08-18 | End: 2024-08-18 | Stop reason: SDUPTHER

## 2024-08-18 RX ORDER — ONDANSETRON 4 MG/1
4 TABLET, ORALLY DISINTEGRATING ORAL EVERY 4 HOURS PRN
Status: DISCONTINUED | OUTPATIENT
Start: 2024-08-18 | End: 2024-08-22 | Stop reason: HOSPADM

## 2024-08-18 RX ORDER — ONDANSETRON 2 MG/ML
4 INJECTION INTRAMUSCULAR; INTRAVENOUS EVERY 4 HOURS PRN
Status: DISCONTINUED | OUTPATIENT
Start: 2024-08-18 | End: 2024-08-22 | Stop reason: HOSPADM

## 2024-08-18 RX ADMIN — ONDANSETRON 4 MG: 2 INJECTION INTRAMUSCULAR; INTRAVENOUS at 16:28

## 2024-08-18 RX ADMIN — FOLIC ACID 1 MG: 1 TABLET ORAL at 09:17

## 2024-08-18 RX ADMIN — METOPROLOL SUCCINATE 50 MG: 50 TABLET, EXTENDED RELEASE ORAL at 09:17

## 2024-08-18 RX ADMIN — SODIUM CHLORIDE: 9 INJECTION, SOLUTION INTRAVENOUS at 09:31

## 2024-08-18 RX ADMIN — ONDANSETRON 4 MG: 2 INJECTION INTRAMUSCULAR; INTRAVENOUS at 03:43

## 2024-08-18 RX ADMIN — MORPHINE SULFATE 2 MG: 2 INJECTION, SOLUTION INTRAMUSCULAR; INTRAVENOUS at 07:18

## 2024-08-18 RX ADMIN — Medication 2000 UNITS: at 09:17

## 2024-08-18 RX ADMIN — Medication 10 ML: at 20:07

## 2024-08-18 RX ADMIN — HYDROMORPHONE HYDROCHLORIDE 0.5 MG: 1 INJECTION, SOLUTION INTRAMUSCULAR; INTRAVENOUS; SUBCUTANEOUS at 22:55

## 2024-08-18 RX ADMIN — ASPIRIN 325 MG: 325 TABLET ORAL at 09:17

## 2024-08-18 RX ADMIN — WATER 1000 MG: 1 INJECTION INTRAMUSCULAR; INTRAVENOUS; SUBCUTANEOUS at 20:07

## 2024-08-18 RX ADMIN — MORPHINE SULFATE 2 MG: 2 INJECTION, SOLUTION INTRAMUSCULAR; INTRAVENOUS at 15:15

## 2024-08-18 RX ADMIN — TAMSULOSIN HYDROCHLORIDE 0.4 MG: 0.4 CAPSULE ORAL at 09:17

## 2024-08-18 RX ADMIN — MORPHINE SULFATE 2 MG: 2 INJECTION, SOLUTION INTRAMUSCULAR; INTRAVENOUS at 12:55

## 2024-08-18 RX ADMIN — Medication 10 ML: at 09:18

## 2024-08-18 RX ADMIN — POLYETHYLENE GLYCOL 3350 17 G: 17 POWDER, FOR SOLUTION ORAL at 20:07

## 2024-08-18 RX ADMIN — ENOXAPARIN SODIUM 40 MG: 100 INJECTION SUBCUTANEOUS at 09:17

## 2024-08-18 RX ADMIN — ACETAMINOPHEN 650 MG: 325 TABLET ORAL at 15:44

## 2024-08-18 ASSESSMENT — PAIN DESCRIPTION - PAIN TYPE
TYPE: ACUTE PAIN
TYPE: ACUTE PAIN

## 2024-08-18 ASSESSMENT — PAIN SCALES - GENERAL
PAINLEVEL_OUTOF10: 6
PAINLEVEL_OUTOF10: 2
PAINLEVEL_OUTOF10: 6
PAINLEVEL_OUTOF10: 10

## 2024-08-18 ASSESSMENT — PAIN DESCRIPTION - LOCATION
LOCATION: ABDOMEN
LOCATION: ABDOMEN

## 2024-08-18 ASSESSMENT — PAIN - FUNCTIONAL ASSESSMENT
PAIN_FUNCTIONAL_ASSESSMENT: ACTIVITIES ARE NOT PREVENTED
PAIN_FUNCTIONAL_ASSESSMENT: PREVENTS OR INTERFERES SOME ACTIVE ACTIVITIES AND ADLS

## 2024-08-18 ASSESSMENT — PAIN DESCRIPTION - FREQUENCY
FREQUENCY: INTERMITTENT
FREQUENCY: INTERMITTENT

## 2024-08-18 ASSESSMENT — PAIN DESCRIPTION - ONSET
ONSET: ON-GOING
ONSET: ON-GOING

## 2024-08-18 ASSESSMENT — PAIN DESCRIPTION - ORIENTATION
ORIENTATION: LOWER;MID
ORIENTATION: LOWER;MID

## 2024-08-18 ASSESSMENT — PAIN DESCRIPTION - DESCRIPTORS
DESCRIPTORS: ACHING
DESCRIPTORS: DISCOMFORT

## 2024-08-18 NOTE — PLAN OF CARE
Problem: Pain  Goal: Verbalizes/displays adequate comfort level or baseline comfort level  8/18/2024 1558 by Lily Parisi RN  Outcome: Progressing  8/18/2024 0306 by Lashell Chapman RN  Outcome: Not Progressing     Problem: Pain  Goal: Verbalizes/displays adequate comfort level or baseline comfort level  8/18/2024 1558 by Lily Parisi RN  Outcome: Progressing  8/18/2024 0306 by Lashell Chapman RN  Outcome: Not Progressing

## 2024-08-18 NOTE — CARE COORDINATION
Discharge Planning:     (CM) reviewed the patient's chart to assess needs. Patient's Readmission Risk Score is 14%. Patient's medical insurance is [employer]. Patient's PCP is JEREL LAGUERRE .     No needs anticipated, at this time. CM team to follow. Staff to inform CM if additional discharge needs arise.     Electronically signed by Ildefonso Pack on 8/18/24 at 9:35 AM EDT

## 2024-08-18 NOTE — ED NOTES
How does patient ambulate?   []Low Fall Risk (ambulates by themselves without support)  [x]Stand by assist   []Contact Guard   []Front wheel walker  []Wheelchair   []Steady  []Bed bound  []History of Lower Extremity Amputation  []Unknown, did not assess in the emergency department   How does patient take pills?  [x]Whole with Water  []Crushed in applesauce  []Crushed in pudding  []Other  []Unknown no oral medications were given in the ED  Is patient alert?   [x]Alert  []Drowsy but responds to voice  []Doesn't respond to voice but responds to painful stimuli  []Unresponsive  Is patient oriented?   [x]To person  [x]To place  [x]To time  [x]To situation  []Confused  []Agitated  []Follows commands  If patient is disoriented or from a Skill Nursing Facility has family been notified of admission?   []Yes   [x]No  Patient belongings?   [x]Cell phone  []Wallet   []Dentures  [x]Clothing  Any specific patient or family belongings/needs/dynamics?   na  Miscellaneous comments/pending orders?  na     If there are any additional questions please reach out to the Emergency Department.

## 2024-08-18 NOTE — H&P
A&O x3  Psychiatric:  Alert and oriented      Labs:  Personally reviewed and interpreted for clinical significance.     Recent Labs     08/17/24  1811   WBC 5.3   HGB 14.9   HCT 41.9   PLT 74*     Recent Labs     08/17/24 1811      K 4.5      CO2 28   BUN 33*   CREATININE 1.3   CALCIUM 9.9     Recent Labs     08/17/24  1811   AST 26   ALT 15   BILITOT 1.5*   ALKPHOS 154*     No results for input(s): \"INR\" in the last 72 hours.  No results for input(s): \"CKTOTAL\", \"TROPHS\" in the last 72 hours.    Imaging: Personally reviewed and interpreted for clinical significance.     CT ABDOMEN PELVIS WO CONTRAST Additional Contrast? None    Result Date: 8/17/2024  EXAMINATION: CT OF THE ABDOMEN AND PELVIS WITHOUT CONTRAST 8/17/2024 6:14 pm TECHNIQUE: CT of the abdomen and pelvis was performed without the administration of intravenous contrast. Multiplanar reformatted images are provided for review. Automated exposure control, iterative reconstruction, and/or weight based adjustment of the mA/kV was utilized to reduce the radiation dose to as low as reasonably achievable. COMPARISON: CT abdomen and pelvis 07/18/2024 HISTORY: Abdominal pain, vomiting, constipation Decision Support Exception - unselect if not a suspected or confirmed emergency medical condition->Emergency Medical Condition (MA) Colon cancer with previously demonstrated hepatic metastatic disease. FINDINGS: Lower Chest: Visualized portions of the lungs are clear.  Cardiac and posterior mediastinal structures visualized are unremarkable. Liver: Redemonstrated hepatic metastatic disease not nearly as well characterized on noncontrast CT as prior exam.  Liver is enlarged. Kidneys: Punctate, nonobstructing calculus inferior pole right kidney.  No ureter calculus or hydronephrosis.  No left nephrolithiasis.  No suspicious renal lesion evident Other organs: Unremarkable appearance of the spleen, pancreas, gallbladder and adrenal glands. GI/Bowel: The stomach

## 2024-08-19 ENCOUNTER — APPOINTMENT (OUTPATIENT)
Dept: NUCLEAR MEDICINE | Age: 69
DRG: 393 | End: 2024-08-19
Payer: MEDICARE

## 2024-08-19 ENCOUNTER — APPOINTMENT (OUTPATIENT)
Dept: ULTRASOUND IMAGING | Age: 69
DRG: 393 | End: 2024-08-19
Payer: MEDICARE

## 2024-08-19 PROBLEM — R10.33 PERIUMBILICAL ABDOMINAL PAIN: Status: ACTIVE | Noted: 2024-08-19

## 2024-08-19 LAB
ALBUMIN SERPL-MCNC: 3.1 G/DL (ref 3.4–5)
ALP SERPL-CCNC: 121 U/L (ref 40–129)
ALT SERPL-CCNC: 12 U/L (ref 10–40)
ANION GAP SERPL CALCULATED.3IONS-SCNC: 8 MMOL/L (ref 3–16)
AST SERPL-CCNC: 23 U/L (ref 15–37)
BASOPHILS # BLD: 0 K/UL (ref 0–0.2)
BASOPHILS NFR BLD: 0.2 %
BILIRUB DIRECT SERPL-MCNC: 0.2 MG/DL (ref 0–0.3)
BILIRUB INDIRECT SERPL-MCNC: 0.5 MG/DL (ref 0–1)
BILIRUB SERPL-MCNC: 0.7 MG/DL (ref 0–1)
BUN SERPL-MCNC: 25 MG/DL (ref 7–20)
CALCIUM SERPL-MCNC: 8.8 MG/DL (ref 8.3–10.6)
CHLORIDE SERPL-SCNC: 106 MMOL/L (ref 99–110)
CO2 SERPL-SCNC: 23 MMOL/L (ref 21–32)
CREAT SERPL-MCNC: 1.2 MG/DL (ref 0.8–1.3)
DEPRECATED RDW RBC AUTO: 17.4 % (ref 12.4–15.4)
EOSINOPHIL # BLD: 0 K/UL (ref 0–0.6)
EOSINOPHIL NFR BLD: 1.3 %
GFR SERPLBLD CREATININE-BSD FMLA CKD-EPI: 66 ML/MIN/{1.73_M2}
GLUCOSE SERPL-MCNC: 132 MG/DL (ref 70–99)
HCT VFR BLD AUTO: 36.6 % (ref 40.5–52.5)
HGB BLD-MCNC: 12.9 G/DL (ref 13.5–17.5)
LYMPHOCYTES # BLD: 0.8 K/UL (ref 1–5.1)
LYMPHOCYTES NFR BLD: 21.6 %
MCH RBC QN AUTO: 42.2 PG (ref 26–34)
MCHC RBC AUTO-ENTMCNC: 35.3 G/DL (ref 31–36)
MCV RBC AUTO: 119.6 FL (ref 80–100)
MONOCYTES # BLD: 0.3 K/UL (ref 0–1.3)
MONOCYTES NFR BLD: 7.3 %
NEUTROPHILS # BLD: 2.6 K/UL (ref 1.7–7.7)
NEUTROPHILS NFR BLD: 69.6 %
PATH INTERP BLD-IMP: NO
PLATELET # BLD AUTO: 69 K/UL (ref 135–450)
PMV BLD AUTO: 8.3 FL (ref 5–10.5)
POTASSIUM SERPL-SCNC: 4.3 MMOL/L (ref 3.5–5.1)
PROT SERPL-MCNC: 5.3 G/DL (ref 6.4–8.2)
RBC # BLD AUTO: 3.06 M/UL (ref 4.2–5.9)
SODIUM SERPL-SCNC: 137 MMOL/L (ref 136–145)
WBC # BLD AUTO: 3.8 K/UL (ref 4–11)

## 2024-08-19 PROCEDURE — 96365 THER/PROPH/DIAG IV INF INIT: CPT

## 2024-08-19 PROCEDURE — 78226 HEPATOBILIARY SYSTEM IMAGING: CPT

## 2024-08-19 PROCEDURE — 2580000003 HC RX 258: Performed by: PHYSICIAN ASSISTANT

## 2024-08-19 PROCEDURE — 96366 THER/PROPH/DIAG IV INF ADDON: CPT

## 2024-08-19 PROCEDURE — 1200000000 HC SEMI PRIVATE

## 2024-08-19 PROCEDURE — 96375 TX/PRO/DX INJ NEW DRUG ADDON: CPT

## 2024-08-19 PROCEDURE — 2580000003 HC RX 258: Performed by: NURSE PRACTITIONER

## 2024-08-19 PROCEDURE — APPSS60 APP SPLIT SHARED TIME 46-60 MINUTES: Performed by: NURSE PRACTITIONER

## 2024-08-19 PROCEDURE — A9537 TC99M MEBROFENIN: HCPCS | Performed by: NURSE PRACTITIONER

## 2024-08-19 PROCEDURE — 6360000002 HC RX W HCPCS: Performed by: PHYSICIAN ASSISTANT

## 2024-08-19 PROCEDURE — 76705 ECHO EXAM OF ABDOMEN: CPT

## 2024-08-19 PROCEDURE — APPNB30 APP NON BILLABLE TIME 0-30 MINS: Performed by: NURSE PRACTITIONER

## 2024-08-19 PROCEDURE — 80048 BASIC METABOLIC PNL TOTAL CA: CPT

## 2024-08-19 PROCEDURE — 99223 1ST HOSP IP/OBS HIGH 75: CPT | Performed by: SURGERY

## 2024-08-19 PROCEDURE — 80076 HEPATIC FUNCTION PANEL: CPT

## 2024-08-19 PROCEDURE — 3430000000 HC RX DIAGNOSTIC RADIOPHARMACEUTICAL: Performed by: NURSE PRACTITIONER

## 2024-08-19 PROCEDURE — 6360000002 HC RX W HCPCS: Performed by: NURSE PRACTITIONER

## 2024-08-19 PROCEDURE — 6370000000 HC RX 637 (ALT 250 FOR IP): Performed by: STUDENT IN AN ORGANIZED HEALTH CARE EDUCATION/TRAINING PROGRAM

## 2024-08-19 PROCEDURE — 96376 TX/PRO/DX INJ SAME DRUG ADON: CPT

## 2024-08-19 PROCEDURE — 36415 COLL VENOUS BLD VENIPUNCTURE: CPT

## 2024-08-19 PROCEDURE — 96361 HYDRATE IV INFUSION ADD-ON: CPT

## 2024-08-19 PROCEDURE — 85025 COMPLETE CBC W/AUTO DIFF WBC: CPT

## 2024-08-19 RX ORDER — PANTOPRAZOLE SODIUM 40 MG/10ML
40 INJECTION, POWDER, LYOPHILIZED, FOR SOLUTION INTRAVENOUS DAILY
Status: DISCONTINUED | OUTPATIENT
Start: 2024-08-19 | End: 2024-08-22 | Stop reason: HOSPADM

## 2024-08-19 RX ORDER — METRONIDAZOLE 500 MG/100ML
500 INJECTION, SOLUTION INTRAVENOUS EVERY 8 HOURS
Status: DISCONTINUED | OUTPATIENT
Start: 2024-08-19 | End: 2024-08-22 | Stop reason: HOSPADM

## 2024-08-19 RX ORDER — SODIUM CHLORIDE 0.9 % (FLUSH) 0.9 %
5-40 SYRINGE (ML) INJECTION PRN
Status: DISCONTINUED | OUTPATIENT
Start: 2024-08-19 | End: 2024-08-22 | Stop reason: HOSPADM

## 2024-08-19 RX ADMIN — WATER 1000 MG: 1 INJECTION INTRAMUSCULAR; INTRAVENOUS; SUBCUTANEOUS at 19:10

## 2024-08-19 RX ADMIN — ASPIRIN 325 MG: 325 TABLET ORAL at 09:00

## 2024-08-19 RX ADMIN — Medication 4.93 MILLICURIE: at 12:06

## 2024-08-19 RX ADMIN — SODIUM CHLORIDE: 9 INJECTION, SOLUTION INTRAVENOUS at 04:21

## 2024-08-19 RX ADMIN — Medication 2000 UNITS: at 08:59

## 2024-08-19 RX ADMIN — FOLIC ACID 1 MG: 1 TABLET ORAL at 08:59

## 2024-08-19 RX ADMIN — METOPROLOL SUCCINATE 50 MG: 50 TABLET, EXTENDED RELEASE ORAL at 08:59

## 2024-08-19 RX ADMIN — METRONIDAZOLE 500 MG: 500 INJECTION, SOLUTION INTRAVENOUS at 16:49

## 2024-08-19 RX ADMIN — SODIUM CHLORIDE: 9 INJECTION, SOLUTION INTRAVENOUS at 19:57

## 2024-08-19 RX ADMIN — TAMSULOSIN HYDROCHLORIDE 0.4 MG: 0.4 CAPSULE ORAL at 09:02

## 2024-08-19 RX ADMIN — PANTOPRAZOLE SODIUM 40 MG: 40 INJECTION, POWDER, FOR SOLUTION INTRAVENOUS at 13:20

## 2024-08-19 RX ADMIN — HYDROMORPHONE HYDROCHLORIDE 0.5 MG: 1 INJECTION, SOLUTION INTRAMUSCULAR; INTRAVENOUS; SUBCUTANEOUS at 04:21

## 2024-08-19 RX ADMIN — SODIUM CHLORIDE, PRESERVATIVE FREE 10 ML: 5 INJECTION INTRAVENOUS at 12:06

## 2024-08-19 RX ADMIN — METRONIDAZOLE 500 MG: 500 INJECTION, SOLUTION INTRAVENOUS at 10:29

## 2024-08-19 ASSESSMENT — PAIN SCALES - GENERAL
PAINLEVEL_OUTOF10: 0
PAINLEVEL_OUTOF10: 0
PAINLEVEL_OUTOF10: 5
PAINLEVEL_OUTOF10: 5
PAINLEVEL_OUTOF10: 0

## 2024-08-19 ASSESSMENT — ENCOUNTER SYMPTOMS
APNEA: 0
VOMITING: 1
CHEST TIGHTNESS: 0
EYE DISCHARGE: 0
COLOR CHANGE: 0
EYE ITCHING: 0
NAUSEA: 1
ABDOMINAL PAIN: 1
BACK PAIN: 0

## 2024-08-19 ASSESSMENT — PAIN SCALES - WONG BAKER
WONGBAKER_NUMERICALRESPONSE: NO HURT

## 2024-08-19 NOTE — PLAN OF CARE
[facility]        Assessment & Plan of Care    History of Present Illness: Mr. Shaffer is a 68 y.o. male who presented to the ED 2 days ago for cramping pain in the supraumbilical region.  Symptoms started 4 days ago after eating a banana and just a few bites of a hamburger for lunch.  At worst, he rated the pain an 8-9 out of 10, but currently his pain is a 2-3.  Pain is better after passing stool; worse with intake--specifically liquids.  Associated nausea, vomiting, anorexia (just since symptom onset), and fatigue/generalized weakness.  He denies fevers, chills, diarrhea, constipation, hematochezia, melena, chest pain, SOB, or similar symptoms or prior episodes of postprandial pain.  Last BM was today.  He reports diarrhea for the last 3-4 weeks since cutting dairy out of his diet; reports that he was having \"gas\" and his oncologist suggested omitting dairy.  Medical history is significant for hypertension, hyperlipidemia, CAD, diabetes, and colon cancer with liver metastasis on chemotherapy (follows with Dr. Toussaint; last IV chemo was 10 days ago).  Prior abdominal surgery includes hernia repair.  Denies history of PUD, no PPI use, no prior EGD.  Last colonoscopy was in June 2023.  No blood thinners.  Former smoker.    CT abdomen/pelvis on 8/17/2024 showed acute enteritis at the jejunum left upper quadrant without significant small bowel distension, suboptimally characterized noncontrast CT.    RUQ ultrasound on 8/19/2024 showed gallstones and sludge within the gallbladder, without evidence of cholecystitis.    Physical Exam:  CONSTITUTIONAL:  alert, no apparent distress and mildly obese  NEUROLOGIC:  Mental Status Exam:  Level of Alertness:   awake  Orientation:   person, place, time  EYES:  sclera clear  ENT:  normocepalic, without obvious abnormality  NECK:  supple, symmetrical, trachea midline  LUNGS:  clear to auscultation  CARDIOVASCULAR:  regular rate and rhythm and

## 2024-08-20 PROBLEM — E43 SEVERE MALNUTRITION (HCC): Chronic | Status: ACTIVE | Noted: 2024-08-20

## 2024-08-20 LAB
ANION GAP SERPL CALCULATED.3IONS-SCNC: 9 MMOL/L (ref 3–16)
BACTERIA UR CULT: ABNORMAL
BASOPHILS # BLD: 0 K/UL (ref 0–0.2)
BASOPHILS NFR BLD: 0.2 %
BUN SERPL-MCNC: 22 MG/DL (ref 7–20)
CALCIUM SERPL-MCNC: 8.3 MG/DL (ref 8.3–10.6)
CHLORIDE SERPL-SCNC: 108 MMOL/L (ref 99–110)
CO2 SERPL-SCNC: 22 MMOL/L (ref 21–32)
CREAT SERPL-MCNC: 1.2 MG/DL (ref 0.8–1.3)
DEPRECATED RDW RBC AUTO: 17.6 % (ref 12.4–15.4)
EOSINOPHIL # BLD: 0.1 K/UL (ref 0–0.6)
EOSINOPHIL NFR BLD: 2.3 %
GFR SERPLBLD CREATININE-BSD FMLA CKD-EPI: 66 ML/MIN/{1.73_M2}
GLUCOSE SERPL-MCNC: 146 MG/DL (ref 70–99)
HCT VFR BLD AUTO: 33.1 % (ref 40.5–52.5)
HGB BLD-MCNC: 11.6 G/DL (ref 13.5–17.5)
LYMPHOCYTES # BLD: 0.8 K/UL (ref 1–5.1)
LYMPHOCYTES NFR BLD: 23.6 %
MCH RBC QN AUTO: 41.9 PG (ref 26–34)
MCHC RBC AUTO-ENTMCNC: 35.1 G/DL (ref 31–36)
MCV RBC AUTO: 119.3 FL (ref 80–100)
MONOCYTES # BLD: 0.3 K/UL (ref 0–1.3)
MONOCYTES NFR BLD: 7.3 %
NEUTROPHILS # BLD: 2.4 K/UL (ref 1.7–7.7)
NEUTROPHILS NFR BLD: 66.6 %
ORGANISM: ABNORMAL
PATH INTERP BLD-IMP: NO
PLATELET # BLD AUTO: 73 K/UL (ref 135–450)
PMV BLD AUTO: 8.7 FL (ref 5–10.5)
POTASSIUM SERPL-SCNC: 3.9 MMOL/L (ref 3.5–5.1)
RBC # BLD AUTO: 2.77 M/UL (ref 4.2–5.9)
SODIUM SERPL-SCNC: 139 MMOL/L (ref 136–145)
WBC # BLD AUTO: 3.6 K/UL (ref 4–11)

## 2024-08-20 PROCEDURE — 96376 TX/PRO/DX INJ SAME DRUG ADON: CPT

## 2024-08-20 PROCEDURE — 2580000003 HC RX 258: Performed by: STUDENT IN AN ORGANIZED HEALTH CARE EDUCATION/TRAINING PROGRAM

## 2024-08-20 PROCEDURE — 6360000002 HC RX W HCPCS: Performed by: PHYSICIAN ASSISTANT

## 2024-08-20 PROCEDURE — 6370000000 HC RX 637 (ALT 250 FOR IP): Performed by: NURSE PRACTITIONER

## 2024-08-20 PROCEDURE — 85025 COMPLETE CBC W/AUTO DIFF WBC: CPT

## 2024-08-20 PROCEDURE — 99231 SBSQ HOSP IP/OBS SF/LOW 25: CPT | Performed by: SURGERY

## 2024-08-20 PROCEDURE — APPNB30 APP NON BILLABLE TIME 0-30 MINS: Performed by: NURSE PRACTITIONER

## 2024-08-20 PROCEDURE — 1200000000 HC SEMI PRIVATE

## 2024-08-20 PROCEDURE — 96367 TX/PROPH/DG ADDL SEQ IV INF: CPT

## 2024-08-20 PROCEDURE — 96366 THER/PROPH/DIAG IV INF ADDON: CPT

## 2024-08-20 PROCEDURE — 36415 COLL VENOUS BLD VENIPUNCTURE: CPT

## 2024-08-20 PROCEDURE — 94760 N-INVAS EAR/PLS OXIMETRY 1: CPT

## 2024-08-20 PROCEDURE — 6370000000 HC RX 637 (ALT 250 FOR IP): Performed by: STUDENT IN AN ORGANIZED HEALTH CARE EDUCATION/TRAINING PROGRAM

## 2024-08-20 PROCEDURE — APPSS15 APP SPLIT SHARED TIME 0-15 MINUTES: Performed by: NURSE PRACTITIONER

## 2024-08-20 PROCEDURE — 80048 BASIC METABOLIC PNL TOTAL CA: CPT

## 2024-08-20 PROCEDURE — 6360000002 HC RX W HCPCS: Performed by: NURSE PRACTITIONER

## 2024-08-20 RX ORDER — CIPROFLOXACIN 2 MG/ML
400 INJECTION, SOLUTION INTRAVENOUS EVERY 12 HOURS
Status: DISCONTINUED | OUTPATIENT
Start: 2024-08-20 | End: 2024-08-21

## 2024-08-20 RX ORDER — DICYCLOMINE HYDROCHLORIDE 10 MG/1
20 CAPSULE ORAL
Status: DISCONTINUED | OUTPATIENT
Start: 2024-08-20 | End: 2024-08-22 | Stop reason: HOSPADM

## 2024-08-20 RX ADMIN — PANTOPRAZOLE SODIUM 40 MG: 40 INJECTION, POWDER, FOR SOLUTION INTRAVENOUS at 09:06

## 2024-08-20 RX ADMIN — METOPROLOL SUCCINATE 50 MG: 50 TABLET, EXTENDED RELEASE ORAL at 09:06

## 2024-08-20 RX ADMIN — METRONIDAZOLE 500 MG: 500 INJECTION, SOLUTION INTRAVENOUS at 17:45

## 2024-08-20 RX ADMIN — FOLIC ACID 1 MG: 1 TABLET ORAL at 09:04

## 2024-08-20 RX ADMIN — CIPROFLOXACIN 400 MG: 400 INJECTION, SOLUTION INTRAVENOUS at 22:35

## 2024-08-20 RX ADMIN — Medication 10 ML: at 09:12

## 2024-08-20 RX ADMIN — ASPIRIN 325 MG: 325 TABLET ORAL at 09:06

## 2024-08-20 RX ADMIN — DICYCLOMINE HYDROCHLORIDE 20 MG: 10 CAPSULE ORAL at 04:19

## 2024-08-20 RX ADMIN — METRONIDAZOLE 500 MG: 500 INJECTION, SOLUTION INTRAVENOUS at 09:17

## 2024-08-20 RX ADMIN — DICYCLOMINE HYDROCHLORIDE 20 MG: 10 CAPSULE ORAL at 21:14

## 2024-08-20 RX ADMIN — Medication 2000 UNITS: at 09:05

## 2024-08-20 RX ADMIN — DICYCLOMINE HYDROCHLORIDE 20 MG: 10 CAPSULE ORAL at 09:04

## 2024-08-20 RX ADMIN — TAMSULOSIN HYDROCHLORIDE 0.4 MG: 0.4 CAPSULE ORAL at 09:13

## 2024-08-20 RX ADMIN — METRONIDAZOLE 500 MG: 500 INJECTION, SOLUTION INTRAVENOUS at 01:16

## 2024-08-20 RX ADMIN — DICYCLOMINE HYDROCHLORIDE 20 MG: 10 CAPSULE ORAL at 17:35

## 2024-08-20 RX ADMIN — CIPROFLOXACIN 400 MG: 400 INJECTION, SOLUTION INTRAVENOUS at 10:51

## 2024-08-20 ASSESSMENT — PAIN SCALES - GENERAL
PAINLEVEL_OUTOF10: 5
PAINLEVEL_OUTOF10: 3
PAINLEVEL_OUTOF10: 0

## 2024-08-20 ASSESSMENT — PAIN - FUNCTIONAL ASSESSMENT: PAIN_FUNCTIONAL_ASSESSMENT: ACTIVITIES ARE NOT PREVENTED

## 2024-08-20 ASSESSMENT — PAIN SCALES - WONG BAKER
WONGBAKER_NUMERICALRESPONSE: NO HURT

## 2024-08-20 ASSESSMENT — PAIN DESCRIPTION - ONSET: ONSET: ON-GOING

## 2024-08-20 ASSESSMENT — PAIN DESCRIPTION - LOCATION
LOCATION: ABDOMEN
LOCATION: ABDOMEN

## 2024-08-20 ASSESSMENT — PAIN DESCRIPTION - FREQUENCY: FREQUENCY: INTERMITTENT

## 2024-08-20 ASSESSMENT — PAIN DESCRIPTION - PAIN TYPE: TYPE: ACUTE PAIN

## 2024-08-20 ASSESSMENT — PAIN DESCRIPTION - ORIENTATION: ORIENTATION: LOWER;MID

## 2024-08-20 ASSESSMENT — PAIN DESCRIPTION - DESCRIPTORS: DESCRIPTORS: ACHING

## 2024-08-20 NOTE — CONSULTS
Ashutosh Shaffer     Chief complaint-crampy supraumbilical abdominal pain    HPI: 68-year-old male who is currently receiving treatment from Dr. Darrell Toussaint for metastatic colorectal cancer.  He last received treatment about 10 days ago.  He presented to the ED 2 days ago with complaints of supraumbilical crampy abdominal pain.  The pain did not radiate.  It is currently at 2-3/10.  At its worst it was an 8-9/10.  The pain is better after passing stool and worse with p.o. intake.  No known history of peptic ulcer disease.  Associated symptoms include nausea, vomiting, anorexia and fatigue/generalized weakness.  CAT scan of the abdomen pelvis and gallbladder ultrasound both showed gallstones.  He does have diffuse metastatic disease in the liver.  No history of fevers or chills.    Past Medical History:   Diagnosis Date    Anxiety     Atrial fibrillation (HCC)     Cervical disc herniation     Cervical spondylosis 07/27/2022    Coronary artery disease due to lipid rich plaque 11/12/2020    HLD (hyperlipidemia) 03/05/2021    HTN (hypertension) 05/09/2013    Malignant neoplasm of ascending colon (HCC) 06/02/2023    Obesity (BMI 30-39.9) 11/19/2020    Post traumatic stress disorder     Rotator cuff (capsule) sprain and strain        Past Surgical History:   Procedure Laterality Date    ANKLE SURGERY Right     around 2000    COLONOSCOPY N/A 6/1/2023    COLONOSCOPY WITH BIOPSY performed by Juan Pablo Colon MD at [facility] ENDOSCOPY    COLONOSCOPY  6/1/2023    COLONOSCOPY POLYPECTOMY SNARE/COLD BIOPSY performed by Juan Pablo oClon MD at [facility] ENDOSCOPY    CORONARY ARTERY BYPASS GRAFT N/A 11/16/2020    MALATHI. TCPB. RT EVH. CABG x 3, LIMA TO DISTAL LAD; SVG TO OM1; SVG TO RPDA. SCOTT CLIP WITH 45mm  ATRICLIP. PLACEMENT OF TEMPORARY VENTRICULAR PACING WIRE. DOPPLER FLOW VERIFICATION OF GRAFTS. BILATERAL INTERCOSTAL NERVE BLOCK WITH EXPAREL & MARCAINE. performed by Spike Viera MD at [facility]    HERNIA REPAIR Right     
Alcohol use: Not Currently     Alcohol/week: 0.8 standard drinks of alcohol     Types: 1 Standard drinks or equivalent per week        Family History   Problem Relation Age of Onset    Heart Disease Father                 Physical Exam  Blood pressure (!) 162/88, pulse 57, temperature 98 °F (36.7 °C), temperature source Oral, resp. rate 18, height 1.727 m (5' 8\"), weight 83.2 kg (183 lb 8 oz), SpO2 99%.    General appearance: alert, cooperative, no distress, appears stated age  Eyes: Anicteric  Head: Normocephalic, without obvious abnormality  Lungs: clear to auscultation bilaterally, Normal Effort  Heart: regular rate and rhythm, normal S1 and S2, no murmurs or rubs  Abdomen: soft, mild epigastric tenderness. Bowel sounds normal. No masses,  no organomegaly.   Extremities: atraumatic, no cyanosis or edema  Skin: warm and dry, no jaundice  Neuro: Grossly intact, A&OX3  Musculoskeletal: 5/5  strength BUE      Data Review:    Recent Labs     08/18/24  0605 08/19/24  0555 08/20/24  0533   WBC 4.1 3.8* 3.6*   HGB 13.3* 12.9* 11.6*   HCT 38.1* 36.6* 33.1*   .1* 119.6* 119.3*   PLT 63* 69* 73*     Recent Labs     08/18/24  0605 08/19/24  0555 08/20/24  0533    137 139   K 4.2 4.3 3.9    106 108   CO2 22 23 22   BUN 31* 25* 22*   CREATININE 1.2 1.2 1.2     Recent Labs     08/17/24  1811 08/19/24  0556   AST 26 23   ALT 15 12   BILIDIR  --  0.2   BILITOT 1.5* 0.7   ALKPHOS 154* 121     Recent Labs     08/17/24  1811   LIPASE 19.0     No results for input(s): \"PROTIME\", \"INR\" in the last 72 hours.  Invalid input(s): \"PTT\"  No results for input(s): \"OCCULTBLD\" in the last 72 hours.    Imaging Studies:                             CT-scan of abdomen and pelvis wo contrast 8/17/24:             IMPRESSION:  1. Acute enteritis at the jejunum left upper quadrant without significant  small bowel distension, suboptimally characterized noncontrast CT.  This may  be infectious or post treatment related.  2.

## 2024-08-21 ENCOUNTER — ANESTHESIA EVENT (OUTPATIENT)
Dept: ENDOSCOPY | Age: 69
DRG: 393 | End: 2024-08-21
Payer: MEDICARE

## 2024-08-21 ENCOUNTER — ANESTHESIA (OUTPATIENT)
Dept: ENDOSCOPY | Age: 69
DRG: 393 | End: 2024-08-21
Payer: MEDICARE

## 2024-08-21 LAB
ANION GAP SERPL CALCULATED.3IONS-SCNC: 10 MMOL/L (ref 3–16)
BASOPHILS # BLD: 0 K/UL (ref 0–0.2)
BASOPHILS NFR BLD: 0.4 %
BUN SERPL-MCNC: 18 MG/DL (ref 7–20)
CALCIUM SERPL-MCNC: 8.7 MG/DL (ref 8.3–10.6)
CHLORIDE SERPL-SCNC: 108 MMOL/L (ref 99–110)
CO2 SERPL-SCNC: 23 MMOL/L (ref 21–32)
CREAT SERPL-MCNC: 1.3 MG/DL (ref 0.8–1.3)
DEPRECATED RDW RBC AUTO: 17.8 % (ref 12.4–15.4)
EOSINOPHIL # BLD: 0.1 K/UL (ref 0–0.6)
EOSINOPHIL NFR BLD: 3.8 %
GFR SERPLBLD CREATININE-BSD FMLA CKD-EPI: 60 ML/MIN/{1.73_M2}
GLUCOSE BLD-MCNC: 94 MG/DL (ref 70–99)
GLUCOSE SERPL-MCNC: 100 MG/DL (ref 70–99)
HCT VFR BLD AUTO: 36.5 % (ref 40.5–52.5)
HGB BLD-MCNC: 12.4 G/DL (ref 13.5–17.5)
LYMPHOCYTES # BLD: 0.9 K/UL (ref 1–5.1)
LYMPHOCYTES NFR BLD: 38.7 %
MCH RBC QN AUTO: 41.2 PG (ref 26–34)
MCHC RBC AUTO-ENTMCNC: 34.1 G/DL (ref 31–36)
MCV RBC AUTO: 121 FL (ref 80–100)
MONOCYTES # BLD: 0.2 K/UL (ref 0–1.3)
MONOCYTES NFR BLD: 6.9 %
NEUTROPHILS # BLD: 1.2 K/UL (ref 1.7–7.7)
NEUTROPHILS NFR BLD: 50.2 %
PATH INTERP BLD-IMP: NO
PERFORMED ON: NORMAL
PLATELET # BLD AUTO: 91 K/UL (ref 135–450)
PMV BLD AUTO: 8.7 FL (ref 5–10.5)
POTASSIUM SERPL-SCNC: 4 MMOL/L (ref 3.5–5.1)
RBC # BLD AUTO: 3.01 M/UL (ref 4.2–5.9)
SODIUM SERPL-SCNC: 141 MMOL/L (ref 136–145)
WBC # BLD AUTO: 2.3 K/UL (ref 4–11)

## 2024-08-21 PROCEDURE — 96376 TX/PRO/DX INJ SAME DRUG ADON: CPT

## 2024-08-21 PROCEDURE — 2580000003 HC RX 258: Performed by: STUDENT IN AN ORGANIZED HEALTH CARE EDUCATION/TRAINING PROGRAM

## 2024-08-21 PROCEDURE — 6360000002 HC RX W HCPCS: Performed by: PHYSICIAN ASSISTANT

## 2024-08-21 PROCEDURE — 1200000000 HC SEMI PRIVATE

## 2024-08-21 PROCEDURE — 2709999900 HC NON-CHARGEABLE SUPPLY: Performed by: INTERNAL MEDICINE

## 2024-08-21 PROCEDURE — C1726 CATH, BAL DIL, NON-VASCULAR: HCPCS | Performed by: INTERNAL MEDICINE

## 2024-08-21 PROCEDURE — APPSS15 APP SPLIT SHARED TIME 0-15 MINUTES: Performed by: NURSE PRACTITIONER

## 2024-08-21 PROCEDURE — 6360000002 HC RX W HCPCS: Performed by: INTERNAL MEDICINE

## 2024-08-21 PROCEDURE — 7100000001 HC PACU RECOVERY - ADDTL 15 MIN: Performed by: INTERNAL MEDICINE

## 2024-08-21 PROCEDURE — 2580000003 HC RX 258: Performed by: NURSE PRACTITIONER

## 2024-08-21 PROCEDURE — 85025 COMPLETE CBC W/AUTO DIFF WBC: CPT

## 2024-08-21 PROCEDURE — 3700000001 HC ADD 15 MINUTES (ANESTHESIA): Performed by: INTERNAL MEDICINE

## 2024-08-21 PROCEDURE — 0DB98ZX EXCISION OF DUODENUM, VIA NATURAL OR ARTIFICIAL OPENING ENDOSCOPIC, DIAGNOSTIC: ICD-10-PCS | Performed by: INTERNAL MEDICINE

## 2024-08-21 PROCEDURE — 2500000003 HC RX 250 WO HCPCS: Performed by: NURSE ANESTHETIST, CERTIFIED REGISTERED

## 2024-08-21 PROCEDURE — 6360000002 HC RX W HCPCS: Performed by: NURSE ANESTHETIST, CERTIFIED REGISTERED

## 2024-08-21 PROCEDURE — 0D768ZZ DILATION OF STOMACH, VIA NATURAL OR ARTIFICIAL OPENING ENDOSCOPIC: ICD-10-PCS | Performed by: INTERNAL MEDICINE

## 2024-08-21 PROCEDURE — 3609017700 HC EGD DILATION GASTRIC/DUODENAL STRICTURE: Performed by: INTERNAL MEDICINE

## 2024-08-21 PROCEDURE — APPNB30 APP NON BILLABLE TIME 0-30 MINS: Performed by: NURSE PRACTITIONER

## 2024-08-21 PROCEDURE — 7100000000 HC PACU RECOVERY - FIRST 15 MIN: Performed by: INTERNAL MEDICINE

## 2024-08-21 PROCEDURE — 36415 COLL VENOUS BLD VENIPUNCTURE: CPT

## 2024-08-21 PROCEDURE — 96366 THER/PROPH/DIAG IV INF ADDON: CPT

## 2024-08-21 PROCEDURE — 6370000000 HC RX 637 (ALT 250 FOR IP): Performed by: INTERNAL MEDICINE

## 2024-08-21 PROCEDURE — 3700000000 HC ANESTHESIA ATTENDED CARE: Performed by: INTERNAL MEDICINE

## 2024-08-21 PROCEDURE — 6360000002 HC RX W HCPCS: Performed by: NURSE PRACTITIONER

## 2024-08-21 PROCEDURE — 3609012400 HC EGD TRANSORAL BIOPSY SINGLE/MULTIPLE: Performed by: INTERNAL MEDICINE

## 2024-08-21 PROCEDURE — 80048 BASIC METABOLIC PNL TOTAL CA: CPT

## 2024-08-21 PROCEDURE — 2580000003 HC RX 258: Performed by: NURSE ANESTHETIST, CERTIFIED REGISTERED

## 2024-08-21 PROCEDURE — 2580000003 HC RX 258: Performed by: INTERNAL MEDICINE

## 2024-08-21 PROCEDURE — 99231 SBSQ HOSP IP/OBS SF/LOW 25: CPT | Performed by: SURGERY

## 2024-08-21 RX ORDER — PROPOFOL 10 MG/ML
INJECTION, EMULSION INTRAVENOUS CONTINUOUS PRN
Status: DISCONTINUED | OUTPATIENT
Start: 2024-08-21 | End: 2024-08-21 | Stop reason: SDUPTHER

## 2024-08-21 RX ORDER — SODIUM CHLORIDE 9 MG/ML
INJECTION, SOLUTION INTRAVENOUS CONTINUOUS PRN
Status: DISCONTINUED | OUTPATIENT
Start: 2024-08-21 | End: 2024-08-21 | Stop reason: SDUPTHER

## 2024-08-21 RX ORDER — LIDOCAINE HYDROCHLORIDE 20 MG/ML
INJECTION, SOLUTION EPIDURAL; INFILTRATION; INTRACAUDAL; PERINEURAL PRN
Status: DISCONTINUED | OUTPATIENT
Start: 2024-08-21 | End: 2024-08-21 | Stop reason: SDUPTHER

## 2024-08-21 RX ORDER — CIPROFLOXACIN 2 MG/ML
400 INJECTION, SOLUTION INTRAVENOUS EVERY 12 HOURS
Status: DISCONTINUED | OUTPATIENT
Start: 2024-08-21 | End: 2024-08-22 | Stop reason: HOSPADM

## 2024-08-21 RX ADMIN — Medication 10 ML: at 08:32

## 2024-08-21 RX ADMIN — DICYCLOMINE HYDROCHLORIDE 20 MG: 10 CAPSULE ORAL at 17:30

## 2024-08-21 RX ADMIN — DICYCLOMINE HYDROCHLORIDE 20 MG: 10 CAPSULE ORAL at 21:07

## 2024-08-21 RX ADMIN — SODIUM CHLORIDE, PRESERVATIVE FREE 10 ML: 5 INJECTION INTRAVENOUS at 11:42

## 2024-08-21 RX ADMIN — FOLIC ACID 1 MG: 1 TABLET ORAL at 17:31

## 2024-08-21 RX ADMIN — METRONIDAZOLE 500 MG: 500 INJECTION, SOLUTION INTRAVENOUS at 09:02

## 2024-08-21 RX ADMIN — Medication 2000 UNITS: at 17:31

## 2024-08-21 RX ADMIN — Medication 10 ML: at 21:09

## 2024-08-21 RX ADMIN — PROPOFOL 40 MG: 10 INJECTION, EMULSION INTRAVENOUS at 15:54

## 2024-08-21 RX ADMIN — CIPROFLOXACIN 400 MG: 400 INJECTION, SOLUTION INTRAVENOUS at 22:53

## 2024-08-21 RX ADMIN — METRONIDAZOLE 500 MG: 500 INJECTION, SOLUTION INTRAVENOUS at 17:40

## 2024-08-21 RX ADMIN — CIPROFLOXACIN 400 MG: 400 INJECTION, SOLUTION INTRAVENOUS at 11:45

## 2024-08-21 RX ADMIN — METRONIDAZOLE 500 MG: 500 INJECTION, SOLUTION INTRAVENOUS at 01:21

## 2024-08-21 RX ADMIN — SODIUM CHLORIDE, PRESERVATIVE FREE 10 ML: 5 INJECTION INTRAVENOUS at 10:38

## 2024-08-21 RX ADMIN — PROPOFOL 140 MCG/KG/MIN: 10 INJECTION, EMULSION INTRAVENOUS at 15:52

## 2024-08-21 RX ADMIN — LIDOCAINE HYDROCHLORIDE 100 MG: 20 INJECTION, SOLUTION EPIDURAL; INFILTRATION; INTRACAUDAL; PERINEURAL at 15:53

## 2024-08-21 RX ADMIN — PROPOFOL 20 MG: 10 INJECTION, EMULSION INTRAVENOUS at 15:56

## 2024-08-21 RX ADMIN — ASPIRIN 325 MG: 325 TABLET ORAL at 17:30

## 2024-08-21 RX ADMIN — TAMSULOSIN HYDROCHLORIDE 0.4 MG: 0.4 CAPSULE ORAL at 17:30

## 2024-08-21 RX ADMIN — SODIUM CHLORIDE: 9 INJECTION, SOLUTION INTRAVENOUS at 15:48

## 2024-08-21 RX ADMIN — PANTOPRAZOLE SODIUM 40 MG: 40 INJECTION, POWDER, FOR SOLUTION INTRAVENOUS at 08:30

## 2024-08-21 ASSESSMENT — PAIN SCALES - WONG BAKER
WONGBAKER_NUMERICALRESPONSE: NO HURT

## 2024-08-21 ASSESSMENT — ENCOUNTER SYMPTOMS: SHORTNESS OF BREATH: 0

## 2024-08-21 ASSESSMENT — PAIN SCALES - GENERAL: PAINLEVEL_OUTOF10: 0

## 2024-08-21 ASSESSMENT — PAIN - FUNCTIONAL ASSESSMENT: PAIN_FUNCTIONAL_ASSESSMENT: 0-10

## 2024-08-21 NOTE — BRIEF OP NOTE
Brief Postoperative Note - Full Note in Chart Review/Procedures tab       Patient: Ashutosh Shaffer  YOB: 1955  MRN: [id number]    Date of Procedure: 8/21/2024    Pre-Op Diagnosis Codes:      * Nausea and vomiting, unspecified vomiting type [R11.2]    Post-Op Diagnosis: Same       Procedure(s):  ESOPHAGOGASTRODUODENOSCOPY BIOPSY  ESOPHAGOGASTRODUODENOSCOPY DILATION BALLOON 18-20MM +HEME    Surgeon(s):  Ganesh Mijares MD    Assistant:  * No surgical staff found *    Anesthesia: Monitor Anesthesia Care    Estimated Blood Loss (mL): Minimal    Complications: None    Specimens:   ID Type Source Tests Collected by Time Destination   A : bx duodenum Tissue Duodenum SURGICAL PATHOLOGY Ganesh Mijares MD 8/21/2024 1559        Implants:  * No implants in log *      Drains: * No LDAs found *    Findings:  Infection Present At Time Of Surgery (PATOS) (choose all levels that have infection present):  No infection present  Other Findings:   Schatzki ring - balloon dilated to 20 mm  Cobblestone appearance to duodenal mucosa - biopsied    Rec:  Continue present diet  Pantoprazole 40 mg daily - can change to po when tolerating diet  Await biopsies  Follow up as inpatient.  Antiemetics as needed     Electronically signed by Ganesh Mijares MD on 8/21/2024 at 4:13 PM

## 2024-08-21 NOTE — PLAN OF CARE
Problem: Pain  Goal: Verbalizes/displays adequate comfort level or baseline comfort level  8/20/2024 2203 by Molly Rodriguez RN  Outcome: Progressing     Problem: Safety - Adult  Goal: Free from fall injury  8/20/2024 2203 by Molly Rodriguez RN  Outcome: Progressing     Problem: ABCDS Injury Assessment  Goal: Absence of physical injury  8/20/2024 2203 by Molly Rodriguez RN  Outcome: Progressing    Problem: Nutrition Deficit:  Goal: Optimize nutritional status  8/20/2024 2203 by Molly Rodriguez RN  Outcome: Progressing     Problem: Chronic Conditions and Co-morbidities  Goal: Patient's chronic conditions and co-morbidity symptoms are monitored and maintained or improved  Outcome: Progressing     Problem: Respiratory - Adult  Goal: Achieves optimal ventilation and oxygenation  Outcome: Progressing     Problem: Genitourinary - Adult  Goal: Absence of urinary retention  Outcome: Progressing     Problem: Infection - Adult  Goal: Absence of fever/infection during anticipated neutropenic period  Outcome: Progressing     Problem: Metabolic/Fluid and Electrolytes - Adult  Goal: Electrolytes maintained within normal limits  Outcome: Progressing     Problem: Metabolic/Fluid and Electrolytes - Adult  Goal: Hemodynamic stability and optimal renal function maintained  Outcome: Progressing     Problem: Gastrointestinal - Adult  Goal: Maintains adequate nutritional intake  Outcome: Progressing

## 2024-08-21 NOTE — PLAN OF CARE
Problem: Safety - Adult  Goal: Free from fall injury  8/21/2024 1050 by Brittaney Vega RN  Outcome: Progressing  8/20/2024 2203 by Molly Rodriguez RN  Outcome: Progressing     Problem: ABCDS Injury Assessment  Goal: Absence of physical injury  8/21/2024 1050 by Brittaney Vega RN  Outcome: Progressing  Flowsheets (Taken 8/21/2024 0907)  Absence of Physical Injury: Implement safety measures based on patient assessment  8/20/2024 2203 by Molly Rodriguez RN  Outcome: Progressing     Problem: Nutrition Deficit:  Goal: Optimize nutritional status  8/21/2024 1050 by Brittaney Vega RN  Outcome: Progressing  8/20/2024 2203 by Molly Rodriguez RN  Outcome: Progressing     Problem: Chronic Conditions and Co-morbidities  Goal: Patient's chronic conditions and co-morbidity symptoms are monitored and maintained or improved  8/21/2024 1050 by Brittaney Vega RN  Outcome: Progressing  Flowsheets (Taken 8/21/2024 0842)  Care Plan - Patient's Chronic Conditions and Co-Morbidity Symptoms are Monitored and Maintained or Improved: Monitor and assess patient's chronic conditions and comorbid symptoms for stability, deterioration, or improvement  8/20/2024 2203 by Molly Rodriguez RN  Outcome: Progressing     Problem: Respiratory - Adult  Goal: Achieves optimal ventilation and oxygenation  8/21/2024 1050 by Brittaney Vega RN  Outcome: Progressing  8/20/2024 2203 by Molly Rodriguez RN  Outcome: Progressing     Problem: Genitourinary - Adult  Goal: Absence of urinary retention  8/21/2024 1050 by Brittaney Vega RN  Outcome: Progressing  Flowsheets (Taken 8/21/2024 0842)  Absence of urinary retention: Assess patient’s ability to void and empty bladder  8/20/2024 2203 by Molly Rodriguez RN  Outcome: Progressing     Problem: Infection - Adult  Goal: Absence of fever/infection during anticipated neutropenic period  8/21/2024 1050 by Brittaney Vega RN  Outcome: Progressing  Flowsheets (Taken 8/21/2024 0842)  Absence of fever/infection during anticipated

## 2024-08-21 NOTE — ANESTHESIA PRE PROCEDURE
neoplasm of ascending colon (HCC) 2023    Obesity (BMI 30-39.9) 2020    Post traumatic stress disorder     Rotator cuff (capsule) sprain and strain        Past Surgical History:        Procedure Laterality Date    ANKLE SURGERY Right     around     COLONOSCOPY N/A 2023    COLONOSCOPY WITH BIOPSY performed by Juan Pablo Colon MD at [facility] ENDOSCOPY    COLONOSCOPY  2023    COLONOSCOPY POLYPECTOMY SNARE/COLD BIOPSY performed by Juan Pablo Colon MD at [facility] ENDOSCOPY    CORONARY ARTERY BYPASS GRAFT N/A 2020    MALATHI. TCPB. RT EVH. CABG x 3, LIMA TO DISTAL LAD; SVG TO OM1; SVG TO RPDA. SCOTT CLIP WITH 45mm  ATRICLIP. PLACEMENT OF TEMPORARY VENTRICULAR PACING WIRE. DOPPLER FLOW VERIFICATION OF GRAFTS. BILATERAL INTERCOSTAL NERVE BLOCK WITH EXPAREL & MARCAINE. performed by Spike Viera MD at [facility] CVOR    HERNIA REPAIR Right         JOINT REPLACEMENT      Right shoulder     PORT SURGERY Left 2023    PORT A CATH PLACEMENT performed by Kenneth Collazo MD at [facility] OR       Social History:    Social History     Tobacco Use    Smoking status: Former     Current packs/day: 0.00     Types: Cigarettes     Quit date: 10/26/2023     Years since quittin.8    Smokeless tobacco: Never   Substance Use Topics    Alcohol use: Not Currently     Alcohol/week: 0.8 standard drinks of alcohol     Types: 1 Standard drinks or equivalent per week                                Counseling given: Not Answered      Vital Signs (Current):   Vitals:    24 0440 24 0625 24 0845 24 1208   BP:  117/76 118/64 115/69   Pulse:  65 67 66   Resp:  18 17 16   Temp:  97.6 °F (36.4 °C) 98.2 °F (36.8 °C) 98.6 °F (37 °C)   TempSrc:  Oral Oral Oral   SpO2:  98% 99% 99%   Weight: 83.2 kg (183 lb 6.8 oz)      Height:                                                  BP Readings from Last 3 Encounters:   24 115/69   24 124/85   24 (!) 121/55       NPO Status:

## 2024-08-21 NOTE — CARE COORDINATION
Case Management Assessment  Initial Evaluation    Date/Time of Evaluation: 8/21/2024 2:49 PM  Assessment Completed by: CHRISTIANNE Mcpherson    If patient is discharged prior to next notation, then this note serves as note for discharge by case management.    Patient Name: Ashutosh Shaffer                   YOB: 1955  Diagnosis: Enteritis [K52.9]  Thrombocytopenia (HCC) [D69.6]  Intractable nausea and vomiting [R11.2]  Urinary tract infection without hematuria, site unspecified [N39.0]                   Date / Time: 8/17/2024  5:30 PM    Patient Admission Status: Inpatient   Readmission Risk (Low < 19, Mod (19-27), High > 27): Readmission Risk Score: 17.9    Current PCP: Seun Wild MD  PCP verified by CM? (P) Yes    Chart Reviewed: Yes      History Provided by: (P) Patient  Patient Orientation: (P) Alert and Oriented, Person, Situation, Place, Self    Patient Cognition: (P) Alert    Hospitalization in the last 30 days (Readmission):  No    If yes, Readmission Assessment in  Navigator will be completed.    Advance Directives:      Code Status: Full Code   Patient's Primary Decision Maker is: (P) Legal Next of Kin      Discharge Planning:    Patient lives with: (P) Alone Type of Home: (P) House  Primary Care Giver: (P) Self  Patient Support Systems include: (P) Family Members, Friends/Neighbors   Current Financial resources: (P) Medicare  Current community resources: (P) None  Current services prior to admission: (P) None            Current DME:              Type of Home Care services:  (P) None    ADLS  Prior functional level: (P) Independent in ADLs/IADLs  Current functional level: (P) Independent in ADLs/IADLs    PT AM-PAC:   /24  OT AM-PAC:   /24    Family can provide assistance at DC: (P) No  Would you like Case Management to discuss the discharge plan with any other family members/significant others, and if so, who? (P) No  Plans to Return to Present Housing: (P) Yes  Other Identified

## 2024-08-22 VITALS
HEIGHT: 68 IN | BODY MASS INDEX: 27.99 KG/M2 | HEART RATE: 76 BPM | TEMPERATURE: 97.8 F | SYSTOLIC BLOOD PRESSURE: 110 MMHG | WEIGHT: 184.7 LBS | DIASTOLIC BLOOD PRESSURE: 64 MMHG | OXYGEN SATURATION: 99 % | RESPIRATION RATE: 16 BRPM

## 2024-08-22 LAB
ANION GAP SERPL CALCULATED.3IONS-SCNC: 10 MMOL/L (ref 3–16)
ANISOCYTOSIS BLD QL SMEAR: ABNORMAL
BACTERIA BLD CULT ORG #2: NORMAL
BACTERIA BLD CULT: NORMAL
BASOPHILS # BLD: 0 K/UL (ref 0–0.2)
BASOPHILS NFR BLD: 1 %
BUN SERPL-MCNC: 18 MG/DL (ref 7–20)
CALCIUM SERPL-MCNC: 8.1 MG/DL (ref 8.3–10.6)
CHLORIDE SERPL-SCNC: 108 MMOL/L (ref 99–110)
CO2 SERPL-SCNC: 21 MMOL/L (ref 21–32)
CREAT SERPL-MCNC: 1.2 MG/DL (ref 0.8–1.3)
DEPRECATED RDW RBC AUTO: 18.4 % (ref 12.4–15.4)
EOSINOPHIL # BLD: 0.1 K/UL (ref 0–0.6)
EOSINOPHIL NFR BLD: 6 %
GFR SERPLBLD CREATININE-BSD FMLA CKD-EPI: 66 ML/MIN/{1.73_M2}
GLUCOSE SERPL-MCNC: 117 MG/DL (ref 70–99)
HCT VFR BLD AUTO: 34.9 % (ref 40.5–52.5)
HGB BLD-MCNC: 11.9 G/DL (ref 13.5–17.5)
LYMPHOCYTES # BLD: 0.9 K/UL (ref 1–5.1)
LYMPHOCYTES NFR BLD: 47 %
MACROCYTES BLD QL SMEAR: ABNORMAL
MAGNESIUM SERPL-MCNC: 1.8 MG/DL (ref 1.8–2.4)
MCH RBC QN AUTO: 41.2 PG (ref 26–34)
MCHC RBC AUTO-ENTMCNC: 34 G/DL (ref 31–36)
MCV RBC AUTO: 121 FL (ref 80–100)
MONOCYTES # BLD: 0.1 K/UL (ref 0–1.3)
MONOCYTES NFR BLD: 3 %
NEUTROPHILS # BLD: 0.7 K/UL (ref 1.7–7.7)
NEUTROPHILS NFR BLD: 39 %
NEUTS BAND NFR BLD MANUAL: 1 % (ref 0–7)
NRBC BLD-RTO: 1 /100 WBC
PATH INTERP BLD-IMP: YES
PLATELET # BLD AUTO: 97 K/UL (ref 135–450)
PLATELET BLD QL SMEAR: ABNORMAL
PMV BLD AUTO: 8.3 FL (ref 5–10.5)
POTASSIUM SERPL-SCNC: 3.5 MMOL/L (ref 3.5–5.1)
RBC # BLD AUTO: 2.88 M/UL (ref 4.2–5.9)
SLIDE REVIEW: ABNORMAL
SODIUM SERPL-SCNC: 139 MMOL/L (ref 136–145)
VARIANT LYMPHS NFR BLD MANUAL: 3 % (ref 0–6)
WBC # BLD AUTO: 1.8 K/UL (ref 4–11)

## 2024-08-22 PROCEDURE — 85025 COMPLETE CBC W/AUTO DIFF WBC: CPT

## 2024-08-22 PROCEDURE — 2580000003 HC RX 258: Performed by: INTERNAL MEDICINE

## 2024-08-22 PROCEDURE — 6360000002 HC RX W HCPCS: Performed by: INTERNAL MEDICINE

## 2024-08-22 PROCEDURE — 96366 THER/PROPH/DIAG IV INF ADDON: CPT

## 2024-08-22 PROCEDURE — 6370000000 HC RX 637 (ALT 250 FOR IP): Performed by: INTERNAL MEDICINE

## 2024-08-22 PROCEDURE — 96376 TX/PRO/DX INJ SAME DRUG ADON: CPT

## 2024-08-22 PROCEDURE — 88305 TISSUE EXAM BY PATHOLOGIST: CPT

## 2024-08-22 PROCEDURE — 83735 ASSAY OF MAGNESIUM: CPT

## 2024-08-22 PROCEDURE — 80048 BASIC METABOLIC PNL TOTAL CA: CPT

## 2024-08-22 PROCEDURE — 36415 COLL VENOUS BLD VENIPUNCTURE: CPT

## 2024-08-22 RX ORDER — SODIUM CHLORIDE 0.9 % (FLUSH) 0.9 %
5-40 SYRINGE (ML) INJECTION EVERY 12 HOURS SCHEDULED
Status: DISCONTINUED | OUTPATIENT
Start: 2024-08-22 | End: 2024-08-22 | Stop reason: HOSPADM

## 2024-08-22 RX ORDER — PANTOPRAZOLE SODIUM 40 MG/1
40 TABLET, DELAYED RELEASE ORAL
Qty: 30 TABLET | Refills: 0 | Status: SHIPPED | OUTPATIENT
Start: 2024-08-22 | End: 2024-09-21

## 2024-08-22 RX ORDER — DICYCLOMINE HYDROCHLORIDE 10 MG/1
20 CAPSULE ORAL 3 TIMES DAILY PRN
Qty: 30 CAPSULE | Refills: 3 | Status: SHIPPED | OUTPATIENT
Start: 2024-08-22

## 2024-08-22 RX ORDER — SODIUM CHLORIDE 0.9 % (FLUSH) 0.9 %
5-40 SYRINGE (ML) INJECTION PRN
Status: DISCONTINUED | OUTPATIENT
Start: 2024-08-22 | End: 2024-08-22 | Stop reason: HOSPADM

## 2024-08-22 RX ORDER — SODIUM CHLORIDE 9 MG/ML
25 INJECTION, SOLUTION INTRAVENOUS PRN
Status: DISCONTINUED | OUTPATIENT
Start: 2024-08-22 | End: 2024-08-22 | Stop reason: HOSPADM

## 2024-08-22 RX ORDER — CIPROFLOXACIN 500 MG/1
500 TABLET, FILM COATED ORAL 2 TIMES DAILY
Qty: 9 TABLET | Refills: 0 | Status: SHIPPED | OUTPATIENT
Start: 2024-08-22 | End: 2024-08-27

## 2024-08-22 RX ADMIN — CIPROFLOXACIN 400 MG: 400 INJECTION, SOLUTION INTRAVENOUS at 12:00

## 2024-08-22 RX ADMIN — METOPROLOL SUCCINATE 50 MG: 50 TABLET, EXTENDED RELEASE ORAL at 08:20

## 2024-08-22 RX ADMIN — PANTOPRAZOLE SODIUM 40 MG: 40 INJECTION, POWDER, FOR SOLUTION INTRAVENOUS at 08:17

## 2024-08-22 RX ADMIN — ASPIRIN 325 MG: 325 TABLET ORAL at 08:20

## 2024-08-22 RX ADMIN — TAMSULOSIN HYDROCHLORIDE 0.4 MG: 0.4 CAPSULE ORAL at 08:19

## 2024-08-22 RX ADMIN — Medication 2000 UNITS: at 08:19

## 2024-08-22 RX ADMIN — DICYCLOMINE HYDROCHLORIDE 20 MG: 10 CAPSULE ORAL at 10:47

## 2024-08-22 RX ADMIN — DICYCLOMINE HYDROCHLORIDE 20 MG: 10 CAPSULE ORAL at 06:17

## 2024-08-22 RX ADMIN — METRONIDAZOLE 500 MG: 500 INJECTION, SOLUTION INTRAVENOUS at 08:17

## 2024-08-22 RX ADMIN — METRONIDAZOLE 500 MG: 500 INJECTION, SOLUTION INTRAVENOUS at 01:19

## 2024-08-22 RX ADMIN — Medication 10 ML: at 08:17

## 2024-08-22 ASSESSMENT — PAIN SCALES - WONG BAKER
WONGBAKER_NUMERICALRESPONSE: NO HURT

## 2024-08-22 NOTE — PROGRESS NOTES
[facility] HOSPITALISTS PROGRESS NOTE    8/20/2024 8:22 AM        Name: Ashutosh Shaffer .              Admitted: 8/17/2024  Primary Care Provider: Seun Wild MD (Tel: [phone])    Brief Course: Admitted with enteritis. Started with vomiting, abdominal pain Thursday.      Subjective:    Patient continues to have central abdominal cramping. Occurred after eating solid foot last night. Denies nausea or vomiting.     He ate a banana in the am and 3 bites of a hamburger at lunch Thursday. No Bm since Thursday except for small smear here. He does have chronic intermittent diarrhea. He lives alone, no wife or kids. He has a sister.    Reviewed interval ancillary notes    Current Medications  dicyclomine (BENTYL) capsule 20 mg, 4x Daily AC & HS  ciprofloxacin (CIPRO) IVPB 400 mg, Q12H  metroNIDAZOLE (FLAGYL) 500 mg in 0.9% NaCl 100 mL IVPB premix, Q8H  pantoprazole (PROTONIX) injection 40 mg, Daily  sodium chloride flush 0.9 % injection 5-40 mL, PRN  HYDROmorphone HCl PF (DILAUDID) injection 0.5 mg, Q3H PRN  ondansetron (ZOFRAN-ODT) disintegrating tablet 4 mg, Q4H PRN   Or  ondansetron (ZOFRAN) injection 4 mg, Q4H PRN  aspirin tablet 325 mg, Daily  Vitamin D (CHOLECALCIFEROL) tablet 2,000 Units, Daily  folic acid (FOLVITE) tablet 1 mg, Daily  metoprolol succinate (TOPROL XL) extended release tablet 50 mg, Daily  tamsulosin (FLOMAX) capsule 0.4 mg, Daily  sodium chloride flush 0.9 % injection 5-40 mL, 2 times per day  sodium chloride flush 0.9 % injection 5-40 mL, PRN  0.9 % sodium chloride infusion, PRN  potassium chloride (KLOR-CON M) extended release tablet 40 mEq, PRN   Or  potassium bicarb-citric acid (EFFER-K) effervescent tablet 40 mEq, PRN   Or  potassium chloride 10 mEq/100 mL IVPB (Peripheral Line), PRN  magnesium sulfate 2000 mg in 50 mL IVPB premix, PRN  enoxaparin (LOVENOX) injection 40 mg, Daily  polyethylene glycol (GLYCOLAX) 
                                                                  [facility]    8/18/2024 8:59 AM        Name: Ashutosh Shaffer .              Admitted: 8/17/2024  Primary Care Provider: Seun Wild MD (Tel: [phone])    Brief Course: Admitted with enteritis. Started with vomiting, abdominal pain 3 days ago.      Subjective:    Patient having some central abdominal pain. Currently denies nausea but appetite poor. Has not eating or drinking at home since Thursday-had eaten a banana in the am and 3 bites of a hamburger at lunch. No Bm since Thursday except for small smear here. He does have chronic intermittent diarrhea. He lives alone, no wife or kids. He has a sister.    Reviewed interval ancillary notes    Current Medications  morphine (PF) injection 2 mg, Q4H PRN  ondansetron (ZOFRAN-ODT) disintegrating tablet 4 mg, Q4H PRN   Or  ondansetron (ZOFRAN) injection 4 mg, Q4H PRN  0.9 % sodium chloride infusion, Continuous  cefTRIAXone (ROCEPHIN) 1,000 mg in sterile water 10 mL IV syringe, Q24H  aspirin tablet 325 mg, Daily  Vitamin D (CHOLECALCIFEROL) tablet 2,000 Units, Daily  folic acid (FOLVITE) tablet 1 mg, Daily  metoprolol succinate (TOPROL XL) extended release tablet 50 mg, Daily  tamsulosin (FLOMAX) capsule 0.4 mg, Daily  sodium chloride flush 0.9 % injection 5-40 mL, 2 times per day  sodium chloride flush 0.9 % injection 5-40 mL, PRN  0.9 % sodium chloride infusion, PRN  potassium chloride (KLOR-CON M) extended release tablet 40 mEq, PRN   Or  potassium bicarb-citric acid (EFFER-K) effervescent tablet 40 mEq, PRN   Or  potassium chloride 10 mEq/100 mL IVPB (Peripheral Line), PRN  magnesium sulfate 2000 mg in 50 mL IVPB premix, PRN  enoxaparin (LOVENOX) injection 40 mg, Daily  polyethylene glycol (GLYCOLAX) packet 17 g, Daily PRN  acetaminophen (TYLENOL) tablet 650 mg, Q6H PRN   Or  acetaminophen (TYLENOL) suppository 650 mg, Q6H PRN        Objective:  BP (!) 148/79   
               [facility]        Progress Note    Patient Name: Ashutosh Shaffer  MRN: [id number]  YOB: 1955  Date of Evaluation: 2024    Chief Complaint: Abdominal pain    Subjective:  No acute events overnight  Denies pain  No nausea or vomiting, tolerating regular diet  Resting in bed at this time      Vital Signs:  Patient Vitals for the past 24 hrs:   BP Temp Temp src Pulse Resp SpO2 Weight   24 1208 115/69 98.6 °F (37 °C) Oral 66 16 99 % --   24 0845 118/64 98.2 °F (36.8 °C) Oral 67 17 99 % --   24 0625 117/76 97.6 °F (36.4 °C) Oral 65 18 98 % --   24 0440 -- -- -- -- -- -- 83.2 kg (183 lb 6.8 oz)   24 2325 116/63 97.7 °F (36.5 °C) Oral 59 18 99 % --   24 1943 102/64 97.8 °F (36.6 °C) Oral 60 18 97 % --   24 1821 117/68 98.1 °F (36.7 °C) Oral 55 18 100 % --      TEMPERATURE HISTORY 24H: Temp (24hrs), Av °F (36.7 °C), Min:97.6 °F (36.4 °C), Max:98.6 °F (37 °C)    BLOOD PRESSURE HISTORY: Systolic (36hrs), Av , Min:102 , Max:162    Diastolic (36hrs), Av, Min:63, Max:88      Intake/Output:  No intake/output data recorded.  No intake/output data recorded.  Drain/tube Output:       Physical Exam:  General: awake, alert, oriented to person, place, time  Cardiovascular: regular rate and rhythm and no murmur noted  Lungs: clear to auscultation  Abdomen: soft, nontender, nondistended, bowel sounds normal     Labs:  CBC:    Recent Labs     24  0555 24  0533 24  0850   WBC 3.8* 3.6* 2.3*   HGB 12.9* 11.6* 12.4*   HCT 36.6* 33.1* 36.5*   PLT 69* 73* 91*     BMP:    Recent Labs     24  0555 24  0533 24  0850    139 141   K 4.3 3.9 4.0    108 108   CO2 23 22 23   BUN 25* 22* 18   CREATININE 1.2 1.2 1.3   GLUCOSE 132* 146* 100*     Hepatic:    Recent Labs     24  0556   AST 23   ALT 12   BILITOT 0.7   ALKPHOS 121     Amylase:  No results found for: \"AMYLASE\"  Lipase:  
               [facility]        Progress Note    Patient Name: Ashutosh Shaffer  MRN: [id number]  YOB: 1955  Date of Evaluation: 2024    Chief Complaint: Abdominal pain    Subjective:  No acute events overnight  No significant pain  No nausea or vomiting, tolerating regular diet  Resting in bed at this time      Vital Signs:  Patient Vitals for the past 24 hrs:   BP Temp Temp src Pulse Resp SpO2 Height Weight   24 1248 110/65 97.6 °F (36.4 °C) Oral 54 18 94 % -- --   24 0935 -- -- -- -- -- -- 1.727 m (5' 8\") --   24 0859 (!) 162/88 98 °F (36.7 °C) Oral 57 18 99 % -- --   24 0714 -- -- -- -- 18 98 % -- --   24 0515 -- -- -- -- -- -- -- 83.2 kg (183 lb 8 oz)   24 0400 (!) 146/82 97.5 °F (36.4 °C) Oral 71 18 98 % -- --   24 1955 (!) 153/78 98.4 °F (36.9 °C) Oral 63 18 98 % -- --   24 1557 129/83 98 °F (36.7 °C) Oral 74 16 98 % -- --      TEMPERATURE HISTORY 24H: Temp (24hrs), Av.9 °F (36.6 °C), Min:97.5 °F (36.4 °C), Max:98.4 °F (36.9 °C)    BLOOD PRESSURE HISTORY: Systolic (36hrs), Av , Min:110 , Max:162    Diastolic (36hrs), Av, Min:65, Max:88      Intake/Output:  I/O last 3 completed shifts:  In: 1343.1 [I.V.:1343.1]  Out: -   No intake/output data recorded.  Drain/tube Output:       Physical Exam:  General: awake, alert, oriented to person, place, time  Cardiovascular:  regular rate and rhythm and no murmur noted  Lungs: clear to auscultation  Abdomen: soft, nontender, nondistended, bowel sounds normal     Labs:  CBC:    Recent Labs     24  0605 24  0555 24  0533   WBC 4.1 3.8* 3.6*   HGB 13.3* 12.9* 11.6*   HCT 38.1* 36.6* 33.1*   PLT 63* 69* 73*     BMP:    Recent Labs     24  0624  0555 24  0533    137 139   K 4.2 4.3 3.9    106 108   CO2 22 23 22   BUN 31* 25* 22*   CREATININE 1.2 1.2 1.2   GLUCOSE 127* 132* 146*     Hepatic:    Recent Labs     
            Gastroenterology Progress Note      Ashutosh Shaffer is a 68 y.o. male patient.  1. Urinary tract infection without hematuria, site unspecified    2. Enteritis    3. Thrombocytopenia (HCC)    4. Nausea and vomiting, unspecified vomiting type        SUBJECTIVE:  no further abdominal pain. Tolerating diet.         Physical    VITALS:  /77   Pulse 75   Temp 97.5 °F (36.4 °C) (Oral)   Resp 16   Ht 1.727 m (5' 8\")   Wt 83.8 kg (184 lb 11.2 oz)   SpO2 97%   BMI 28.08 kg/m²   TEMPERATURE:  Current - Temp: 97.5 °F (36.4 °C); Max - Temp  Av.8 °F (36.6 °C)  Min: 97.2 °F (36.2 °C)  Max: 98.6 °F (37 °C)    NAD  RRR, Nl s1s2  Lungs CTA Bilaterally, normal effort  Abdomen soft, ND, NT, no HSM, Bowel sounds normal       Data    Data Review:    Recent Labs     24  0533 24  0850 24  0927   WBC 3.6* 2.3* 1.8*   HGB 11.6* 12.4* 11.9*   HCT 33.1* 36.5* 34.9*   .3* 121.0* 121.0*   PLT 73* 91* 97*     Recent Labs     24  0533 24  0850 24  0927    141 139   K 3.9 4.0 3.5    108 108   CO2 22 23 21   BUN 22* 18 18   CREATININE 1.2 1.3 1.2     No results for input(s): \"AST\", \"ALT\", \"BILIDIR\", \"BILITOT\", \"ALKPHOS\" in the last 72 hours.    Invalid input(s): \"ALB\"  No results for input(s): \"LIPASE\", \"AMYLASE\" in the last 72 hours.  No results for input(s): \"PROTIME\", \"INR\" in the last 72 hours.  Invalid input(s): \"PTT\"         ASSESSMENT / PLAN      Epigastric pain, nausea, vomiting, enteritis -CT with enteritis. US with gallstones only. HIDA negative.   Suspect symptoms due to chemo which was recently started.   EGD 24 with Schatzki ring which was dilated to 20 mm.  Cobblestone appearance to the duodenum which was biopsied.  No further pain, nausea, vomiting.  - PPI  -Follow-up pathology  - can take Bentyl PRN     Metastatic colon cancer      Elevated liver enzymes - likely due to metastatic disease.      Will be available if needed.  Discussed with  
  Physician Progress Note      PATIENT:               MIGNON FRYE  CSN #:                  [id number]  :                       1955  ADMIT DATE:       2024 5:30 PM  DISCH DATE:  RESPONDING  PROVIDER #:        PATRICIA MIJARES PA-C          QUERY TEXT:    Patient admitted with enteritis and being treated with Flagyl and Rocephin.    Per GI consult on  \"Suspect symptoms due to chemo which was recently   started.\" If possible, please document the type of enteritis in the medical   record.    The medical record reflects the following:  Risk Factors: colon cancer on chemotherapy  Clinical Indicators: abd pain, nausea, vomiting; CT shows \"Acute enteritis at   the jejunum left upper quadrant without significant small bowel distension,   suboptimally characterized noncontrast CT.  This may be infectious or post   treatment related.\"  Per GI consult on  \"Suspect symptoms due to chemo   which was recently started.\"  Treatment: CT, Flagyl, Rocephin, IV fluids, NPO, GI consult  Options provided:  -- Treating as bacterial enteritis  -- Treating as chemo induced enteritis  -- Other - I will add my own diagnosis  -- Disagree - Not applicable / Not valid  -- Disagree - Clinically unable to determine / Unknown  -- Refer to Clinical Documentation Reviewer    PROVIDER RESPONSE TEXT:    Treating as chemo induced enteritis    Query created by: Emma Gutierrez on 2024 9:59 AM      Electronically signed by:  PATRICIA MIJARES PA-C 2024 10:22 AM          
Awake , Dr Mijares at bedside talking to patient re: findings and procedure,vu  
Awake, room air, denies pain , abdomen soft , criteria met to transfer to room 5567,5 T R.N. telephone report given   
CLINICAL PHARMACY NOTE: MEDS TO BEDS    Total # of Prescriptions Filled: 1   The following medications were delivered to the patient:  HYDROCODONE - ACETAMINOPHEN 5 - 325 TAB    Additional Documentation: Patient friend picked up=signed  Amelia Swenson Pharmacy Tech  
CLINICAL PHARMACY NOTE: MEDS TO BEDS    Total # of Prescriptions Filled: 3   The following medications were delivered to the patient:  Dicyclomine 10mg  Pantoprazole 40mg  Ciprofloxacin 500mg    Additional Documentation:     [facility] approved medication delivery    Medications were delivered to patient's room and patient signed for    Bernadette Harrington CPhT   
Nutrition Note    RECOMMENDATIONS  PO Diet: Resume diet as appropriate  ONS: Ordered Ensure Plus HP BID when diet is resumed     ASSESSMENT   Pt triggered for positive nutrition screen. Hx of metastatic colorectal cancer, receiving chemotherapy. Upon visiting, pt reported 80 lb wt loss overall (some wt loss was intentional as encouraged by cardiologist) but more recently 6 lb over a few days prior to admission r/t abdominal pain worse with eating and associated N/V; CT scan showed evidence of gallstones. Eating 1/3 of meals lately. Wt hx in EMR indicates pt weighed around 250 lb in 2022, 203 lb on 5/1, admission wt of 182 lb; wt loss is considered significant. Likes ONS, willing to receive to offer additional nutrition when diet is resumed. RD will monitor for adequate po intake.     Malnutrition Status: Severe malnutrition  Chronic Illness  Findings of the 6 clinical characteristics of malnutrition:  Energy Intake:  75% or less estimated energy requirements for 1 month or longer  Weight Loss:  Greater than 7.5% over 3 months (~67 lb (27%) wt loss in 2 years; 21 lb (10.3%) wt loss in 3.5 months)     Body Fat Loss:  No significant body fat loss     Muscle Mass Loss:  Mild muscle mass loss Temples (temporalis)  Fluid Accumulation:  No significant fluid accumulation     Strength:  Not Performed    NUTRITION DIAGNOSIS   Severe malnutrition, In context of chronic illness related to catabolic illness as evidenced by Criteria as identified in malnutrition assessment    Goals: PO intake 50% or greater, by next RD assessment     NUTRITION RELATED FINDINGS  Objective: Labs reviewed. LBM pta. No edema noted.  Wounds: None    CURRENT NUTRITION THERAPIES  Diet NPO     PO Intake: NPO   PO Supplement Intake:NPO    ANTHROPOMETRICS  Current Height: 172.7 cm (5' 8\")  Current Weight - Scale: 83.2 kg (183 lb 8 oz)    Admission weight: 82.6 kg (182 lb 1.6 oz)  Ideal Body Weight (IBW): 154 lbs  (70 kg)        BMI: 27.9    COMPARATIVE 
Patient states that he is having pain uncontrolled by morphine.  MD Alfaro notified.  Orders received.  
Pt to endo.   
RN discharge summary from [facility] to home.     This patient has had a discharge order placed. They are returning home and being picked up in the lobby. Discharge paperwork has been printed, highlighted, and gone over with the patient by this RN. Patient understands teaching and has no further questions at this time. IV has been removed with no complications. Telemetry has been removed. Pt has all belongings present.    
Received from ENDO - Drowsy,room air 99%,abdomen soft non tender, vss.   
Shift assessment completed by Alice MADDOX. Routine vitals obtained. Scheduled medications given. Patient is awake, alert and oriented. Respirations are easy and unlabored. Patient does not appear to be in distress, resting comfortably at this time. Call light within reach. No further needs expressed.   
Shift assessment completed by Alice MADDOX. Routine vitals obtained. Scheduled medications given. Patient is awake, alert and oriented. Respirations are easy and unlabored. Patient does not appear to be in distress, resting comfortably at this time. Call light within reach. No further needs expressed.   
Shift assessment completed. Routine vitals obtained. Scheduled medications given. Call light within reach.   
Shift assessment completed. Routine vitals stable. Scheduled medications given. Patient is awake, alert and oriented. Respirations are easy and unlabored. Patient does not appear to be in distress, resting comfortably at this time. Call light within reach.   
Teaching / education initiated regarding perioperative experience, expectations, and pain management during stay. Patient verbalized understanding.  
6.8 oz)   SpO2 100%   BMI 27.89 kg/m²     Intake/Output Summary (Last 24 hours) at 8/21/2024 1717  Last data filed at 8/21/2024 1607  Gross per 24 hour   Intake 400 ml   Output --   Net 400 ml        Wt Readings from Last 3 Encounters:   08/21/24 83.2 kg (183 lb 6.8 oz)   08/07/24 83.6 kg (184 lb 6.4 oz)   07/07/24 85.1 kg (187 lb 11.2 oz)       General appearance:  Appears comfortable. AAOx3  HEENT: atraumatic, Pupils equal, muscous membranes moist, no masses appreciated  Cardiovascular: Regular rate and rhythm no murmurs appreciated  Respiratory: CTAB no wheezing  Gastrointestinal: Abdomen soft, non-tender, BS+  EXT: no edema  Neurology: no gross focal deficts  Psychiatry: Appropriate affect. Not agitated  Skin: Warm, dry, no rashes appreciated    Labs and Tests:  CBC:   Recent Labs     08/19/24  0555 08/20/24  0533 08/21/24  0850   WBC 3.8* 3.6* 2.3*   HGB 12.9* 11.6* 12.4*   PLT 69* 73* 91*     BMP:    Recent Labs     08/19/24  0555 08/20/24  0533 08/21/24  0850    139 141   K 4.3 3.9 4.0    108 108   CO2 23 22 23   BUN 25* 22* 18   CREATININE 1.2 1.2 1.3   GLUCOSE 132* 146* 100*     Hepatic:   Recent Labs     08/19/24  0556   AST 23   ALT 12   BILITOT 0.7   ALKPHOS 121     NM HEPATOBILIARY   Final Result   No evidence of acute cholecystitis.         US GALLBLADDER RUQ   Final Result   1. Gallstones and sludge within the gallbladder, without evidence of   cholecystitis.   2. Echogenic foci within the liver parenchyma, consistent with intrahepatic   metastatic disease.   3. Unremarkable sonographic appearance of the common bile duct, right kidney,   and visualized pancreas.         CT ABDOMEN PELVIS WO CONTRAST Additional Contrast? None   Final Result   1. Acute enteritis at the jejunum left upper quadrant without significant   small bowel distension, suboptimally characterized noncontrast CT.  This may   be infectious or post treatment related.   2. Punctate, nonobstructing calculus inferior pole 
°F (36.6 °C) (Oral)   Resp 18   Ht 1.727 m (5' 8\")   Wt 82.6 kg (182 lb 3.2 oz)   SpO2 97%   BMI 27.70 kg/m²     Intake/Output Summary (Last 24 hours) at 8/19/2024 0903  Last data filed at 8/19/2024 0610  Gross per 24 hour   Intake 2209.09 ml   Output --   Net 2209.09 ml      Wt Readings from Last 3 Encounters:   08/19/24 82.6 kg (182 lb 3.2 oz)   08/07/24 83.6 kg (184 lb 6.4 oz)   07/07/24 85.1 kg (187 lb 11.2 oz)       General appearance:  Appears comfortable. AAOx3  HEENT: atraumatic, Pupils equal, muscous membranes moist, no masses appreciated  Cardiovascular: Regular rate and rhythm no murmurs appreciated  Respiratory: CTAB no wheezing  Gastrointestinal: Abdomen soft, non-tender, BS+  EXT: no edema  Neurology: no gross focal deficts  Psychiatry: Appropriate affect. Not agitated  Skin: Warm, dry, no rashes appreciated    Labs and Tests:  CBC:   Recent Labs     08/17/24  1811 08/18/24  0605 08/19/24  0555   WBC 5.3 4.1 3.8*   HGB 14.9 13.3* 12.9*   PLT 74* 63* 69*     BMP:    Recent Labs     08/17/24  1811 08/18/24  0605 08/19/24  0555    138 137   K 4.5 4.2 4.3    103 106   CO2 28 22 23   BUN 33* 31* 25*   CREATININE 1.3 1.2 1.2   GLUCOSE 155* 127* 132*     Hepatic:   Recent Labs     08/17/24  1811   AST 26   ALT 15   BILITOT 1.5*   ALKPHOS 154*     US GALLBLADDER RUQ   Final Result   1. Gallstones and sludge within the gallbladder, without evidence of   cholecystitis.   2. Echogenic foci within the liver parenchyma, consistent with intrahepatic   metastatic disease.   3. Unremarkable sonographic appearance of the common bile duct, right kidney,   and visualized pancreas.         CT ABDOMEN PELVIS WO CONTRAST Additional Contrast? None   Final Result   1. Acute enteritis at the jejunum left upper quadrant without significant   small bowel distension, suboptimally characterized noncontrast CT.  This may   be infectious or post treatment related.   2. Punctate, nonobstructing calculus inferior pole

## 2024-08-22 NOTE — PLAN OF CARE
Problem: Pain  Goal: Verbalizes/displays adequate comfort level or baseline comfort level  8/21/2024 2143 by Molly Rodriguez RN  Outcome: Progressing     Problem: Safety - Adult  Goal: Free from fall injury  8/21/2024 2143 by Molly Rodriguez RN  Outcome: Progressing     Problem: ABCDS Injury Assessment  Goal: Absence of physical injury  8/21/2024 2143 by Molly Rodriguez RN  Outcome: Progressing     Problem: Nutrition Deficit:  Goal: Optimize nutritional status  8/21/2024 2143 by Molly Rodriguez RN  Outcome: Progressing     Problem: Chronic Conditions and Co-morbidities  Goal: Patient's chronic conditions and co-morbidity symptoms are monitored and maintained or improved  8/21/2024 2143 by Molly Rodriguez RN  Outcome: Progressing     Problem: Respiratory - Adult  Goal: Achieves optimal ventilation and oxygenation  8/21/2024 2143 by Molly Rodriguez RN  Outcome: Progressing     Problem: Genitourinary - Adult  Goal: Absence of urinary retention  8/21/2024 2143 by Molly Rodriguez RN  Outcome: Progressing     Problem: Infection - Adult  Goal: Absence of fever/infection during anticipated neutropenic period  8/21/2024 2143 by Molly Rodriguez RN  Outcome: Progressing     Problem: Metabolic/Fluid and Electrolytes - Adult  Goal: Electrolytes maintained within normal limits  8/21/2024 2143 by Molly Rodriguez RN  Outcome: Progressing     Problem: Metabolic/Fluid and Electrolytes - Adult  Goal: Hemodynamic stability and optimal renal function maintained  8/21/2024 2143 by Molly Rodriguez RN  Outcome: Progressing     Problem: Gastrointestinal - Adult  Goal: Maintains adequate nutritional intake  8/21/2024 2143 by Molly Rodriguez RN  Outcome: Progressing     Problem: Cardiovascular - Adult  Goal: Maintains optimal cardiac output and hemodynamic stability  Outcome: Progressing     Problem: Cardiovascular - Adult  Goal: Absence of cardiac dysrhythmias or at baseline  Outcome: Progressing

## 2024-08-22 NOTE — PLAN OF CARE
Problem: Pain  Goal: Verbalizes/displays adequate comfort level or baseline comfort level  8/22/2024 1229 by Cierra Gonzales RN  Outcome: Completed  8/22/2024 0824 by Cierra Gonzales RN  Outcome: Progressing     Problem: Safety - Adult  Goal: Free from fall injury  8/22/2024 1229 by Cierra Gonzales RN  Outcome: Completed  8/22/2024 0824 by Cierra Gonzales RN  Outcome: Progressing     Problem: ABCDS Injury Assessment  Goal: Absence of physical injury  8/22/2024 1229 by Cierra Gonzales RN  Outcome: Completed  8/22/2024 0824 by Cierra Gonzales RN  Outcome: Progressing     Problem: Nutrition Deficit:  Goal: Optimize nutritional status  8/22/2024 1229 by Cierra Gonzales RN  Outcome: Completed  8/22/2024 0824 by Cierra Gonzales RN  Outcome: Progressing     Problem: Chronic Conditions and Co-morbidities  Goal: Patient's chronic conditions and co-morbidity symptoms are monitored and maintained or improved  8/22/2024 1229 by Cierra Gonzales RN  Outcome: Completed  8/22/2024 0824 by Cierra Gonzales RN  Outcome: Progressing     Problem: Respiratory - Adult  Goal: Achieves optimal ventilation and oxygenation  8/22/2024 1229 by Cierra Gonzales RN  Outcome: Completed  8/22/2024 0824 by Cierra Gonzales RN  Outcome: Progressing     Problem: Genitourinary - Adult  Goal: Absence of urinary retention  8/22/2024 1229 by Cierra Gonzales RN  Outcome: Completed  8/22/2024 0824 by Cierra Gonzales RN  Outcome: Progressing     Problem: Infection - Adult  Goal: Absence of fever/infection during anticipated neutropenic period  8/22/2024 1229 by Cierra Gonzales RN  Outcome: Completed  8/22/2024 0824 by Cierra Gonzales RN  Outcome: Progressing     Problem: Metabolic/Fluid and Electrolytes - Adult  Goal: Electrolytes maintained within normal limits  8/22/2024 1229 by Cierra Gonzales RN  Outcome: Completed  8/22/2024 0824 by Cierra Gonzales RN  Outcome: Progressing  Goal: Hemodynamic stability and optimal renal function maintained  8/22/2024

## 2024-08-22 NOTE — PLAN OF CARE
Problem: Pain  Goal: Verbalizes/displays adequate comfort level or baseline comfort level  8/22/2024 0824 by Cierra Gonzales RN  Outcome: Progressing  8/21/2024 2143 by Molly Rodriguez RN  Outcome: Progressing     Problem: Safety - Adult  Goal: Free from fall injury  8/22/2024 0824 by Cierra Gonzales RN  Outcome: Progressing  8/21/2024 2143 by Molly Rodriguez RN  Outcome: Progressing     Problem: ABCDS Injury Assessment  Goal: Absence of physical injury  8/22/2024 0824 by Cierra Gonzales RN  Outcome: Progressing  8/21/2024 2143 by Molly Rodriguez RN  Outcome: Progressing     Problem: Nutrition Deficit:  Goal: Optimize nutritional status  8/22/2024 0824 by Cierra Gonzales RN  Outcome: Progressing  8/21/2024 2143 by Molly Rodriguez RN  Outcome: Progressing     Problem: Chronic Conditions and Co-morbidities  Goal: Patient's chronic conditions and co-morbidity symptoms are monitored and maintained or improved  8/22/2024 0824 by Cierra Gonzales RN  Outcome: Progressing  8/21/2024 2143 by Molly Rodriguez RN  Outcome: Progressing     Problem: Respiratory - Adult  Goal: Achieves optimal ventilation and oxygenation  8/22/2024 0824 by Cierra Gonzales RN  Outcome: Progressing  8/21/2024 2143 by Molly Rodriguez RN  Outcome: Progressing     Problem: Genitourinary - Adult  Goal: Absence of urinary retention  8/22/2024 0824 by Cierra Gonzales RN  Outcome: Progressing  8/21/2024 2143 by Molly Rodriguez RN  Outcome: Progressing     Problem: Infection - Adult  Goal: Absence of fever/infection during anticipated neutropenic period  8/22/2024 0824 by Cierra Gonzales RN  Outcome: Progressing  8/21/2024 2143 by Molly Rodriguez RN  Outcome: Progressing     Problem: Metabolic/Fluid and Electrolytes - Adult  Goal: Electrolytes maintained within normal limits  8/22/2024 0824 by Cierra Gonzales RN  Outcome: Progressing  8/21/2024 2143 by Molly Rodriguez RN  Outcome: Progressing  Goal: Hemodynamic stability and optimal renal function

## 2024-08-22 NOTE — DISCHARGE SUMMARY
Hospital Medicine Discharge Summary    Patient: Ashutosh Shaffer     Gender: male  : 1955   Age: 68 y.o.  MRN: [id number]    Admitting Physician: Tammy Chavarria DO  Discharge Physician: Rubi Golden PA-C     Code Status: Full Code     Admit Date: 2024   Discharge Date:   24    Disposition:  Home  Time spent arranging discharge: 34 minutes    Discharge Diagnoses:    Active Hospital Problems    Diagnosis Date Noted    Coronary artery disease due to lipid rich plaque [I25.10, I25.83] 2020     Priority: High    Adjustment disorder with anxiety [F43.22] 2022     Priority: Medium    Chronic pain syndrome [G89.4] 2022     Priority: Medium    Severe malnutrition (HCC) [E43] 2024    Periumbilical abdominal pain [R10.33] 2024    Intractable nausea and vomiting [R11.2] 2024    Hx SBO [Z87.19] 2024    UTI (urinary tract infection) [N39.0] 2024    BPH (benign prostatic hyperplasia) [N40.0] 2024    Malignant neoplasm of ascending colon (HCC) [C18.2] 2023    JINA (obstructive sleep apnea) [G47.33] 2021    Chronic systolic heart failure (HCC) [I50.22] 2021    Mixed hyperlipidemia [E78.2] 2021    History of coronary artery bypass graft x 3 [Z95.1] 2021    DM2 (diabetes mellitus, type 2) (HCC) [E11.9] 2020    CAD in native artery [I25.10] 2020    Anxiety [F41.9]     Essential hypertension [I10] 2013       Recommendations: Follow-up with Dr. Toussaint  as scheduled, PCP in 1 week    Condition at Discharge:  Stable    Hospital Course:   Patient is a pleasant 68-year-old male with metastatic colon cancer-liver mets on chemotherapy, who presented to the hospital with periumbilical abdominal pain and nausea.  In the emergency room CT scan revealed jejunal enteritis.  White blood cell count was 3.5, platelets were 74,000.    Enteritis-patient was admitted and started on IV antibiotics as well as pain

## 2024-08-23 LAB — PATH INTERP BLD-IMP: NORMAL

## 2024-09-13 ENCOUNTER — HOSPITAL ENCOUNTER (OUTPATIENT)
Age: 69
Discharge: HOME | End: 2024-09-13
Payer: MEDICARE

## 2024-09-13 LAB
ALT SERPL-CCNC: 7 U/L (ref 10–40)
AST SERPL-CCNC: 25 U/L (ref 15–37)
BASOPHILS # BLD: 0 K/UL (ref 0–0.2)
BASOPHILS NFR BLD: 0 %
CRP SERPL-MCNC: 4 MG/L (ref 0–5.1)
DEPRECATED RDW RBC AUTO: 15.5 % (ref 12.4–15.4)
EOSINOPHIL # BLD: 0.3 K/UL (ref 0–0.6)
EOSINOPHIL NFR BLD: 4 %
HCT VFR BLD AUTO: 38.7 % (ref 40.5–52.5)
HGB BLD-MCNC: 13.1 G/DL (ref 13.5–17.5)
LYMPHOCYTES # BLD: 1.2 K/UL (ref 1–5.1)
LYMPHOCYTES NFR BLD: 17 %
MACROCYTES BLD QL SMEAR: ABNORMAL
MCH RBC QN AUTO: 39.9 PG (ref 26–34)
MCHC RBC AUTO-ENTMCNC: 33.9 G/DL (ref 31–36)
MCV RBC AUTO: 117.7 FL (ref 80–100)
MONOCYTES # BLD: 0.4 K/UL (ref 0–1.3)
MONOCYTES NFR BLD: 6 %
NEUTROPHILS # BLD: 5.2 K/UL (ref 1.7–7.7)
NEUTROPHILS NFR BLD: 62 %
NEUTS BAND NFR BLD MANUAL: 11 % (ref 0–7)
PATH INTERP BLD-IMP: NO
PLATELET # BLD AUTO: 90 K/UL (ref 135–450)
PMV BLD AUTO: 10 FL (ref 5–10.5)
RBC # BLD AUTO: 3.29 M/UL (ref 4.2–5.9)
WBC # BLD AUTO: 7.1 K/UL (ref 4–11)

## 2024-09-13 PROCEDURE — 84450 TRANSFERASE (AST) (SGOT): CPT

## 2024-09-13 PROCEDURE — 84460 ALANINE AMINO (ALT) (SGPT): CPT

## 2024-09-13 PROCEDURE — 85025 COMPLETE CBC W/AUTO DIFF WBC: CPT

## 2024-09-13 PROCEDURE — 36415 COLL VENOUS BLD VENIPUNCTURE: CPT

## 2024-09-13 PROCEDURE — 86140 C-REACTIVE PROTEIN: CPT

## 2024-09-16 PROBLEM — N39.0 UTI (URINARY TRACT INFECTION): Status: RESOLVED | Noted: 2024-08-17 | Resolved: 2024-09-16

## 2024-10-02 ENCOUNTER — APPOINTMENT (OUTPATIENT)
Dept: CT IMAGING | Age: 69
DRG: 394 | End: 2024-10-02
Payer: MEDICARE

## 2024-10-02 ENCOUNTER — OFFICE VISIT (OUTPATIENT)
Dept: PAIN MANAGEMENT | Age: 69
End: 2024-10-02

## 2024-10-02 ENCOUNTER — HOSPITAL ENCOUNTER (INPATIENT)
Age: 69
LOS: 2 days | Discharge: HOME | DRG: 394 | End: 2024-10-04
Attending: EMERGENCY MEDICINE | Admitting: HOSPITALIST
Payer: MEDICARE

## 2024-10-02 ENCOUNTER — APPOINTMENT (OUTPATIENT)
Dept: GENERAL RADIOLOGY | Age: 69
DRG: 394 | End: 2024-10-02
Payer: MEDICARE

## 2024-10-02 VITALS
DIASTOLIC BLOOD PRESSURE: 84 MMHG | HEART RATE: 62 BPM | BODY MASS INDEX: 27.37 KG/M2 | SYSTOLIC BLOOD PRESSURE: 154 MMHG | OXYGEN SATURATION: 99 % | WEIGHT: 180 LBS

## 2024-10-02 DIAGNOSIS — F43.22 ADJUSTMENT DISORDER WITH ANXIETY: ICD-10-CM

## 2024-10-02 DIAGNOSIS — T45.1X5A IMMUNODEFICIENCY DUE TO CHEMOTHERAPY (HCC): ICD-10-CM

## 2024-10-02 DIAGNOSIS — Z51.81 ENCOUNTER FOR THERAPEUTIC DRUG MONITORING: ICD-10-CM

## 2024-10-02 DIAGNOSIS — C18.2 MALIGNANT NEOPLASM OF ASCENDING COLON (HCC): Primary | ICD-10-CM

## 2024-10-02 DIAGNOSIS — M47.812 CERVICAL SPONDYLOSIS: ICD-10-CM

## 2024-10-02 DIAGNOSIS — Z79.899 IMMUNODEFICIENCY DUE TO CHEMOTHERAPY (HCC): ICD-10-CM

## 2024-10-02 DIAGNOSIS — K52.9 ENTERITIS: Primary | ICD-10-CM

## 2024-10-02 DIAGNOSIS — M50.20 DISPLACEMENT OF CERVICAL INTERVERTEBRAL DISC WITHOUT MYELOPATHY: ICD-10-CM

## 2024-10-02 DIAGNOSIS — D84.821 IMMUNODEFICIENCY DUE TO CHEMOTHERAPY (HCC): ICD-10-CM

## 2024-10-02 DIAGNOSIS — G89.4 CHRONIC PAIN SYNDROME: ICD-10-CM

## 2024-10-02 DIAGNOSIS — E86.0 DEHYDRATION: ICD-10-CM

## 2024-10-02 DIAGNOSIS — K56.600 PARTIAL OBSTRUCTION OF COLON (HCC): ICD-10-CM

## 2024-10-02 PROBLEM — R10.9 ABDOMINAL PAIN: Status: ACTIVE | Noted: 2024-10-02

## 2024-10-02 LAB
ALBUMIN SERPL-MCNC: 3.9 G/DL (ref 3.4–5)
ALBUMIN/GLOB SERPL: 1.3 {RATIO} (ref 1.1–2.2)
ALP SERPL-CCNC: 245 U/L (ref 40–129)
ALT SERPL-CCNC: 9 U/L (ref 10–40)
ANION GAP SERPL CALCULATED.3IONS-SCNC: 12 MMOL/L (ref 3–16)
AST SERPL-CCNC: 22 U/L (ref 15–37)
BACTERIA URNS QL MICRO: NORMAL /HPF
BASOPHILS # BLD: 0 K/UL (ref 0–0.2)
BASOPHILS NFR BLD: 0 %
BILIRUB SERPL-MCNC: 0.5 MG/DL (ref 0–1)
BILIRUB UR QL STRIP.AUTO: NEGATIVE
BUN SERPL-MCNC: 29 MG/DL (ref 7–20)
CALCIUM SERPL-MCNC: 9.6 MG/DL (ref 8.3–10.6)
CHLORIDE SERPL-SCNC: 98 MMOL/L (ref 99–110)
CLARITY UR: CLEAR
CO2 SERPL-SCNC: 29 MMOL/L (ref 21–32)
COLOR UR: YELLOW
CREAT SERPL-MCNC: 1.2 MG/DL (ref 0.8–1.3)
DEPRECATED RDW RBC AUTO: 15.2 % (ref 12.4–15.4)
EOSINOPHIL # BLD: 0 K/UL (ref 0–0.6)
EOSINOPHIL NFR BLD: 0 %
EPI CELLS #/AREA URNS AUTO: 0 /HPF (ref 0–5)
GFR SERPLBLD CREATININE-BSD FMLA CKD-EPI: 66 ML/MIN/{1.73_M2}
GLUCOSE BLD-MCNC: 113 MG/DL (ref 70–99)
GLUCOSE SERPL-MCNC: 163 MG/DL (ref 70–99)
GLUCOSE UR STRIP.AUTO-MCNC: NEGATIVE MG/DL
HCT VFR BLD AUTO: 43 % (ref 40.5–52.5)
HGB BLD-MCNC: 14.7 G/DL (ref 13.5–17.5)
HGB UR QL STRIP.AUTO: NEGATIVE
HOWELL-JOLLY BOD BLD QL SMEAR: ABNORMAL
HYALINE CASTS #/AREA URNS AUTO: 0 /LPF (ref 0–8)
INR PPP: 1.05 (ref 0.85–1.15)
KETONES UR STRIP.AUTO-MCNC: NEGATIVE MG/DL
LACTATE BLDV-SCNC: 1.2 MMOL/L (ref 0.4–1.9)
LACTATE BLDV-SCNC: 2 MMOL/L (ref 0.4–1.9)
LEUKOCYTE ESTERASE UR QL STRIP.AUTO: ABNORMAL
LIPASE SERPL-CCNC: 19 U/L (ref 13–60)
LYMPHOCYTES # BLD: 0.7 K/UL (ref 1–5.1)
LYMPHOCYTES NFR BLD: 4 %
MACROCYTES BLD QL SMEAR: ABNORMAL
MCH RBC QN AUTO: 38 PG (ref 26–34)
MCHC RBC AUTO-ENTMCNC: 34.2 G/DL (ref 31–36)
MCV RBC AUTO: 111.1 FL (ref 80–100)
MONOCYTES # BLD: 0.4 K/UL (ref 0–1.3)
MONOCYTES NFR BLD: 3 %
NEUTROPHILS # BLD: 13 K/UL (ref 1.7–7.7)
NEUTROPHILS NFR BLD: 92 %
NITRITE UR QL STRIP.AUTO: NEGATIVE
PERFORMED ON: ABNORMAL
PH UR STRIP.AUTO: 8 [PH] (ref 5–8)
PLATELET # BLD AUTO: 137 K/UL (ref 135–450)
PMV BLD AUTO: 8.6 FL (ref 5–10.5)
POTASSIUM SERPL-SCNC: 4.4 MMOL/L (ref 3.5–5.1)
PROCALCITONIN SERPL IA-MCNC: 0.29 NG/ML (ref 0–0.15)
PROT SERPL-MCNC: 7 G/DL (ref 6.4–8.2)
PROT UR STRIP.AUTO-MCNC: 100 MG/DL
PROTHROMBIN TIME: 14 SEC (ref 11.9–14.9)
RBC # BLD AUTO: 3.87 M/UL (ref 4.2–5.9)
RBC CLUMPS #/AREA URNS AUTO: 0 /HPF (ref 0–4)
SODIUM SERPL-SCNC: 139 MMOL/L (ref 136–145)
SP GR UR STRIP.AUTO: 1.02 (ref 1–1.03)
UA COMPLETE W REFLEX CULTURE PNL UR: ABNORMAL
UA DIPSTICK W REFLEX MICRO PNL UR: YES
URN SPEC COLLECT METH UR: ABNORMAL
UROBILINOGEN UR STRIP-ACNC: 1 E.U./DL
VARIANT LYMPHS NFR BLD MANUAL: 1 % (ref 0–6)
WBC # BLD AUTO: 14.1 K/UL (ref 4–11)
WBC #/AREA URNS AUTO: 1 /HPF (ref 0–5)

## 2024-10-02 PROCEDURE — G0378 HOSPITAL OBSERVATION PER HR: HCPCS

## 2024-10-02 PROCEDURE — 2580000003 HC RX 258: Performed by: HOSPITALIST

## 2024-10-02 PROCEDURE — 87040 BLOOD CULTURE FOR BACTERIA: CPT

## 2024-10-02 PROCEDURE — 6370000000 HC RX 637 (ALT 250 FOR IP): Performed by: HOSPITALIST

## 2024-10-02 PROCEDURE — 96375 TX/PRO/DX INJ NEW DRUG ADDON: CPT

## 2024-10-02 PROCEDURE — 85610 PROTHROMBIN TIME: CPT

## 2024-10-02 PROCEDURE — 96365 THER/PROPH/DIAG IV INF INIT: CPT

## 2024-10-02 PROCEDURE — 96376 TX/PRO/DX INJ SAME DRUG ADON: CPT

## 2024-10-02 PROCEDURE — 85025 COMPLETE CBC W/AUTO DIFF WBC: CPT

## 2024-10-02 PROCEDURE — 81001 URINALYSIS AUTO W/SCOPE: CPT

## 2024-10-02 PROCEDURE — 96366 THER/PROPH/DIAG IV INF ADDON: CPT

## 2024-10-02 PROCEDURE — 96374 THER/PROPH/DIAG INJ IV PUSH: CPT

## 2024-10-02 PROCEDURE — 36415 COLL VENOUS BLD VENIPUNCTURE: CPT

## 2024-10-02 PROCEDURE — 84145 PROCALCITONIN (PCT): CPT

## 2024-10-02 PROCEDURE — 83690 ASSAY OF LIPASE: CPT

## 2024-10-02 PROCEDURE — 1200000000 HC SEMI PRIVATE

## 2024-10-02 PROCEDURE — 6360000002 HC RX W HCPCS: Performed by: HOSPITALIST

## 2024-10-02 PROCEDURE — 71045 X-RAY EXAM CHEST 1 VIEW: CPT

## 2024-10-02 PROCEDURE — 6360000002 HC RX W HCPCS: Performed by: EMERGENCY MEDICINE

## 2024-10-02 PROCEDURE — 96367 TX/PROPH/DG ADDL SEQ IV INF: CPT

## 2024-10-02 PROCEDURE — 99285 EMERGENCY DEPT VISIT HI MDM: CPT

## 2024-10-02 PROCEDURE — 96361 HYDRATE IV INFUSION ADD-ON: CPT

## 2024-10-02 PROCEDURE — 2580000003 HC RX 258: Performed by: EMERGENCY MEDICINE

## 2024-10-02 PROCEDURE — 83605 ASSAY OF LACTIC ACID: CPT

## 2024-10-02 PROCEDURE — 80053 COMPREHEN METABOLIC PANEL: CPT

## 2024-10-02 PROCEDURE — 74176 CT ABD & PELVIS W/O CONTRAST: CPT

## 2024-10-02 RX ORDER — ONDANSETRON 2 MG/ML
4 INJECTION INTRAMUSCULAR; INTRAVENOUS EVERY 6 HOURS PRN
Status: DISCONTINUED | OUTPATIENT
Start: 2024-10-02 | End: 2024-10-04 | Stop reason: HOSPADM

## 2024-10-02 RX ORDER — ONDANSETRON 4 MG/1
4 TABLET, ORALLY DISINTEGRATING ORAL EVERY 8 HOURS PRN
Status: DISCONTINUED | OUTPATIENT
Start: 2024-10-02 | End: 2024-10-04 | Stop reason: HOSPADM

## 2024-10-02 RX ORDER — LEVOFLOXACIN 5 MG/ML
500 INJECTION, SOLUTION INTRAVENOUS EVERY 24 HOURS
Status: DISCONTINUED | OUTPATIENT
Start: 2024-10-03 | End: 2024-10-04 | Stop reason: HOSPADM

## 2024-10-02 RX ORDER — DICYCLOMINE HYDROCHLORIDE 10 MG/1
20 CAPSULE ORAL 3 TIMES DAILY PRN
Status: DISCONTINUED | OUTPATIENT
Start: 2024-10-02 | End: 2024-10-04 | Stop reason: HOSPADM

## 2024-10-02 RX ORDER — CIPROFLOXACIN 2 MG/ML
400 INJECTION, SOLUTION INTRAVENOUS EVERY 12 HOURS
Status: DISCONTINUED | OUTPATIENT
Start: 2024-10-02 | End: 2024-10-02 | Stop reason: CLARIF

## 2024-10-02 RX ORDER — SODIUM CHLORIDE 0.9 % (FLUSH) 0.9 %
5-40 SYRINGE (ML) INJECTION EVERY 12 HOURS SCHEDULED
Status: DISCONTINUED | OUTPATIENT
Start: 2024-10-02 | End: 2024-10-04 | Stop reason: HOSPADM

## 2024-10-02 RX ORDER — CIPROFLOXACIN 2 MG/ML
400 INJECTION, SOLUTION INTRAVENOUS ONCE
Status: COMPLETED | OUTPATIENT
Start: 2024-10-02 | End: 2024-10-02

## 2024-10-02 RX ORDER — CAPECITABINE 500 MG/1
1500 TABLET, FILM COATED ORAL DAILY
Status: DISCONTINUED | OUTPATIENT
Start: 2024-10-02 | End: 2024-10-02 | Stop reason: RX

## 2024-10-02 RX ORDER — DICLOFENAC SODIUM 100 MG/1
TABLET, FILM COATED, EXTENDED RELEASE ORAL
COMMUNITY

## 2024-10-02 RX ORDER — PROCHLORPERAZINE EDISYLATE 5 MG/ML
10 INJECTION INTRAMUSCULAR; INTRAVENOUS EVERY 6 HOURS PRN
Status: DISCONTINUED | OUTPATIENT
Start: 2024-10-02 | End: 2024-10-04 | Stop reason: HOSPADM

## 2024-10-02 RX ORDER — POLYETHYLENE GLYCOL 3350 17 G/17G
17 POWDER, FOR SOLUTION ORAL DAILY PRN
Status: DISCONTINUED | OUTPATIENT
Start: 2024-10-02 | End: 2024-10-04 | Stop reason: HOSPADM

## 2024-10-02 RX ORDER — ONDANSETRON 2 MG/ML
4 INJECTION INTRAMUSCULAR; INTRAVENOUS
Status: DISCONTINUED | OUTPATIENT
Start: 2024-10-02 | End: 2024-10-02 | Stop reason: SDUPTHER

## 2024-10-02 RX ORDER — MAGNESIUM SULFATE IN WATER 40 MG/ML
2000 INJECTION, SOLUTION INTRAVENOUS PRN
Status: DISCONTINUED | OUTPATIENT
Start: 2024-10-02 | End: 2024-10-04 | Stop reason: HOSPADM

## 2024-10-02 RX ORDER — TAMSULOSIN HYDROCHLORIDE 0.4 MG/1
0.4 CAPSULE ORAL NIGHTLY
Status: DISCONTINUED | OUTPATIENT
Start: 2024-10-02 | End: 2024-10-04 | Stop reason: HOSPADM

## 2024-10-02 RX ORDER — SODIUM CHLORIDE 0.9 % (FLUSH) 0.9 %
5-40 SYRINGE (ML) INJECTION PRN
Status: DISCONTINUED | OUTPATIENT
Start: 2024-10-02 | End: 2024-10-04 | Stop reason: HOSPADM

## 2024-10-02 RX ORDER — POTASSIUM CHLORIDE 7.45 MG/ML
10 INJECTION INTRAVENOUS PRN
Status: DISCONTINUED | OUTPATIENT
Start: 2024-10-02 | End: 2024-10-04 | Stop reason: HOSPADM

## 2024-10-02 RX ORDER — ENOXAPARIN SODIUM 100 MG/ML
40 INJECTION SUBCUTANEOUS DAILY
Status: DISCONTINUED | OUTPATIENT
Start: 2024-10-03 | End: 2024-10-04 | Stop reason: HOSPADM

## 2024-10-02 RX ORDER — ACETAMINOPHEN 650 MG/1
650 SUPPOSITORY RECTAL EVERY 6 HOURS PRN
Status: DISCONTINUED | OUTPATIENT
Start: 2024-10-02 | End: 2024-10-04 | Stop reason: HOSPADM

## 2024-10-02 RX ORDER — METRONIDAZOLE 500 MG/100ML
500 INJECTION, SOLUTION INTRAVENOUS ONCE
Status: DISCONTINUED | OUTPATIENT
Start: 2024-10-02 | End: 2024-10-02 | Stop reason: SDUPTHER

## 2024-10-02 RX ORDER — PANTOPRAZOLE SODIUM 40 MG/10ML
40 INJECTION, POWDER, LYOPHILIZED, FOR SOLUTION INTRAVENOUS DAILY
Status: DISCONTINUED | OUTPATIENT
Start: 2024-10-02 | End: 2024-10-04 | Stop reason: HOSPADM

## 2024-10-02 RX ORDER — SODIUM CHLORIDE 9 MG/ML
INJECTION, SOLUTION INTRAVENOUS PRN
Status: DISCONTINUED | OUTPATIENT
Start: 2024-10-02 | End: 2024-10-04 | Stop reason: HOSPADM

## 2024-10-02 RX ORDER — ASPIRIN 325 MG
325 TABLET ORAL DAILY
Status: DISCONTINUED | OUTPATIENT
Start: 2024-10-03 | End: 2024-10-04 | Stop reason: HOSPADM

## 2024-10-02 RX ORDER — METRONIDAZOLE 500 MG/100ML
500 INJECTION, SOLUTION INTRAVENOUS EVERY 8 HOURS
Status: DISCONTINUED | OUTPATIENT
Start: 2024-10-02 | End: 2024-10-04 | Stop reason: HOSPADM

## 2024-10-02 RX ORDER — ACETAMINOPHEN 325 MG/1
650 TABLET ORAL EVERY 6 HOURS PRN
Status: DISCONTINUED | OUTPATIENT
Start: 2024-10-02 | End: 2024-10-04 | Stop reason: HOSPADM

## 2024-10-02 RX ORDER — PANTOPRAZOLE SODIUM 40 MG/10ML
40 INJECTION, POWDER, LYOPHILIZED, FOR SOLUTION INTRAVENOUS ONCE
Status: COMPLETED | OUTPATIENT
Start: 2024-10-02 | End: 2024-10-02

## 2024-10-02 RX ORDER — POTASSIUM CHLORIDE 1500 MG/1
40 TABLET, EXTENDED RELEASE ORAL PRN
Status: DISCONTINUED | OUTPATIENT
Start: 2024-10-02 | End: 2024-10-04 | Stop reason: HOSPADM

## 2024-10-02 RX ORDER — SODIUM CHLORIDE 9 MG/ML
INJECTION, SOLUTION INTRAVENOUS CONTINUOUS
Status: DISCONTINUED | OUTPATIENT
Start: 2024-10-02 | End: 2024-10-04 | Stop reason: HOSPADM

## 2024-10-02 RX ORDER — FOLIC ACID 1 MG/1
1000 TABLET ORAL DAILY
Status: DISCONTINUED | OUTPATIENT
Start: 2024-10-03 | End: 2024-10-04 | Stop reason: HOSPADM

## 2024-10-02 RX ORDER — 0.9 % SODIUM CHLORIDE 0.9 %
30 INTRAVENOUS SOLUTION INTRAVENOUS ONCE
Status: COMPLETED | OUTPATIENT
Start: 2024-10-02 | End: 2024-10-02

## 2024-10-02 RX ORDER — MORPHINE SULFATE 4 MG/ML
4 INJECTION, SOLUTION INTRAMUSCULAR; INTRAVENOUS
Status: COMPLETED | OUTPATIENT
Start: 2024-10-02 | End: 2024-10-02

## 2024-10-02 RX ADMIN — ONDANSETRON 4 MG: 2 INJECTION, SOLUTION INTRAMUSCULAR; INTRAVENOUS at 13:10

## 2024-10-02 RX ADMIN — PANTOPRAZOLE SODIUM 40 MG: 40 INJECTION, POWDER, FOR SOLUTION INTRAVENOUS at 13:10

## 2024-10-02 RX ADMIN — MORPHINE SULFATE 4 MG: 4 INJECTION, SOLUTION INTRAMUSCULAR; INTRAVENOUS at 15:01

## 2024-10-02 RX ADMIN — PANTOPRAZOLE SODIUM 40 MG: 40 INJECTION, POWDER, FOR SOLUTION INTRAVENOUS at 18:01

## 2024-10-02 RX ADMIN — SODIUM CHLORIDE: 9 INJECTION, SOLUTION INTRAVENOUS at 18:01

## 2024-10-02 RX ADMIN — SODIUM CHLORIDE 2448 ML: 9 INJECTION, SOLUTION INTRAVENOUS at 13:09

## 2024-10-02 RX ADMIN — TAMSULOSIN HYDROCHLORIDE 0.4 MG: 0.4 CAPSULE ORAL at 22:17

## 2024-10-02 RX ADMIN — MORPHINE SULFATE 4 MG: 4 INJECTION, SOLUTION INTRAMUSCULAR; INTRAVENOUS at 13:10

## 2024-10-02 RX ADMIN — CIPROFLOXACIN 400 MG: 400 INJECTION, SOLUTION INTRAVENOUS at 15:04

## 2024-10-02 RX ADMIN — METRONIDAZOLE 500 MG: 500 INJECTION, SOLUTION INTRAVENOUS at 23:38

## 2024-10-02 ASSESSMENT — PAIN DESCRIPTION - LOCATION: LOCATION: ABDOMEN

## 2024-10-02 ASSESSMENT — LIFESTYLE VARIABLES
HOW MANY STANDARD DRINKS CONTAINING ALCOHOL DO YOU HAVE ON A TYPICAL DAY: PATIENT DOES NOT DRINK
HOW OFTEN DO YOU HAVE A DRINK CONTAINING ALCOHOL: NEVER
HOW MANY STANDARD DRINKS CONTAINING ALCOHOL DO YOU HAVE ON A TYPICAL DAY: PATIENT DOES NOT DRINK
HOW OFTEN DO YOU HAVE A DRINK CONTAINING ALCOHOL: NEVER

## 2024-10-02 ASSESSMENT — PAIN DESCRIPTION - PAIN TYPE: TYPE: ACUTE PAIN

## 2024-10-02 ASSESSMENT — PAIN - FUNCTIONAL ASSESSMENT: PAIN_FUNCTIONAL_ASSESSMENT: ACTIVITIES ARE NOT PREVENTED

## 2024-10-02 ASSESSMENT — PAIN DESCRIPTION - DESCRIPTORS: DESCRIPTORS: DISCOMFORT

## 2024-10-02 ASSESSMENT — PAIN SCALES - GENERAL: PAINLEVEL_OUTOF10: 2

## 2024-10-02 NOTE — PROGRESS NOTES
Patient seen in ED, room 28.  Admission completed with the following exceptions:  4 Eyes Assessment, Immunizations, Vaccines, Rights and Responsibilities, Orientation to room, Plan of Care, Education/Learning Assessment and Education Plan, white board, height and weight, pain assessment and head to toe assessment.  Patient is alert and oriented X 4.  Patient lives in a house alone and is being admitted for Abdominal pain.  Home Medications as well as Outside Sources will be completed per pharmacy staff.    Patient is currently getting chemotherapy IV every 2 weeks and last had 12 days ago

## 2024-10-02 NOTE — ED PROVIDER NOTES
[facility] Emergency Department [facility]    I, Fabian Sanchez MD, am the primary clinician of record.    CHIEF COMPLAINT  Chief Complaint   Patient presents with    Emesis     Pt states he has been having episodes of not eating for a few days due to chemo. Pt states every time he eats he vomits. Pt states some dizziness and lightheaded.         HISTORY OF PRESENT ILLNESS  Ashutosh Shaffer is a 68 y.o. male  who presents to the ED complaining of concern for several days of persistence of nausea vomiting and poor p.o. intake with generalized abdominal pains.  He reports decreased p.o. intake resulting in decreased bowel movements but did have a normal bowel movement yesterday, no diarrhea or blood in it.  He has not measured a fever.  He feels very dehydrated and lightheaded as well.  He has not passed out though.  He is able to keep some of his medicines down however says that anytime he tries to eat or drink something most of it comes up.  It has all been nonbloody nonbilious vomiting.  No chest pain cough or SOB.    Patient was seen earlier today at pain medicine clinic where his chronic pain is treated for malignant neoplasm of ascending colon, cervical intervertebral disc disease and chronic pain.    The patient was also admitted to the hospital here on August 17 until August 22 this year, with colon CA and liver mets on chemo, and found to have jejunal enteritis.  During that admission he was treated with abx, had an EGD on 8/21/24 showing Schatzki ring that was dilated and duodenal cobblestoning inflammation, gallstones but no cholecystitis, Klebsiella UTI treated as well.     No other complaints, modifying factors or associated symptoms.     I have reviewed the following from the nursing documentation.    Past Medical History:   Diagnosis Date    Anxiety     Atrial fibrillation (HCC)     Cervical disc herniation     Cervical spondylosis 07/27/2022    Coronary artery disease due to lipid rich plaque

## 2024-10-02 NOTE — PROGRESS NOTES
4 Eyes Skin Assessment     NAME:  Ashutosh Shaffer  YOB: 1955  MEDICAL RECORD NUMBER:  [id number]    The patient is being assessed for  Admission    I agree that at least one RN has performed a thorough Head to Toe Skin Assessment on the patient. ALL assessment sites listed below have been assessed.      Areas assessed by both nurses:    Head, Face, Ears, Shoulders, Back, Chest, Arms, Elbows, Hands, Sacrum. Buttock, Coccyx, Ischium, Legs. Feet and Heels, and Under Medical Devices         Does the Patient have a Wound? No noted wound(s)       Charan Prevention initiated by RN: No  Wound Care Orders initiated by RN: No    Pressure Injury (Stage 3,4, Unstageable, DTI, NWPT, and Complex wounds) if present, place Wound referral order by RN under : No    New Ostomies, if present place, Ostomy referral order under : No     Nurse 1 eSignature: Electronically signed by Claudia Bonilla RN on 10/2/24 at 7:05 PM EDT    **SHARE this note so that the co-signing nurse can place an eSignature**    Nurse 2 eSignature: Electronically signed by Williams Carter RN on 10/2/24 at 7:21 PM EDT

## 2024-10-02 NOTE — PROGRESS NOTES
Patient arrived to room 3325 from ER. Patient ambulates independently. Vital signs stable. Safety precautions in place. Call light and bedside table within reach.

## 2024-10-02 NOTE — PROGRESS NOTES
tablets by mouth daily      Methylsulfonylmethane (MSM PO) Take by mouth      folic acid (FOLVITE) 800 MCG tablet Take 1 tablet by mouth daily      RESVERATROL PO Take by mouth      Cholecalciferol (VITAMIN D3) 50 MCG (2000 UT) CAPS Take by mouth daily      aspirin 325 MG tablet Take 1 tablet by mouth daily      Lancets MISC 1 each by Does not apply route 2 times daily 100 each 0    blood glucose monitor strips Test 2 times a day & as needed for symptoms of irregular blood glucose. Dispense sufficient amount for indicated testing frequency plus additional to accommodate PRN testing needs. 100 strip 0    Alcohol Swabs PADS 1 Units by Does not apply route 2 times daily 100 each 0    tamsulosin (FLOMAX) 0.4 MG capsule Take 1 capsule by mouth daily       No current facility-administered medications for this visit.     I will continue his current medication regimen  which is part of the above treatment schedule. It has been helping with Mr. Shaffer's chronic  medical problems which for this visit include:   The primary encounter diagnosis was Malignant neoplasm of ascending colon (HCC). Diagnoses of Displacement of cervical intervertebral disc without myelopathy, Partial obstruction of colon (HCC), Cervical spondylosis, Encounter for therapeutic drug monitoring, Adjustment disorder with anxiety, and Chronic pain syndrome were also pertinent to this visit.   Risks and benefits of the medications and other alternative treatments  including no treatment were discussed with the patient.The common side effects of these medications were also explained to the patient.  Informed verbal consent was obtained.   Goals of current treatment regimen include improvement in pain, restoration of functioning- with focus on improvement in physical performance, general activity, work or disability,emotional distress, health care utilization and  decreased medication consumption. Will continue to monitor progress towards

## 2024-10-02 NOTE — H&P
HOSPITALISTS HISTORY AND PHYSICAL    10/2/2024 2:51 PM    Patient Information:  ASHUTOSH FRYE is a 68 y.o. male [id number]  PCP:  Seun Wild MD (Tel: [phone] )    Chief complaint:    Chief Complaint   Patient presents with    Emesis     Pt states he has been having episodes of not eating for a few days due to chemo. Pt states every time he eats he vomits. Pt states some dizziness and lightheaded.         History of Present Illness:  Ashutosh Frye is a 68 y.o. male who presented with ER with complaints of generalized fatigue weakness nausea vomiting poor appetite.  And abdominal pain ongoing for couple days that progressively got worse.  Reports decreased p.o. intake with decreased bowel movement.  Patient denies any diarrhea.  Feels lightheaded and dehydrated.  Patient had similar episode earlier this morning.     REVIEW OF SYSTEMS:   Constitutional: Negative for fever,chills or night sweats  ENT: Negative for rhinorrhea, epistaxis, hoarseness, sore throat.  Respiratory: Negative for shortness of breath,wheezing  Cardiovascular: Negative for chest pain, palpitations   Gastrointestinal: Negative for nausea, vomiting, diarrhea  Genitourinary: Negative for polyuria, dysuria   Hematologic/Lymphatic: Negative for bleeding tendency, easy bruising  Musculoskeletal: Negative for myalgias and arthralgias  Neurologic: Negative for confusion,dysarthria.  Skin: Negative for itching,rash, good capillary refill.   Psychiatric: Negative for depression,anxiety, agitation.  Endocrine: Negative for polydipsia,polyuria,heat /cold intolerance.    Past Medical History:   has a past medical history of Anxiety, Atrial fibrillation (HCC), Cervical disc herniation, Cervical spondylosis, Coronary artery disease due to lipid rich plaque, HLD (hyperlipidemia), HTN

## 2024-10-03 LAB
ANION GAP SERPL CALCULATED.3IONS-SCNC: 8 MMOL/L (ref 3–16)
BASOPHILS # BLD: 0 K/UL (ref 0–0.2)
BASOPHILS NFR BLD: 0 %
BUN SERPL-MCNC: 21 MG/DL (ref 7–20)
CALCIUM SERPL-MCNC: 8.6 MG/DL (ref 8.3–10.6)
CHLORIDE SERPL-SCNC: 106 MMOL/L (ref 99–110)
CO2 SERPL-SCNC: 26 MMOL/L (ref 21–32)
CREAT SERPL-MCNC: 1.1 MG/DL (ref 0.8–1.3)
DEPRECATED RDW RBC AUTO: 15.4 % (ref 12.4–15.4)
EOSINOPHIL # BLD: 0 K/UL (ref 0–0.6)
EOSINOPHIL NFR BLD: 0 %
GFR SERPLBLD CREATININE-BSD FMLA CKD-EPI: 73 ML/MIN/{1.73_M2}
GLUCOSE BLD-MCNC: 108 MG/DL (ref 70–99)
GLUCOSE BLD-MCNC: 118 MG/DL (ref 70–99)
GLUCOSE BLD-MCNC: 167 MG/DL (ref 70–99)
GLUCOSE SERPL-MCNC: 105 MG/DL (ref 70–99)
HCT VFR BLD AUTO: 34.9 % (ref 40.5–52.5)
HGB BLD-MCNC: 11.5 G/DL (ref 13.5–17.5)
HOWELL-JOLLY BOD BLD QL SMEAR: ABNORMAL
LYMPHOCYTES # BLD: 1 K/UL (ref 1–5.1)
LYMPHOCYTES NFR BLD: 12 %
MCH RBC QN AUTO: 37.4 PG (ref 26–34)
MCHC RBC AUTO-ENTMCNC: 33 G/DL (ref 31–36)
MCV RBC AUTO: 113.4 FL (ref 80–100)
MONOCYTES # BLD: 0.3 K/UL (ref 0–1.3)
MONOCYTES NFR BLD: 3 %
NEUTROPHILS # BLD: 7.4 K/UL (ref 1.7–7.7)
NEUTROPHILS NFR BLD: 85 %
PERFORMED ON: ABNORMAL
PLATELET # BLD AUTO: 104 K/UL (ref 135–450)
PMV BLD AUTO: 8.1 FL (ref 5–10.5)
POTASSIUM SERPL-SCNC: 4.1 MMOL/L (ref 3.5–5.1)
RBC # BLD AUTO: 3.08 M/UL (ref 4.2–5.9)
RBC MORPH BLD: NORMAL
SODIUM SERPL-SCNC: 140 MMOL/L (ref 136–145)
WBC # BLD AUTO: 8.7 K/UL (ref 4–11)

## 2024-10-03 PROCEDURE — 96366 THER/PROPH/DIAG IV INF ADDON: CPT

## 2024-10-03 PROCEDURE — 96376 TX/PRO/DX INJ SAME DRUG ADON: CPT

## 2024-10-03 PROCEDURE — 85025 COMPLETE CBC W/AUTO DIFF WBC: CPT

## 2024-10-03 PROCEDURE — 80048 BASIC METABOLIC PNL TOTAL CA: CPT

## 2024-10-03 PROCEDURE — 96375 TX/PRO/DX INJ NEW DRUG ADDON: CPT

## 2024-10-03 PROCEDURE — 2580000003 HC RX 258: Performed by: HOSPITALIST

## 2024-10-03 PROCEDURE — 6360000002 HC RX W HCPCS: Performed by: HOSPITALIST

## 2024-10-03 PROCEDURE — 6370000000 HC RX 637 (ALT 250 FOR IP): Performed by: HOSPITALIST

## 2024-10-03 PROCEDURE — G0378 HOSPITAL OBSERVATION PER HR: HCPCS

## 2024-10-03 PROCEDURE — 96367 TX/PROPH/DG ADDL SEQ IV INF: CPT

## 2024-10-03 PROCEDURE — 1200000000 HC SEMI PRIVATE

## 2024-10-03 RX ADMIN — METRONIDAZOLE 500 MG: 500 INJECTION, SOLUTION INTRAVENOUS at 05:27

## 2024-10-03 RX ADMIN — SODIUM CHLORIDE: 9 INJECTION, SOLUTION INTRAVENOUS at 17:41

## 2024-10-03 RX ADMIN — METRONIDAZOLE 500 MG: 500 INJECTION, SOLUTION INTRAVENOUS at 20:24

## 2024-10-03 RX ADMIN — PROCHLORPERAZINE EDISYLATE 10 MG: 5 INJECTION INTRAMUSCULAR; INTRAVENOUS at 18:33

## 2024-10-03 RX ADMIN — LEVOFLOXACIN 500 MG: 5 INJECTION, SOLUTION INTRAVENOUS at 06:41

## 2024-10-03 RX ADMIN — METRONIDAZOLE 500 MG: 500 INJECTION, SOLUTION INTRAVENOUS at 12:33

## 2024-10-03 RX ADMIN — SODIUM CHLORIDE, PRESERVATIVE FREE 10 ML: 5 INJECTION INTRAVENOUS at 20:23

## 2024-10-03 RX ADMIN — FOLIC ACID 1000 MCG: 1 TABLET ORAL at 09:52

## 2024-10-03 RX ADMIN — PANTOPRAZOLE SODIUM 40 MG: 40 INJECTION, POWDER, FOR SOLUTION INTRAVENOUS at 09:52

## 2024-10-03 RX ADMIN — TAMSULOSIN HYDROCHLORIDE 0.4 MG: 0.4 CAPSULE ORAL at 20:22

## 2024-10-03 RX ADMIN — SODIUM CHLORIDE, PRESERVATIVE FREE 10 ML: 5 INJECTION INTRAVENOUS at 09:52

## 2024-10-03 RX ADMIN — ASPIRIN 325 MG: 325 TABLET ORAL at 09:52

## 2024-10-03 ASSESSMENT — PAIN SCALES - GENERAL: PAINLEVEL_OUTOF10: 0

## 2024-10-03 NOTE — CONSULTS
[facility]    Consult Note      Requesting Physician:  The hospitalist    CHIEF COMPLAINT:  Nausea and vomiting      HISTORY OF PRESENT ILLNESS:    Mr. Shaffer  is a 68 y.o. male we are seeing in consultation for Nausea and vomiting.  He has metastatic colon cancer.  He currently is undergoing FOLFOX plus bevacizumab.  He received pegfilgrastim.  He developed nausea and vomiting.  Since admission he is feeling better.      ICD-10-CM    1. Enteritis  K52.9       2. 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 Dehydration  E86.0              Past Medical History:  Past Medical History:   Diagnosis Date    Anxiety     Atrial fibrillation (HCC)     Cervical disc herniation     Cervical spondylosis 07/27/2022    Coronary artery disease due to lipid rich plaque 11/12/2020    HLD (hyperlipidemia) 03/05/2021    HTN (hypertension) 05/09/2013    Malignant neoplasm of ascending colon (HCC) 06/02/2023    Stage 4: currently on IV chemotherapy    Neuropathy     Obesity (BMI 30-39.9) 11/19/2020    Post traumatic stress disorder     Pre-diabetes     Rotator cuff (capsule) sprain and strain        Past Surgical History:  Past Surgical History:   Procedure Laterality Date    ANKLE SURGERY Right     around 2000    COLONOSCOPY N/A 6/1/2023    COLONOSCOPY WITH BIOPSY performed by Juan Pablo Colon MD at [facility] ENDOSCOPY    COLONOSCOPY  6/1/2023    COLONOSCOPY POLYPECTOMY SNARE/COLD BIOPSY performed by Juan Pablo Colon MD at [facility] ENDOSCOPY    CORONARY ARTERY BYPASS GRAFT N/A 11/16/2020    MALATHI. TCPB. RT EVH. CABG x 3, LIMA TO DISTAL LAD; SVG TO OM1; SVG TO RPDA. SCOTT CLIP WITH 45mm  ATRICLIP. PLACEMENT OF TEMPORARY VENTRICULAR PACING WIRE. DOPPLER FLOW VERIFICATION OF GRAFTS. BILATERAL INTERCOSTAL NERVE BLOCK WITH EXPAREL & MARCAINE. performed by [facility]  are clear without pleural effusion or pneumothorax.  The  cardiomediastinal silhouette is normal in size with median sternotomy wires  and a left atrial appendage clip.  No acute osseous abnormality.  Impression: No acute cardiopulmonary process.      Problem List  Patient Active Problem List   Diagnosis    BWC Rotator cuff (capsule) sprain and strain    BWC Neck sprain and strain    BWC Sprain and strain of unspecified site of shoulder and upper arm    Post traumatic stress disorder    Rotator cuff (capsule) sprain and strain    Essential hypertension    Displacement of cervical intervertebral disc without myelopathy    Anxiety    Coronary artery disease due to lipid rich plaque    CAD in native artery    DM2 (diabetes mellitus, type 2) (HCC)    Postoperative atrial fibrillation (HCC)    Mixed hyperlipidemia    History of coronary artery bypass graft x 3    Chronic systolic heart failure (HCC)    JINA (obstructive sleep apnea)    Adjustment disorder with anxiety    Cervical spondylosis    Chronic pain syndrome    RBBB    Partial obstruction of colon (HCC)    Malignant neoplasm of ascending colon (HCC)    Intractable nausea and vomiting    Hx SBO    BPH (benign prostatic hyperplasia)    Periumbilical abdominal pain    Severe malnutrition (HCC)    Encounter for therapeutic drug monitoring    Abdominal pain       IMPRESSION/RECOMMENDATIONS:  Gastroenteritis from chemotherapy  Metastatic colon cancer  Anemia and thrombocytopenia secondary to chemotherapy  Neuropathy    Reviewed his labs and imaging studies.  His CT scan showed mild small bowel dilatation without obvious obstruction.  Liver lesion appeared stable.  His last CEA dropped to 27 on 9/6/2024.  He is tolerating advance diet.  Will continue supportive care.  Explained to the patient.  Will postpone his chemotherapy.      Thank you for asking me to see the patient.       Darrell Toussaint MD  Please Contact Through [employer]

## 2024-10-03 NOTE — PROGRESS NOTES
V2.0    [facility] Progress Note      Name:  Ashutosh Shaffer /Age/Sex: 1955  (68 y.o. male)   MRN & CSN:  [id number] & [id number] Encounter Date/Time: 10/3/2024 11:30 AM EDT   Location:  [facility]3325/3325-01 PCP: Seun Wild MD     Attending:Syed Alfaro MD       Hospital Day: 2    Assessment and Recommendations   Ashutosh Shaffer is a 68 y.o. male who presents with Abdominal pain      Plan:   Abdominal pain.  Concern For likely secondary gastroenteritis from chemo versus colitis.  Continue empiric antibiotics for now WBC has trended down.  Monitor closely.  IV fluid.  Continue to advance diet.  If continues to do well hopefully discharge in next 24 hours.    Gastroenteritis from chemotherapy.  Anemia of chronic disease with thrombocytopenia.  Neuropathy pain.  Metastatic colon cancer.  Ongoing treatment chemotherapy on hold for now      Diet ADULT DIET; Regular   DVT Prophylaxis    Code Status Full Code   Disposition 24 hours         Personally reviewed Lab Studies and Imaging         Subjective:     Chief Complaint:     Ashutosh Shaffer is a 68 y.o. male who presents with feeling better abdominal pain improved but still not 100%      Review of Systems:      Pertinent positives and negatives discussed in HPI    Objective:   No intake or output data in the 24 hours ending 10/03/24 1130   Vitals:   Vitals:    10/02/24 1733 10/02/24 2215 10/03/24 0607 10/03/24 0951   BP: (!) 148/85 111/63  133/76   Pulse: 74 80  76   Resp: 18 18  14   Temp: 97.9 °F (36.6 °C) 98.1 °F (36.7 °C)  98.4 °F (36.9 °C)   TempSrc: Temporal Oral     SpO2: 95% 100%  96%   Weight: 81.7 kg (180 lb 1.6 oz)  83.2 kg (183 lb 8 oz)    Height: 1.702 m (5' 7\")            Physical Exam:      General: NAD  Eyes: EOMI  ENT: neck supple  Cardiovascular: Regular rate.  Respiratory: Clear to auscultation  Gastrointestinal: Soft, non tender  Genitourinary: no suprapubic tenderness  Musculoskeletal: No edema  Skin: warm, dry  Neuro: Alert.  Psych:  (MA) Reason for Exam: colon CA with liver met, has gallstones, has abd pain + n/v and dec PO intake x a few days FINDINGS: Lower Chest: Normal bronchovascular markings. No effusion, pneumothorax or airspace process. Organs: Liver: Again identified and unchanged multiple slightly lobulated poorly marginated low-attenuation lesions throughout the right and left hepatic lobe.  No intrahepatic ductal dilatation Gallbladder: Cholelithiasis. Pancreas: Atrophy of the body of the pancreas and fatty replacement. Adrenal glands: Normal Kidneys: The previously noted punctate calcification, lower pole right kidney is no longer seen may be passed or nonvisualized secondary to slice selection and volume averaging.  No worrisome stone, mass or hydronephrosis in either the right or left kidney. Spleen: Normal GI/Bowel: Stomach: Unremarkable Small bowel: Mild distension of the proximal small bowel with fluid.  There is mild diffuse mucosal thickening but no focal mass or significant mesenteric inflammation. Colon: Scattered colonic diverticula.  The appendix is normal.  The cecum is stool filled. Pelvis: Prostate gland is normal for age with mild calcification.  Partially distended bladder is normal.  Seminal vesicles are normal.  There may be a trace amount of free fluid in the posterior pelvis. Peritoneum/Retroperitoneum: No adenopathy or free air.  Slight leonel appearance to the small bowel mesentery unchanged. Bones/Soft Tissues: Degenerative changes spine and hips no lytic or blastic or sclerotic lesion.     Persistent mild proximal small bowel dilatation with mucosal thickening may reflect enteritis in the correct clinical setting. Persistent stable leonel appearing small-bowel mesentery. Stable cholelithiasis. Stable lesions within the liver consistent with metastatic disease. Overall little changed, compared to the prior study.     XR CHEST PORTABLE    Result Date: 10/2/2024  EXAMINATION: ONE XRAY VIEW OF THE CHEST 10/2/2024

## 2024-10-04 VITALS
TEMPERATURE: 97.8 F | SYSTOLIC BLOOD PRESSURE: 119 MMHG | HEIGHT: 67 IN | HEART RATE: 68 BPM | DIASTOLIC BLOOD PRESSURE: 71 MMHG | WEIGHT: 179.8 LBS | BODY MASS INDEX: 28.22 KG/M2 | OXYGEN SATURATION: 98 % | RESPIRATION RATE: 14 BRPM

## 2024-10-04 LAB
ANION GAP SERPL CALCULATED.3IONS-SCNC: 7 MMOL/L (ref 3–16)
BASOPHILS # BLD: 0.1 K/UL (ref 0–0.2)
BASOPHILS NFR BLD: 0.7 %
BUN SERPL-MCNC: 15 MG/DL (ref 7–20)
CALCIUM SERPL-MCNC: 8.4 MG/DL (ref 8.3–10.6)
CHLORIDE SERPL-SCNC: 108 MMOL/L (ref 99–110)
CO2 SERPL-SCNC: 25 MMOL/L (ref 21–32)
CREAT SERPL-MCNC: 1.3 MG/DL (ref 0.8–1.3)
DEPRECATED RDW RBC AUTO: 15 % (ref 12.4–15.4)
EOSINOPHIL # BLD: 0 K/UL (ref 0–0.6)
EOSINOPHIL NFR BLD: 0.5 %
GFR SERPLBLD CREATININE-BSD FMLA CKD-EPI: 60 ML/MIN/{1.73_M2}
GLUCOSE SERPL-MCNC: 105 MG/DL (ref 70–99)
HCT VFR BLD AUTO: 33.3 % (ref 40.5–52.5)
HGB BLD-MCNC: 11.4 G/DL (ref 13.5–17.5)
LYMPHOCYTES # BLD: 1.3 K/UL (ref 1–5.1)
LYMPHOCYTES NFR BLD: 16.9 %
MCH RBC QN AUTO: 38.7 PG (ref 26–34)
MCHC RBC AUTO-ENTMCNC: 34.3 G/DL (ref 31–36)
MCV RBC AUTO: 112.9 FL (ref 80–100)
MONOCYTES # BLD: 0.5 K/UL (ref 0–1.3)
MONOCYTES NFR BLD: 6.7 %
NEUTROPHILS # BLD: 5.7 K/UL (ref 1.7–7.7)
NEUTROPHILS NFR BLD: 75.2 %
PATH INTERP BLD-IMP: NO
PLATELET # BLD AUTO: 104 K/UL (ref 135–450)
PMV BLD AUTO: 8.5 FL (ref 5–10.5)
POTASSIUM SERPL-SCNC: 3.7 MMOL/L (ref 3.5–5.1)
RBC # BLD AUTO: 2.95 M/UL (ref 4.2–5.9)
SODIUM SERPL-SCNC: 140 MMOL/L (ref 136–145)
WBC # BLD AUTO: 7.5 K/UL (ref 4–11)

## 2024-10-04 PROCEDURE — 96366 THER/PROPH/DIAG IV INF ADDON: CPT

## 2024-10-04 PROCEDURE — 6370000000 HC RX 637 (ALT 250 FOR IP): Performed by: HOSPITALIST

## 2024-10-04 PROCEDURE — G0378 HOSPITAL OBSERVATION PER HR: HCPCS

## 2024-10-04 PROCEDURE — 6360000002 HC RX W HCPCS: Performed by: HOSPITALIST

## 2024-10-04 PROCEDURE — 80048 BASIC METABOLIC PNL TOTAL CA: CPT

## 2024-10-04 PROCEDURE — 85025 COMPLETE CBC W/AUTO DIFF WBC: CPT

## 2024-10-04 PROCEDURE — 96376 TX/PRO/DX INJ SAME DRUG ADON: CPT

## 2024-10-04 PROCEDURE — 2580000003 HC RX 258: Performed by: HOSPITALIST

## 2024-10-04 RX ORDER — METRONIDAZOLE 500 MG/1
500 TABLET ORAL 3 TIMES DAILY
Qty: 21 TABLET | Refills: 0 | Status: SHIPPED | OUTPATIENT
Start: 2024-10-04 | End: 2024-10-11

## 2024-10-04 RX ORDER — LEVOFLOXACIN 250 MG/1
250 TABLET, FILM COATED ORAL DAILY
Qty: 7 TABLET | Refills: 0 | Status: SHIPPED | OUTPATIENT
Start: 2024-10-04 | End: 2024-10-11

## 2024-10-04 RX ADMIN — SODIUM CHLORIDE: 9 INJECTION, SOLUTION INTRAVENOUS at 01:05

## 2024-10-04 RX ADMIN — LEVOFLOXACIN 500 MG: 5 INJECTION, SOLUTION INTRAVENOUS at 05:45

## 2024-10-04 RX ADMIN — FOLIC ACID 1000 MCG: 1 TABLET ORAL at 07:56

## 2024-10-04 RX ADMIN — SODIUM CHLORIDE, PRESERVATIVE FREE 10 ML: 5 INJECTION INTRAVENOUS at 07:56

## 2024-10-04 RX ADMIN — ASPIRIN 325 MG: 325 TABLET ORAL at 07:56

## 2024-10-04 RX ADMIN — METRONIDAZOLE 500 MG: 500 INJECTION, SOLUTION INTRAVENOUS at 03:55

## 2024-10-04 RX ADMIN — PANTOPRAZOLE SODIUM 40 MG: 40 INJECTION, POWDER, FOR SOLUTION INTRAVENOUS at 07:56

## 2024-10-04 NOTE — PLAN OF CARE
Problem: Chronic Conditions and Co-morbidities  Goal: Patient's chronic conditions and co-morbidity symptoms are monitored and maintained or improved  10/4/2024 0005 by Kamila Perera RN  Outcome: Progressing     Problem: Discharge Planning  Goal: Discharge to home or other facility with appropriate resources  10/4/2024 1346 by Lily Parisi RN  Outcome: Adequate for Discharge  10/4/2024 0755 by Lily Parisi RN  Outcome: Progressing  10/4/2024 0005 by Kamila Perera RN  Outcome: Progressing     Problem: Safety - Adult  Goal: Free from fall injury  10/4/2024 1346 by Lily Parisi RN  Outcome: Adequate for Discharge  10/4/2024 0755 by Lily Parisi RN  Outcome: Progressing  10/4/2024 0005 by Kamila Perera RN  Outcome: Progressing

## 2024-10-04 NOTE — PLAN OF CARE
Problem: Chronic Conditions and Co-morbidities  Goal: Patient's chronic conditions and co-morbidity symptoms are monitored and maintained or improved  10/4/2024 0005 by Kamila Perera RN  Outcome: Progressing     Problem: Discharge Planning  Goal: Discharge to home or other facility with appropriate resources  10/4/2024 0755 by Lily Parisi RN  Outcome: Progressing  10/4/2024 0005 by Kamila Perera RN  Outcome: Progressing     Problem: Safety - Adult  Goal: Free from fall injury  10/4/2024 0755 by Lily Parisi RN  Outcome: Progressing  10/4/2024 0005 by Kamila Perera RN  Outcome: Progressing     Problem: Cardiovascular - Adult  Goal: Maintains optimal cardiac output and hemodynamic stability  10/4/2024 0755 by Lily Parisi RN  Outcome: Progressing  10/4/2024 0005 by Kamila Perera RN  Outcome: Progressing  Goal: Absence of cardiac dysrhythmias or at baseline  10/4/2024 0755 by Lily Parisi RN  Outcome: Progressing  10/4/2024 0005 by Kamila Perera RN  Outcome: Progressing     Problem: Gastrointestinal - Adult  Goal: Minimal or absence of nausea and vomiting  10/4/2024 0755 by Lily Parisi RN  Outcome: Progressing  10/4/2024 0005 by Kamila Perera RN  Outcome: Progressing

## 2024-10-04 NOTE — CARE COORDINATION
Case Management Assessment  Initial Evaluation    Date/Time of Evaluation: 10/4/2024 9:04 AM  Assessment Completed by: CHRISTIANNE Cardozo    If patient is discharged prior to next notation, then this note serves as note for discharge by case management.    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 (P) Yes    Chart Reviewed: Yes      History Provided by: (P) Patient  Patient Orientation: (P) Alert and Oriented    Patient Cognition: (P) Alert    Hospitalization in the last 30 days (Readmission):  No    If yes, Readmission Assessment in  Navigator will be completed.    Advance Directives:      Code Status: Full Code   Patient's Primary Decision Maker is: (P) Legal Next of Kin      Discharge Planning:    Patient lives with: (P) Alone Type of Home: (P) House  Primary Care Giver: (P) Self  Patient Support Systems include: (P) None   Current Financial resources: (P) None  Current community resources: (P) None  Current services prior to admission: (P) None            Current DME: (P) Cane, Walker            Type of Home Care services:  (P) None    ADLS  Prior functional level: (P) Independent in ADLs/IADLs  Current functional level: (P) Assistance with the following:, Mobility    PT AM-PAC:   /24  OT AM-PAC:   /24    Family can provide assistance at DC: (P) No  Would you like Case Management to discuss the discharge plan with any other family members/significant others, and if so, who? (P) No  Plans to Return to Present Housing: (P) Yes  Other Identified Issues/Barriers to RETURNING to current housing: NA  Potential  Assistance needed at discharge: (P) N/A            Potential DME:    Patient expects to discharge to: (P) House  Plan for transportation at discharge: (P) Self    Financial    Payor: [employer]VANTAGE / Plan: [employer] CHOICE HMO / Product Type: *No Product type* /     Does insurance require precert for SNF: Yes    Potential assistance Purchasing Medications: (P) No  Meds-to-Beds request: Yes      [id number] - [place], [place] - [address] [phone] - F [phone]  [address] [facility] [address]  Phone: [phone] Fax: [phone]    [facility]) #[id number] [place], [place] - [address] [phone] - F [phone]  [address] [address]  Phone: [phone] Fax: [phone]    [facility] [address] - P [phone] - F [phone]  [address] [address]  Phone: [phone] Fax: [phone]      Notes:    Factors facilitating achievement of predicted outcomes: Cooperative and Pleasant    Barriers to discharge: NA    Additional Case Management Notes: SW met with patient at bedside. Pt lives alone. Pt reports being independent with iADLs and ADLs. Pt has no needs and will discharge home.     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 Freedom of choice list with basic dialogue that supports the patient's individualized plan of care/goals and shares the quality data associated with the providers was provided to:     Patient Representative Name:       The Patient and/or Patient Representative Agree with

## 2024-10-04 NOTE — PROGRESS NOTES
Patient verbalized understanding of discharge instructions.  Peripheral IV removed intact.  Patient states he is in possession of all belongings.  Patient escorted to exit by RN via wheelchair.

## 2024-10-04 NOTE — PROGRESS NOTES
Patients head to toe assessment completed. Vital signs WNL, call light within reach. Scheduled medications given per MAR. Patient denies any pain at the moment. Patient resting in bed.

## 2024-10-04 NOTE — DISCHARGE INSTR - COC
COVID-19, PFIZER PURPLE top, DILUTE for use, (age 12 y+), 30mcg/0.3mL 01/05/2022       Active Problems:  Patient Active Problem List   Diagnosis Code    [facility] Rotator cuff (capsule) sprain and strain S43.429A    [facility] Neck sprain and strain S13.9XXA    [facility] Sprain and strain of unspecified site of shoulder and upper arm [id number]    Post traumatic stress disorder F43.10    Rotator cuff (capsule) sprain and strain [id number]    Essential hypertension I10    Displacement of cervical intervertebral disc without myelopathy M50.20    Anxiety F41.9    Coronary artery disease due to lipid rich plaque I25.10, I25.83    CAD in native artery I25.10    DM2 (diabetes mellitus, type 2) (HCC) E11.9    Postoperative atrial fibrillation (HCC) I97.89, I48.91    Mixed hyperlipidemia E78.2    History of coronary artery bypass graft x 3 Z95.1    Chronic systolic heart failure (HCC) I50.22    JINA (obstructive sleep apnea) G47.33    Adjustment disorder with anxiety F43.22    Cervical spondylosis M47.812    Chronic pain syndrome G89.4    RBBB I45.10    Partial obstruction of colon (HCC) K56.600    Malignant neoplasm of ascending colon (HCC) C18.2    Intractable nausea and vomiting R11.2    Hx SBO Z87.19    BPH (benign prostatic hyperplasia) N40.0    Periumbilical abdominal pain R10.33    Severe malnutrition (HCC) E43    Encounter for therapeutic drug monitoring Z51.81    Abdominal pain R10.9       Isolation/Infection:   Isolation            Contact          Patient Infection Status       Infection Onset Added Last Indicated Last Indicated By Review Planned Expiration Resolved Resolved By    MDRO (multi-drug resistant organism)  08/20/24 08/20/24 Darrel Sanders          08/20/24 08/20/24 Darrel Sanders                           Nurse Assessment:  Last Vital Signs: /71   Pulse 68   Temp 97.8 °F (36.6 °C) (Oral)   Resp 14   Ht 1.702 m (5' 7.01\")   Wt 81.6 kg (179 lb 12.8 oz)   SpO2 98%   BMI 28.15 kg/m²     Last documented  (please select all that are sent with patient):  {CHP DME Belongings:336362142}    RN SIGNATURE:  {Esignature:558189800}    CASE MANAGEMENT/SOCIAL WORK SECTION    Inpatient Status Date: 10/2/24    Readmission Risk Assessment Score:  Readmission Risk              Risk of Unplanned Readmission:  20             / signature: Electronically signed by CHRISTIANNE Cardozo on 10/4/24 at 9:06 AM EDT    PHYSICIAN SECTION    Prognosis: {Prognosis:4890002047}    Condition at Discharge: { Patient Condition:829710122}    Rehab Potential (if transferring to Rehab): {Prognosis:5640039537}    Recommended Labs or Other Treatments After Discharge: ***    Physician Certification: I certify the above information and transfer of Ashutosh Shaffer  is necessary for the continuing treatment of the diagnosis listed and that he requires {Admit to Appropriate Level of Care:62314} for {GREATER/LESS:338984902} 30 days.     Update Admission H&P: {CHP DME Changes in HandP:951645529}    PHYSICIAN SIGNATURE:  {Esignature:001314731}

## 2024-10-04 NOTE — PROGRESS NOTES
Occupational Therapy  Ashutosh Shaffer     OT orders received and chart reviewed. Per chart, pt is up ad colt. Spoke with pt and pt declines need for therapy services. Pt reports he's been ambulating in room, using rest room, grooming IND. Pt denies any concerns with balance or ADL completion and is ready to d/c home. Pt agreeable to ambulate in room with this writer, no concerns or deficits noted at this time. Will sign off OT orders at this time    Chloe Ga [facility] OTR/L [id number]

## 2024-10-04 NOTE — PROGRESS NOTES
ONCOLOGY HEMATOLOGY CARE PROGRESS NOTE      SUBJECTIVE:  Ate nearly half of his meal.  Denies nausea or vomiting.  Slight up abdominal discomfort.  Had a bowel movement.  Denies diarrhea.    ROS:     Constitutional:  No weight loss, No fever, No chills, No night sweats.  Energy level good.  Eyes:  No impairment or change in vision  ENT / Mouth:  No pain, abnormal ulceration, bleeding, nasal drip or change in voice or hearing  Cardiovascular:  No chest pain, palpitations, new edema, or calf discomfort  Respiratory:  No pain, hemoptysis, change to breathing  Breast:  No pain, discharge, change in appearance or texture  Gastrointestinal:  No pain, cramping, jaundice, change to eating and bowel habits  Urinary:  No pain, bleeding or change in continence  Genitalia: No pain, bleeding or discharge  Musculoskeletal:  No redness, pain, edema or weakness  Skin:  No pruritus, rash, change to nodules or lesions  Neurologic:  No discomfort, change in mental status, speech, sensory or motor activity  Psychiatric:  No change in concentration or change to affect or mood  Endocrine:  No hot flashes, increased thirst, or change to urine production  Hematologic: No petechiae, ecchymosis or bleeding  Lymphatic:  No lymphadenopathy or lymphedema  Allergy / Immunologic:  No eczema, hives, frequent or recurrent infections    OBJECTIVE        Physical    VITALS:  Patient Vitals for the past 24 hrs:   BP Temp Temp src Pulse Resp SpO2 Height Weight   10/04/24 0751 119/71 97.8 °F (36.6 °C) Oral 68 14 98 % -- --   10/04/24 0545 -- -- -- -- -- -- 1.702 m (5' 7.01\") 81.6 kg (179 lb 12.8 oz)   10/03/24 2018 (!) 153/73 97.9 °F (36.6 °C) Oral 60 16 100 % -- --   10/03/24 0951 133/76 98.4 °F (36.9 °C) Oral 76 14 96 % -- --       24HR INTAKE/OUTPUT:    Intake/Output Summary (Last 24 hours) at 10/4/2024 0753  Last data filed at 10/3/2024 2331  Gross per 24 hour   Intake 2505 ml   Output --   Net 2505 ml

## 2024-10-04 NOTE — DISCHARGE SUMMARY
[facility]    Patient Demographics    Patient. Ashutosh Shaffer  Date of Birth. 1955  MRN. [id number]     Primary care provider. Seun Wild MD  (Tel: [phone])    Admit date: 10/2/2024    Discharge date (blank if same as Note Date):   Note Date: 10/4/2024     Reason for Hospitalization.   Chief Complaint   Patient presents with    Emesis     Pt states he has been having episodes of not eating for a few days due to chemo. Pt states every time he eats he vomits. Pt states some dizziness and lightheaded.            Problem-based Hospital Course.  Acute enteritis colitis.  Probably secondary to chemo induced.  Infection cannot be ruled out.  Cultures remain negative.  Marked improvement.  Discharge oral antibiotics empirically for now outpatient follow-up with oncology for further treatment plan    Consults.  IP CONSULT TO ONCOLOGY    Physical examination on discharge day.   /71   Pulse 68   Temp 97.8 °F (36.6 °C) (Oral)   Resp 14   Ht 1.702 m (5' 7.01\")   Wt 81.6 kg (179 lb 12.8 oz)   SpO2 98%   BMI 28.15 kg/m²   General appearance.  Alert. Looks comfortable.  HEENT. Sclera clear. Moist mucus membranes.  Cardiovascular. Regular rate and rhythm, normal S1, S2. No murmur.   Respiratory. Not using accessory muscles.Clear to auscultation bilaterally, no wheeze.  Gastrointestinal. Abdomen soft, non-tender, not distended, normal bowel sounds  Neurology. Facial symmetry. No speech deficits. Moving all extremities equally.  Extremities. No edema in lower extremities.  Skin. Warm, dry, normal turgor    Condition at time of discharge stable     Medication instructions provided to patient at discharge.     Medication List        START taking these medications      levoFLOXacin 250 MG tablet  Commonly known as: Levaquin  Take 1 tablet by mouth  [address], [facility] [address]      Phone: [phone]   levoFLOXacin 250 MG tablet  metroNIDAZOLE 500 MG tablet         Discharge recommendations given to patient.  Follow Up. pcp in 1 week   Disposition.  home  Activity. activity as tolerated  Diet: ADULT DIET; Regular      Spent 45  minutes in discharge process.    Signed:  Syed Alfaro MD     10/4/2024 11:18 AM

## 2024-10-04 NOTE — PLAN OF CARE
Problem: Chronic Conditions and Co-morbidities  Goal: Patient's chronic conditions and co-morbidity symptoms are monitored and maintained or improved  Outcome: Progressing     Problem: Discharge Planning  Goal: Discharge to home or other facility with appropriate resources  Outcome: Progressing     Problem: Safety - Adult  Goal: Free from fall injury  Outcome: Progressing     Problem: Cardiovascular - Adult  Goal: Maintains optimal cardiac output and hemodynamic stability  Outcome: Progressing  Goal: Absence of cardiac dysrhythmias or at baseline  Outcome: Progressing     Problem: Gastrointestinal - Adult  Goal: Minimal or absence of nausea and vomiting  Outcome: Progressing     Problem: Infection - Adult  Goal: Absence of infection at discharge  Outcome: Progressing  Goal: Absence of infection during hospitalization  Outcome: Progressing     Problem: Metabolic/Fluid and Electrolytes - Adult  Goal: Electrolytes maintained within normal limits  Outcome: Progressing

## 2024-10-04 NOTE — PROGRESS NOTES
CLINICAL PHARMACY NOTE: MEDS TO BEDS    Total # of Prescriptions Filled: 2   The following medications were delivered to the patient:  METRONIDAZOLE 500MG TABS  LEVOFLOXACIN 250MG TABS    Additional Documentation: Lily WOODARD approved to deliver medications to patient room=signed  [facility] tech

## 2024-10-04 NOTE — PROGRESS NOTES
Physical Therapy  Ashutosh Carrillo        Pt offered PT evaluation and declined. Pt independent in room with all mobility and has no PT needs, nursing agrees. DC PT eval at this time.     Iwona Holly [id number]

## 2024-10-06 LAB
BACTERIA BLD CULT ORG #2: NORMAL
BACTERIA BLD CULT: NORMAL

## 2024-10-16 ENCOUNTER — HOSPITAL ENCOUNTER (OUTPATIENT)
Dept: CT IMAGING | Age: 69
Discharge: HOME | End: 2024-10-16
Payer: MEDICARE

## 2024-10-16 DIAGNOSIS — R10.84 GENERALIZED ABDOMINAL PAIN: ICD-10-CM

## 2024-10-16 DIAGNOSIS — C18.2 MALIGNANT NEOPLASM OF ASCENDING COLON (HCC): ICD-10-CM

## 2024-10-16 PROCEDURE — 74176 CT ABD & PELVIS W/O CONTRAST: CPT

## 2024-11-08 ENCOUNTER — TELEPHONE (OUTPATIENT)
Dept: CARDIOLOGY CLINIC | Age: 69
End: 2024-11-08

## 2024-11-08 NOTE — TELEPHONE ENCOUNTER
Pt is having chest pains and pcp told him to make an appt with [facility]. Pt is scheduled for 11/8 @ 2:00. Please advise if pt needs immediate care.

## 2024-11-08 NOTE — TELEPHONE ENCOUNTER
Tried calling patient. No availability to leave a voice message. Mail box is full. If patient is having active chest pain or anything persistent, progressive, or bad needs ER eval.    Has OV 11/11 with Dr Marcus.

## 2024-11-11 ENCOUNTER — OFFICE VISIT (OUTPATIENT)
Dept: CARDIOLOGY CLINIC | Age: 69
End: 2024-11-11
Payer: MEDICARE

## 2024-11-11 VITALS
OXYGEN SATURATION: 98 % | SYSTOLIC BLOOD PRESSURE: 116 MMHG | HEART RATE: 87 BPM | DIASTOLIC BLOOD PRESSURE: 68 MMHG | WEIGHT: 177 LBS | HEIGHT: 67 IN | BODY MASS INDEX: 27.78 KG/M2

## 2024-11-11 DIAGNOSIS — R07.9 CHEST PAIN, UNSPECIFIED TYPE: Primary | ICD-10-CM

## 2024-11-11 PROCEDURE — 1123F ACP DISCUSS/DSCN MKR DOCD: CPT | Performed by: INTERNAL MEDICINE

## 2024-11-11 PROCEDURE — 3074F SYST BP LT 130 MM HG: CPT | Performed by: INTERNAL MEDICINE

## 2024-11-11 PROCEDURE — 93000 ELECTROCARDIOGRAM COMPLETE: CPT | Performed by: INTERNAL MEDICINE

## 2024-11-11 PROCEDURE — 99214 OFFICE O/P EST MOD 30 MIN: CPT | Performed by: INTERNAL MEDICINE

## 2024-11-11 PROCEDURE — 3078F DIAST BP <80 MM HG: CPT | Performed by: INTERNAL MEDICINE

## 2024-11-11 NOTE — PROGRESS NOTES
E/e' = 12.1.   -Left atrium is dilated.   -The aortic valve appears sclerotic but opens well.   -Trivial tricuspid regurgitation.   -Right ventricle is normal in size. Reduced function suggested by TAPSE:   1.58 cm. RVS velocity: 10.2 cm/s.   RVSP = 14 mmHG.      ECHO 1/2023-Reviewed Borderline RV size and function- TAPSE=1.6   Summary   Normal left ventricle size, wall thickness, and systolic function with an   estimated ejection fraction of 55%.   No regional wall motion abnormalities are seen.   There is reversal of E/A inflow velocities across the mitral valve.   Aortic valve appears sclerotic but opens adequately.     CT CHEST 10/2022  IMPRESSION:  Post treatment changes from left atrial appendage clip.  No residual left  atrial appendage is seen.  No thrombus in the left atrium     Coronary artery disease with bypass grafts, partially imaged     A few tiny punctate pulmonary nodules are seen,, similar compared to 2020.  No specific imaging follow-up recommended      CARDIAC CTA 11/2022  Post treatment changes from left atrial appendage clip.  No residual left  atrial appendage is seen.  No thrombus in the left atrium     Coronary artery disease with bypass grafts, partially imaged     A few tiny punctate pulmonary nodules are seen,, similar compared to 2020.  No specific imaging follow-up recommended     Trace aortic valve calcification    EKG:  Sinus, chronic RBBB    ASSESSMENT:    CAD:  Atypical chest pain.  Discussed options including stress test.  He does not want a stress test at this time with his cancer.  He does not feel this is his heart.  I have told him to call if he decides he wants a stress test otherwise I will see him back in 3 months.  Multivessel CAD s/p CABG 11/2020  Continue medical therapy  Continue lifestyle modification  Follow    Ischemic Cardiomyopathy:  LV function now normal on ECHO 1/2023  Continue Toprol. Change back to Toprol. Start 50 mg.  Continue lisinopril 10 mg.   Stay

## 2024-11-22 ENCOUNTER — HOSPITAL ENCOUNTER (OUTPATIENT)
Dept: ULTRASOUND IMAGING | Age: 69
Discharge: HOME | End: 2024-11-22
Payer: MEDICARE

## 2024-11-22 DIAGNOSIS — R59.1 LYMPHADENOPATHY: ICD-10-CM

## 2024-11-22 DIAGNOSIS — R19.04 LEFT LOWER QUADRANT ABDOMINAL SWELLING, MASS AND LUMP: ICD-10-CM

## 2024-11-22 PROCEDURE — 76999 ECHO EXAMINATION PROCEDURE: CPT

## 2024-11-22 PROCEDURE — 76870 US EXAM SCROTUM: CPT

## 2024-11-27 ENCOUNTER — OFFICE VISIT (OUTPATIENT)
Dept: PAIN MANAGEMENT | Age: 69
End: 2024-11-27

## 2024-11-27 VITALS
DIASTOLIC BLOOD PRESSURE: 78 MMHG | WEIGHT: 179 LBS | SYSTOLIC BLOOD PRESSURE: 124 MMHG | OXYGEN SATURATION: 98 % | BODY MASS INDEX: 28.03 KG/M2 | HEART RATE: 85 BPM

## 2024-11-27 DIAGNOSIS — M47.812 CERVICAL SPONDYLOSIS: ICD-10-CM

## 2024-11-27 DIAGNOSIS — F43.22 ADJUSTMENT DISORDER WITH ANXIETY: ICD-10-CM

## 2024-11-27 DIAGNOSIS — C18.2 MALIGNANT NEOPLASM OF ASCENDING COLON (HCC): Primary | ICD-10-CM

## 2024-11-27 DIAGNOSIS — Z51.81 ENCOUNTER FOR THERAPEUTIC DRUG MONITORING: ICD-10-CM

## 2024-11-27 DIAGNOSIS — G89.4 CHRONIC PAIN SYNDROME: ICD-10-CM

## 2024-11-27 DIAGNOSIS — M50.20 DISPLACEMENT OF CERVICAL INTERVERTEBRAL DISC WITHOUT MYELOPATHY: ICD-10-CM

## 2024-11-27 DIAGNOSIS — K56.600 PARTIAL OBSTRUCTION OF COLON (HCC): ICD-10-CM

## 2024-11-27 NOTE — PROGRESS NOTES
Ashutosh Shaffer  1955  [id number]      HISTORY OF PRESENT ILLNESS: Mr. Shaffer is a 68 y.o. male returns for a follow up visit for pain management  He has a diagnosis of   1. Malignant neoplasm of ascending colon (HCC)    2. Displacement of cervical intervertebral disc without myelopathy    3. Partial obstruction of colon (HCC)    4. Cervical spondylosis    5. Encounter for therapeutic drug monitoring    6. Adjustment disorder with anxiety    7. Chronic pain syndrome    .      New Medications since Last Office visit have been reviewed with patient.     As per Information Obtained from the PADT (Patient Assessment and Documentation Tool)    He complains of pain in the neck, right shoulder. He rates the pain 3/10 and describes it as aching, burning. Current treatment regimen has helped relieve about 40% of the pain since beginning treatment plan.  He denies any side effects from the current pain regimen. Patient reports that since implementation of their treatment plan; their physical functioning is unchanged, family/social relationships are unchanged, mood is unchanged sleep patterns are unchanged, and that the overall functioning is  a little worse .  Patient denies/admits that any of the above have changed since last office visit. Patient denies misusing/abusing his narcotic pain medications or using any illegal drugs.      Upon obtaining medical history from Mr. Shaffer    ALLERGIES: Patients list of allergies were reviewed     MEDICATIONS: Mr. Shaffer list of medications were reviewed.His current medications are   Outpatient Medications Prior to Visit   Medication Sig Dispense Refill    Evolocumab (REPATHA SURECLICK) 140 MG/ML SOAJ Inject 140 mg into the skin every 14 days 6 mL 4    metoprolol succinate (TOPROL XL) 50 MG extended release tablet Take 1 tablet by mouth daily 90 tablet 4    metFORMIN (GLUCOPHAGE) 500 MG tablet Take 2 tablets by mouth 2 times daily (with meals)      aspirin 325 MG tablet

## 2025-01-03 ENCOUNTER — TELEPHONE (OUTPATIENT)
Dept: CARDIOLOGY CLINIC | Age: 70
End: 2025-01-03

## 2025-01-03 DIAGNOSIS — I25.10 CORONARY ARTERY DISEASE DUE TO LIPID RICH PLAQUE: Primary | ICD-10-CM

## 2025-01-03 DIAGNOSIS — R07.9 CHEST PAIN, UNSPECIFIED TYPE: ICD-10-CM

## 2025-01-03 DIAGNOSIS — I25.83 CORONARY ARTERY DISEASE DUE TO LIPID RICH PLAQUE: Primary | ICD-10-CM

## 2025-01-03 NOTE — TELEPHONE ENCOUNTER
Tried calling patient again to discuss need for stress testing prior to cardiac clearance for surgery. Mailbox is full.

## 2025-01-03 NOTE — TELEPHONE ENCOUNTER
CARDIAC CLEARANCE     What type of procedure are you having?  inguinal hernia   Which physician is performing your procedure? Dr Veronica Clark     When is your procedure scheduled for? Not sched yet need clearance    Where are you having this procedure?    Are you taking Blood Thinners? NO   If so what? (Name/dose/frequesncy)     Does the surgeon want you to stop your blood thinner?  If so for how long?    Phone Number and Contact Name for Physicians office: [phone]     Fax number to send information: [phone]

## 2025-01-03 NOTE — TELEPHONE ENCOUNTER
Tried calling patient. No answer and mailbox is full. Unsure if he will be able to walk on treadmill or not.   Will try calling again.    Called Kayla from Dr Clark's office. Notified her that pt will need a stress test and he is hard to reach via phone. If she happens to liliana with him she will have him call office.    My chart message sent to him.

## 2025-01-07 NOTE — TELEPHONE ENCOUNTER
Patient called back. Gave him central scheduling number to call office and set up test for the week of 1/20.

## 2025-01-20 ENCOUNTER — HOSPITAL ENCOUNTER (OUTPATIENT)
Age: 70
Discharge: HOME | End: 2025-01-22
Attending: INTERNAL MEDICINE
Payer: MEDICARE

## 2025-01-20 VITALS — HEART RATE: 100 BPM | SYSTOLIC BLOOD PRESSURE: 146 MMHG | DIASTOLIC BLOOD PRESSURE: 92 MMHG | RESPIRATION RATE: 12 BRPM

## 2025-01-20 VITALS — BODY MASS INDEX: 28.25 KG/M2 | WEIGHT: 180 LBS | HEIGHT: 67 IN

## 2025-01-20 DIAGNOSIS — I25.83 CORONARY ARTERY DISEASE DUE TO LIPID RICH PLAQUE: ICD-10-CM

## 2025-01-20 DIAGNOSIS — I25.10 CORONARY ARTERY DISEASE DUE TO LIPID RICH PLAQUE: ICD-10-CM

## 2025-01-20 DIAGNOSIS — R07.9 CHEST PAIN, UNSPECIFIED TYPE: ICD-10-CM

## 2025-01-20 LAB
ECHO BSA: 1.96 M2
NUC STRESS EJECTION FRACTION: 52 %
NUC STRESS LV EDV: 107 ML (ref 67–155)
NUC STRESS LV ESV: 51 ML (ref 22–58)
NUC STRESS LV MASS: 139 G
STRESS BASELINE DIAS BP: 92 MMHG
STRESS BASELINE HR: 93 BPM
STRESS BASELINE SYS BP: 146 MMHG
STRESS ESTIMATED WORKLOAD: 1 METS
STRESS EXERCISE DUR SEC: 4 SEC
STRESS O2 SAT PEAK: 99 %
STRESS O2 SAT REST: 99 %
STRESS PEAK DIAS BP: 81 MMHG
STRESS PEAK SYS BP: 166 MMHG
STRESS PERCENT HR ACHIEVED: 79 %
STRESS POST PEAK HR: 120 BPM
STRESS RATE PRESSURE PRODUCT: NORMAL BPM*MMHG
STRESS TARGET HR: 151 BPM
TID: 0.91

## 2025-01-20 PROCEDURE — 3430000000 HC RX DIAGNOSTIC RADIOPHARMACEUTICAL: Performed by: INTERNAL MEDICINE

## 2025-01-20 PROCEDURE — 93018 CV STRESS TEST I&R ONLY: CPT | Performed by: INTERNAL MEDICINE

## 2025-01-20 PROCEDURE — A9502 TC99M TETROFOSMIN: HCPCS | Performed by: INTERNAL MEDICINE

## 2025-01-20 PROCEDURE — 93017 CV STRESS TEST TRACING ONLY: CPT

## 2025-01-20 PROCEDURE — 93016 CV STRESS TEST SUPVJ ONLY: CPT | Performed by: INTERNAL MEDICINE

## 2025-01-20 PROCEDURE — 78452 HT MUSCLE IMAGE SPECT MULT: CPT | Performed by: INTERNAL MEDICINE

## 2025-01-20 PROCEDURE — 6360000002 HC RX W HCPCS: Performed by: INTERNAL MEDICINE

## 2025-01-20 PROCEDURE — 78452 HT MUSCLE IMAGE SPECT MULT: CPT

## 2025-01-20 RX ORDER — REGADENOSON 0.08 MG/ML
0.4 INJECTION, SOLUTION INTRAVENOUS
Status: COMPLETED | OUTPATIENT
Start: 2025-01-20 | End: 2025-01-20

## 2025-01-20 RX ADMIN — TETROFOSMIN 11 MILLICURIE: 1.38 INJECTION, POWDER, LYOPHILIZED, FOR SOLUTION INTRAVENOUS at 07:51

## 2025-01-20 RX ADMIN — REGADENOSON 0.4 MG: 0.08 INJECTION, SOLUTION INTRAVENOUS at 09:24

## 2025-01-20 RX ADMIN — TETROFOSMIN 32.9 MILLICURIE: 1.38 INJECTION, POWDER, LYOPHILIZED, FOR SOLUTION INTRAVENOUS at 09:26

## 2025-01-22 ENCOUNTER — OFFICE VISIT (OUTPATIENT)
Dept: PAIN MANAGEMENT | Age: 70
End: 2025-01-22

## 2025-01-22 VITALS
WEIGHT: 186 LBS | HEART RATE: 87 BPM | SYSTOLIC BLOOD PRESSURE: 137 MMHG | DIASTOLIC BLOOD PRESSURE: 93 MMHG | BODY MASS INDEX: 29.13 KG/M2 | OXYGEN SATURATION: 98 %

## 2025-01-22 DIAGNOSIS — F43.22 ADJUSTMENT DISORDER WITH ANXIETY: ICD-10-CM

## 2025-01-22 DIAGNOSIS — G89.4 CHRONIC PAIN SYNDROME: ICD-10-CM

## 2025-01-22 DIAGNOSIS — Z51.81 ENCOUNTER FOR THERAPEUTIC DRUG MONITORING: ICD-10-CM

## 2025-01-22 DIAGNOSIS — C18.2 MALIGNANT NEOPLASM OF ASCENDING COLON (HCC): Primary | ICD-10-CM

## 2025-01-22 DIAGNOSIS — M47.812 CERVICAL SPONDYLOSIS: ICD-10-CM

## 2025-01-22 DIAGNOSIS — K56.600 PARTIAL OBSTRUCTION OF COLON (HCC): ICD-10-CM

## 2025-01-22 DIAGNOSIS — M50.20 DISPLACEMENT OF CERVICAL INTERVERTEBRAL DISC WITHOUT MYELOPATHY: ICD-10-CM

## 2025-01-22 NOTE — PROGRESS NOTES
Ashutosh Shaffer  1955  [id number]      HISTORY OF PRESENT ILLNESS: Mr. Shaffer is a 69 y.o. male returns for a follow up visit for pain management  He has a diagnosis of   1. Malignant neoplasm of ascending colon (HCC)    2. Displacement of cervical intervertebral disc without myelopathy    3. Partial obstruction of colon (HCC)    4. Cervical spondylosis    5. Encounter for therapeutic drug monitoring    6. Adjustment disorder with anxiety    7. Chronic pain syndrome    .      New Medications since Last Office visit have been reviewed with patient.     As per Information Obtained from the PADT (Patient Assessment and Documentation Tool)    He complains of pain in the neck. He rates the pain 5/10 and describes it as aching, numbness. Current treatment regimen has helped relieve about 50% of the pain since beginning treatment plan.  He denies any side effects from the current pain regimen. Patient reports that since implementation of their treatment plan; their physical functioning is worse, family/social relationships are unchanged, mood is unchanged sleep patterns are worse, and that the overall functioning is unchanged.  Patient denies/admits that any of the above have changed since last office visit. Patient denies misusing/abusing his narcotic pain medications or using any illegal drugs.      Upon obtaining medical history from Mr. Shaffer    ALLERGIES: Patients list of allergies were reviewed     MEDICATIONS: Mr. Shaffer list of medications were reviewed.His current medications are   Outpatient Medications Prior to Visit   Medication Sig Dispense Refill    Evolocumab (REPATHA SURECLICK) 140 MG/ML SOAJ Inject 140 mg into the skin every 14 days 6 mL 4    metoprolol succinate (TOPROL XL) 50 MG extended release tablet Take 1 tablet by mouth daily 90 tablet 4    metFORMIN (GLUCOPHAGE) 500 MG tablet Take 2 tablets by mouth 2 times daily (with meals)      aspirin 325 MG tablet Take 1 tablet by mouth daily

## 2025-02-03 ENCOUNTER — TELEPHONE (OUTPATIENT)
Dept: SURGERY | Age: 70
End: 2025-02-03

## 2025-02-03 NOTE — TELEPHONE ENCOUNTER
Patient called the surgery dept at the hospital requesting an appointment with Dr. Collazo. Called patient to schedule, VM full.

## 2025-02-05 ENCOUNTER — OFFICE VISIT (OUTPATIENT)
Dept: PRIMARY CARE CLINIC | Age: 70
End: 2025-02-05
Payer: MEDICARE

## 2025-02-05 VITALS
DIASTOLIC BLOOD PRESSURE: 78 MMHG | OXYGEN SATURATION: 98 % | WEIGHT: 184 LBS | BODY MASS INDEX: 28.88 KG/M2 | TEMPERATURE: 98.1 F | HEIGHT: 67 IN | HEART RATE: 87 BPM | SYSTOLIC BLOOD PRESSURE: 114 MMHG

## 2025-02-05 DIAGNOSIS — K40.90 RIGHT INGUINAL HERNIA: Primary | ICD-10-CM

## 2025-02-05 DIAGNOSIS — N40.1 BENIGN PROSTATIC HYPERPLASIA WITH LOWER URINARY TRACT SYMPTOMS, SYMPTOM DETAILS UNSPECIFIED: ICD-10-CM

## 2025-02-05 PROCEDURE — 3078F DIAST BP <80 MM HG: CPT | Performed by: STUDENT IN AN ORGANIZED HEALTH CARE EDUCATION/TRAINING PROGRAM

## 2025-02-05 PROCEDURE — 3074F SYST BP LT 130 MM HG: CPT | Performed by: STUDENT IN AN ORGANIZED HEALTH CARE EDUCATION/TRAINING PROGRAM

## 2025-02-05 PROCEDURE — 99204 OFFICE O/P NEW MOD 45 MIN: CPT | Performed by: STUDENT IN AN ORGANIZED HEALTH CARE EDUCATION/TRAINING PROGRAM

## 2025-02-05 PROCEDURE — 1123F ACP DISCUSS/DSCN MKR DOCD: CPT | Performed by: STUDENT IN AN ORGANIZED HEALTH CARE EDUCATION/TRAINING PROGRAM

## 2025-02-05 RX ORDER — TAMSULOSIN HYDROCHLORIDE 0.4 MG/1
0.4 CAPSULE ORAL DAILY
Qty: 90 CAPSULE | Refills: 1 | Status: SHIPPED | OUTPATIENT
Start: 2025-02-05

## 2025-02-05 SDOH — ECONOMIC STABILITY: FOOD INSECURITY: WITHIN THE PAST 12 MONTHS, YOU WORRIED THAT YOUR FOOD WOULD RUN OUT BEFORE YOU GOT MONEY TO BUY MORE.: NEVER TRUE

## 2025-02-05 SDOH — ECONOMIC STABILITY: FOOD INSECURITY: WITHIN THE PAST 12 MONTHS, THE FOOD YOU BOUGHT JUST DIDN'T LAST AND YOU DIDN'T HAVE MONEY TO GET MORE.: NEVER TRUE

## 2025-02-05 ASSESSMENT — PATIENT HEALTH QUESTIONNAIRE - PHQ9
SUM OF ALL RESPONSES TO PHQ QUESTIONS 1-9: 0
2. FEELING DOWN, DEPRESSED OR HOPELESS: NOT AT ALL
SUM OF ALL RESPONSES TO PHQ QUESTIONS 1-9: 0
SUM OF ALL RESPONSES TO PHQ QUESTIONS 1-9: 0
SUM OF ALL RESPONSES TO PHQ9 QUESTIONS 1 & 2: 0
1. LITTLE INTEREST OR PLEASURE IN DOING THINGS: NOT AT ALL
SUM OF ALL RESPONSES TO PHQ QUESTIONS 1-9: 0

## 2025-02-05 NOTE — PROGRESS NOTES
Office Note  2025  Patient Name: Ashutosh Shaffer  MRN: [id number] : 1955    SUBJECTIVE:     CHIEF COMPLAINT:  Chief Complaint   Patient presents with    New Patient     Here to [facility]  Hernia repair (referral)       HISTORY OF PRESENT ILLNESS:  He presents today with the following concerns:    Right inguinal hernia:  -Patient presents today to see if he can get a referral for a new general surgeon for his right inguinal hernia  -Patient states that this was first noticed about 4 months ago and has continued to progress and worsen.  Pain is sharp at times causing him to stay in bed for most of the day.  He does not make the pain worse include walking and standing.  Patient does not feel that the lump is reducible all the time.  Denies any issues with constipation or difficulty urinating as long as he is on his Tamsulosin.    Patient also has a history of adenocarcinoma of the ascending colon with mets to the liver and is currently under IV chemotherapy    Patient also follows with pain management for neck pain    Patient states that he was told he was meant to undergo surgery as of January of this year but states that the office contacted him in regards to setting up a schedule for next year    Patient would like a new referral to be seen by another surgeon to have this taken a look at sooner    Patient does have a history of hernia repair over 30 years ago.      Past Medical History:  Past Medical History:   Diagnosis Date    Anxiety     Atrial fibrillation (HCC)     Cervical disc herniation     Cervical spondylosis 2022    Coronary artery disease due to lipid rich plaque 2020    HLD (hyperlipidemia) 2021    HTN (hypertension) 2013    Malignant neoplasm of ascending colon (HCC) 2023    Stage 4: currently on IV chemotherapy    Neuropathy     Obesity (BMI 30-39.9) 2020    Post traumatic stress disorder     Pre-diabetes     Rotator cuff (capsule) sprain and strain

## 2025-02-10 ENCOUNTER — HOSPITAL ENCOUNTER (EMERGENCY)
Age: 70
Discharge: HOME | End: 2025-02-10
Attending: EMERGENCY MEDICINE
Payer: MEDICARE

## 2025-02-10 ENCOUNTER — APPOINTMENT (OUTPATIENT)
Dept: GENERAL RADIOLOGY | Age: 70
End: 2025-02-10
Payer: MEDICARE

## 2025-02-10 ENCOUNTER — APPOINTMENT (OUTPATIENT)
Dept: CT IMAGING | Age: 70
End: 2025-02-10
Payer: MEDICARE

## 2025-02-10 VITALS
OXYGEN SATURATION: 98 % | HEART RATE: 68 BPM | RESPIRATION RATE: 16 BRPM | SYSTOLIC BLOOD PRESSURE: 130 MMHG | TEMPERATURE: 98 F | HEIGHT: 67 IN | DIASTOLIC BLOOD PRESSURE: 67 MMHG | BODY MASS INDEX: 28.88 KG/M2 | WEIGHT: 184 LBS

## 2025-02-10 DIAGNOSIS — M79.89 SWELLING OF LEFT FOOT: ICD-10-CM

## 2025-02-10 DIAGNOSIS — M79.672 LEFT FOOT PAIN: Primary | ICD-10-CM

## 2025-02-10 LAB
ALBUMIN SERPL-MCNC: 3.8 G/DL (ref 3.4–5)
ALBUMIN/GLOB SERPL: 1.2 {RATIO} (ref 1.1–2.2)
ALP SERPL-CCNC: 252 U/L (ref 40–129)
ALT SERPL-CCNC: 18 U/L (ref 10–40)
ANION GAP SERPL CALCULATED.3IONS-SCNC: 9 MMOL/L (ref 3–16)
AST SERPL-CCNC: 31 U/L (ref 15–37)
BASOPHILS # BLD: 0 K/UL (ref 0–0.2)
BASOPHILS NFR BLD: 0.3 %
BILIRUB SERPL-MCNC: 0.5 MG/DL (ref 0–1)
BUN SERPL-MCNC: 15 MG/DL (ref 7–20)
CALCIUM SERPL-MCNC: 9 MG/DL (ref 8.3–10.6)
CHLORIDE SERPL-SCNC: 107 MMOL/L (ref 99–110)
CO2 SERPL-SCNC: 23 MMOL/L (ref 21–32)
CREAT SERPL-MCNC: 1.1 MG/DL (ref 0.8–1.3)
CRP SERPL-MCNC: <3 MG/L (ref 0–5.1)
DEPRECATED RDW RBC AUTO: 17.6 % (ref 12.4–15.4)
EOSINOPHIL # BLD: 0 K/UL (ref 0–0.6)
EOSINOPHIL NFR BLD: 0.1 %
ERYTHROCYTE [SEDIMENTATION RATE] IN BLOOD BY WESTERGREN METHOD: 16 MM/HR (ref 0–20)
GFR SERPLBLD CREATININE-BSD FMLA CKD-EPI: 73 ML/MIN/{1.73_M2}
GLUCOSE SERPL-MCNC: 158 MG/DL (ref 70–99)
HCT VFR BLD AUTO: 40.6 % (ref 40.5–52.5)
HGB BLD-MCNC: 13.5 G/DL (ref 13.5–17.5)
LYMPHOCYTES # BLD: 0.4 K/UL (ref 1–5.1)
LYMPHOCYTES NFR BLD: 2.5 %
MCH RBC QN AUTO: 33.2 PG (ref 26–34)
MCHC RBC AUTO-ENTMCNC: 33.2 G/DL (ref 31–36)
MCV RBC AUTO: 100.1 FL (ref 80–100)
MONOCYTES # BLD: 0.2 K/UL (ref 0–1.3)
MONOCYTES NFR BLD: 1.7 %
NEUTROPHILS # BLD: 14 K/UL (ref 1.7–7.7)
NEUTROPHILS NFR BLD: 95.4 %
PLATELET # BLD AUTO: 110 K/UL (ref 135–450)
PMV BLD AUTO: 8 FL (ref 5–10.5)
POTASSIUM SERPL-SCNC: 5.3 MMOL/L (ref 3.5–5.1)
PROT SERPL-MCNC: 7 G/DL (ref 6.4–8.2)
RBC # BLD AUTO: 4.06 M/UL (ref 4.2–5.9)
SODIUM SERPL-SCNC: 139 MMOL/L (ref 136–145)
WBC # BLD AUTO: 14.6 K/UL (ref 4–11)

## 2025-02-10 PROCEDURE — 86140 C-REACTIVE PROTEIN: CPT

## 2025-02-10 PROCEDURE — 75635 CT ANGIO ABDOMINAL ARTERIES: CPT

## 2025-02-10 PROCEDURE — 6360000004 HC RX CONTRAST MEDICATION: Performed by: PHYSICIAN ASSISTANT

## 2025-02-10 PROCEDURE — 85652 RBC SED RATE AUTOMATED: CPT

## 2025-02-10 PROCEDURE — 85025 COMPLETE CBC W/AUTO DIFF WBC: CPT

## 2025-02-10 PROCEDURE — 99285 EMERGENCY DEPT VISIT HI MDM: CPT

## 2025-02-10 PROCEDURE — 80053 COMPREHEN METABOLIC PANEL: CPT

## 2025-02-10 PROCEDURE — 73630 X-RAY EXAM OF FOOT: CPT

## 2025-02-10 RX ORDER — IOPAMIDOL 755 MG/ML
120 INJECTION, SOLUTION INTRAVASCULAR
Status: COMPLETED | OUTPATIENT
Start: 2025-02-10 | End: 2025-02-10

## 2025-02-10 RX ADMIN — IOPAMIDOL 120 ML: 755 INJECTION, SOLUTION INTRAVENOUS at 19:25

## 2025-02-10 ASSESSMENT — ENCOUNTER SYMPTOMS
SHORTNESS OF BREATH: 0
COUGH: 0
NAUSEA: 0
VOMITING: 0
DIARRHEA: 0
ABDOMINAL PAIN: 0
CHEST TIGHTNESS: 0
BACK PAIN: 0

## 2025-02-10 ASSESSMENT — PAIN DESCRIPTION - DESCRIPTORS: DESCRIPTORS: DISCOMFORT

## 2025-02-10 ASSESSMENT — PAIN DESCRIPTION - ORIENTATION: ORIENTATION: LEFT

## 2025-02-10 ASSESSMENT — PAIN SCALES - GENERAL: PAINLEVEL_OUTOF10: 8

## 2025-02-10 ASSESSMENT — PAIN DESCRIPTION - LOCATION: LOCATION: FOOT

## 2025-02-10 ASSESSMENT — PAIN - FUNCTIONAL ASSESSMENT: PAIN_FUNCTIONAL_ASSESSMENT: 0-10

## 2025-02-11 ENCOUNTER — OFFICE VISIT (OUTPATIENT)
Dept: CARDIOLOGY CLINIC | Age: 70
End: 2025-02-11
Payer: MEDICARE

## 2025-02-11 VITALS
WEIGHT: 189 LBS | DIASTOLIC BLOOD PRESSURE: 64 MMHG | BODY MASS INDEX: 29.66 KG/M2 | HEART RATE: 70 BPM | SYSTOLIC BLOOD PRESSURE: 120 MMHG | OXYGEN SATURATION: 98 % | HEIGHT: 67 IN

## 2025-02-11 DIAGNOSIS — I10 ESSENTIAL HYPERTENSION: Chronic | ICD-10-CM

## 2025-02-11 DIAGNOSIS — Z95.1 HISTORY OF CORONARY ARTERY BYPASS GRAFT X 3: ICD-10-CM

## 2025-02-11 DIAGNOSIS — I50.22 CHRONIC SYSTOLIC HEART FAILURE (HCC): Chronic | ICD-10-CM

## 2025-02-11 DIAGNOSIS — I48.91 POSTOPERATIVE ATRIAL FIBRILLATION (HCC): ICD-10-CM

## 2025-02-11 DIAGNOSIS — I97.89 POSTOPERATIVE ATRIAL FIBRILLATION (HCC): ICD-10-CM

## 2025-02-11 DIAGNOSIS — E78.2 MIXED HYPERLIPIDEMIA: ICD-10-CM

## 2025-02-11 DIAGNOSIS — G47.33 OSA (OBSTRUCTIVE SLEEP APNEA): ICD-10-CM

## 2025-02-11 DIAGNOSIS — I25.10 CAD IN NATIVE ARTERY: Primary | ICD-10-CM

## 2025-02-11 DIAGNOSIS — I45.10 RBBB: ICD-10-CM

## 2025-02-11 PROCEDURE — 99214 OFFICE O/P EST MOD 30 MIN: CPT | Performed by: INTERNAL MEDICINE

## 2025-02-11 PROCEDURE — 3074F SYST BP LT 130 MM HG: CPT | Performed by: INTERNAL MEDICINE

## 2025-02-11 PROCEDURE — 3078F DIAST BP <80 MM HG: CPT | Performed by: INTERNAL MEDICINE

## 2025-02-11 PROCEDURE — 1123F ACP DISCUSS/DSCN MKR DOCD: CPT | Performed by: INTERNAL MEDICINE

## 2025-02-11 RX ORDER — METOPROLOL SUCCINATE 50 MG/1
50 TABLET, EXTENDED RELEASE ORAL DAILY
Qty: 90 TABLET | Refills: 4 | Status: SHIPPED | OUTPATIENT
Start: 2025-02-11

## 2025-02-11 NOTE — PROGRESS NOTES
[facility]  Cardiac Consult     Referring Provider:  Annmarie Paris DO     Chief Complaint   Patient presents with    3 Month Follow-Up    Coronary Artery Disease    Hypertension        History of Present Illness:  69 y.o. male seen in f/u for CAD s/p CABG.     He underwent CABG 11/16/2020 for multivessel CAD. LV function normal on cath, mildly decreased with inferior HK on ECHO. He had post-op afib treated with short term amiodarone and was placed on coumadin. No obvious or symptomatic recurrence. He did undergo SCOTT clipping with surgery.     He was diagnosed with Stage 4 colon CA. Undergoing chemo.     He seems to be doing slightly better.  He denies chest pain or shortness of breath at this time.  He was having pain in his left foot when he would put weight on it.  He went to the emergency room for evaluation there was negative.  His foot is feeling better at this time.  Prior to his colon cancer diagnosis would started Repatha.  He never took that however because he was diagnosed with colon cancer and had \"so much going on\".    Past Medical History:   has a past medical history of Anxiety, Atrial fibrillation (HCC), Cervical disc herniation, Cervical spondylosis, Coronary artery disease due to lipid rich plaque, HLD (hyperlipidemia), HTN (hypertension), Malignant neoplasm of ascending colon (HCC), Neuropathy, Obesity (BMI 30-39.9), Post traumatic stress disorder, Pre-diabetes, and Rotator cuff (capsule) sprain and strain.    Surgical History:   has a past surgical history that includes Ankle surgery (Right); hernia repair (Right); joint replacement; Coronary artery bypass graft (N/A, 11/16/2020); Colonoscopy (N/A, 06/01/2023); Colonoscopy (06/01/2023); Port Surgery (Left, 06/02/2023); Upper gastrointestinal endoscopy (N/A, 08/21/2024); Upper gastrointestinal endoscopy (N/A, 08/21/2024); and Cardiac catheterization (11/12/2020).     Social History:  Social History     Tobacco Use    Smoking status:

## 2025-02-11 NOTE — ED PROVIDER NOTES
In addition to the advanced practice provider, I personally saw Ashutosh Shaffer and performed a substantive portion of the visit including all aspects of the medical decision making.    Medical Decision Making  Patient seen and evaluated at bedside. Briefly, patient is presenting with left foot pain that has been ongoing for the last few days or so but worsened today. He has 2+ dorsalis pedis and posterior tibial pulses on bilateral lower extremity. CTA abdominal aorta with bilateral runoff shows widely patent arteries in the bilateral lower extremities. X-ray of the left foot negative for acute fracture or dislocation, but did show mild to moderate plantar calcaneal spurring.  Patient was notified of his imaging results.  He is feeling better after treatment and reexamination.  Inflammatory markers, CRP and ECR, were nonelevated and essentially rules out osteomyelitis.  I do suspect his pain is secondary to possible arthritis and/or this moderate plantar calcaneal spurring that he has.  He was provided with podiatry follow-up and advised to continue supportive care at home with rest, ice and elevation of the left lower extremity.  Patient understands and agrees with plan going forward.  He stable for discharge at this time.    EKG  N/A    SEP-1  Is this patient to be included in the SEP-1 Core Measure due to severe sepsis or septic shock?   No     Exclusion criteria - the patient is NOT to be included for SEP-1 Core Measure due to:  2+ SIRS criteria are not met    Screenings     [place] Coma Scale  Eye Opening: Spontaneous  Best Verbal Response: Oriented  Best Motor Response: Obeys commands  Pily Coma Scale Score: 15             Patient Referrals:  Annmarie Paris DO  [address]  [facility] [address] [phone]    Schedule an appointment as soon as possible for a visit       [facility] Emergency Department  [address]  [facility] [address] [phone]  Go to   As needed, If symptoms 
light-headedness, numbness and headaches.   All other systems reviewed and are negative.      Positives and Pertinent negatives as per HPI.     SURGICAL HISTORY     Past Surgical History:   Procedure Laterality Date    ANKLE SURGERY Right     around 2000    CARDIAC CATHETERIZATION  11/12/2020    Dr. dawn; Co dominant, LM dist 30%, LAD mid 70% and dist 100%, Diag 1 80% prox, Cx prox 100%, OM1 100% prox, % prox    COLONOSCOPY N/A 06/01/2023    COLONOSCOPY WITH BIOPSY performed by Juan Pablo Colon MD at [facility] ENDOSCOPY    COLONOSCOPY  06/01/2023    COLONOSCOPY POLYPECTOMY SNARE/COLD BIOPSY performed by Juan Pablo Colon MD at [facility] ENDOSCOPY    CORONARY ARTERY BYPASS GRAFT N/A 11/16/2020    MALATHI. TCPB. RT EVH. CABG x 3, LIMA TO DISTAL LAD; SVG TO OM1; SVG TO RPDA. SCOTT CLIP WITH 45mm  ATRICLIP. PLACEMENT OF TEMPORARY VENTRICULAR PACING WIRE. DOPPLER FLOW VERIFICATION OF GRAFTS. BILATERAL INTERCOSTAL NERVE BLOCK WITH EXPAREL & MARCAINE. performed by Spike Viera MD at [facility] CVOR    HERNIA REPAIR Right     1980's    JOINT REPLACEMENT      Right shoulder     PORT SURGERY Left 06/02/2023    PORT A CATH PLACEMENT performed by Kenneth Collazo MD at [facility] OR    UPPER GASTROINTESTINAL ENDOSCOPY N/A 08/21/2024    ESOPHAGOGASTRODUODENOSCOPY BIOPSY performed by Ganesh Mijares MD at [facility] ENDOSCOPY    UPPER GASTROINTESTINAL ENDOSCOPY N/A 08/21/2024    ESOPHAGOGASTRODUODENOSCOPY DILATION BALLOON 18-20MM +HEME performed by Ganesh Mijares MD at [facility] ENDOSCOPY       CURRENTMEDICATIONS       Previous Medications    ASPIRIN 325 MG TABLET    Take 1 tablet by mouth daily    BLOOD GLUCOSE MONITOR STRIPS    Test 2 times a day & as needed for symptoms of irregular blood glucose. Dispense sufficient amount for indicated testing frequency plus additional to accommodate PRN testing needs.    EVOLOCUMAB (REPATHA SURECLICK) 140 MG/ML SOAJ    Inject 140 mg into the skin every 14 days    LANCETS MISC    1

## 2025-02-17 ENCOUNTER — OFFICE VISIT (OUTPATIENT)
Dept: SURGERY | Age: 70
End: 2025-02-17
Payer: MEDICARE

## 2025-02-17 VITALS
BODY MASS INDEX: 28.91 KG/M2 | DIASTOLIC BLOOD PRESSURE: 82 MMHG | WEIGHT: 184.2 LBS | HEIGHT: 67 IN | SYSTOLIC BLOOD PRESSURE: 126 MMHG

## 2025-02-17 DIAGNOSIS — K40.91 UNILATERAL RECURRENT INGUINAL HERNIA WITHOUT OBSTRUCTION OR GANGRENE: Primary | ICD-10-CM

## 2025-02-17 PROCEDURE — 3074F SYST BP LT 130 MM HG: CPT | Performed by: SURGERY

## 2025-02-17 PROCEDURE — 1123F ACP DISCUSS/DSCN MKR DOCD: CPT | Performed by: SURGERY

## 2025-02-17 PROCEDURE — 99204 OFFICE O/P NEW MOD 45 MIN: CPT | Performed by: SURGERY

## 2025-02-17 PROCEDURE — 3079F DIAST BP 80-89 MM HG: CPT | Performed by: SURGERY

## 2025-02-17 ASSESSMENT — ENCOUNTER SYMPTOMS
EYE ITCHING: 0
APNEA: 0
ABDOMINAL DISTENTION: 0
COLOR CHANGE: 0
ABDOMINAL PAIN: 1
BACK PAIN: 0
EYE DISCHARGE: 0
CHEST TIGHTNESS: 0

## 2025-02-17 NOTE — PROGRESS NOTES
or equivalent per week    Drug use: No    Sexual activity: Not Currently     Partners: Female   Other Topics Concern    Not on file   Social History Narrative    Not on file     Social Determinants of Health     Financial Resource Strain: Not on file   Food Insecurity: No Food Insecurity (2/5/2025)    Hunger Vital Sign     Worried About Running Out of Food in the Last Year: Never true     Ran Out of Food in the Last Year: Never true   Transportation Needs: No Transportation Needs (2/5/2025)    PRAPARE - Transportation     Lack of Transportation (Medical): No     Lack of Transportation (Non-Medical): No   Physical Activity: Not on file   Stress: Not on file   Social Connections: Not on file   Intimate Partner Violence: Not on file   Housing Stability: Low Risk  (2/5/2025)    Housing Stability Vital Sign     Unable to Pay for Housing in the Last Year: No     Number of Times Moved in the Last Year: 0     Homeless in the Last Year: No       Review of Systems   Constitutional:  Negative for activity change and appetite change.   HENT:  Negative for congestion and dental problem.    Eyes:  Negative for discharge and itching.   Respiratory:  Negative for apnea and chest tightness.    Cardiovascular:  Negative for chest pain and leg swelling.   Gastrointestinal:  Positive for abdominal pain. Negative for abdominal distention.   Endocrine: Negative for cold intolerance and heat intolerance.   Genitourinary:  Negative for difficulty urinating and dysuria.   Musculoskeletal:  Negative for arthralgias and back pain.   Skin:  Negative for color change and pallor.   Allergic/Immunologic: Negative for environmental allergies and food allergies.   Neurological:  Negative for dizziness and facial asymmetry.   Hematological:  Negative for adenopathy. Does not bruise/bleed easily.   Psychiatric/Behavioral:  Negative for agitation and behavioral problems.        :   Physical Exam  Constitutional:       Appearance: He is well-developed.

## 2025-02-24 ENCOUNTER — PREP FOR PROCEDURE (OUTPATIENT)
Dept: SURGERY | Age: 70
End: 2025-02-24

## 2025-02-24 DIAGNOSIS — K76.9 LIVER LESION: ICD-10-CM

## 2025-02-24 PROBLEM — K40.20 BILATERAL INGUINAL HERNIA: Status: ACTIVE | Noted: 2025-02-24

## 2025-03-12 RX ORDER — ACETAMINOPHEN 500 MG
500 TABLET ORAL EVERY 6 HOURS PRN
COMMUNITY

## 2025-03-12 RX ORDER — ACETAMINOPHEN AND CODEINE PHOSPHATE 300; 60 MG/1; MG/1
1 TABLET ORAL EVERY 4 HOURS PRN
COMMUNITY

## 2025-03-12 RX ORDER — ASPIRIN 81 MG/1
81 TABLET ORAL DAILY
COMMUNITY

## 2025-03-12 NOTE — PROGRESS NOTES
Patient here @ admitting. States unable to reach PAT. States he never set up vm and is unable to acess system.

## 2025-03-19 ENCOUNTER — HOSPITAL ENCOUNTER (OUTPATIENT)
Age: 70
Setting detail: OUTPATIENT SURGERY
Discharge: HOME | End: 2025-03-19
Attending: SURGERY | Admitting: SURGERY
Payer: MEDICARE

## 2025-03-19 ENCOUNTER — ANESTHESIA EVENT (OUTPATIENT)
Dept: OPERATING ROOM | Age: 70
End: 2025-03-19
Payer: MEDICARE

## 2025-03-19 ENCOUNTER — ANESTHESIA (OUTPATIENT)
Dept: OPERATING ROOM | Age: 70
End: 2025-03-19
Payer: MEDICARE

## 2025-03-19 VITALS
TEMPERATURE: 97 F | HEIGHT: 67 IN | DIASTOLIC BLOOD PRESSURE: 66 MMHG | WEIGHT: 192.4 LBS | HEART RATE: 75 BPM | OXYGEN SATURATION: 95 % | BODY MASS INDEX: 30.2 KG/M2 | SYSTOLIC BLOOD PRESSURE: 108 MMHG | RESPIRATION RATE: 15 BRPM

## 2025-03-19 DIAGNOSIS — K40.90 NON-RECURRENT UNILATERAL INGUINAL HERNIA WITHOUT OBSTRUCTION OR GANGRENE: Primary | ICD-10-CM

## 2025-03-19 LAB
ANION GAP SERPL CALCULATED.3IONS-SCNC: 10 MMOL/L (ref 3–16)
BUN SERPL-MCNC: 19 MG/DL (ref 7–20)
CALCIUM SERPL-MCNC: 9.4 MG/DL (ref 8.3–10.6)
CHLORIDE SERPL-SCNC: 107 MMOL/L (ref 99–110)
CO2 SERPL-SCNC: 25 MMOL/L (ref 21–32)
CREAT SERPL-MCNC: 1 MG/DL (ref 0.8–1.3)
GFR SERPLBLD CREATININE-BSD FMLA CKD-EPI: 81 ML/MIN/{1.73_M2}
GLUCOSE BLD-MCNC: 120 MG/DL (ref 70–99)
GLUCOSE SERPL-MCNC: 131 MG/DL (ref 70–99)
PERFORMED ON: ABNORMAL
POTASSIUM SERPL-SCNC: 4.5 MMOL/L (ref 3.5–5.1)
SODIUM SERPL-SCNC: 142 MMOL/L (ref 136–145)

## 2025-03-19 PROCEDURE — 2580000003 HC RX 258: Performed by: NURSE ANESTHETIST, CERTIFIED REGISTERED

## 2025-03-19 PROCEDURE — S2900 ROBOTIC SURGICAL SYSTEM: HCPCS | Performed by: SURGERY

## 2025-03-19 PROCEDURE — 7100000000 HC PACU RECOVERY - FIRST 15 MIN: Performed by: SURGERY

## 2025-03-19 PROCEDURE — 3700000000 HC ANESTHESIA ATTENDED CARE: Performed by: SURGERY

## 2025-03-19 PROCEDURE — 7100000011 HC PHASE II RECOVERY - ADDTL 15 MIN: Performed by: SURGERY

## 2025-03-19 PROCEDURE — 3600000009 HC SURGERY ROBOT BASE: Performed by: SURGERY

## 2025-03-19 PROCEDURE — 6370000000 HC RX 637 (ALT 250 FOR IP)

## 2025-03-19 PROCEDURE — 3700000001 HC ADD 15 MINUTES (ANESTHESIA): Performed by: SURGERY

## 2025-03-19 PROCEDURE — 6360000002 HC RX W HCPCS: Performed by: SURGERY

## 2025-03-19 PROCEDURE — 2709999900 HC NON-CHARGEABLE SUPPLY: Performed by: SURGERY

## 2025-03-19 PROCEDURE — 49650 LAP ING HERNIA REPAIR INIT: CPT | Performed by: SURGERY

## 2025-03-19 PROCEDURE — 2500000003 HC RX 250 WO HCPCS: Performed by: NURSE ANESTHETIST, CERTIFIED REGISTERED

## 2025-03-19 PROCEDURE — 80048 BASIC METABOLIC PNL TOTAL CA: CPT

## 2025-03-19 PROCEDURE — 7100000001 HC PACU RECOVERY - ADDTL 15 MIN: Performed by: SURGERY

## 2025-03-19 PROCEDURE — 3600000019 HC SURGERY ROBOT ADDTL 15MIN: Performed by: SURGERY

## 2025-03-19 PROCEDURE — 6360000002 HC RX W HCPCS: Performed by: NURSE ANESTHETIST, CERTIFIED REGISTERED

## 2025-03-19 PROCEDURE — 2500000003 HC RX 250 WO HCPCS: Performed by: SURGERY

## 2025-03-19 PROCEDURE — 36415 COLL VENOUS BLD VENIPUNCTURE: CPT

## 2025-03-19 PROCEDURE — C1781 MESH (IMPLANTABLE): HCPCS | Performed by: SURGERY

## 2025-03-19 PROCEDURE — 7100000010 HC PHASE II RECOVERY - FIRST 15 MIN: Performed by: SURGERY

## 2025-03-19 DEVICE — MESH HERN W10XL15CM PET PLA 70% CLLGN 30% GLYC LAP SELF: Type: IMPLANTABLE DEVICE | Site: GROIN | Status: FUNCTIONAL

## 2025-03-19 RX ORDER — DEXAMETHASONE SODIUM PHOSPHATE 4 MG/ML
INJECTION, SOLUTION INTRA-ARTICULAR; INTRALESIONAL; INTRAMUSCULAR; INTRAVENOUS; SOFT TISSUE
Status: DISCONTINUED | OUTPATIENT
Start: 2025-03-19 | End: 2025-03-19 | Stop reason: SDUPTHER

## 2025-03-19 RX ORDER — SODIUM CHLORIDE 0.9 % (FLUSH) 0.9 %
5-40 SYRINGE (ML) INJECTION EVERY 12 HOURS SCHEDULED
Status: DISCONTINUED | OUTPATIENT
Start: 2025-03-19 | End: 2025-03-19 | Stop reason: HOSPADM

## 2025-03-19 RX ORDER — FENTANYL CITRATE 50 UG/ML
INJECTION, SOLUTION INTRAMUSCULAR; INTRAVENOUS
Status: DISCONTINUED | OUTPATIENT
Start: 2025-03-19 | End: 2025-03-19 | Stop reason: SDUPTHER

## 2025-03-19 RX ORDER — ONDANSETRON 2 MG/ML
4 INJECTION INTRAMUSCULAR; INTRAVENOUS
Status: DISCONTINUED | OUTPATIENT
Start: 2025-03-19 | End: 2025-03-19 | Stop reason: HOSPADM

## 2025-03-19 RX ORDER — LIDOCAINE HYDROCHLORIDE 20 MG/ML
INJECTION, SOLUTION INFILTRATION; PERINEURAL
Status: DISCONTINUED | OUTPATIENT
Start: 2025-03-19 | End: 2025-03-19 | Stop reason: SDUPTHER

## 2025-03-19 RX ORDER — HYDROMORPHONE HYDROCHLORIDE 2 MG/ML
0.5 INJECTION, SOLUTION INTRAMUSCULAR; INTRAVENOUS; SUBCUTANEOUS EVERY 5 MIN PRN
Status: DISCONTINUED | OUTPATIENT
Start: 2025-03-19 | End: 2025-03-19 | Stop reason: HOSPADM

## 2025-03-19 RX ORDER — NALOXONE HYDROCHLORIDE 0.4 MG/ML
INJECTION, SOLUTION INTRAMUSCULAR; INTRAVENOUS; SUBCUTANEOUS PRN
Status: DISCONTINUED | OUTPATIENT
Start: 2025-03-19 | End: 2025-03-19 | Stop reason: HOSPADM

## 2025-03-19 RX ORDER — METOCLOPRAMIDE HYDROCHLORIDE 5 MG/ML
10 INJECTION INTRAMUSCULAR; INTRAVENOUS
Status: DISCONTINUED | OUTPATIENT
Start: 2025-03-19 | End: 2025-03-19 | Stop reason: HOSPADM

## 2025-03-19 RX ORDER — PHENYLEPHRINE HCL IN 0.9% NACL 1 MG/10 ML
SYRINGE (ML) INTRAVENOUS
Status: DISCONTINUED | OUTPATIENT
Start: 2025-03-19 | End: 2025-03-19 | Stop reason: SDUPTHER

## 2025-03-19 RX ORDER — ONDANSETRON 2 MG/ML
INJECTION INTRAMUSCULAR; INTRAVENOUS
Status: DISCONTINUED | OUTPATIENT
Start: 2025-03-19 | End: 2025-03-19 | Stop reason: SDUPTHER

## 2025-03-19 RX ORDER — KETOROLAC TROMETHAMINE 30 MG/ML
INJECTION, SOLUTION INTRAMUSCULAR; INTRAVENOUS
Status: DISCONTINUED | OUTPATIENT
Start: 2025-03-19 | End: 2025-03-19 | Stop reason: SDUPTHER

## 2025-03-19 RX ORDER — FENTANYL CITRATE 50 UG/ML
25 INJECTION, SOLUTION INTRAMUSCULAR; INTRAVENOUS EVERY 5 MIN PRN
Status: DISCONTINUED | OUTPATIENT
Start: 2025-03-19 | End: 2025-03-19 | Stop reason: HOSPADM

## 2025-03-19 RX ORDER — BUPIVACAINE HYDROCHLORIDE AND EPINEPHRINE 5; 5 MG/ML; UG/ML
INJECTION, SOLUTION EPIDURAL; INTRACAUDAL; PERINEURAL
Status: DISCONTINUED | OUTPATIENT
Start: 2025-03-19 | End: 2025-03-19 | Stop reason: ALTCHOICE

## 2025-03-19 RX ORDER — MAGNESIUM HYDROXIDE 1200 MG/15ML
LIQUID ORAL CONTINUOUS PRN
Status: COMPLETED | OUTPATIENT
Start: 2025-03-19 | End: 2025-03-19

## 2025-03-19 RX ORDER — ROCURONIUM BROMIDE 10 MG/ML
INJECTION, SOLUTION INTRAVENOUS
Status: DISCONTINUED | OUTPATIENT
Start: 2025-03-19 | End: 2025-03-19 | Stop reason: SDUPTHER

## 2025-03-19 RX ORDER — SODIUM CHLORIDE 0.9 % (FLUSH) 0.9 %
5-40 SYRINGE (ML) INJECTION PRN
Status: DISCONTINUED | OUTPATIENT
Start: 2025-03-19 | End: 2025-03-19 | Stop reason: HOSPADM

## 2025-03-19 RX ORDER — OXYCODONE HYDROCHLORIDE 5 MG/1
10 TABLET ORAL PRN
Status: DISCONTINUED | OUTPATIENT
Start: 2025-03-19 | End: 2025-03-19 | Stop reason: HOSPADM

## 2025-03-19 RX ORDER — PROPOFOL 10 MG/ML
INJECTION, EMULSION INTRAVENOUS
Status: DISCONTINUED | OUTPATIENT
Start: 2025-03-19 | End: 2025-03-19 | Stop reason: SDUPTHER

## 2025-03-19 RX ORDER — OXYCODONE HYDROCHLORIDE 5 MG/1
5 TABLET ORAL PRN
Status: DISCONTINUED | OUTPATIENT
Start: 2025-03-19 | End: 2025-03-19 | Stop reason: HOSPADM

## 2025-03-19 RX ORDER — METOPROLOL SUCCINATE 50 MG/1
TABLET, EXTENDED RELEASE ORAL
Status: COMPLETED
Start: 2025-03-19 | End: 2025-03-19

## 2025-03-19 RX ORDER — HYDROCODONE BITARTRATE AND ACETAMINOPHEN 5; 325 MG/1; MG/1
1-2 TABLET ORAL EVERY 4 HOURS PRN
Qty: 15 TABLET | Refills: 0 | Status: SHIPPED | OUTPATIENT
Start: 2025-03-19 | End: 2025-03-22

## 2025-03-19 RX ORDER — HYDROMORPHONE HYDROCHLORIDE 2 MG/ML
INJECTION, SOLUTION INTRAMUSCULAR; INTRAVENOUS; SUBCUTANEOUS
Status: DISCONTINUED | OUTPATIENT
Start: 2025-03-19 | End: 2025-03-19 | Stop reason: SDUPTHER

## 2025-03-19 RX ORDER — SODIUM CHLORIDE 9 MG/ML
INJECTION, SOLUTION INTRAVENOUS PRN
Status: DISCONTINUED | OUTPATIENT
Start: 2025-03-19 | End: 2025-03-19 | Stop reason: HOSPADM

## 2025-03-19 RX ORDER — METOPROLOL SUCCINATE 50 MG/1
50 TABLET, EXTENDED RELEASE ORAL ONCE
Status: COMPLETED | OUTPATIENT
Start: 2025-03-19 | End: 2025-03-19

## 2025-03-19 RX ORDER — SODIUM CHLORIDE 9 MG/ML
INJECTION, SOLUTION INTRAVENOUS
Status: DISCONTINUED | OUTPATIENT
Start: 2025-03-19 | End: 2025-03-19 | Stop reason: SDUPTHER

## 2025-03-19 RX ADMIN — DEXAMETHASONE SODIUM PHOSPHATE 4 MG: 4 INJECTION, SOLUTION INTRAMUSCULAR; INTRAVENOUS at 11:38

## 2025-03-19 RX ADMIN — SUGAMMADEX 200 MG: 100 INJECTION, SOLUTION INTRAVENOUS at 12:31

## 2025-03-19 RX ADMIN — Medication 100 MCG: at 11:45

## 2025-03-19 RX ADMIN — LIDOCAINE HYDROCHLORIDE 100 MG: 20 INJECTION, SOLUTION INFILTRATION; PERINEURAL at 11:26

## 2025-03-19 RX ADMIN — CEFAZOLIN 2000 MG: 2 INJECTION, POWDER, FOR SOLUTION INTRAMUSCULAR; INTRAVENOUS at 11:32

## 2025-03-19 RX ADMIN — METOPROLOL SUCCINATE 50 MG: 50 TABLET, EXTENDED RELEASE ORAL at 10:44

## 2025-03-19 RX ADMIN — KETOROLAC TROMETHAMINE 15 MG: 60 INJECTION, SOLUTION INTRAMUSCULAR at 12:31

## 2025-03-19 RX ADMIN — HYDROMORPHONE HYDROCHLORIDE 0.5 MG: 2 INJECTION, SOLUTION INTRAMUSCULAR; INTRAVENOUS; SUBCUTANEOUS at 12:31

## 2025-03-19 RX ADMIN — FENTANYL CITRATE 50 MCG: 50 INJECTION, SOLUTION INTRAMUSCULAR; INTRAVENOUS at 12:31

## 2025-03-19 RX ADMIN — ROCURONIUM BROMIDE 50 MG: 50 INJECTION INTRAVENOUS at 11:26

## 2025-03-19 RX ADMIN — ONDANSETRON 4 MG: 2 INJECTION, SOLUTION INTRAMUSCULAR; INTRAVENOUS at 12:35

## 2025-03-19 RX ADMIN — PROPOFOL 50 MG: 10 INJECTION, EMULSION INTRAVENOUS at 12:26

## 2025-03-19 RX ADMIN — HYDROMORPHONE HYDROCHLORIDE 0.5 MG: 2 INJECTION, SOLUTION INTRAMUSCULAR; INTRAVENOUS; SUBCUTANEOUS at 12:42

## 2025-03-19 RX ADMIN — FENTANYL CITRATE 25 MCG: 50 INJECTION, SOLUTION INTRAMUSCULAR; INTRAVENOUS at 11:53

## 2025-03-19 RX ADMIN — Medication 100 MCG: at 11:41

## 2025-03-19 RX ADMIN — ROCURONIUM BROMIDE 10 MG: 50 INJECTION INTRAVENOUS at 11:52

## 2025-03-19 RX ADMIN — SODIUM CHLORIDE: 9 INJECTION, SOLUTION INTRAVENOUS at 11:21

## 2025-03-19 RX ADMIN — PROPOFOL 150 MG: 10 INJECTION, EMULSION INTRAVENOUS at 11:26

## 2025-03-19 RX ADMIN — FENTANYL CITRATE 25 MCG: 50 INJECTION, SOLUTION INTRAMUSCULAR; INTRAVENOUS at 11:49

## 2025-03-19 ASSESSMENT — PAIN SCALES - GENERAL: PAINLEVEL_OUTOF10: 0

## 2025-03-19 ASSESSMENT — PAIN - FUNCTIONAL ASSESSMENT
PAIN_FUNCTIONAL_ASSESSMENT: 0-10
PAIN_FUNCTIONAL_ASSESSMENT: 0-10

## 2025-03-19 NOTE — PROGRESS NOTES
Patient continues to do well, no complaints. Pharmacy delivered prescription to room. We reviewed discharge instructions to include the new prescription and use of his scrotal support and ice. Ice pack refreshed. He is aware that he needs to call office for follow up with surgeon in 2-weeks. To be taken to lobby via wheelchair with belongings, sister here for ride home. Patient in no apparent distress.

## 2025-03-19 NOTE — PROGRESS NOTES
Assumed phase 2 care from Huong RN. Patient is alert, oriented and currently without complaints. Drinking fluids at this time, declined offer for small snack. Prescription sent to pharmacy. VSS. Rails up, call light in reach, family in waiting room at this time at patient's request. He is in no apparent distress.

## 2025-03-19 NOTE — OP NOTE
falciform ligament.  We introduced an 8 mm left upper quadrant trocar followed by an 8 mm right upper quadrant trocar.  The bed was placed in reverse Trendelenburg.  The robot was then docked to the trocar.  We created a peritoneal flap, starting just above the right ASIS.  This was opened medially and then developed inferiorly.  The lateral space was cleared with blunt dissection and cautery.  The pubic tubercle as well as Boston ligament was also cleared with blunt dissection and cautery.  He had a large sac, which was dissected down to its distal end.  It was then dissected down to below the anticipated inferior margin of the mesh.    We selected a 15 x 10 cm ProGrip mesh.  The mesh was positioned so that about 5 mm was beneath the pubic tubercle and Boston ligament.  The mesh did lie flat and covered all hernia spaces.  We had excellent hemostasis within the pocket and in the abdominal cavity.  The peritoneal flap was closed with a running 2-0 V-Loc suture.    The trocars were removed under direct visualization, and the abdomen was de-insufflated.  The trocar sites had been previously injected with 0.5% Marcaine.  The skin of the incisions was closed with running 4-0 subcuticular sutures.  Dermabond was then applied.  The patient tolerated the procedure without difficulty and was transferred to the recovery room in stable condition.          HARJEET AKHTAR MD      D:  03/19/2025 12:35:34     T:  03/19/2025 13:07:21     SUDEEP/WONG  Job #:  [id number]     Doc#:  [id number]    CC:   MD Dr. Annmarie Bardales

## 2025-03-19 NOTE — H&P
Breath sounds: Normal breath sounds.   Musculoskeletal:         General: Normal range of motion.      Cervical back: Normal range of motion and neck supple.   Skin:     General: Skin is warm and dry.   Neurological:      Mental Status: He is alert and oriented to person, place, and time.   Psychiatric:         Behavior: Behavior normal.         Assessment:  Right inguinal hernia    Plan:  Robotic laparoscopic right inguinal hernia repair with mesh.  The patient has metastatic colorectal cancer.  He was told that if carcinomatosis was found on laparoscopy, we will likely abort the inguinal hernia repair.    Kenneth Collazo MD  3/19/2025

## 2025-03-19 NOTE — ANESTHESIA PRE PROCEDURE
Department of Anesthesiology  Preprocedure Note       Name:  Ashutosh Shaffer   Age:  69 y.o.  :  1955                                          MRN:  [id number]         Date:  3/19/2025      Surgeon: Surgeon(s):  Kenneth Collazo MD    Procedure: Procedure(s):  ROBOT ASSISTED LAPAROSCOPIC RIGHT INGUINAL HERNIA REPAIR WITH POSSIBLE MESH,POSSIBLE BILATERAL INGUINAL HERNIA WITH MESH; POSSIBLE ROBOT ASSISTED LAPAROSCOPIC LIVER BIOPSY    Medications prior to admission:   Prior to Admission medications    Medication Sig Start Date End Date Taking? Authorizing Provider   acetaminophen-codeine (TYLENOL #4) 300-60 MG per tablet Take 1 tablet by mouth every 4 hours as needed for Pain.   Yes Inocencio Monzon MD   metoprolol succinate (TOPROL XL) 50 MG extended release tablet Take 1 tablet by mouth daily 25  Yes Duong Marcus MD   tamsulosin (FLOMAX) 0.4 MG capsule Take 1 capsule by mouth daily 25  Yes Annmarie Paris DO   metFORMIN (GLUCOPHAGE) 500 MG tablet Take 1 tablet by mouth daily   Yes Inocencio Monzon MD   aspirin 81 MG EC tablet Take 1 tablet by mouth daily    ProviderInocencio MD   acetaminophen (TYLENOL) 500 MG tablet Take 1 tablet by mouth every 6 hours as needed for Pain    ProviderInocencio MD   Lancets MISC 1 each by Does not apply route 2 times daily 20   Kentrell Alfaro MD   blood glucose monitor strips Test 2 times a day & as needed for symptoms of irregular blood glucose. Dispense sufficient amount for indicated testing frequency plus additional to accommodate PRN testing needs. 20   Kentrell Alfaro MD       Current medications:    No current facility-administered medications for this encounter.       Allergies:    Allergies   Allergen Reactions   • Iodinated Contrast Media Swelling     Pt states that when he received IV contrast in Dec 2023, approx 2 hours after his CT his lips swelled to 4x their normal size. Pt states he called [facility]--[facility] states allergy

## 2025-03-19 NOTE — BRIEF OP NOTE
Brief Postoperative Note      Patient: Ashutosh Shaffer  YOB: 1955  MRN: [id number]    Date of Procedure: 3/19/2025    Pre-Op Diagnosis Codes:      * Bilateral inguinal hernia [K40.20]     * Liver lesion [K76.9]    Post-Op Diagnosis: Same       Procedure(s):  ROBOT ASSISTED LAPAROSCOPIC RIGHT INGUINAL HERNIA REPAIR POSSIBLE ROBOT ASSISTED LAPAROSCOPIC LIVER BIOPSY    Surgeon(s):  Kenneth Collazo MD    Assistant:  Surgical Assistant: Cyril Bird RN    Anesthesia: General    Estimated Blood Loss (mL): Minimal    Complications: None    Specimens:   * No specimens in log *    Implants:  Implant Name Type Inv. Item Serial No.  Lot No. LRB No. Used Action   MESH VINCENZO [id number] PET JUDITH 70% CLLGN 30% GLYC LAP SELF - [id number]  MESH VINCENZO [id number] PET JUDITH 70% CLLGN 30% GLYC LAP SELF  [employer] COVIDIEN US SURGICAL-WD [id number] 1 Implanted         Drains: * No LDAs found *    Findings:  Infection Present At Time Of Surgery (PATOS) (choose all levels that have infection present):  No infection present  Other Findings: Large right indirect inguinal hernia, findings consistent with metastatic colorectal cancer to liver.  No carcinomatosis identified    Electronically signed by Kenneth Collazo MD on 3/19/2025 at 12:30 PM

## 2025-03-19 NOTE — DISCHARGE INSTRUCTIONS
Contact information     Office number - [phone]  Office hours are 8 am - 5 pm  Monday - Friday  Contact the doctor on call during the evenings ( 5 pm - 8 am ) or on Weekends for urgent or emergent issues by using the main office number ( [phone] ).  Please hold routine questions until normal business hours.        Wound care    The incisions are closed with dissolvable sutures which are beneath the skin. Surgical glue is then applied to the skin. The glue is purple in color and should be left undisturbed until your follow-up appointment.  No bandages are required over the surgical glue.  It is okay to shower but do not bathe in a bathtub.  Gently wash over the incisions with soap and water and then pat them dry with a towel. Contact the office for redness of the skin surrounding the incision or if there is drainage of pus.        Activities    It is generally okay to go up and down stairs. Please be careful as your gate may be unsteady from the surgery or pain medications.    Driving: Do not drive while on narcotic pain medications or while still under the effect of narcotic pain medications. Make sure that you are moving comfortably and not limited by postoperative pain or weakness that would make it difficult to react in an emergency situation.    Exercise : The main purpose of the activity restrictions is to reduce the risk of developing a hernia at an incision site.                   Do not lift greater than 25 lbs                   Avoid strenuous abdominal exercises- sit ups, core workouts                   Light cardiovascular exercise is generally okay                   Ask your doctor about the length of the exercise restrictions    Follow up    Please call the office for any concerns between the time of surgery and your follow up appointment.  For your convenience, we ask that you schedule your follow up appointment about 2 weeks from the time of surgery at a time which works best with your

## 2025-03-19 NOTE — ANESTHESIA POSTPROCEDURE EVALUATION
Department of Anesthesiology  Postprocedure Note    Patient: Ashutosh Shaffer  MRN: [id number]  YOB: 1955  Date of evaluation: 3/19/2025    Procedure Summary       Date: 03/19/25 Room / Location: [address]    Anesthesia Start: 1122 Anesthesia Stop: 1255    Procedure: ROBOT ASSISTED LAPAROSCOPIC RIGHT INGUINAL HERNIA REPAIR (Right: Groin) Diagnosis:       Bilateral inguinal hernia      Liver lesion      (Bilateral inguinal hernia [K40.20])      (Liver lesion [K76.9])    Surgeons: Kenneth Collazo MD Responsible Provider: Richie Ghotra MD    Anesthesia Type: General ASA Status: 3            Anesthesia Type: General    Shelby Phase I: Shelby Score: 10    Shelby Phase II:      Anesthesia Post Evaluation    Patient location during evaluation: PACU  Patient participation: complete - patient participated  Level of consciousness: awake and alert  Pain score: 2  Airway patency: patent  Nausea & Vomiting: no vomiting  Cardiovascular status: blood pressure returned to baseline  Respiratory status: acceptable  Hydration status: euvolemic  Multimodal analgesia pain management approach  Pain management: adequate    No notable events documented.

## 2025-04-03 ENCOUNTER — OFFICE VISIT (OUTPATIENT)
Dept: SURGERY | Age: 70
End: 2025-04-03

## 2025-04-03 VITALS — DIASTOLIC BLOOD PRESSURE: 76 MMHG | WEIGHT: 187.2 LBS | BODY MASS INDEX: 29.32 KG/M2 | SYSTOLIC BLOOD PRESSURE: 128 MMHG

## 2025-04-03 DIAGNOSIS — K40.91 UNILATERAL RECURRENT INGUINAL HERNIA WITHOUT OBSTRUCTION OR GANGRENE: Primary | ICD-10-CM

## 2025-04-03 PROCEDURE — 99024 POSTOP FOLLOW-UP VISIT: CPT | Performed by: SURGERY

## 2025-04-03 NOTE — PROGRESS NOTES
Subjective   Patient ID: Ashutosh Shaffer is a 69 y.o. male.    HPI    Review of Systems       Objective   Physical Exam   Abdomen soft  Incisions healing well    Assessment   69-year-old male who is status post robotic laparoscopic right inguinal hernia repair with mesh.  In terms of operative findings, there was no evidence of carcinomatosis.  He did have small areas of metastatic colon cancer to the liver.  He has done very well with regards to the right inguinal hernia repair.      Plan   Continue lifting restrictions for 2 more weeks  Follow-up as needed        Kenneth Collazo MD

## 2025-04-09 ENCOUNTER — OFFICE VISIT (OUTPATIENT)
Dept: PAIN MANAGEMENT | Age: 70
End: 2025-04-09
Payer: MEDICARE

## 2025-04-09 VITALS
SYSTOLIC BLOOD PRESSURE: 96 MMHG | DIASTOLIC BLOOD PRESSURE: 64 MMHG | WEIGHT: 188 LBS | HEART RATE: 92 BPM | OXYGEN SATURATION: 97 % | BODY MASS INDEX: 29.44 KG/M2

## 2025-04-09 DIAGNOSIS — G89.4 CHRONIC PAIN SYNDROME: ICD-10-CM

## 2025-04-09 DIAGNOSIS — M47.812 CERVICAL SPONDYLOSIS: ICD-10-CM

## 2025-04-09 DIAGNOSIS — Z51.81 ENCOUNTER FOR THERAPEUTIC DRUG MONITORING: ICD-10-CM

## 2025-04-09 DIAGNOSIS — C18.2 MALIGNANT NEOPLASM OF ASCENDING COLON (HCC): ICD-10-CM

## 2025-04-09 DIAGNOSIS — F43.22 ADJUSTMENT DISORDER WITH ANXIETY: ICD-10-CM

## 2025-04-09 DIAGNOSIS — M50.20 DISPLACEMENT OF CERVICAL INTERVERTEBRAL DISC WITHOUT MYELOPATHY: ICD-10-CM

## 2025-04-09 DIAGNOSIS — K56.600 PARTIAL OBSTRUCTION OF COLON (HCC): ICD-10-CM

## 2025-04-09 PROCEDURE — 3074F SYST BP LT 130 MM HG: CPT | Performed by: NURSE PRACTITIONER

## 2025-04-09 PROCEDURE — 99214 OFFICE O/P EST MOD 30 MIN: CPT | Performed by: NURSE PRACTITIONER

## 2025-04-09 PROCEDURE — 3078F DIAST BP <80 MM HG: CPT | Performed by: NURSE PRACTITIONER

## 2025-04-09 PROCEDURE — 1123F ACP DISCUSS/DSCN MKR DOCD: CPT | Performed by: NURSE PRACTITIONER

## 2025-04-09 RX ORDER — ACETAMINOPHEN AND CODEINE PHOSPHATE 300; 60 MG/1; MG/1
1 TABLET ORAL 2 TIMES DAILY PRN
Qty: 10 TABLET | Refills: 0 | Status: SHIPPED | OUTPATIENT
Start: 2025-04-09 | End: 2025-04-14

## 2025-04-09 NOTE — PROGRESS NOTES
mouth daily 90 tablet 4    tamsulosin (FLOMAX) 0.4 MG capsule Take 1 capsule by mouth daily 90 capsule 1    metFORMIN (GLUCOPHAGE) 500 MG tablet Take 1 tablet by mouth daily      Lancets MISC 1 each by Does not apply route 2 times daily 100 each 0    blood glucose monitor strips Test 2 times a day & as needed for symptoms of irregular blood glucose. Dispense sufficient amount for indicated testing frequency plus additional to accommodate PRN testing needs. 100 strip 0     No facility-administered medications prior to visit.       SOCIAL/FAMILY/PAST MEDICAL HISTORY: Mr. Shaffer social, family and past medical history was reviewed.     REVIEW OF SYSTEMS:    Respiratory: Negative for apnea, chest tightness and shortness of breath or change in baseline breathing.    Gastrointestinal: Negative for nausea, vomiting, abdominal pain, diarrhea, constipation, blood in stool and abdominal distention.       PHYSICAL EXAM:   Nursing note and vitals reviewed. Wt 85.3 kg (188 lb)   BMI 29.44 kg/m²   Constitutional: He appears well-developed and well-nourished. No acute distress.   Skin: Skin is warm and dry, good turgor. No rash noted. He is not diaphoretic.  Cardiovascular: Normal rate, regular rhythm, normal heart sounds, and does not have murmur.     Pulmonary/Chest: Effort normal. No respiratory distress. He does not have wheezes in the lung fields. He has no rales.     Neurological/Psychiatric:He is alert and oriented to person, place, and time. Coordination is  normal.  His mood isAppropriate and affect is Neutral/Euthymic(normal) .   Gait: abnormal, antalgic without assist device  MSK:has lumbar facet tenderness    Prescription pain medication monitoring:                  MEDD current = 0-10              ORT Score = 0              Other Risk factors - (mood, ORT) stable             Date of Last Medication Agreement:  01/22/2025              Date Naloxone prescribed: 01/22/2025-pt has at home               UDT:

## 2025-04-12 LAB
1,3 CHLOROPHENYL PIPERAZINE, URINE: NOT DETECTED
6-MONOACETYLMORPHINE: NEGATIVE NG/ML
7-AMINOCLONAZEPAM/CREATININE URINE: NOT DETECTED NG/MG CREAT
8-HYDROXYAMOXAPINE, URINE: NOT DETECTED
8-HYDROXYLOXAPINE, URINE: NOT DETECTED
ACETAMINOPHEN, URINE: NORMAL UG/ML
ACETAMINOPHEN, URINE: PRESENT
ALPHA HYDROXYALPRAZOLAM/CREATININE URINE: NOT DETECTED NG/MG CREAT
ALPHA HYDROXYTRIAZOLAM/CREAT UR CFM: NOT DETECTED NG/MG CREAT
ALPHA-HYDROXYMIDAZOLAM, URINE: NOT DETECTED NG/MG CREAT
ALPRAZOLAM/CREATININE URINE: NOT DETECTED NG/MG CREAT
AMINO CHLOROPYRIDINE, URINE: NOT DETECTED
AMITRIPTYLINE: NOT DETECTED
AMOXAPINE, URINE: NOT DETECTED
AMPHETAMINE SCREEN URINE: NEGATIVE NG/ML
ANALGESICS SCREEN URINE: NORMAL
ANTIDEPRESSANTS SCREEN URINE: NEGATIVE
ANTIPSYCHOTICS SCREEN URINE: NEGATIVE
ARIPIPRAZOLE, URINE: NOT DETECTED
ASENAPINE, URINE: NOT DETECTED
BACLOFEN, URINE: NOT DETECTED
BARBITURATE SCREEN URINE: NEGATIVE NG/ML
BENZODIAZEPINE SCREEN, URINE: NEGATIVE
BUPRENORPHINE URINE: NEGATIVE
BUPRENORPHINE/CREATININE URINE: NOT DETECTED NG/MG CREAT
BUPROPION, URINE: NOT DETECTED
CANNABINOID SCREEN URINE: NEGATIVE NG/ML
CARBAMAZEPINE, URINE: NOT DETECTED
CARISOPRODOL, UR: NOT DETECTED
CHLORPROMAZINE, URINE: NOT DETECTED
CITALOPRAM, URINE: NOT DETECTED
CLOBAZAM, URINE: NOT DETECTED
CLOMIPRAMINE, URINE: NOT DETECTED
CLONAZEPAM/CREATININE URINE: NOT DETECTED NG/MG CREAT
CLOZAPINE, URINE: NOT DETECTED
COCAINE METABOLITE SCREEN URINE: NEGATIVE NG/ML
CODEINE, URINE: 1242 NG/MG CREAT
CREATININE URINE: 166 MG/DL
CYCLOBENZAPRINE, URINE: NOT DETECTED
DESALKYLFLURAZEPAM/CREATININE URINE: NOT DETECTED NG/MG CREAT
DESIPRAMINE: NOT DETECTED
DESMETHYLCITALOPRAM, URINE: NOT DETECTED
DESMETHYLCLOMIPRAMINE, URINE: NOT DETECTED
DESMETHYLCLOZAPINE, URINE: NOT DETECTED
DESMETHYLCYCLOBENZAPRINE, URINE: NOT DETECTED
DESMETHYLDOXEPIN, URINE: NOT DETECTED
DESMETHYLFLUNITRAZEPAM, URINE: NOT DETECTED NG/MG CREAT
DESMETHYLSERTRALINE, URINE: NOT DETECTED
DESMETHYLVENLAFAXINE, URINE: NOT DETECTED
DIAZEPAM/CREATININE URINE: NOT DETECTED NG/MG CREAT
DIHYDROCODEINE/CREATININE URINE: NOT DETECTED NG/MG CREAT
DOXEPIN, URINE: NOT DETECTED
DULOXETINE, URINE: NOT DETECTED
EZOGABINE, URINE: NOT DETECTED
FENTANYL / ANALOGUES SCREEN URINE: NEGATIVE
FENTANYL/CREATININE URINE: NOT DETECTED NG/MG CREAT
FLUNITRAZEPAM, URINE: NOT DETECTED NG/MG CREAT
FLUOXETINE, URINE: NOT DETECTED
FLUPHENAZINE, URINE: NOT DETECTED
FLUVOXAMINE, URINE: NOT DETECTED
GABAPENTIN: NOT DETECTED
HALOPERIDOL, URINE: NOT DETECTED
HYDROCODONE GC/MS CONF: NOT DETECTED NG/MG CREAT
HYDROMORPHONE GC/MS CONF: NOT DETECTED NG/MG CREAT
HYDROXYBUPROPION, URINE: NOT DETECTED
ILOPERIDONE, URINE: NOT DETECTED
IMIPRAMINE, URINE: NOT DETECTED
KETAMINE, URINE: NOT DETECTED
LABORATORY REPORT: NORMAL
LAMOTRIGINE, URINE: NOT DETECTED
LEVETIRACETAM, URINE: NOT DETECTED
LORAZEPAM/CREATININE URINE: NOT DETECTED NG/MG CREAT
LOXAPINE, URINE: NOT DETECTED
LURASIDONE, URINE: NOT DETECTED
MAPROTILINE, URINE: NOT DETECTED
MEPERIDINE: NEGATIVE NG/ML
MEPROBAMATE, URINE: NOT DETECTED
MESORIDAZINE, URINE: NOT DETECTED
METAXALONE, URINE: NOT DETECTED
METHADONE AND METABOLITES, URINE: NEGATIVE NG/ML
METHADONE SCREEN, URINE: NEGATIVE NG/ML
METHOCARBAMOL, URINE: NOT DETECTED
METHYLPHENIDATE, URINE: NOT DETECTED
MIDAZOLAM/CREATININE URINE: NOT DETECTED NG/MG CREAT
MIRTAZAPINE, URINE: NOT DETECTED
MOLINDONE, URINE: NOT DETECTED
MORPHINE GC/MS CONF: 452 NG/MG CREAT
MUSCLE RELAXANTS SCREEN URINE: NEGATIVE
NEFAZODONE, URINE: NOT DETECTED
NORBUPRENORPHINE/CREATININE URINE: NOT DETECTED NG/MG CREAT
NORCODEINE/CREATININE URINE: 128 NG/MG CREAT
NORDIAZEPAM/CREATININE URINE: NOT DETECTED NG/MG CREAT
NORFENTANYL/CREATININE URINE: NOT DETECTED NG/MG CREAT
NORFLUOXETINE, URINE: NOT DETECTED
NORHYDROCODONE (LCMSMS): NOT DETECTED NG/MG CREAT
NORKETAMINE, URINE: NOT DETECTED
NORMORPHINE URINE: 202 NG/MG CREAT
NORTRIPTYLINE: NOT DETECTED
OLANZAPINE, URINE: NOT DETECTED
OPIATE SCREEN URINE: NORMAL NG/ML
OPIATES: NORMAL
ORPHENADRINE, URINE: NOT DETECTED
OTHER HALLUCINOGENS SCREEN URINE: NEGATIVE
OXAZEPAM/CREATININE URINE: NOT DETECTED NG/MG CREAT
OXCARBAZEPINE MHD, URINE: NOT DETECTED
OXYCODONE SCREEN URINE: NEGATIVE NG/ML
PAROXETINE, URINE: NOT DETECTED
PERPHENAZINE, URINE: NOT DETECTED
PHENCYCLIDINE SCREEN URINE: NEGATIVE NG/ML
PHENYTOIN, URINE: NOT DETECTED
PIMOZIDE, URINE: NOT DETECTED
PREGABALIN, URINE: NOT DETECTED
PREGABALIN: NEGATIVE
PRESCRIBED MEDICATIONS: NORMAL
PRIMIDONE, URINE: NOT DETECTED
PROCHLORPERAZINE, URINE: NOT DETECTED
PROPOXYPHENE: NEGATIVE NG/ML
PROTRIPTYLINE, URINE: NOT DETECTED
QUETIAPINE, URINE: NOT DETECTED
RISPERIDONE, URINE: NOT DETECTED
RITALINIC ACID, UR: NOT DETECTED
RUFINAMIDE, URINE: NOT DETECTED
SEDATIVES/HYPNOTICS SCREEN URINE: NEGATIVE
SERTRALINE, URINE: NOT DETECTED
SYMPATHOMIMETICS SCREEN URINE: NEGATIVE
TAPENTADOL SCREEN URINE: NEGATIVE NG/ML
TEMAZEPAM/CREATININE URINE: NOT DETECTED NG/MG CREAT
THIORIDAZINE, URINE: NOT DETECTED
THIOTHIXENE, URINE: NOT DETECTED
TIAGABINE, URINE: NOT DETECTED
TIZANIDINE, URINE: NOT DETECTED
TOPIRAMATE, URINE: NOT DETECTED
TRAMADOL SCREEN URINE: NEGATIVE NG/ML
TRAZODONE, URINE: NOT DETECTED
TRIFLUOPERAZINE, URINE: NOT DETECTED
TRIMIPRAMINE, URINE: NOT DETECTED
VENLAFAXINE, URINE: NOT DETECTED
VILAZODONE, URINE: NOT DETECTED
ZALEPLON, URINE: NOT DETECTED
ZIPRASIDONE, URINE: NOT DETECTED
ZOLPIDEM ACID, URINE: NOT DETECTED
ZOLPIDEM, URINE: NOT DETECTED
ZONISAMIDE, URINE: NOT DETECTED
ZOPICLONE/ESZOPICLONE, URINE: NOT DETECTED

## 2025-04-16 ENCOUNTER — HOSPITAL ENCOUNTER (OUTPATIENT)
Dept: CT IMAGING | Age: 70
Discharge: HOME | End: 2025-04-16
Payer: MEDICARE

## 2025-04-16 DIAGNOSIS — C18.2 MALIGNANT NEOPLASM OF ASCENDING COLON (HCC): ICD-10-CM

## 2025-04-16 PROCEDURE — 6360000004 HC RX CONTRAST MEDICATION: Performed by: STUDENT IN AN ORGANIZED HEALTH CARE EDUCATION/TRAINING PROGRAM

## 2025-04-16 PROCEDURE — 74177 CT ABD & PELVIS W/CONTRAST: CPT

## 2025-04-16 RX ORDER — IOPAMIDOL 755 MG/ML
75 INJECTION, SOLUTION INTRAVASCULAR
Status: COMPLETED | OUTPATIENT
Start: 2025-04-16 | End: 2025-04-16

## 2025-04-16 RX ADMIN — IOHEXOL 25 ML: 350 INJECTION, SOLUTION INTRAVENOUS at 14:59

## 2025-04-16 RX ADMIN — IOPAMIDOL 75 ML: 755 INJECTION, SOLUTION INTRAVENOUS at 14:59

## 2025-05-08 ENCOUNTER — OFFICE VISIT (OUTPATIENT)
Dept: PRIMARY CARE CLINIC | Age: 70
End: 2025-05-08
Payer: MEDICARE

## 2025-05-08 VITALS
DIASTOLIC BLOOD PRESSURE: 78 MMHG | BODY MASS INDEX: 29.66 KG/M2 | HEIGHT: 67 IN | TEMPERATURE: 97 F | WEIGHT: 189 LBS | HEART RATE: 98 BPM | SYSTOLIC BLOOD PRESSURE: 133 MMHG | OXYGEN SATURATION: 90 %

## 2025-05-08 DIAGNOSIS — E55.9 VITAMIN D DEFICIENCY: ICD-10-CM

## 2025-05-08 DIAGNOSIS — Z00.00 MEDICARE ANNUAL WELLNESS VISIT, SUBSEQUENT: Primary | ICD-10-CM

## 2025-05-08 DIAGNOSIS — C18.2 MALIGNANT NEOPLASM OF ASCENDING COLON (HCC): ICD-10-CM

## 2025-05-08 DIAGNOSIS — E11.9 TYPE 2 DIABETES MELLITUS WITHOUT COMPLICATION, WITHOUT LONG-TERM CURRENT USE OF INSULIN (HCC): ICD-10-CM

## 2025-05-08 LAB
25(OH)D3 SERPL-MCNC: 53.8 NG/ML
CHOLEST SERPL-MCNC: 193 MG/DL (ref 0–199)
DEPRECATED RDW RBC AUTO: 15.6 % (ref 12.4–15.4)
FOLATE SERPL-MCNC: 17.3 NG/ML (ref 4.78–24.2)
HCT VFR BLD AUTO: 37.7 % (ref 40.5–52.5)
HCV AB SERPL QL IA: NORMAL
HDLC SERPL-MCNC: 45 MG/DL (ref 40–60)
HGB BLD-MCNC: 12.8 G/DL (ref 13.5–17.5)
LDLC SERPL CALC-MCNC: 126 MG/DL
MCH RBC QN AUTO: 35.3 PG (ref 26–34)
MCHC RBC AUTO-ENTMCNC: 34.1 G/DL (ref 31–36)
MCV RBC AUTO: 103.5 FL (ref 80–100)
PLATELET # BLD AUTO: 133 K/UL (ref 135–450)
PMV BLD AUTO: 9.1 FL (ref 5–10.5)
RBC # BLD AUTO: 3.64 M/UL (ref 4.2–5.9)
TRIGL SERPL-MCNC: 108 MG/DL (ref 0–150)
TSH SERPL DL<=0.005 MIU/L-ACNC: 3.08 UIU/ML (ref 0.27–4.2)
VIT B12 SERPL-MCNC: 1271 PG/ML (ref 211–911)
VLDLC SERPL CALC-MCNC: 22 MG/DL
WBC # BLD AUTO: 5.5 K/UL (ref 4–11)

## 2025-05-08 PROCEDURE — 3078F DIAST BP <80 MM HG: CPT | Performed by: STUDENT IN AN ORGANIZED HEALTH CARE EDUCATION/TRAINING PROGRAM

## 2025-05-08 PROCEDURE — 36415 COLL VENOUS BLD VENIPUNCTURE: CPT | Performed by: STUDENT IN AN ORGANIZED HEALTH CARE EDUCATION/TRAINING PROGRAM

## 2025-05-08 PROCEDURE — 1123F ACP DISCUSS/DSCN MKR DOCD: CPT | Performed by: STUDENT IN AN ORGANIZED HEALTH CARE EDUCATION/TRAINING PROGRAM

## 2025-05-08 PROCEDURE — 3075F SYST BP GE 130 - 139MM HG: CPT | Performed by: STUDENT IN AN ORGANIZED HEALTH CARE EDUCATION/TRAINING PROGRAM

## 2025-05-08 PROCEDURE — G0439 PPPS, SUBSEQ VISIT: HCPCS | Performed by: STUDENT IN AN ORGANIZED HEALTH CARE EDUCATION/TRAINING PROGRAM

## 2025-05-08 ASSESSMENT — PATIENT HEALTH QUESTIONNAIRE - PHQ9
SUM OF ALL RESPONSES TO PHQ QUESTIONS 1-9: 0
1. LITTLE INTEREST OR PLEASURE IN DOING THINGS: NOT AT ALL
SUM OF ALL RESPONSES TO PHQ QUESTIONS 1-9: 0
2. FEELING DOWN, DEPRESSED OR HOPELESS: NOT AT ALL

## 2025-05-08 ASSESSMENT — LIFESTYLE VARIABLES
HOW OFTEN DO YOU HAVE A DRINK CONTAINING ALCOHOL: NEVER
HOW MANY STANDARD DRINKS CONTAINING ALCOHOL DO YOU HAVE ON A TYPICAL DAY: PATIENT DOES NOT DRINK

## 2025-05-08 NOTE — PROGRESS NOTES
Medicare Annual Wellness Visit    Ashutosh hSaffer is here for Medicare AWV and Medication Check    Assessment & Plan   Medicare annual wellness visit, subsequent  -     Vitamin D 25 Hydroxy; Future  -     Vitamin B12 & Folate; Future  -     CBC; Future  -     Hemoglobin A1C; Future  -     TSH reflex to FT4; Future  -     Lipid Panel; Future  -     Hepatitis C Antibody; Future  -     Albumin/Creatinine Ratio, Urine; Future  -     Ambulatory Referral to [facility] Clinical Specialist  Type 2 diabetes mellitus without complication, without long-term current use of insulin (HCC)  -     Albumin/Creatinine Ratio, Urine; Future  Vitamin D deficiency  -     Vitamin D 25 Hydroxy; Future  Malignant neoplasm of ascending colon (HCC)  -     Ambulatory Referral to [facility] Clinical [facility]ist       Return in about 3 months (around 8/8/2025) for check up.     Subjective   Doing well post surgery for his hernia surgery.  Has been cleared by his surgeon to go back to his regular scheduled program as tolerated.  Patient also follows with cardiology last seen back in February.  Patient doing okay from that standpoint, due to follow-up with his cardiologist in August.  Patient also following with pain management, plans to talk to them at next visit about his neuropathy symptoms which are likely secondary to his chemo that he is undergoing for metastatic stage IV colon cancer.  Next appointment with his oncology team for treatment is going to be on Monday.    Patient's complete Health Risk Assessment and screening values have been reviewed and are found in Flowsheets. The following problems were reviewed today and where indicated follow up appointments were made and/or referrals ordered.    Positive Risk Factor Screenings with Interventions:    Fall Risk:  Do you feel unsteady or are you worried about falling? : (!) yes  2 or more falls in past year?: (!) yes  Fall with injury in past year?: no     Interventions:    Usually occurs when working

## 2025-05-09 ENCOUNTER — CLINICAL DOCUMENTATION (OUTPATIENT)
Dept: SPIRITUAL SERVICES | Age: 70
End: 2025-05-09

## 2025-05-09 LAB
CREAT UR-MCNC: 143 MG/DL (ref 39–259)
EST. AVERAGE GLUCOSE BLD GHB EST-MCNC: 111.2 MG/DL
HBA1C MFR BLD: 5.5 %
MICROALBUMIN UR DL<=1MG/L-MCNC: 11 MG/DL
MICROALBUMIN/CREAT UR: 76.9 MG/G (ref 0–30)

## 2025-05-09 NOTE — ACP (ADVANCE CARE PLANNING)
Advance Care Planning   Ambulatory ACP Specialist Patient Outreach    Date:  5/9/2025    ACP Specialist:  Glory Marti LPN    Outreach call to patient in follow-up to ACP Specialist referral from:Annmarie Paris DO    [x] PCP  [] Provider   [] Ambulatory Care Management [] Other     For:                  [x] Advance Directive Assistance              [] Complete Portable DNR order              [] Complete POST/POLST/MOST              [x] Code Status Discussion             [x] Discuss Goals of Care             [] Early ACP Decision-Making              [] Other (Specify)    Date Referral Received:5/8/25    Next Step:   [] ACP scheduled conversation  [x] Outreach again in one week               [] Email / Mail ACP Info Sheets  [] Email / Mail Advance Directive   [] Closing referral.  Routing closure to referring provider/staff and to ACP Specialist .    [] Closure letter mailed to patient with invitation to contact ACP Specialist if / when ready.   [] Other (Specify here):       [x] At this time, Healthcare Decision Maker Is:     Primary Decision Maker: Carolyn Chopra - Brother/Sister - [phone]    Primary Decision Maker: JED MARC - Brother/Sister - [phone]          [] Primary agent named in scanned advance directive.    [x] Legal Next of Kin.     [] Unable to determine legal decision maker at this time.    Outreaches:       [x] 1st -  Date:  5/9/25               Intervention:  [] Spoke with Patient   [] Left Voice mail [] Email / Mail    [] MyChart  [x] Other (Specify) : VM not set up.      Outcomes:ACP Referral received. Placed call to home/preferred number [phone]. Unable to leave a message, voicemail is not set up.  Email sent. If no return call, outreach will be attempted in about one week.           [] 2nd -  Date:                 Intervention:  [] Spoke with Patient  [] Left Voice mail [] Email / Mail    [] MyChart  [] Other (Specify) :              Outcomes:                [] 3rd

## 2025-05-27 ENCOUNTER — TELEPHONE (OUTPATIENT)
Dept: PRIMARY CARE CLINIC | Age: 70
End: 2025-05-27

## 2025-05-27 NOTE — TELEPHONE ENCOUNTER
Pt called he needs refill on  Last visit 5/8/25  tamsulosin (FLOMAX) 0.4 MG capsule     [id number] - [facility] [address] [phone] - F [phone] [[id number]]

## 2025-05-27 NOTE — TELEPHONE ENCOUNTER
Pharm states that pt has 2 refills left of this medication.   They will get it ready for him.    Attempted to reach pt. VM not set up.

## 2025-05-28 DIAGNOSIS — I25.10 CAD IN NATIVE ARTERY: ICD-10-CM

## 2025-05-28 RX ORDER — METOPROLOL SUCCINATE 50 MG/1
50 TABLET, EXTENDED RELEASE ORAL DAILY
Refills: 0 | OUTPATIENT
Start: 2025-05-28

## 2025-06-02 ENCOUNTER — TELEPHONE (OUTPATIENT)
Dept: CARDIOLOGY CLINIC | Age: 70
End: 2025-06-02

## 2025-06-02 NOTE — TELEPHONE ENCOUNTER
Patient BP has been running high for the past week, staying around 156-160/90, he is taking lonsurf and was told that this could be the reason. Asking if there is anything else he could take in place of metoprolol? Please advise. Patient will call back for answer. He is not able to hear his phone.

## 2025-06-02 NOTE — TELEPHONE ENCOUNTER
Per MMK- If BP running high can add Norvasc 2.5 mg daily.   Tried calling patient. No answer and VM is full.

## 2025-06-03 NOTE — TELEPHONE ENCOUNTER
Pt called to speak w/Debbi, Pt stated he will call back as he does not hear his phone much.  SAKINA

## 2025-06-04 ENCOUNTER — HOSPITAL ENCOUNTER (OUTPATIENT)
Dept: CT IMAGING | Age: 70
Discharge: HOME | End: 2025-06-04
Attending: INTERNAL MEDICINE
Payer: MEDICARE

## 2025-06-04 DIAGNOSIS — C18.2 MALIGNANT NEOPLASM OF ASCENDING COLON (HCC): ICD-10-CM

## 2025-06-04 LAB
CREAT SERPL-MCNC: 1 MG/DL (ref 0.8–1.3)
GFR SERPLBLD CREATININE-BSD FMLA CKD-EPI: 81 ML/MIN/{1.73_M2}

## 2025-06-04 PROCEDURE — 74177 CT ABD & PELVIS W/CONTRAST: CPT

## 2025-06-04 PROCEDURE — 82565 ASSAY OF CREATININE: CPT

## 2025-06-04 PROCEDURE — 6360000004 HC RX CONTRAST MEDICATION: Performed by: INTERNAL MEDICINE

## 2025-06-04 PROCEDURE — 36415 COLL VENOUS BLD VENIPUNCTURE: CPT

## 2025-06-04 RX ORDER — AMLODIPINE BESYLATE 2.5 MG/1
2.5 TABLET ORAL DAILY
Qty: 90 TABLET | Refills: 1 | Status: SHIPPED | OUTPATIENT
Start: 2025-06-04

## 2025-06-04 RX ADMIN — IOHEXOL 75 ML: 350 INJECTION, SOLUTION INTRAVENOUS at 16:25

## 2025-06-04 RX ADMIN — IOHEXOL 25 ML: 350 INJECTION, SOLUTION INTRAVENOUS at 16:26

## 2025-06-04 NOTE — TELEPHONE ENCOUNTER
Pt.came to office regarding message below per Debbi WOODARD ,I relayed message below pt.stated his b/p has been high and would like to get med for his high b/p .

## 2025-06-09 ENCOUNTER — TELEPHONE (OUTPATIENT)
Dept: CARDIOLOGY CLINIC | Age: 70
End: 2025-06-09

## 2025-06-09 NOTE — TELEPHONE ENCOUNTER
Spoke with pharmacy. Insurance rejected medication saying refill to soon but it's a new medication. Pharmacist was going to resend the claim.    Tried to call patient. No answer and voicemail is full.

## 2025-06-09 NOTE — TELEPHONE ENCOUNTER
Pt states he went to get his Norvasc medication on Saturday and was told by the pharmacy his insurance wouldn't approve this medication because he already had enough blood thinner medication. Pt will call back if he has any problems.

## 2025-06-12 ENCOUNTER — OFFICE VISIT (OUTPATIENT)
Dept: PRIMARY CARE CLINIC | Age: 70
End: 2025-06-12
Payer: MEDICARE

## 2025-06-12 ENCOUNTER — HOSPITAL ENCOUNTER (OUTPATIENT)
Dept: GENERAL RADIOLOGY | Age: 70
Discharge: HOME | End: 2025-06-12
Payer: MEDICARE

## 2025-06-12 ENCOUNTER — HOSPITAL ENCOUNTER (OUTPATIENT)
Age: 70
Discharge: HOME | End: 2025-06-12
Payer: MEDICARE

## 2025-06-12 VITALS
WEIGHT: 185.6 LBS | BODY MASS INDEX: 29.13 KG/M2 | HEIGHT: 67 IN | DIASTOLIC BLOOD PRESSURE: 72 MMHG | TEMPERATURE: 97.5 F | HEART RATE: 99 BPM | RESPIRATION RATE: 16 BRPM | SYSTOLIC BLOOD PRESSURE: 114 MMHG | OXYGEN SATURATION: 98 %

## 2025-06-12 DIAGNOSIS — M25.562 ACUTE PAIN OF LEFT KNEE: ICD-10-CM

## 2025-06-12 DIAGNOSIS — R80.9 PROTEINURIA, UNSPECIFIED TYPE: ICD-10-CM

## 2025-06-12 DIAGNOSIS — C18.2 MALIGNANT NEOPLASM OF ASCENDING COLON (HCC): ICD-10-CM

## 2025-06-12 DIAGNOSIS — M25.562 ACUTE PAIN OF LEFT KNEE: Primary | ICD-10-CM

## 2025-06-12 DIAGNOSIS — E78.00 HYPERCHOLESTEREMIA: ICD-10-CM

## 2025-06-12 DIAGNOSIS — D64.9 ANEMIA, UNSPECIFIED TYPE: ICD-10-CM

## 2025-06-12 PROCEDURE — 1159F MED LIST DOCD IN RCRD: CPT | Performed by: STUDENT IN AN ORGANIZED HEALTH CARE EDUCATION/TRAINING PROGRAM

## 2025-06-12 PROCEDURE — 3074F SYST BP LT 130 MM HG: CPT | Performed by: STUDENT IN AN ORGANIZED HEALTH CARE EDUCATION/TRAINING PROGRAM

## 2025-06-12 PROCEDURE — 73562 X-RAY EXAM OF KNEE 3: CPT

## 2025-06-12 PROCEDURE — 1123F ACP DISCUSS/DSCN MKR DOCD: CPT | Performed by: STUDENT IN AN ORGANIZED HEALTH CARE EDUCATION/TRAINING PROGRAM

## 2025-06-12 PROCEDURE — 1160F RVW MEDS BY RX/DR IN RCRD: CPT | Performed by: STUDENT IN AN ORGANIZED HEALTH CARE EDUCATION/TRAINING PROGRAM

## 2025-06-12 PROCEDURE — 3078F DIAST BP <80 MM HG: CPT | Performed by: STUDENT IN AN ORGANIZED HEALTH CARE EDUCATION/TRAINING PROGRAM

## 2025-06-12 PROCEDURE — 99214 OFFICE O/P EST MOD 30 MIN: CPT | Performed by: STUDENT IN AN ORGANIZED HEALTH CARE EDUCATION/TRAINING PROGRAM

## 2025-06-12 NOTE — PROGRESS NOTES
COLONOSCOPY POLYPECTOMY SNARE/COLD BIOPSY performed by Juan Pablo Colon MD at [facility] ENDOSCOPY    CORONARY ARTERY BYPASS GRAFT N/A 11/16/2020    MALATHI. TCPB. RT EVH. CABG x 3, LIMA TO DISTAL LAD; SVG TO OM1; SVG TO RPDA. SCOTT CLIP WITH 45mm  ATRICLIP. PLACEMENT OF TEMPORARY VENTRICULAR PACING WIRE. DOPPLER FLOW VERIFICATION OF GRAFTS. BILATERAL INTERCOSTAL NERVE BLOCK WITH EXPAREL & MARCAINE. performed by Spike Viera MD at [facility] CVOR    HERNIA REPAIR Right     1980's    INGUINAL HERNIA REPAIR Right 3/19/2025    ROBOT ASSISTED LAPAROSCOPIC RIGHT INGUINAL HERNIA REPAIR performed by Kenneth Collazo MD at [facility] OR    JOINT REPLACEMENT      Right shoulder     PORT SURGERY Left 06/02/2023    PORT A CATH PLACEMENT performed by Kenneth Collazo MD at [facility] OR    UPPER GASTROINTESTINAL ENDOSCOPY N/A 08/21/2024    ESOPHAGOGASTRODUODENOSCOPY BIOPSY performed by Ganesh Mijares MD at [facility] ENDOSCOPY    UPPER GASTROINTESTINAL ENDOSCOPY N/A 08/21/2024    ESOPHAGOGASTRODUODENOSCOPY DILATION BALLOON 18-20MM +HEME performed by Ganesh Mijares MD at [facility] ENDOSCOPY     Medications:  Current Outpatient Medications   Medication Sig Dispense Refill    amLODIPine (NORVASC) 2.5 MG tablet Take 1 tablet by mouth daily 90 tablet 1    aspirin 81 MG EC tablet Take 1 tablet by mouth daily      acetaminophen (TYLENOL) 500 MG tablet Take 1 tablet by mouth every 6 hours as needed for Pain      metoprolol succinate (TOPROL XL) 50 MG extended release tablet Take 1 tablet by mouth daily 90 tablet 4    tamsulosin (FLOMAX) 0.4 MG capsule Take 1 capsule by mouth daily 90 capsule 1    metFORMIN (GLUCOPHAGE) 500 MG tablet Take 1 tablet by mouth daily      Lancets MISC 1 each by Does not apply route 2 times daily (Patient not taking: Reported on 6/12/2025) 100 each 0    blood glucose monitor strips Test 2 times a day & as needed for symptoms of irregular blood glucose. Dispense sufficient amount for indicated testing

## 2025-06-18 ENCOUNTER — RESULTS FOLLOW-UP (OUTPATIENT)
Dept: PRIMARY CARE CLINIC | Age: 70
End: 2025-06-18

## 2025-07-02 ENCOUNTER — OFFICE VISIT (OUTPATIENT)
Dept: PAIN MANAGEMENT | Age: 70
End: 2025-07-02
Payer: MEDICARE

## 2025-07-02 VITALS
SYSTOLIC BLOOD PRESSURE: 164 MMHG | WEIGHT: 180 LBS | BODY MASS INDEX: 28.19 KG/M2 | OXYGEN SATURATION: 98 % | DIASTOLIC BLOOD PRESSURE: 106 MMHG | HEART RATE: 82 BPM

## 2025-07-02 DIAGNOSIS — C18.2 MALIGNANT NEOPLASM OF ASCENDING COLON (HCC): Primary | ICD-10-CM

## 2025-07-02 DIAGNOSIS — G89.4 CHRONIC PAIN SYNDROME: ICD-10-CM

## 2025-07-02 DIAGNOSIS — F43.22 ADJUSTMENT DISORDER WITH ANXIETY: ICD-10-CM

## 2025-07-02 DIAGNOSIS — Z51.81 ENCOUNTER FOR THERAPEUTIC DRUG MONITORING: ICD-10-CM

## 2025-07-02 DIAGNOSIS — K56.600 PARTIAL OBSTRUCTION OF COLON (HCC): ICD-10-CM

## 2025-07-02 DIAGNOSIS — M50.20 DISPLACEMENT OF CERVICAL INTERVERTEBRAL DISC WITHOUT MYELOPATHY: ICD-10-CM

## 2025-07-02 DIAGNOSIS — M47.812 CERVICAL SPONDYLOSIS: ICD-10-CM

## 2025-07-02 PROCEDURE — 1159F MED LIST DOCD IN RCRD: CPT | Performed by: NURSE PRACTITIONER

## 2025-07-02 PROCEDURE — 3080F DIAST BP >= 90 MM HG: CPT | Performed by: NURSE PRACTITIONER

## 2025-07-02 PROCEDURE — 1160F RVW MEDS BY RX/DR IN RCRD: CPT | Performed by: NURSE PRACTITIONER

## 2025-07-02 PROCEDURE — 1123F ACP DISCUSS/DSCN MKR DOCD: CPT | Performed by: NURSE PRACTITIONER

## 2025-07-02 PROCEDURE — 99213 OFFICE O/P EST LOW 20 MIN: CPT | Performed by: NURSE PRACTITIONER

## 2025-07-02 PROCEDURE — 3077F SYST BP >= 140 MM HG: CPT | Performed by: NURSE PRACTITIONER

## 2025-07-02 RX ORDER — ACETAMINOPHEN AND CODEINE PHOSPHATE 300; 60 MG/1; MG/1
1 TABLET ORAL 2 TIMES DAILY PRN
Qty: 10 TABLET | Refills: 0 | Status: CANCELLED | OUTPATIENT
Start: 2025-07-02 | End: 2025-07-07

## 2025-07-02 NOTE — PROGRESS NOTES
Ashutosh Shaffer  1955  [id number]      HISTORY OF PRESENT ILLNESS: Mr. Shaffer is a 69 y.o. male returns for a follow up visit for pain management  He has a diagnosis of   1. Malignant neoplasm of ascending colon (HCC)    2. Displacement of cervical intervertebral disc without myelopathy    3. Partial obstruction of colon (HCC)    4. Cervical spondylosis    5. Encounter for therapeutic drug monitoring    6. Adjustment disorder with anxiety    7. Chronic pain syndrome    .      New Medications since Last Office visit have been reviewed with patient.     As per Information Obtained from the PADT (Patient Assessment and Documentation Tool)    He complains of pain in the neck, lower back. He rates the pain 5/10 and describes it as aching. Current treatment regimen has helped relieve about 50% of the pain since beginning treatment plan.  He denies any side effects from the current pain regimen. Patient reports that since implementation of their treatment plan; their physical functioning is worse, family/social relationships are unchanged, mood is unchanged sleep patterns are worse, and that the overall functioning is unchanged.  Patient denies/admits that any of the above have changed since last office visit. Patient denies misusing/abusing his narcotic pain medications or using any illegal drugs.      Upon obtaining medical history from Mr. Shaffer    ALLERGIES: Patients list of allergies were reviewed     MEDICATIONS: Mr. Shaffer list of medications were reviewed.His current medications are   Outpatient Medications Prior to Visit   Medication Sig Dispense Refill    amLODIPine (NORVASC) 2.5 MG tablet Take 1 tablet by mouth daily 90 tablet 1    aspirin 81 MG EC tablet Take 1 tablet by mouth daily      acetaminophen (TYLENOL) 500 MG tablet Take 1 tablet by mouth every 6 hours as needed for Pain      metoprolol succinate (TOPROL XL) 50 MG extended release tablet Take 1 tablet by mouth daily 90 tablet 4

## 2025-07-28 ENCOUNTER — HOSPITAL ENCOUNTER (OUTPATIENT)
Age: 70
Discharge: HOME | End: 2025-07-28
Payer: MEDICARE

## 2025-07-28 DIAGNOSIS — E78.00 HYPERCHOLESTEREMIA: ICD-10-CM

## 2025-07-28 DIAGNOSIS — D64.9 ANEMIA, UNSPECIFIED TYPE: ICD-10-CM

## 2025-07-28 LAB
BASOPHILS # BLD: 0 K/UL (ref 0–0.2)
BASOPHILS NFR BLD: 0.6 %
CHOLEST SERPL-MCNC: 184 MG/DL (ref 0–199)
DEPRECATED RDW RBC AUTO: 15.1 % (ref 12.4–15.4)
EOSINOPHIL # BLD: 0.1 K/UL (ref 0–0.6)
EOSINOPHIL NFR BLD: 2.7 %
HCT VFR BLD AUTO: 45.1 % (ref 40.5–52.5)
HDLC SERPL-MCNC: 34 MG/DL (ref 40–60)
HGB BLD-MCNC: 15.2 G/DL (ref 13.5–17.5)
LDLC SERPL CALC-MCNC: 127 MG/DL
LYMPHOCYTES # BLD: 0.6 K/UL (ref 1–5.1)
LYMPHOCYTES NFR BLD: 11.2 %
MCH RBC QN AUTO: 35 PG (ref 26–34)
MCHC RBC AUTO-ENTMCNC: 33.8 G/DL (ref 31–36)
MCV RBC AUTO: 103.5 FL (ref 80–100)
MONOCYTES # BLD: 0.5 K/UL (ref 0–1.3)
MONOCYTES NFR BLD: 9.7 %
NEUTROPHILS # BLD: 4.1 K/UL (ref 1.7–7.7)
NEUTROPHILS NFR BLD: 75.8 %
PLATELET # BLD AUTO: 146 K/UL (ref 135–450)
PMV BLD AUTO: 9.3 FL (ref 5–10.5)
RBC # BLD AUTO: 4.35 M/UL (ref 4.2–5.9)
TRIGL SERPL-MCNC: 116 MG/DL (ref 0–150)
VLDLC SERPL CALC-MCNC: 23 MG/DL
WBC # BLD AUTO: 5.4 K/UL (ref 4–11)

## 2025-07-28 PROCEDURE — 36415 COLL VENOUS BLD VENIPUNCTURE: CPT

## 2025-07-28 PROCEDURE — 85025 COMPLETE CBC W/AUTO DIFF WBC: CPT

## 2025-07-28 PROCEDURE — 80061 LIPID PANEL: CPT

## 2025-08-13 ENCOUNTER — OFFICE VISIT (OUTPATIENT)
Dept: PRIMARY CARE CLINIC | Age: 70
End: 2025-08-13
Payer: MEDICARE

## 2025-08-13 VITALS
WEIGHT: 173 LBS | BODY MASS INDEX: 27.15 KG/M2 | OXYGEN SATURATION: 98 % | TEMPERATURE: 98.4 F | HEART RATE: 110 BPM | DIASTOLIC BLOOD PRESSURE: 64 MMHG | HEIGHT: 67 IN | SYSTOLIC BLOOD PRESSURE: 92 MMHG

## 2025-08-13 DIAGNOSIS — G47.00 INSOMNIA, UNSPECIFIED TYPE: Primary | ICD-10-CM

## 2025-08-13 DIAGNOSIS — C18.2 MALIGNANT NEOPLASM OF ASCENDING COLON (HCC): ICD-10-CM

## 2025-08-13 PROCEDURE — 3074F SYST BP LT 130 MM HG: CPT | Performed by: STUDENT IN AN ORGANIZED HEALTH CARE EDUCATION/TRAINING PROGRAM

## 2025-08-13 PROCEDURE — 1159F MED LIST DOCD IN RCRD: CPT | Performed by: STUDENT IN AN ORGANIZED HEALTH CARE EDUCATION/TRAINING PROGRAM

## 2025-08-13 PROCEDURE — 3078F DIAST BP <80 MM HG: CPT | Performed by: STUDENT IN AN ORGANIZED HEALTH CARE EDUCATION/TRAINING PROGRAM

## 2025-08-13 PROCEDURE — 99213 OFFICE O/P EST LOW 20 MIN: CPT | Performed by: STUDENT IN AN ORGANIZED HEALTH CARE EDUCATION/TRAINING PROGRAM

## 2025-08-13 PROCEDURE — 1123F ACP DISCUSS/DSCN MKR DOCD: CPT | Performed by: STUDENT IN AN ORGANIZED HEALTH CARE EDUCATION/TRAINING PROGRAM

## 2025-08-13 RX ORDER — TRAZODONE HYDROCHLORIDE 50 MG/1
50 TABLET ORAL NIGHTLY
Qty: 30 TABLET | Refills: 1 | Status: SHIPPED | OUTPATIENT
Start: 2025-08-13 | End: 2025-08-14

## 2025-08-14 ENCOUNTER — CLINICAL DOCUMENTATION (OUTPATIENT)
Age: 70
End: 2025-08-14

## 2025-08-14 ENCOUNTER — OFFICE VISIT (OUTPATIENT)
Dept: CARDIOLOGY CLINIC | Age: 70
End: 2025-08-14
Payer: MEDICARE

## 2025-08-14 VITALS
BODY MASS INDEX: 27.15 KG/M2 | HEART RATE: 102 BPM | SYSTOLIC BLOOD PRESSURE: 100 MMHG | WEIGHT: 173 LBS | OXYGEN SATURATION: 98 % | DIASTOLIC BLOOD PRESSURE: 68 MMHG | HEIGHT: 67 IN

## 2025-08-14 DIAGNOSIS — I45.10 RBBB: ICD-10-CM

## 2025-08-14 DIAGNOSIS — Z95.1 HISTORY OF CORONARY ARTERY BYPASS GRAFT X 3: ICD-10-CM

## 2025-08-14 DIAGNOSIS — I10 ESSENTIAL HYPERTENSION: Chronic | ICD-10-CM

## 2025-08-14 DIAGNOSIS — I25.10 CAD IN NATIVE ARTERY: Primary | ICD-10-CM

## 2025-08-14 DIAGNOSIS — G47.33 OSA (OBSTRUCTIVE SLEEP APNEA): ICD-10-CM

## 2025-08-14 DIAGNOSIS — I97.89 POSTOPERATIVE ATRIAL FIBRILLATION (HCC): ICD-10-CM

## 2025-08-14 DIAGNOSIS — I50.22 CHRONIC SYSTOLIC HEART FAILURE (HCC): Chronic | ICD-10-CM

## 2025-08-14 DIAGNOSIS — I48.91 POSTOPERATIVE ATRIAL FIBRILLATION (HCC): ICD-10-CM

## 2025-08-14 PROCEDURE — 3078F DIAST BP <80 MM HG: CPT | Performed by: INTERNAL MEDICINE

## 2025-08-14 PROCEDURE — 99214 OFFICE O/P EST MOD 30 MIN: CPT | Performed by: INTERNAL MEDICINE

## 2025-08-14 PROCEDURE — 1123F ACP DISCUSS/DSCN MKR DOCD: CPT | Performed by: INTERNAL MEDICINE

## 2025-08-14 PROCEDURE — 3074F SYST BP LT 130 MM HG: CPT | Performed by: INTERNAL MEDICINE

## 2025-08-14 PROCEDURE — 1159F MED LIST DOCD IN RCRD: CPT | Performed by: INTERNAL MEDICINE

## 2025-08-14 RX ORDER — OXYCODONE HYDROCHLORIDE 5 MG/1
5 TABLET ORAL EVERY 4 HOURS PRN
COMMUNITY
Start: 2025-08-11

## (undated) DEVICE — BW-412T DISP COMBO CLEANING BRUSH: Brand: SINGLE USE COMBINATION CLEANING BRUSH

## (undated) DEVICE — PROCEDURE KIT COMP

## (undated) DEVICE — SUTURE VLOC 90 2/0 VL 6 GS-22 VLOCM2105

## (undated) DEVICE — EZE-BAND LF ST 4X5.5 36/CS: Brand: EZE-BAND LF ST 4X5.5 36/CS

## (undated) DEVICE — SPONGE LAP W18XL18IN WHT COT 4 PLY FLD STRUNG RADPQ DISP ST

## (undated) DEVICE — DRAIN SURG 24FR BLAK SIL HUBLESS 4 CHANNELED RADPQ EXTN TB

## (undated) DEVICE — ENDOSCOPIC KIT 6X3/16 FT COLON W/ 1.1 OZ 2 GWN W/O BRSH

## (undated) DEVICE — SUTURE VCRL + SZ 4-0 L18IN ABSRB UD L19MM PS-2 3/8 CIR PRIM VCP496H

## (undated) DEVICE — NEEDLE HYPO 18GA L1.5IN THN WALL PIVOTING SHLD BVL ORIENTED

## (undated) DEVICE — SUTURE VCRL + SZ 3-0 L27IN ABSRB UD L26MM SH 1/2 CIR VCP416H

## (undated) DEVICE — NEEDLE,22GX1.5",REG,BEVEL: Brand: MEDLINE

## (undated) DEVICE — SYRINGE MED 20ML STD CLR PLAS LUERLOCK TIP N CTRL DISP

## (undated) DEVICE — MOUTHPIECE ENDOSCP L CTRL OPN AND SIDE PORTS DISP

## (undated) DEVICE — SUTURE VCRL SZ 2-0 L36IN ABSRB UD L36MM CT-1 1/2 CIR J945H

## (undated) DEVICE — DECANTER FLD 9IN ST BG FOR ASEP TRNSF OF FLD

## (undated) DEVICE — HYPODERMIC SAFETY NEEDLE: Brand: MAGELLAN

## (undated) DEVICE — GAUZE,SPONGE,4"X4",8PLY,STRL,LF,10/TRAY: Brand: MEDLINE

## (undated) DEVICE — SUTURE ETHBND SZ 3-0 L30IN NONABSORBABLE GRN SH L26MM 1/2 X562H

## (undated) DEVICE — SUTURE ETHBND EXCEL SZ 0 L18IN NONABSORBABLE GRN L36MM CT-1 CX21D

## (undated) DEVICE — DRESSING TRNSPAR W FAB BORD SORBAVIEW ULT 475X425IN

## (undated) DEVICE — INTENDED FOR TISSUE SEPARATION, AND OTHER PROCEDURES THAT REQUIRE A SHARP SURGICAL BLADE TO PUNCTURE OR CUT.: Brand: BARD-PARKER ® STAINLESS STEEL BLADES

## (undated) DEVICE — SUTURE PERMA-HAND SZ 4-0 L144IN NONABSORBABLE BLK LIGAPAK LA53G

## (undated) DEVICE — BLANKET WRM CARD FOR MISTRAL AIR WRM SYS

## (undated) DEVICE — SWAN-GANZ CCOMBO V THERMODILUTION CATHETER: Brand: SWAN-GANZ CCOMBO V

## (undated) DEVICE — PAD THRM M SPL TORSO

## (undated) DEVICE — SYRINGE INFL 60ML DISP ALLIANCE II

## (undated) DEVICE — GLOVE ORANGE PI 7   MSG9070

## (undated) DEVICE — C-ARM: Brand: UNBRANDED

## (undated) DEVICE — BLADE RETRCT 3 SH FNGR ASST

## (undated) DEVICE — SYSTEM SKIN CLSR 22CM DERMBND PRINEO

## (undated) DEVICE — TTL1LYR 16FR10ML 100%SIL TMPST TR: Brand: MEDLINE

## (undated) DEVICE — APPLIER LIG CLP M L11IN TI STR RNG HNDL FOR 20 CLP DISP

## (undated) DEVICE — INDICATED FOR SURGICAL CLAMPING DURING CARDIOVASCULAR PERIPHERAL VASCULAR, AND GENERAL SURGERY.: Brand: SOFT/FIBRA® SPRING CLIP

## (undated) DEVICE — DRAIN SURG SGL COLL PT TB FOR ATS BG OASIS

## (undated) DEVICE — COVER LT HNDL BLU PLAS

## (undated) DEVICE — DRAPE,T,LAPARO,TRANS,STERILE: Brand: MEDLINE

## (undated) DEVICE — ELECTRODE PT RET AD L9FT HI MOIST COND ADH HYDRGEL CORDED

## (undated) DEVICE — COVER,TABLE,77X90,STERILE: Brand: MEDLINE

## (undated) DEVICE — [HIGH FLOW INSUFFLATOR,  DO NOT USE IF PACKAGE IS DAMAGED,  KEEP DRY,  KEEP AWAY FROM SUNLIGHT,  PROTECT FROM HEAT AND RADIOACTIVE SOURCES.]: Brand: PNEUMOSURE

## (undated) DEVICE — BOWL MED L 32OZ PLAS W/ MOLD GRAD EZ OPN PEEL PCH

## (undated) DEVICE — SINGLE USE AIR/WATER, SUCTION AND BIOPSY VALVES SET: Brand: ORCAPOD™

## (undated) DEVICE — STERILE POLYISOPRENE POWDER-FREE SURGICAL GLOVES: Brand: PROTEXIS

## (undated) DEVICE — FAIRFIELD CABG ACCESSARY PK

## (undated) DEVICE — KIT ART LN 20GA L12CM FEP RADPQ 0.025X13.75IN SPR GWIRE

## (undated) DEVICE — INSUFFLATION NEEDLE TO ESTABLISH PNEUMOPERITONEUM.: Brand: INSUFFLATION NEEDLE

## (undated) DEVICE — 30978 SEE SHARP - ENHANCED INTRAOPERATIVE LAPAROSCOPE CLEANING & DEFOGGING: Brand: 30978 SEE SHARP - ENHANCED INTRAOPERATIVE LAPAROSCOPE CLEANING & DEFOGGING

## (undated) DEVICE — SUTURE SZ 7 L18IN NONABSORBABLE SIL CCS L48MM 1/2 CIR STRNM M655G

## (undated) DEVICE — CANNULA PERF L15IN DIA29FR VEN 3 STG THN WALL DSGN W  VENT

## (undated) DEVICE — GLOVE ORANGE PI 8   MSG9080

## (undated) DEVICE — BASIC SINGLE BASIN 1-LF: Brand: MEDLINE INDUSTRIES, INC.

## (undated) DEVICE — SUTURE PROL SZ 2-0 L36IN NONABSORBABLE BLU SH L26MM 1/2 CIR 8523H

## (undated) DEVICE — 3 ML SYRINGE LUER-LOCK TIP: Brand: MONOJECT

## (undated) DEVICE — PEN SURG UTIL MRK BLK REG TIP NONSMEARING W 6IN RUL

## (undated) DEVICE — EVERGRIP INSERT SET 61MM: Brand: FOGARTY EVERGRIP

## (undated) DEVICE — PRESSURE TUBING: Brand: TRUWAVE

## (undated) DEVICE — SUTURE NONABSORBABLE MONOFILAMENT 4-0 RB-1 36 IN BLU PROLENE 8557H

## (undated) DEVICE — APPLICATOR PREP 26ML 0.7% IOD POVACRYLEX 74% ISO ALC ST

## (undated) DEVICE — AIR/WATER CLEANING ADAPTER FOR OLYMPUS® GI ENDOSCOPE: Brand: BULLDOG®

## (undated) DEVICE — SYRINGE, LUER LOCK, 10ML: Brand: MEDLINE

## (undated) DEVICE — SUTURE PROL SZ 8-0 L24IN NONABSORBABLE BLU L6.5MM BV130-5 8732H

## (undated) DEVICE — GOWN SIRUS NONREIN XL W/TWL: Brand: MEDLINE INDUSTRIES, INC.

## (undated) DEVICE — LABEL MED CUST CVR

## (undated) DEVICE — ATHLETIC SUPPORTER LATEX FREE, LARGE

## (undated) DEVICE — DRESSING WND SM W2.5XL4IN BRTH W/ CATH SECUREMENT AND

## (undated) DEVICE — GLOVE SURG SZ 75 L12IN FNGR THK94MIL STD WHT LTX FREE

## (undated) DEVICE — CONNECTOR PERF W3/8XH0.25XL0.25IN BASE UNEQUAL Y SHP W/O

## (undated) DEVICE — SOLUTION IRRIG 500ML STRL H2O NONPYROGENIC

## (undated) DEVICE — TRAP SPEC RETRV CLR PLAS POLYP IN LN SUCT QUIK CTCH

## (undated) DEVICE — DRESSING GERM DIA1IN CNTR H DIA7MM BLU CHG ANTIMIC PROTCT

## (undated) DEVICE — VALVE SUCTION AIR H2O SET ORCA POD + DISP

## (undated) DEVICE — SUTURE VCRL SZ 4-0 L18IN ABSRB UD L19MM PS-2 3/8 CIR PRIM J496H

## (undated) DEVICE — SYSTEM ENDOSCP VES HARV W/ TOOL CANN SEAL SHT PRT BLNT TIP

## (undated) DEVICE — FORCEPS BX L240CM WRK CHN 2.8MM STD CAP W/ NDL MIC MESH

## (undated) DEVICE — BLADE ES ELASTOMERIC COAT INSUL DURABLE BEND UPTO 90DEG

## (undated) DEVICE — CONTROL SYRINGE LUER-LOCK TIP: Brand: MONOJECT

## (undated) DEVICE — SET PERF L15IN BLU CLMP MULT FEM LUER ON SGL INLET LEG DLP

## (undated) DEVICE — NEEDLE HYPO 27GA L0.5IN GRY POLYPR HUB S STL REG BVL STR

## (undated) DEVICE — 3M™ TEGADERM™ TRANSPARENT FILM DRESSING FRAME STYLE, 1626W, 4 IN X 4-3/4 IN (10 CM X 12 CM), 50/CT 4CT/CASE: Brand: 3M™ TEGADERM™

## (undated) DEVICE — TOWEL 26X17IN 2 PER PACK COTTON DELINTED

## (undated) DEVICE — BLADELESS OBTURATOR: Brand: WECK VISTA

## (undated) DEVICE — SUTURE ETHBND EXCEL SZ 2-0 L36IN NONABSORBABLE GRN SH-2 X559H

## (undated) DEVICE — GLOVE SURG SZ 7 L12IN FNGR THK79MIL GRN LTX FREE

## (undated) DEVICE — GLOVE SURG SZ 65 THK91MIL LTX FREE SYN POLYISOPRENE

## (undated) DEVICE — AORTIC PNCH 4.0MM 8IN BX 10 DISP

## (undated) DEVICE — BLADE CLIPPER GEN PURP NS

## (undated) DEVICE — PACK PROCEDURE SURG EXTREMITY MFFOP CUST

## (undated) DEVICE — SHEET,DRAPE,53X77,STERILE: Brand: MEDLINE

## (undated) DEVICE — DILATOR ENDOSCP L180CM DIA6FR BLLN L8CM DIA54-60FR

## (undated) DEVICE — SUTURE NONABSORBABLE MONOFILAMENT 5-0 C-1 1X24 IN PROLENE 8725H

## (undated) DEVICE — BLADE SURG NO12 S STL STR DISP GLASSVAN

## (undated) DEVICE — LIQUIBAND RAPID ADHESIVE 36/CS 0.8ML: Brand: MEDLINE

## (undated) DEVICE — BLANKET WRM W29.9XL79.1IN UP BODY FORC AIR MISTRAL-AIR

## (undated) DEVICE — BANDAGE COMPR W6INXL10YD ST M E WHITE/BEIGE

## (undated) DEVICE — GEL US 20GM NONIRRITATING OVERWRAPPED FILE PCH TRNSMIT

## (undated) DEVICE — SUTURE BOOT: Brand: DEROYAL

## (undated) DEVICE — ARM DRAPE

## (undated) DEVICE — DRAPE THER FLUID WARMING 66X44 IN FLAT SLUSH DBL DISC ORS

## (undated) DEVICE — SUTURE NONABSORBABLE MONOFILAMENT 6-0 C-1 1X30 IN PROLENE 8706H

## (undated) DEVICE — CANNULA ART 24FR OVERALL L105IN VENT CONN 3 8IN MTL TIP W

## (undated) DEVICE — GLOVE ORTHO 7 1/2   MSG9475

## (undated) DEVICE — PRESSURE MONITORING SET: Brand: TRUWAVE, VAMP PLUS

## (undated) DEVICE — STERNUM BLADE, OFFSET (31.7 X 0.64 X 6.3MM)

## (undated) DEVICE — KIT OR ROOM TURNOVER W/STRAP

## (undated) DEVICE — SUTURE PROL SZ 7-0 L24IN NONABSORBABLE BLU L8MM BV175-6 3/8 8735H

## (undated) DEVICE — OPEN HRT BASIC PK

## (undated) DEVICE — SUTURE PERMAHAND SZ 2-0 L30IN NONABSORBABLE BLK SILK W/O A305H

## (undated) DEVICE — TOWEL,OR,DSP,ST,BLUE,STD,4/PK,20PK/CS: Brand: MEDLINE

## (undated) DEVICE — GENERAL ROBOT: Brand: MEDLINE INDUSTRIES, INC.

## (undated) DEVICE — TUBING, SUCTION, 1/4" X 10', STRAIGHT: Brand: MEDLINE

## (undated) DEVICE — SUTURE MONOCRYL + SZ 4-0 L27IN ABSRB UD L19MM PS-2 3/8 CIR MCP426H

## (undated) DEVICE — SYRINGE, LUER LOCK, 30ML: Brand: MEDLINE

## (undated) DEVICE — SYRINGE, LUER LOCK, 60ML: Brand: MEDLINE

## (undated) DEVICE — OPTIFOAM GENTLE LIQUITRAP, SACRUM, 7"X7": Brand: MEDLINE

## (undated) DEVICE — SUTURE PERMA-HAND SZ 2 L60IN NONABSORBABLE BLK SILK BRAID SA8H

## (undated) DEVICE — SUTURE VCRL SZ 3-0 L27IN ABSRB UD L26MM SH 1/2 CIR J416H

## (undated) DEVICE — SEAL

## (undated) DEVICE — Z DISCONTINUED USE 2516375 APPLICATOR MEDICATED 3 CC CLR STRL CHLORAPREP

## (undated) DEVICE — SUTURE PDS II SZ 0 L27IN ABSRB VLT L36MM CT-1 1/2 CIR Z340H

## (undated) DEVICE — GLOVE SURG SZ 7 L11.33IN FNGR THK9.8MIL STRW LTX POLYMER

## (undated) DEVICE — WAX SURG 2.5GM HEMSTAT BNE BEESWAX PARAFFIN ISO PALMITATE

## (undated) DEVICE — SOLUTION IRRIG 500ML 0.9% SOD CHLO USP POUR PLAS BTL

## (undated) DEVICE — APPLICATOR MEDICATED 26 CC SOLUTION HI LT ORNG CHLORAPREP

## (undated) DEVICE — CANNULA PERF 7FR L5.5IN AORT ROOT RADPQ STD TIP W/ VENT LN

## (undated) DEVICE — CATHETER ETER IV 22GA L1IN POLYUR STR RADPQ INTROCAN SFTY

## (undated) DEVICE — SOLUTION IV IRRIG WATER 500ML POUR BRL ST 2F7113

## (undated) DEVICE — BLADE OPHTH 180DEG CUT SURF BLU STR SHRP DBL BVL GRINDLESS

## (undated) DEVICE — SUTURE VIC + VIO CT3 0 27IN VCP329H

## (undated) DEVICE — SET ADMIN PRIMING 67ML L105IN NVENT 180UM FLTR 3 RLER CLMP

## (undated) DEVICE — BLADE ES L4IN INSUL EDGE

## (undated) DEVICE — EXTENSION SET, 2 INJECTION SITES, MALE LUER LOCK ADAPTER WITH RETRACTABLE COLLAR: Brand: INTERLINK

## (undated) DEVICE — Device: Brand: DISPOSABLE ELECTROSURGICAL SNARE

## (undated) DEVICE — APPLIER CLP L9.375IN APER 2.1MM CLS L3.8MM 20 SM TI CLP

## (undated) DEVICE — CONTAINER STOR SURG SLUSH

## (undated) DEVICE — SET TRNQT W/ STD 7IN 12FR 5 TB 1 SNR DLP

## (undated) DEVICE — NEEDLE SPNL L3.5IN PNK HUB S STL REG WALL FIT STYL W/ QNCKE